# Patient Record
Sex: FEMALE | Race: WHITE | NOT HISPANIC OR LATINO | Employment: OTHER | URBAN - METROPOLITAN AREA
[De-identification: names, ages, dates, MRNs, and addresses within clinical notes are randomized per-mention and may not be internally consistent; named-entity substitution may affect disease eponyms.]

---

## 2020-07-21 ENCOUNTER — OFFICE VISIT CONVERTED (OUTPATIENT)
Dept: FAMILY MEDICINE CLINIC | Facility: CLINIC | Age: 40
End: 2020-07-21
Attending: NURSE PRACTITIONER

## 2020-07-21 ENCOUNTER — HOSPITAL ENCOUNTER (OUTPATIENT)
Dept: FAMILY MEDICINE CLINIC | Facility: CLINIC | Age: 40
Discharge: HOME | End: 2020-07-21
Attending: NURSE PRACTITIONER

## 2020-07-21 LAB
ALBUMIN SERPL-MCNC: 4.2 G/DL (ref 3.5–5)
ALBUMIN/GLOB SERPL: 1.6 {RATIO} (ref 1.4–2.6)
ALP SERPL-CCNC: 121 U/L (ref 42–98)
ALT SERPL-CCNC: 11 U/L (ref 10–40)
ANION GAP SERPL CALC-SCNC: 20 MMOL/L (ref 8–19)
AST SERPL-CCNC: 15 U/L (ref 15–50)
BASOPHILS # BLD AUTO: 0.05 10*3/UL (ref 0–0.2)
BASOPHILS NFR BLD AUTO: 0.6 % (ref 0–3)
BILIRUB SERPL-MCNC: 0.19 MG/DL (ref 0.2–1.3)
BUN SERPL-MCNC: 12 MG/DL (ref 5–25)
BUN/CREAT SERPL: 9 {RATIO} (ref 6–20)
CALCIUM SERPL-MCNC: 9 MG/DL (ref 8.7–10.4)
CHLORIDE SERPL-SCNC: 106 MMOL/L (ref 99–111)
CHOLEST SERPL-MCNC: 227 MG/DL (ref 107–200)
CHOLEST/HDLC SERPL: 5.7 {RATIO} (ref 3–6)
CONV ABS IMM GRAN: 0.01 10*3/UL (ref 0–0.2)
CONV CO2: 19 MMOL/L (ref 22–32)
CONV IMMATURE GRAN: 0.1 % (ref 0–1.8)
CONV TOTAL PROTEIN: 6.9 G/DL (ref 6.3–8.2)
CREAT UR-MCNC: 1.34 MG/DL (ref 0.5–0.9)
DEPRECATED RDW RBC AUTO: 55 FL (ref 36.4–46.3)
EOSINOPHIL # BLD AUTO: 0.23 10*3/UL (ref 0–0.7)
EOSINOPHIL # BLD AUTO: 3 % (ref 0–7)
ERYTHROCYTE [DISTWIDTH] IN BLOOD BY AUTOMATED COUNT: 14.5 % (ref 11.7–14.4)
GFR SERPLBLD BASED ON 1.73 SQ M-ARVRAT: 49 ML/MIN/{1.73_M2}
GLOBULIN UR ELPH-MCNC: 2.7 G/DL (ref 2–3.5)
GLUCOSE SERPL-MCNC: 82 MG/DL (ref 65–99)
HCT VFR BLD AUTO: 41.7 % (ref 37–47)
HDLC SERPL-MCNC: 40 MG/DL (ref 40–60)
HGB BLD-MCNC: 13.8 G/DL (ref 12–16)
LDLC SERPL CALC-MCNC: 140 MG/DL (ref 70–100)
LYMPHOCYTES # BLD AUTO: 2.99 10*3/UL (ref 1–5)
LYMPHOCYTES NFR BLD AUTO: 38.7 % (ref 20–45)
MCH RBC QN AUTO: 34.4 PG (ref 27–31)
MCHC RBC AUTO-ENTMCNC: 33.1 G/DL (ref 33–37)
MCV RBC AUTO: 104 FL (ref 81–99)
MONOCYTES # BLD AUTO: 0.51 10*3/UL (ref 0.2–1.2)
MONOCYTES NFR BLD AUTO: 6.6 % (ref 3–10)
NEUTROPHILS # BLD AUTO: 3.93 10*3/UL (ref 2–8)
NEUTROPHILS NFR BLD AUTO: 51 % (ref 30–85)
NRBC CBCN: 0 % (ref 0–0.7)
OSMOLALITY SERPL CALC.SUM OF ELEC: 289 MOSM/KG (ref 273–304)
PLATELET # BLD AUTO: 349 10*3/UL (ref 130–400)
PMV BLD AUTO: 10.3 FL (ref 9.4–12.3)
POTASSIUM SERPL-SCNC: 4.7 MMOL/L (ref 3.5–5.3)
RBC # BLD AUTO: 4.01 10*6/UL (ref 4.2–5.4)
SODIUM SERPL-SCNC: 140 MMOL/L (ref 135–147)
TRIGL SERPL-MCNC: 234 MG/DL (ref 40–150)
VLDLC SERPL-MCNC: 47 MG/DL (ref 5–37)
WBC # BLD AUTO: 7.72 10*3/UL (ref 4.8–10.8)

## 2020-07-22 ENCOUNTER — HOSPITAL ENCOUNTER (OUTPATIENT)
Dept: FAMILY MEDICINE CLINIC | Facility: CLINIC | Age: 40
Discharge: HOME | End: 2020-07-22
Attending: NURSE PRACTITIONER

## 2020-07-22 ENCOUNTER — HOSPITAL ENCOUNTER (OUTPATIENT)
Dept: GENERAL RADIOLOGY | Facility: HOSPITAL | Age: 40
Discharge: HOME | End: 2020-07-22
Attending: NURSE PRACTITIONER

## 2020-07-22 LAB
CONV CREATININE URINE, RANDOM: 49 MG/DL (ref 10–300)
CONV MICROALBUM.,U,RANDOM: <12 MG/L (ref 0–20)
MICROALBUMIN/CREAT UR: 24.5 MG/G{CRE} (ref 0–35)
T4 FREE SERPL-MCNC: 0.9 NG/DL (ref 0.9–1.8)
TSH SERPL-ACNC: 2.32 M[IU]/L (ref 0.27–4.2)

## 2020-08-24 ENCOUNTER — OFFICE VISIT CONVERTED (OUTPATIENT)
Dept: FAMILY MEDICINE CLINIC | Facility: CLINIC | Age: 40
End: 2020-08-24
Attending: NURSE PRACTITIONER

## 2020-08-24 ENCOUNTER — HOSPITAL ENCOUNTER (OUTPATIENT)
Dept: FAMILY MEDICINE CLINIC | Facility: CLINIC | Age: 40
Discharge: HOME | End: 2020-08-24
Attending: NURSE PRACTITIONER

## 2020-08-24 LAB
ANION GAP SERPL CALC-SCNC: 18 MMOL/L (ref 8–19)
BUN SERPL-MCNC: 17 MG/DL (ref 5–25)
BUN/CREAT SERPL: 11 {RATIO} (ref 6–20)
CALCIUM SERPL-MCNC: 9.9 MG/DL (ref 8.7–10.4)
CHLORIDE SERPL-SCNC: 102 MMOL/L (ref 99–111)
CONV CO2: 23 MMOL/L (ref 22–32)
CREAT UR-MCNC: 1.52 MG/DL (ref 0.5–0.9)
GFR SERPLBLD BASED ON 1.73 SQ M-ARVRAT: 42 ML/MIN/{1.73_M2}
GLUCOSE SERPL-MCNC: 85 MG/DL (ref 65–99)
OSMOLALITY SERPL CALC.SUM OF ELEC: 287 MOSM/KG (ref 273–304)
POTASSIUM SERPL-SCNC: 4.7 MMOL/L (ref 3.5–5.3)
SODIUM SERPL-SCNC: 138 MMOL/L (ref 135–147)

## 2020-09-01 ENCOUNTER — OFFICE VISIT CONVERTED (OUTPATIENT)
Dept: FAMILY MEDICINE CLINIC | Facility: CLINIC | Age: 40
End: 2020-09-01
Attending: NURSE PRACTITIONER

## 2020-12-02 ENCOUNTER — HOSPITAL ENCOUNTER (OUTPATIENT)
Dept: GENERAL RADIOLOGY | Facility: HOSPITAL | Age: 40
Discharge: HOME | End: 2020-12-02
Attending: NURSE PRACTITIONER

## 2020-12-02 ENCOUNTER — HOSPITAL ENCOUNTER (OUTPATIENT)
Dept: FAMILY MEDICINE CLINIC | Facility: CLINIC | Age: 40
Discharge: HOME | End: 2020-12-02
Attending: NURSE PRACTITIONER

## 2020-12-02 ENCOUNTER — OFFICE VISIT CONVERTED (OUTPATIENT)
Dept: FAMILY MEDICINE CLINIC | Facility: CLINIC | Age: 40
End: 2020-12-02
Attending: NURSE PRACTITIONER

## 2020-12-02 LAB
ALBUMIN SERPL-MCNC: 3.8 G/DL (ref 3.5–5)
ALBUMIN/GLOB SERPL: 1.5 {RATIO} (ref 1.4–2.6)
ALP SERPL-CCNC: 104 U/L (ref 42–98)
ALT SERPL-CCNC: 14 U/L (ref 10–40)
ANION GAP SERPL CALC-SCNC: 16 MMOL/L (ref 8–19)
AST SERPL-CCNC: 14 U/L (ref 15–50)
BASOPHILS # BLD AUTO: 0.05 10*3/UL (ref 0–0.2)
BASOPHILS NFR BLD AUTO: 0.6 % (ref 0–3)
BILIRUB SERPL-MCNC: <0.15 MG/DL (ref 0.2–1.3)
BUN SERPL-MCNC: 11 MG/DL (ref 5–25)
BUN/CREAT SERPL: 9 {RATIO} (ref 6–20)
CALCIUM SERPL-MCNC: 9 MG/DL (ref 8.7–10.4)
CHLORIDE SERPL-SCNC: 105 MMOL/L (ref 99–111)
CONV ABS IMM GRAN: 0.01 10*3/UL (ref 0–0.2)
CONV CO2: 23 MMOL/L (ref 22–32)
CONV IMMATURE GRAN: 0.1 % (ref 0–1.8)
CONV TOTAL PROTEIN: 6.4 G/DL (ref 6.3–8.2)
CREAT UR-MCNC: 1.22 MG/DL (ref 0.5–0.9)
DEPRECATED RDW RBC AUTO: 50.2 FL (ref 36.4–46.3)
EOSINOPHIL # BLD AUTO: 0.26 10*3/UL (ref 0–0.7)
EOSINOPHIL # BLD AUTO: 3.4 % (ref 0–7)
ERYTHROCYTE [DISTWIDTH] IN BLOOD BY AUTOMATED COUNT: 13.1 % (ref 11.7–14.4)
GFR SERPLBLD BASED ON 1.73 SQ M-ARVRAT: 54 ML/MIN/{1.73_M2}
GLOBULIN UR ELPH-MCNC: 2.6 G/DL (ref 2–3.5)
GLUCOSE SERPL-MCNC: 63 MG/DL (ref 65–99)
HCT VFR BLD AUTO: 40.9 % (ref 37–47)
HGB BLD-MCNC: 13.1 G/DL (ref 12–16)
LYMPHOCYTES # BLD AUTO: 3.66 10*3/UL (ref 1–5)
LYMPHOCYTES NFR BLD AUTO: 47.4 % (ref 20–45)
MCH RBC QN AUTO: 33 PG (ref 27–31)
MCHC RBC AUTO-ENTMCNC: 32 G/DL (ref 33–37)
MCV RBC AUTO: 103 FL (ref 81–99)
MONOCYTES # BLD AUTO: 0.6 10*3/UL (ref 0.2–1.2)
MONOCYTES NFR BLD AUTO: 7.8 % (ref 3–10)
NEUTROPHILS # BLD AUTO: 3.14 10*3/UL (ref 2–8)
NEUTROPHILS NFR BLD AUTO: 40.7 % (ref 30–85)
NRBC CBCN: 0 % (ref 0–0.7)
OSMOLALITY SERPL CALC.SUM OF ELEC: 287 MOSM/KG (ref 273–304)
PLATELET # BLD AUTO: 293 10*3/UL (ref 130–400)
PMV BLD AUTO: 9.7 FL (ref 9.4–12.3)
POTASSIUM SERPL-SCNC: 4.3 MMOL/L (ref 3.5–5.3)
RBC # BLD AUTO: 3.97 10*6/UL (ref 4.2–5.4)
SODIUM SERPL-SCNC: 140 MMOL/L (ref 135–147)
WBC # BLD AUTO: 7.72 10*3/UL (ref 4.8–10.8)

## 2020-12-03 LAB — 25(OH)D3 SERPL-MCNC: 9.8 NG/ML (ref 30–100)

## 2020-12-04 ENCOUNTER — LAB REQUISITION (OUTPATIENT)
Dept: LAB | Facility: HOSPITAL | Age: 40
End: 2020-12-04

## 2020-12-04 DIAGNOSIS — Z00.00 ROUTINE GENERAL MEDICAL EXAMINATION AT A HEALTH CARE FACILITY: ICD-10-CM

## 2020-12-04 LAB
FOLATE SERPL-MCNC: 6.42 NG/ML (ref 4.78–24.2)
VIT B12 BLD-MCNC: 209 PG/ML (ref 211–946)

## 2020-12-04 PROCEDURE — 82746 ASSAY OF FOLIC ACID SERUM: CPT

## 2020-12-04 PROCEDURE — 82607 VITAMIN B-12: CPT

## 2020-12-21 ENCOUNTER — OFFICE VISIT CONVERTED (OUTPATIENT)
Dept: PODIATRY | Facility: CLINIC | Age: 40
End: 2020-12-21
Attending: PODIATRIST

## 2021-01-04 ENCOUNTER — CONVERSION ENCOUNTER (OUTPATIENT)
Dept: FAMILY MEDICINE CLINIC | Facility: CLINIC | Age: 41
End: 2021-01-04

## 2021-01-04 ENCOUNTER — OFFICE VISIT CONVERTED (OUTPATIENT)
Dept: FAMILY MEDICINE CLINIC | Facility: CLINIC | Age: 41
End: 2021-01-04
Attending: NURSE PRACTITIONER

## 2021-02-10 ENCOUNTER — OFFICE VISIT CONVERTED (OUTPATIENT)
Dept: FAMILY MEDICINE CLINIC | Facility: CLINIC | Age: 41
End: 2021-02-10
Attending: NURSE PRACTITIONER

## 2021-02-10 ENCOUNTER — HOSPITAL ENCOUNTER (OUTPATIENT)
Dept: FAMILY MEDICINE CLINIC | Facility: CLINIC | Age: 41
Discharge: HOME | End: 2021-02-10
Attending: NURSE PRACTITIONER

## 2021-02-10 LAB
ANION GAP SERPL CALC-SCNC: 16 MMOL/L (ref 8–19)
BUN SERPL-MCNC: 14 MG/DL (ref 5–25)
BUN/CREAT SERPL: 15 {RATIO} (ref 6–20)
CALCIUM SERPL-MCNC: 8.6 MG/DL (ref 8.7–10.4)
CHLORIDE SERPL-SCNC: 101 MMOL/L (ref 99–111)
CONV CO2: 23 MMOL/L (ref 22–32)
CREAT UR-MCNC: 0.96 MG/DL (ref 0.5–0.9)
FOLATE SERPL-MCNC: 10.1 NG/ML (ref 4.8–20)
GFR SERPLBLD BASED ON 1.73 SQ M-ARVRAT: >60 ML/MIN/{1.73_M2}
GLUCOSE SERPL-MCNC: 86 MG/DL (ref 65–99)
OSMOLALITY SERPL CALC.SUM OF ELEC: 282 MOSM/KG (ref 273–304)
POTASSIUM SERPL-SCNC: 3.8 MMOL/L (ref 3.5–5.3)
SODIUM SERPL-SCNC: 136 MMOL/L (ref 135–147)
VIT B12 SERPL-MCNC: 258 PG/ML (ref 211–911)

## 2021-02-11 LAB — 25(OH)D3 SERPL-MCNC: 24.6 NG/ML (ref 30–100)

## 2021-03-15 ENCOUNTER — HOSPITAL ENCOUNTER (OUTPATIENT)
Dept: ULTRASOUND IMAGING | Facility: HOSPITAL | Age: 41
Discharge: HOME | End: 2021-03-15
Attending: NURSE PRACTITIONER

## 2021-04-06 ENCOUNTER — HOSPITAL ENCOUNTER (OUTPATIENT)
Dept: MRI IMAGING | Facility: HOSPITAL | Age: 41
Discharge: HOME | End: 2021-04-06
Attending: ANESTHESIOLOGY

## 2021-04-06 ENCOUNTER — OFFICE VISIT CONVERTED (OUTPATIENT)
Dept: PODIATRY | Facility: CLINIC | Age: 41
End: 2021-04-06
Attending: PODIATRIST

## 2021-05-10 NOTE — H&P
History and Physical      Patient Name: Bebe Steward   Patient ID: [id number]   Sex: Female   YOB: 1980    Primary Care Provider: Francie STILES   Referring Provider: Francie STILES    Visit Date: December 21, 2020    Provider: William Marti DPM   Location: [facility]   Location Address: [address] [place] [address]  [id number]   Location Phone: [phone]          Chief Complaint  · Right Foot Pain  · Bursitis      History Of Present Illness  Bebe Steward is a 40 year old /White female who presents to the [facility] today new patient referred from Francie STILES.      New, Established, New Problem:  new  Location:  Right styloid process area  Duration:  early November 2020  Onset:  insidious  Nature:  sore, shooting  Stable, worsening, improving:  stable, worsening    Aggravating factors:   Patient relates pain is aggravated by shoe gear and ambulation.   Previous Treatment:  none    Patient denies any fevers, chills, nausea, vomiting, shortness of breathe, nor any other constitutional signs nor symptoms.    Pt relates hx of Left BKA in distant past due to trauma that did not healed.    Medically disabled.         Past Medical History  Abnormal Pap smear of cervix; Allergies; Anemia; Anxiety; Arthritis; Broken Bones; Corns and callus; Essential hypertension; Foot pain; Gallstone; Head injury; Hypertension; Ingrown toenail; Insomnia, unspecified; Kidney problem; Left below-knee amputee; Migraine; Numbness in feet; Plantar wart; Right foot pain; Right hip pain; Sexually transmissible disease; Sinus trouble; Stage 3 chronic kidney disease; Vitamin D deficiency         Past Surgical History  Abdomen; Amputation of left leg below knee; Ankle surgery; Back surgery; Endometrial ablation; Gallbladder; Gastric Bypass; Knee surgery; Spinal Surgery; Tonsilectomy         Medication List  amlodipine 5 mg oral tablet;  "Prosthetic leg; Prosthetic Supplies; rizatriptan 10 mg oral tablet,disintegrating; tramadol 50 mg oral tablet; trazodone 100 mg oral tablet; Tylenol 325 mg oral capsule; Vitamin D2 1,250 mcg (50,000 unit) oral capsule         Allergy List  Ceclor; Demerol; erythromycin; Flexeril; morphine; NSAIDS; PENICILLINS; Septra; SULFA (SULFONAMIDES)       Allergies Reconciled  Family Medical History  Stroke; Heart Disease; Renal failure; FH: diabetes mellitus; FH: melanoma; FH: kidney disease         Social History  Alcohol (Light); Tobacco (Former)         Immunizations  Name Date Admin   Influenza 12/02/2020         Review of Systems  · Constitutional  o Denies  o : fatigue, night sweats  · Eyes  o Denies  o : double vision, blurred vision  · HENT  o Denies  o : vertigo, recent head injury  · Cardiovascular  o Denies  o : chest pain, irregular heart beats  · Respiratory  o Denies  o : shortness of breath, productive cough  · Gastrointestinal  o Denies  o : nausea, vomiting  · Genitourinary  o Denies  o : dysuria, urinary retention  · Integument  o Denies  o : hair growth change, new skin lesions  · Neurologic  o Denies  o : altered mental status, seizures  · Musculoskeletal  o * See HPI  · Endocrine  o Denies  o : cold intolerance, heat intolerance  · Heme-Lymph  o Denies  o : petechiae, lymph node enlargement or tenderness  · Allergic-Immunologic  o Denies  o : frequent illnesses      Vitals  Date Time BP Position Site L\R Cuff Size HR RR TEMP (F) WT  HT  BMI kg/m2 BSA m2 O2 Sat FR L/min FiO2 HC       12/21/2020 10:51 /64 Sitting    87 - R  97.9 246lbs 0oz 5'  4\" 42.23 2.24 99 %            Physical Examination  · Constitutional  o Appearance  o : Awake, alert, well developed, well nourished and well groomed  · Cardiovascular  o Peripheral Vascular System  o :   § Pedal Pulses  § : pulses 2 bilaterally  § Extremities  § : no edema of the lower extremities  · Musculoskeletal  o Extremeties/Joint  o : Lower extremity " muscle strength and range of motion is equal and symmetrical bilaterally.   · Skin and Subcutaneous Tissue  o General Inspection  o : Skin is noted to have normal texture and turgor, with no excresences noted.  o Digits and Nails  o : The tonails are noted to be without disease.  · Neurologic  o Sensation  o : Epicritic sensations intact bilaterally.   o Gait and Station  o :   § Gait Screening  § : normal gait  · Right Ankle/Foot  o Inspection  o : Right foot: Foot structure is noted to be normal. +TTP to the Right styloid process area. No edema, erythema, lymphangitis, nor signs of infection.   · Injection Note/Aspiration Note  o Site  o : Right 5th styloid process area.  o Procedure  o : This patient presents for a trigger point injection, which is located Retrocalcaneal bursa. I have discussed the nature, risks, benefits, alternatives and limitations of this procedure with this patient and obtained informed consent. The area was sterilely prepped with alcohol.  o Medication  o : 0.5ml of 1% lidocaine plain, 0.5ml of 40mg/ml Kenalog, and 0.5ml of 4mg/ml Dexamethasone   o Disposition  o : The patient tolerated the procedure well.     Dr. Marti reviewed radiographs and results from [facility] and discussed them with the patient.  These are significant for DJD in 1st and 5th metatarsal phalangeal joint.           Assessment  · Bursitis of foot, right     726.79/M77.51  · Foot pain, right     729.5/M79.671      Plan  · Orders  o Decadron 4 mg/1cc Injection () - 726.79/M77.51, 729.5/M79.671 - 12/21/2020   Injection - Dexamethasone 4mg/mL; Dose: 2 mg; Site: Not Entered; Route: subcutaneous; Date: 12/21/2020 13:12:55; Exp: 12/31/2020; Lot: [id number]; Mfg: [employer]; TradeName: dexamethasone sodium phosphate; Location: [facility]; Administered By: William Marti DPM; Comment: ndc [id number] inj site right foot  o 2.00 - Kenalog Injection 20mg (-3) - 726.79/M77.51, 729.5/M79.671 -  12/21/2020   Injection - Kenalog 20 mg; Dose: 20mg; Site: Not Entered; Route: subcutaneous; Date: 12/21/2020 13:13:52; Exp: 05/31/2022; Lot: [id number]; Mfg: [employer].; TradeName: triamcinolone acetonide; [facility] [facility]; Administered By: William Marti DPM; Comment: ndc [id number] inj site right foor  o Inj Tendon Sheath Or Ligament ([id number]) - 726.79/M77.51, 729.5/M79.671 - 12/21/2020  · Medications  o Medications have been Reconciled  o Transition of Care or Provider Policy  · Instructions  o Discuss Findings: I have discussed the findings of this evaluation with the patient. The discussion included a complete verbal explanation of any changes in the examination results, diagnosis, and the current treatment plan. A schedule for future care needs was explained. If any questions should arise after returning home, I have encouraged the patient to feel free to contact Dr. Marti. The patient states understanding and agreement with this plan.  o Driving Precautions: Advised patient may resume driving, but advised not to drive while wearing an ambulatory device. Advised quick/hard depression of brakes could cause injury to areas. Also advised of possible legal implications while driving in restrictive device. The patient states understanding and agreement with these instructions.  o Monitor for Problems: Pt to monitor for problems and to contact Dr. Marti for follow-up should such signs occur. Patient states understanding and agreement with this plan.  o Rice Therapy: It is important to treat any injury as soon as possible to help control swelling and increase recovery time. The recognized regimen for immediate treatment of sport injuries includes rest, ice (cold application), compression, and elevation (RICE). Remove the injured athlete from play, apply ice to the affected area, wrap or compress the injured area with an elastic bandage when appropriate, and elevate the injured area above heart level  to reduce swelling.  o Rest: Rest the area. Do not move the injured area. Splint or immobilize the area if needed.  o Ice: Apply ice or cold application to the area. Ice helps to promote local constriction of blood vessels, which in turn helps to control swelling. Ice also helps decrease pain to the injured area. Ice should be applied at least 72 hours following the injury. Ice should be left on the injured area for approximately 20 minutes (longer for areas with more muscle/fat, such as the thigh). Remember to place a towel over the skin before applying a commercial ice pack.  o Compression: Apply an elastic wrap over and around the injured area to help reduce swelling. Using medium tightness, begin the wrap at the distal end of the limb (furthest away from your body) and wrap to the proximal end of the limb (closest to your body). For example, if an elastic wrap is needed for an ankle sprain, begin at the ball of the foot (leave toes exposed), and spiral up ending about mid-calf.  o Elevation: Elevate the injured area to help reduce swelling around the injury site.  o Patient request PRN follow-up.  o Discuss Findings: I have discussed the findings of this evaluation with the patient. The discussion included a complete verbal explanation of any changes in the examination results, diagnosis, and the current treatment plan. A schedule for future care needs was explained. If any questions should arise after returning home, I have encouraged the patient to feel free to contact Dr. Marti. The patient states understanding and agreement with this plan.  o Discussed proper shoegear for the patient's feet and medical condition.Patient to monitor for recurrence of symptoms and to contact Dr. Potts office for a follow-up appointment. The patient states understanding and agreement with this plan.   o Electronically Identified Patient Education Materials Provided Electronically  · Disposition  o Call or Return if  symptoms worsen or persist.            Electronically Signed by: William Marti DPM -Author on December 21, 2020 01:36:12 PM

## 2021-05-11 ENCOUNTER — HOSPITAL ENCOUNTER (OUTPATIENT)
Dept: FAMILY MEDICINE CLINIC | Facility: CLINIC | Age: 41
Discharge: HOME | End: 2021-05-11
Attending: NURSE PRACTITIONER

## 2021-05-11 ENCOUNTER — OFFICE VISIT CONVERTED (OUTPATIENT)
Dept: FAMILY MEDICINE CLINIC | Facility: CLINIC | Age: 41
End: 2021-05-11
Attending: NURSE PRACTITIONER

## 2021-05-11 LAB
ALBUMIN SERPL-MCNC: 4.1 G/DL (ref 3.5–5)
ALBUMIN/GLOB SERPL: 1.3 {RATIO} (ref 1.4–2.6)
ALP SERPL-CCNC: 103 U/L (ref 42–98)
ALT SERPL-CCNC: 10 U/L (ref 10–40)
ANION GAP SERPL CALC-SCNC: 13 MMOL/L (ref 8–19)
AST SERPL-CCNC: 11 U/L (ref 15–50)
BASOPHILS # BLD AUTO: 0.06 10*3/UL (ref 0–0.2)
BASOPHILS NFR BLD AUTO: 0.8 % (ref 0–3)
BILIRUB SERPL-MCNC: 0.19 MG/DL (ref 0.2–1.3)
BUN SERPL-MCNC: 17 MG/DL (ref 5–25)
BUN/CREAT SERPL: 15 {RATIO} (ref 6–20)
CALCIUM SERPL-MCNC: 8.9 MG/DL (ref 8.7–10.4)
CHLORIDE SERPL-SCNC: 103 MMOL/L (ref 99–111)
CHOLEST SERPL-MCNC: 222 MG/DL (ref 107–200)
CHOLEST/HDLC SERPL: 2.8 {RATIO} (ref 3–6)
CONV ABS IMM GRAN: 0.01 10*3/UL (ref 0–0.2)
CONV CO2: 26 MMOL/L (ref 22–32)
CONV IMMATURE GRAN: 0.1 % (ref 0–1.8)
CONV TOTAL PROTEIN: 7.3 G/DL (ref 6.3–8.2)
CREAT UR-MCNC: 1.17 MG/DL (ref 0.5–0.9)
DEPRECATED RDW RBC AUTO: 44.5 FL (ref 36.4–46.3)
EOSINOPHIL # BLD AUTO: 0.22 10*3/UL (ref 0–0.7)
EOSINOPHIL # BLD AUTO: 2.8 % (ref 0–7)
ERYTHROCYTE [DISTWIDTH] IN BLOOD BY AUTOMATED COUNT: 12.2 % (ref 11.7–14.4)
GFR SERPLBLD BASED ON 1.73 SQ M-ARVRAT: 57 ML/MIN/{1.73_M2}
GLOBULIN UR ELPH-MCNC: 3.2 G/DL (ref 2–3.5)
GLUCOSE SERPL-MCNC: 79 MG/DL (ref 65–99)
HCT VFR BLD AUTO: 43.1 % (ref 37–47)
HDLC SERPL-MCNC: 79 MG/DL (ref 40–60)
HGB BLD-MCNC: 13.6 G/DL (ref 12–16)
LDLC SERPL CALC-MCNC: 125 MG/DL (ref 70–100)
LYMPHOCYTES # BLD AUTO: 3.15 10*3/UL (ref 1–5)
LYMPHOCYTES NFR BLD AUTO: 39.4 % (ref 20–45)
MCH RBC QN AUTO: 31.4 PG (ref 27–31)
MCHC RBC AUTO-ENTMCNC: 31.6 G/DL (ref 33–37)
MCV RBC AUTO: 99.5 FL (ref 81–99)
MONOCYTES # BLD AUTO: 0.43 10*3/UL (ref 0.2–1.2)
MONOCYTES NFR BLD AUTO: 5.4 % (ref 3–10)
NEUTROPHILS # BLD AUTO: 4.13 10*3/UL (ref 2–8)
NEUTROPHILS NFR BLD AUTO: 51.5 % (ref 30–85)
NRBC CBCN: 0 % (ref 0–0.7)
OSMOLALITY SERPL CALC.SUM OF ELEC: 286 MOSM/KG (ref 273–304)
PLATELET # BLD AUTO: 317 10*3/UL (ref 130–400)
PMV BLD AUTO: 10.4 FL (ref 9.4–12.3)
POTASSIUM SERPL-SCNC: 4.4 MMOL/L (ref 3.5–5.3)
RBC # BLD AUTO: 4.33 10*6/UL (ref 4.2–5.4)
SODIUM SERPL-SCNC: 138 MMOL/L (ref 135–147)
TRIGL SERPL-MCNC: 91 MG/DL (ref 40–150)
VIT B12 SERPL-MCNC: 832 PG/ML (ref 211–911)
VLDLC SERPL-MCNC: 18 MG/DL (ref 5–37)
WBC # BLD AUTO: 8 10*3/UL (ref 4.8–10.8)

## 2021-05-12 LAB — 25(OH)D3 SERPL-MCNC: 18.3 NG/ML (ref 30–100)

## 2021-05-13 NOTE — PROGRESS NOTES
Progress Note      Patient Name: Bebe Steward   Patient ID: [id number]   Sex: Female   YOB: 1980        Visit Date: August 24, 2020    Provider: GO Melendez   Location: [facility]   Location Address: [address] [place] [address], [address] [place] [address]  [id number]   Location Phone: [phone]          Chief Complaint  · patient need letter and prescription for new prosthetic leg      History Of Present Illness  Bebe Steward is a 40 year old /White female who presents for evaluation and treatment of:      She is a left below the knee amputee and uses a prosthesis.  She has had this prosthesis for 5 years and it recently has been broken and flops when she walks.  She went to have it serviced and they have told her that they no longer make this model or service it so she will have to get a new prosthesis.  She needs an order in a letter that states that hers is broken and that she is in need of a prosthesis.    She states she quit smoking 3 weeks ago.  She states she also has stopped drinking sodas.  She has a strong family history of chronic kidney disease and her father is on dialysis.  Her last labs showed that her kidney functions that she was in chronic kidney disease stage III.  She is wanting to do what ever she needs to do to preserve her kidneys.    History of hypertension: Blood pressure is well controlled at 110/78, she is on Lisinopril-hydrochlorothiazide 20-25 mg.       Past Medical History  Disease Name Date Onset Notes   Abnormal Pap smear of cervix 07/21/2020 --    Allergies --  --    Anemia --  --    Anxiety 2014 --    Arthritis --  --    Broken Bones 08/14 --    Essential hypertension 07/21/2020 --    Gallstone 2001 --    Head injury 1996 --    Hypertension --  --    Insomnia, unspecified 07/21/2020 --    Left below-knee amputee 07/21/2020 --    Migraine --  --    Right hip pain 07/21/2020 --    Sexually transmissible disease 2019 --    Sinus trouble --  --           Past Surgical History  Procedure Name Date Notes   Abdomen 2010 --    Amputation of left leg below knee 2014 --    Ankle surgery 03/08,05/08,08/08,12/10 03/11, 2012, 2013, 2014   Endometrial ablation 2007 --    Gallbladder 2001 --    Gastric Bypass 2001 --    Knee surgery 1996, 2015 left 1996/1997, right 2015   Spinal Surgery 2008 lumbar spine   Tonsilectomy 1994 --          Medication List  Name Date Started Instructions   lisinopril-hydrochlorothiazide 20-25 mg oral tablet 07/21/2020 take 1 tablet by oral route once daily for 30 days   Prosthetic Supplies 07/21/2020 Patient needs prosthetic supplies   trazodone 50 mg oral tablet 07/21/2020 take 1 tablet (50 mg) by oral route once daily at bedtime for 30 days         Allergy List  Allergen Name Date Reaction Notes   Ceclor --  --  --    Demerol --  --  --    erythromycin --  --  --    Flexeril --  --  --    morphine --  --  --    PENICILLINS --  --  --    Septra --  --  --    SULFA (SULFONAMIDES) --  --  --          Family Medical History  Disease Name Relative/Age Notes   Stroke Mother/   grandparent   Heart Disease Father/   grandparent   Renal failure Mother/   --          Social History  Finding Status Start/Stop Quantity Notes   Tobacco Former --/-- --  08/24/2020- pt stated quit smoking 3 weeks ago         Review of Systems  · Constitutional  o Denies  o : fever, fatigue, weight loss, weight gain  · Cardiovascular  o Denies  o : lower extremity edema, claudication, chest pressure, palpitations  · Respiratory  o Denies  o : shortness of breath, wheezing, cough, hemoptysis, dyspnea on exertion  · Gastrointestinal  o Denies  o : nausea, vomiting, diarrhea, constipation, abdominal pain  · Integument  o Denies  o : rash, itching  · Musculoskeletal  o Admits  o : limitation of motion  o Denies  o : knee pain      Vitals  Date Time BP Position Site L\R Cuff Size HR RR TEMP (F) WT  HT  BMI kg/m2 BSA m2 O2 Sat        08/24/2020 08:10 /78 Sitting    74  "- R  97.1 255lbs 6oz 5'  4\" 43.83 2.29 97 %          Physical Examination  · Constitutional  o Appearance  o : no acute distress, well-nourished  · Head and Face  o Head  o :   § Inspection  § : atraumatic, normocephalic  · Respiratory  o Respiratory Effort  o : breathing comfortably, symmetric chest rise  o Auscultation of Lungs  o : clear to asculatation bilaterally, no wheezes, rales, or rhonchii  · Cardiovascular  o Heart  o :   § Auscultation of Heart  § : regular rate and rhythm, no murmurs, rubs, or gallops  o Peripheral Vascular System  o :   § Extremities  § : no edema  · Neurologic  o Mental Status Examination  o :   § Orientation  § : grossly oriented to person, place and time  o Gait and Station  o :   § Gait Screening  § : Limping gait due to broken left below the knee prosthesis  · Psychiatric  o General  o : normal mood and affect          Assessment  · Essential hypertension     401.9/I10  · Left below-knee amputee     V49.75/Z89.512  · Prosthesis fitting     V52.9/Z44.9  · CKD (chronic kidney disease) stage 3, GFR 30-59 ml/min     585.3/N18.3    Problems Reconciled  Plan  · Orders  o [facility] ([id number]) - 585.3/N18.3 - 08/24/2020  o ACO-39: Current medications updated and reviewed () - - 08/24/2020  · Medications  o Prosthetic leg   SIG: Please fit patient with LT below the knee prosthesis   DISP: (1) with 0 refills  Prescribed on 08/24/2020     o Medications have been Reconciled  o Transition of Care or Provider Policy  · Instructions  o Patient advised to monitor blood pressure (B/P) at home and journal readings. Patient informed that a B/P reading at home of more than 130/80 is considered hypertension. For readings greater pegw478/90 or higher patient is advised to follow up in the office with readings for management. Patient advised to limit sodium intake.  o Patient was educated/instructed on their diagnosis, treatment and medications prior to discharge from the clinic today.  o Patient " instructed to seek medical attention urgently for new or worsening symptoms.  o Call the office with any concerns or questions.  · Disposition  o Return to clinic in 3 months     We will recheck kidney functions today with a BMP, will call with results.  We discussed that if her kidney functions have not improved, we may need to change her blood pressure medicine to amlodipine which would be more kidney friendly.             Electronically Signed by: GO Melendez -Author on August 24, 2020 11:53:39 AM

## 2021-05-13 NOTE — PROGRESS NOTES
Progress Note      Patient Name: Bebe Steward   Patient ID: [id number]   Sex: Female   YOB: 1980        Visit Date: July 21, 2020    Provider: GO Melendez   Location: [facility]   Location Address: [address] [place] [address], [address] [place] [address]  [id number]   Location Phone: [phone]          Chief Complaint  · New Pt  · Med refill  · Right hip pain      History Of Present Illness  Bebe Steward is a 40 year old /White female who presents for evaluation and treatment of:      As a new patient today to establish care.  She has recently moved here from [place].    Hypertension: Her blood pressure is high today 180/98 but she states she has been out of her medication x1 week.  She usually takes Lisinoprilhydrochlorothiazide 20-25 mg once daily.    She is wanting to have some labs checked.  She states that she has a strong family history of kidney failure.    History left below the knee amputee, she states she had a severe fracture of the left ankle in 2008.  She states she had multiple surgeries and never could heal and she ended up having to have an amputation in 2014.  She is needing an order stating that she needs prosthetic supplies.  She states she was on gabapentin at one time but she did not like taking medications and did not feel like she needed anymore.  She does states sometimes at night she does have some phantom pain.    She is complaining of right hip pain, complains on the lateral side of her hip and states it feels like it is deep and is a strong ache.  She would like to have an x-ray.  She takes Tylenol as needed.    She states she has a history of abnormal Pap smear, she states she has had 3 abnormal Pap smears, last one was in 2019.  She states she also has a history of endometriosis and PCOS.    Her last mammogram was in either August or September 2019, states she never did receive the results but assumed it was within normal limits.  She states it  "was done at [facility] in [place].    She complains of chronic insomnia.  She states she was on Ambien 10 mg at one time but states when they changed to 5 mg it did not work for her.  She is tried multiple over-the-counter medicines such as melatonin and Benadryl which no longer work.  She is also tried Lunesta which did not help.   PHQ 9 score was 5 today but she denies depression.  Her score is related to her sleep issues.       Review of Systems  · Constitutional  o Denies  o : fever, fatigue, weight loss, weight gain  · HENT  o Admits  o : headaches  o Denies  o : sore throat  · Cardiovascular  o Denies  o : lower extremity edema, claudication, chest pressure, palpitations  · Respiratory  o Denies  o : shortness of breath, wheezing, cough, hemoptysis, dyspnea on exertion  · Gastrointestinal  o Denies  o : nausea, vomiting, diarrhea, constipation, abdominal pain  · Genitourinary  o Denies  o : urgency, frequency  · Integument  o Denies  o : rash, itching  · Neurologic  o Denies  o : tingling or numbness, loss of balance  · Musculoskeletal  o Admits  o : limitation of motion, hip pain  o Denies  o : joint pain, joint swelling  · Psychiatric  o Admits  o : difficulty sleeping  o Denies  o : anxiety, depression, suicidal ideation, homicidal ideation  · Allergic-Immunologic  o Admits  o : sinus allergy symptoms  o Denies  o : frequent illnesses      Vitals  Date Time BP Position Site L\R Cuff Size HR RR TEMP (F) WT  HT  BMI kg/m2 BSA m2 O2 Sat HC       07/21/2020 01:33 /98 Sitting    92 - R  98.2 246lbs 0oz 5'  4\" 42.23 2.24 97 %    07/21/2020 01:34 /98 Sitting                     Physical Examination  · Constitutional  o Appearance  o : no acute distress, well-nourished  · Head and Face  o Head  o :   § Inspection  § : atraumatic, normocephalic  · Neck  o Thyroid  o : gland size normal, nontender, no nodules or masses present on palpation, symmetric  · Respiratory  o Respiratory " Effort  o : breathing comfortably, symmetric chest rise  o Auscultation of Lungs  o : clear to asculatation bilaterally, no wheezes, rales, or rhonchii  · Cardiovascular  o Heart  o :   § Auscultation of Heart  § : regular rate and rhythm, no murmurs, rubs, or gallops  o Peripheral Vascular System  o :   § Extremities  § : no edema  · Lymphatic  o Neck  o : no lymphadenopathy present  · Neurologic  o Mental Status Examination  o :   § Orientation  § : grossly oriented to person, place and time  o Gait and Station  o :   § Gait Screening  § : normal gait, left below the knee amputee using prosthesis  · Psychiatric  o General  o : normal mood and affect  o Presence of Abnormal Thoughts  o : no hallucinations, no delusions present, no psychotic thoughts, no homicidal ideation, no suicidal ideation, no evidence of obsessional thinking          Assessment  · Essential hypertension     401.9/I10  · Insomnia, unspecified     780.52/G47.00  · Visit for screening mammogram     V76.12/Z12.31  · Left below-knee amputee     V49.75/Z89.512  · Right hip pain     719.45/M25.551  · Abnormal Pap smear of cervix     795.00/R87.619    Problems Reconciled  Plan  · Orders  o HTN/Lipid Panel (CMP, Lipid) [facility] (61066, 69248) - 401.9/I10 - 07/21/2020  o CBC with Auto Diff [facility] (41171) - 401.9/I10 - 07/21/2020  o Screening Mammography; Bilateral 3D (49815, , 35547) - V76.12/Z12.31 - 07/21/2020   last one done at [facility] in [facility], Aug or Sept 2019  o Thyroid Profile (09598, 10591, THYII) - 780.52/G47.00, 401.9/I10, V49.75/Z89.512, 719.45/M25.551 - 07/21/2020  o ACO-39: Current medications updated and reviewed () - - 07/21/2020  o ACO-18: Negative screen for clinical depression using a standardized tool () - 780.52/G47.00 - 07/21/2020   PHQ 9 score 5 but patient denies depression, due to insomnia  o Hip (Right) 2 or more views (includes AP Pelvis) X-Ray [facility]. (09469) - 384.57/U25.555,  V49.75/Z89.512 - 07/21/2020  o OB/GYN CONSULTATION (OBGYN) - 795.00/R87.619 - 07/21/2020   Dr. Rubi but if they don't take ins, EPW  · Medications  o trazodone 50 mg oral tablet   SIG: take 1 tablet (50 mg) by oral route once daily at bedtime for 30 days   DISP: (30) tablets with 2 refills  Prescribed on 07/21/2020     o Prosthetic Supplies   SIG: Patient needs prosthetic supplies   DISP: (1) with 0 refills  Prescribed on 07/21/2020     o lisinopril-hydrochlorothiazide 20-25 mg oral tablet   SIG: take 1 tablet by oral route once daily for 30 days   DISP: (30) tablet with 5 refills  Adjusted on 07/21/2020     · Instructions  o Patient advised to monitor blood pressure (B/P) at home and journal readings. Patient informed that a B/P reading at home of more than 130/80 is considered hypertension. For readings greater zsdr918/90 or higher patient is advised to follow up in the office with readings for management. Patient advised to limit sodium intake.  o Avoid any electronic use for at least 30 minutes prior to bed time. Cell phone screens, tablets and TVs imitate daylight, so your brain can become confused on the time of day. No caffeine use in the late afternoon and evenings.  o Patient was educated/instructed on their diagnosis, treatment and medications prior to discharge from the clinic today.  o Patient instructed to seek medical attention urgently for new or worsening symptoms.  o Call the office with any concerns or questions.  · Disposition  o Return to clinic in 3 months     We will start her on trazodone 50 mg nightly for her sleep.  We did discuss that may be in the future if she wants to retry gabapentin that would also help with her phantom pain and may help with her insomnia.             Electronically Signed by: GO Melendez -Author on July 21, 2020 03:00:26 PM

## 2021-05-13 NOTE — PROGRESS NOTES
Progress Note      Patient Name: Bebe Steward   Patient ID: [id number]   Sex: Female   YOB: 1980        Visit Date: September 1, 2020    Provider: GO Melendez   Location: [facility]   Location Address: [address] [place] [address], [address] [place] [address]  [id number]   Location Phone: [phone]          Chief Complaint  · Annual Wellness Exam      History Of Present Illness  The patient is a 40 year old /White female who has come to this office for her Annual Wellness Visit.   Her Primary Care Provider is Francie STILES. Her comprehensive Care Team list, including suppliers, has been updated on the Facesheet. Her medical/family history, height, weight, BMI, and blood pressure have been reviewed and are in the chart. The Health Risk Assessment has been completed and scanned in the chart.   Medications are listed in the medication list.   The active problem list includes: Abnormal Pap smear of cervix, Allergies, Anemia, Arthritis, Essential hypertension, Hypertension, Insomnia, unspecified, Left below-knee amputee, Migraine, Right hip pain, and Sinus trouble   The patient does not have a history of substance use.   Patient reports her diet is adequate.   The Mini-Cog has been administered and is scanned in chart. The results are negative. Her cognitive function is without limitation.   A hearing loss screen was completed today and the result is negative.   Patient does not have any risk factors for depression. Patient completed the PHQ-9 today and it has been scanned in the chart. The total score is 1-4.   The Timed Up and Go screen was administered today and the result is negative.   The Carter Index of [place] in ADLs indicated full function (score of 6).   A Falls Risk Assessment has been completed, including a review of home fall hazards and medication review.   Overall, the patient's functional ability is noted by this provider to be within normal  limits. Her level of safety is noted to be within normal limits. Her balance/gait is within normal limits. There have been two or more falls in the past year. Patient has a prosthetic left leg and has slipped on wet floor. Patient-specific home safety recommendations have been reviewed and a copy has been given to patient.   She denies issues with leaking urine.   There are no additional risk factors identified.   Living Will/Advanced Directive has not previously been completed.   Personalized health advice was given to the patient and a written health screening schedule was established; see Plan for details.   Bebe Steward is a 40 year old /White female who presents for evaluation and treatment of:      History of insomnia: She states she has been taking 2 trazodone 50 mg tablets because 1 does not seem to help her sleep enough.  She states she is going need a refill sooner since she is taking twice as many.    Hypertension with chronic kidney disease and stage III: We had stopped her Lisinoprilhydrochlorothiazide due to her GFR.  She was given a prescription for amlodipine 5 mg to replace it.  She states she has been monitoring her blood pressure at home and it has been in the normal range so she has not started taking this medication yet.  She is concerned about her kidneys because she has a strong family history of kidney failure and she wants to know whether she should be referred to nephrology.    Obesity: BMI 43.3, we discussed her weight.  She states that she had gastric bypass in 2001 and lost 80 pounds at that time.  She states that after she has had her 3 children and then had to have her leg amputated is when she gained weight again.    History of migraines: She states that she used to take Excedrin for her migraines but she is concerned because it is an NSAID.  She states she gets migraines more in the fall when her sinuses are bothering her.       Past Medical History  Disease Name Date  Onset Notes   Abnormal Pap smear of cervix 07/21/2020 --    Allergies --  --    Anemia --  --    Anxiety 2014 --    Arthritis --  --    Broken Bones 08/14 --    Essential hypertension 07/21/2020 --    Gallstone 2001 --    Head injury 1996 --    Hypertension --  --    Insomnia, unspecified 07/21/2020 --    Left below-knee amputee 07/21/2020 --    Migraine --  --    Right hip pain 07/21/2020 --    Sexually transmissible disease 2019 --    Sinus trouble --  --    Stage 3 chronic kidney disease 09/01/2020 --          Past Surgical History  Procedure Name Date Notes   Abdomen 2010 --    Amputation of left leg below knee 2014 --    Ankle surgery 03/08,05/08,08/08,12/10 03/11, 2012, 2013, 2014   Endometrial ablation 2007 --    Gallbladder 2001 --    Gastric Bypass 2001 --    Knee surgery 1996, 2015 left 1996/1997, right 2015   Spinal Surgery 2008 lumbar spine   Tonsilectomy 1994 --          Medication List  Name Date Started Instructions   amlodipine 5 mg oral tablet 08/25/2020 take 1 tablet (5 mg) by oral route once daily for 30 days   Prosthetic leg 08/24/2020 Please fit patient with LT below the knee prosthesis   Prosthetic Supplies 07/21/2020 Patient needs prosthetic supplies   trazodone 100 mg oral tablet 09/01/2020 take 1 tablet (100 mg) by oral route once daily at bedtime for 30 days         Allergy List  Allergen Name Date Reaction Notes   Ceclor --  --  --    Demerol --  --  --    erythromycin --  --  --    Flexeril --  --  --    morphine --  --  --    PENICILLINS --  --  --    Septra --  --  --    SULFA (SULFONAMIDES) --  --  --          Family Medical History  Disease Name Relative/Age Notes   Stroke Mother/   grandparent   Heart Disease Father/   grandparent   Renal failure Mother/   --          Social History  Finding Status Start/Stop Quantity Notes   Tobacco Former --/-- --  08/24/2020- pt stated quit smoking 3 weeks ago         Review of Systems  · Constitutional  o Denies  o : fatigue, fever,  "chills  · HENT  o Admits  o : headaches  o Denies  o : nasal congestion  · Cardiovascular  o Denies  o : chest pain, dyspnea on exertion, lower extremity edema  · Respiratory  o Denies  o : shortness of breath, cough  · Genitourinary  o Denies  o : urgency, frequency  · Integument  o Denies  o : rash, itching  · Psychiatric  o Admits  o : difficulty sleeping  o Denies  o : anxiety, depression, suicidal ideation, homicidal ideation      Vitals  Date Time BP Position Site L\R Cuff Size HR RR TEMP (F) WT  HT  BMI kg/m2 BSA m2 O2 Sat HC       09/01/2020 02:55 /82 Sitting    78 - R  98.2 252lbs 4oz 5'  4\" 43.3 2.27 97 %          Physical Examination  · Constitutional  o Appearance  o : well-nourished, well developed  · Head and Face  o Head  o :   § Inspection  § : atraumatic, normocephalic  · Respiratory  o Respiratory Effort  o : breathing comfortably, symmetric chest rise  o Auscultation of Lungs  o : clear to asculatation bilaterally, no wheezes, rales, or rhonchii  · Cardiovascular  o Heart  o :   § Auscultation of Heart  § : regular rate and rhythm, no murmurs, rubs, or gallops  o Peripheral Vascular System  o :   § Extremities  § : no edema  · Neurologic  o Mental Status Examination  o :   § Orientation  § : grossly oriented to person, place and time  o Gait and Station  o :   § Gait Screening  § : normal gait  · Psychiatric  o General  o : normal mood and affect              Assessment  · Encounter for Medicare annual wellness exam     V70.0/Z00.00  · Screening for depression     V79.0/Z13.89  · Screening for alcoholism     V79.1/Z13.39  · Essential hypertension     401.9/I10  Advised her that if her blood pressure is more than 130/80 that she needs to start taking amlodipine, but we discussed that she may want to start with a half the 5 mg tablet.  · Insomnia, unspecified     780.52/G47.00  She is doing better on 2 tabs of trazodone 50 mg. We will increase her trazodone dose to 100 mg nightly.  · Class 3 " severe obesity with body mass index (BMI) of 40.0 to 44.9 in adult, unspecified obesity type, unspecified whether serious comorbidity present       Morbid (severe) obesity due to excess calories     278.01/E66.01  Body mass index (BMI) 40.0-44.9, adult     278.01/Z68.41  · Migraine     346.10/G43.009  We will start her on rizatriptan to take at onset of migraines.  · Stage 3 chronic kidney disease     585.3/N18.3  I discussed with her that we do not need to refer her to nephrology at this time but we will continue to monitor her kidney functions closely. We discussed that hopefully the discontinuing of Lisinoprilhydrochlorothiazide will improve her kidney functions. We will plan to recheck a BMP on her follow-up in 3 months.      Plan  · Orders  o Falls Risk Assessment Completed (3288F) - V70.0/Z00.00 - 09/01/2020  o Brief hearing screening (written) [facility] () - V70.0/Z00.00 - 09/01/2020  o Annual Wellness Visit-includes a Personalized Prevention Plan of Service (PPS), SUBSEQUENT VISIT (Medicare) () - V70.0/Z00.00 - 09/01/2020  o Presence or absence of urinary incontinence assessed (JUANITO) (1090F) - V70.0/Z00.00 - 09/01/2020  o Annual depression screening using the PHQ-9 tool, 15 minutes (07464, ) - V79.0/Z13.89 - 09/01/2020  o ACO-18: Negative screen for clinical depression using a standardized tool () - V79.0/Z13.89 - 09/01/2020   2 pts.  o Annual alcohol screening using the AUDIT-C tool, 15 minutes [facility] (76257, ) - V79.1/Z13.39 - 09/01/2020  o Negative alcohol screening () - V79.1/Z13.39 - 09/01/2020  o ACO-39: Current medications updated and reviewed () - - 09/01/2020  o ACO-13: Fall Risk Screening with 2 or more falls in past year or any fall with injury in the past year (1100F) - - 09/01/2020   slips on wet floor, has prosthetic left leg  o ACO - Pt declines to or was not able to provide an Advance Care Plan or name a Surrogate Decision Maker (1124F) - -  09/01/2020  · Medications  o rizatriptan 10 mg oral tablet,disintegrating   SIG: palce 1 tablet on top of tongue where to dissolve at onset of migraine, may repeat at 2 hour intervals; do not exceed 30 mg in 24 hours   DISP: (9) tablets with 5 refills  Prescribed on 09/01/2020     o trazodone 100 mg oral tablet   SIG: take 1 tablet (100 mg) by oral route once daily at bedtime for 30 days   DISP: (30) tablets with 5 refills  Adjusted on 09/01/2020     · Instructions  o Health Risk Assessment has been reviewed with the patient.  o Written health screening schedule for next 5-10 years was established with patient; information scanned in chart and given/mailed to patient.  o Fall prevention methods discussed and a copy of recommendations given/mailed to patient.  o Today's PHQ-9 score is: _2_  o Audit-C Questionnaire completed and scanned into the EMR under the designated folder within the patient's documents.  o Audit-C score of 0-4 - Negative Screen - Brief Discussion  o Patient advised to monitor blood pressure (B/P) at home and journal readings. Patient informed that a B/P reading at home of more than 130/80 is considered hypertension. For readings greater wwnl586/90 or higher patient is advised to follow up in the office with readings for management. Patient advised to limit sodium intake.  o Patient instructed/educated on their diet and exercise program.  o Patient was educated/instructed on their diagnosis, treatment and medications prior to discharge from the clinic today.  o Patient counseled to reduce calorie intake.  o Patient was instructed to exercise regularly.  o Patient instructed to seek medical attention urgently for new or worsening symptoms.  o Call the office with any concerns or questions.  · Disposition  o Return to clinic in 3 months     We discussed her BMI and need to lose weight.  Patient states she is trying to get more active and states she eats a kidney friendly diet.              Electronically Signed by: Francie Koch, APRN -Author on September 1, 2020 03:39:39 PM

## 2021-05-13 NOTE — PROGRESS NOTES
Progress Note      Patient Name: Bebe Steward   Patient ID: [id number]   Sex: Female   YOB: 1980        Visit Date: December 2, 2020    Provider: GO Melendez   Location: [facility]   Location Address: [address] [place] [address], [address] [place] [address]  [id number]   Location Phone: [phone]          Chief Complaint  · follow up      History Of Present Illness  Bebe Steward is a 40 year old /White female who presents for evaluation and treatment of:      She is here for follow-up on her chronic kidney disease and hypertension.  Her GFR had decreased to 42 on her last labs.  We changed her blood pressure medicine from Lisinoprilhydrochlorothiazide to amlodipine 5 mg daily.  Her blood pressure is borderline elevated today at 144/88.    History of left below the knee amputation.  She recently got a new prosthesis for her left below the knee amputation.  She states she is having some problems with the adjustments.  She is complaining of right foot pain for the past month that has gotten worse.  She states she cannot stand for to be touched on the side and the bottom of her foot is hurting as well.  She states that she has had stress fractures in the past without knowing it.  She has been taking Tylenol for the pain but states is not helping enough.  She cannot take NSAIDs due to her kidney disease.  She states she has tried rubbing Voltaren gel on her foot which did not help either.    She states she has a history of vitamin D deficiency.  She is complaining of Chronic fatigue also.       Past Medical History  Disease Name Date Onset Notes   Abnormal Pap smear of cervix 07/21/2020 --    Allergies --  --    Anemia --  --    Anxiety 2014 --    Arthritis --  --    Broken Bones 08/14 --    Essential hypertension 07/21/2020 --    Gallstone 2001 --    Head injury 1996 --    Hypertension --  --    Insomnia, unspecified 07/21/2020 --    Left below-knee amputee 07/21/2020  --    Migraine --  --    Right hip pain 07/21/2020 --    Sexually transmissible disease 2019 --    Sinus trouble --  --    Stage 3 chronic kidney disease 09/01/2020 --          Past Surgical History  Procedure Name Date Notes   Abdomen 2010 --    Amputation of left leg below knee 2014 --    Ankle surgery 03/08,05/08,08/08,12/10 03/11, 2012, 2013, 2014   Endometrial ablation 2007 --    Gallbladder 2001 --    Gastric Bypass 2001 --    Knee surgery 1996, 2015 left 1996/1997, right 2015   Spinal Surgery 2008 lumbar spine   Tonsilectomy 1994 --          Medication List  Name Date Started Instructions   amlodipine 5 mg oral tablet 12/02/2020 take 2 tablets (10 mg) by oral route once daily for 30 days   Prosthetic leg 08/24/2020 Please fit patient with LT below the knee prosthesis   Prosthetic Supplies 07/21/2020 Patient needs prosthetic supplies   rizatriptan 10 mg oral tablet,disintegrating 09/01/2020 palce 1 tablet on top of tongue where to dissolve at onset of migraine, may repeat at 2 hour intervals; do not exceed 30 mg in 24 hours   trazodone 100 mg oral tablet 09/01/2020 take 1 tablet (100 mg) by oral route once daily at bedtime for 30 days   Tylenol 325 mg oral capsule  take 1 capsule by oral route As needed         Allergy List  Allergen Name Date Reaction Notes   Ceclor --  --  --    Demerol --  --  --    erythromycin --  --  --    Flexeril --  --  --    morphine --  --  --    PENICILLINS --  --  --    Septra --  --  --    SULFA (SULFONAMIDES) --  --  --          Family Medical History  Disease Name Relative/Age Notes   Stroke Mother/   grandparent   Heart Disease Father/   grandparent   Renal failure Mother/   --          Social History  Finding Status Start/Stop Quantity Notes   Tobacco Former --/-- --  08/24/2020- pt stated quit smoking 3 weeks ago         Review of Systems  · Constitutional  o Admits  o : fatigue  o Denies  o : fever, weight loss, weight gain  · Cardiovascular  o Denies  o : lower extremity  "edema, claudication, chest pressure, palpitations  · Respiratory  o Denies  o : shortness of breath, wheezing, cough, hemoptysis, dyspnea on exertion  · Gastrointestinal  o Denies  o : nausea, vomiting, diarrhea, constipation, abdominal pain  · Genitourinary  o Denies  o : urgency, frequency  · Integument  o Denies  o : rash, itching  · Musculoskeletal  o Admits  o : limitation of motion, foot pain  o Denies  o : joint swelling      Vitals  Date Time BP Position Site L\R Cuff Size HR RR TEMP (F) WT  HT  BMI kg/m2 BSA m2 O2 Sat FR L/min FiO2 HC       12/02/2020 11:46 /88 Sitting    92 - R  98.1 263lbs 2oz 5'  4\" 45.16 2.32 95 %            Physical Examination  · Constitutional  o Appearance  o : no acute distress, well-nourished  · Head and Face  o Head  o :   § Inspection  § : atraumatic, normocephalic  · Neck  o Thyroid  o : gland size normal, nontender, no nodules or masses present on palpation, symmetric  · Respiratory  o Respiratory Effort  o : breathing comfortably, symmetric chest rise  o Auscultation of Lungs  o : clear to asculatation bilaterally, no wheezes, rales, or rhonchii  · Cardiovascular  o Heart  o :   § Auscultation of Heart  § : regular rate and rhythm, no murmurs, rubs, or gallops  o Peripheral Vascular System  o :   § Extremities  § : no edema  · Neurologic  o Mental Status Examination  o :   § Orientation  § : grossly oriented to person, place and time  o Gait and Station  o :   § Gait Screening  § : normal gait  · Psychiatric  o General  o : normal mood and affect          Assessment  · Need for influenza vaccination     V04.81/Z23  · Essential hypertension     401.9/I10  Blood pressure not at goal, will increase amlodipine 5 mg to 2 tablets daily. Advised patient to monitor for swelling of her lower extremity.  · Fatigue     780.79/R53.83  We will check labs today, will call with results.  · Vitamin D deficiency     268.9/E55.9  · Left below-knee amputee     V49.75/Z89.512  · Stage 3 " chronic kidney disease     585.3/N18.3  · Right foot pain     729.5/M79.671  We will get an x-ray of her right foot. Due to her severe pain not relieved with Tylenol, unable to take NSAIDs, I will prescribe her tramadol. Bernabe has been reviewed and is negative. Narcotic contract discussed and signed today.      Plan  · Orders  o Immunization Admin Fee (Single) ([facility]) (50712) - V04.81/Z23 - 12/02/2020  o Fluzone Quadrivalent Vaccine, age 6 months + (33764) - V04.81/Z23 - 12/02/2020   Vaccine - Influenza; Dose: 0.5; Site: Right Deltoid; Route: Intramuscular; Date: 12/02/2020 12:44:00; Exp: 06/30/2021; Lot: [id number]; Mfg: sanofi pasteur; TradeName: Fluzone Quadrivalent; Administered By: Dulce Maria Saini MA; Comment: Pt tolerated well, stable condition  o CMP [facility] (01685) - 401.9/I10, 585.3/N18.3, 780.79/R53.83 - 12/02/2020  o CBC with Auto Diff [facility] (78363) - 780.79/R53.83 - 12/02/2020  o B12 Folate levels (B12FO) - 780.79/R53.83 - 12/02/2020  o Vitamin D Level (14893) - 268.9/E55.9 - 12/02/2020  o ACO-39: Current medications updated and reviewed (, 1159F) - - 12/02/2020  o Foot (Right) 3 or more views X-Ray [facility] Preferred View (01593-AE) - 729.5/M79.671 - 12/02/2020  · Medications  o tramadol 50 mg oral tablet   SIG: take 1 tablet (50 mg) by oral route every 6 hours as needed for 10 days   DISP: (40) Tablet with 0 refills  Prescribed on 12/02/2020     o amlodipine 5 mg oral tablet   SIG: take 2 tablets (10 mg) by oral route once daily for 30 days   DISP: (60) Tablet with 3 refills  Adjusted on 12/02/2020     · Instructions  o Patient advised to monitor blood pressure (B/P) at home and journal readings. Patient informed that a B/P reading at home of more than 130/80 is considered hypertension. For readings greater [id number]/90 or higher patient is advised to follow up in the office with readings for management. Patient advised to limit sodium intake.  o Discussed the risk and benefits of the use of controlled substances  with the patient, including the risk of tolerance and drug dependence. The patient has been counseled on the need to have an exit strategy, including potentially discontinuing the use of controlled substances. NIMO has or will be reviewed as soon as it becomes available.   o See note for indication of controlled substance. Nimo and drug screen have been reviewed. Controlled substance agreement signed and scanned into chart. After discussion of risks and benefits of medication, I have determined patient is suitable for Rx while demonstrating the ability to safely follow and administer the medication plan. Patient understands the expectation that medication directions cannot be adjusted without a provider's written approval.   o Patient was educated/instructed on their diagnosis, treatment and medications prior to discharge from the clinic today.  o Patient instructed to seek medical attention urgently for new or worsening symptoms.  o Call the office with any concerns or questions.  · Disposition  o Return to clinic in 4 weeks            Electronically Signed by: GO Melendez -Author on December 2, 2020 01:16:23 PM

## 2021-05-14 VITALS
HEART RATE: 74 BPM | DIASTOLIC BLOOD PRESSURE: 78 MMHG | TEMPERATURE: 97.1 F | SYSTOLIC BLOOD PRESSURE: 110 MMHG | BODY MASS INDEX: 43.6 KG/M2 | OXYGEN SATURATION: 97 % | HEIGHT: 64 IN | WEIGHT: 255.37 LBS

## 2021-05-14 VITALS
DIASTOLIC BLOOD PRESSURE: 111 MMHG | SYSTOLIC BLOOD PRESSURE: 173 MMHG | TEMPERATURE: 97.5 F | WEIGHT: 260 LBS | BODY MASS INDEX: 44.39 KG/M2 | OXYGEN SATURATION: 96 % | HEIGHT: 64 IN | HEART RATE: 80 BPM

## 2021-05-14 VITALS
HEART RATE: 92 BPM | BODY MASS INDEX: 44.92 KG/M2 | WEIGHT: 263.12 LBS | HEIGHT: 64 IN | SYSTOLIC BLOOD PRESSURE: 144 MMHG | OXYGEN SATURATION: 95 % | DIASTOLIC BLOOD PRESSURE: 88 MMHG | TEMPERATURE: 98.1 F

## 2021-05-14 VITALS
HEART RATE: 87 BPM | SYSTOLIC BLOOD PRESSURE: 125 MMHG | BODY MASS INDEX: 42 KG/M2 | OXYGEN SATURATION: 99 % | HEIGHT: 64 IN | WEIGHT: 246 LBS | TEMPERATURE: 97.9 F | DIASTOLIC BLOOD PRESSURE: 64 MMHG

## 2021-05-14 VITALS
WEIGHT: 250.12 LBS | TEMPERATURE: 97.4 F | DIASTOLIC BLOOD PRESSURE: 94 MMHG | OXYGEN SATURATION: 96 % | SYSTOLIC BLOOD PRESSURE: 174 MMHG | HEART RATE: 104 BPM | HEIGHT: 64 IN | BODY MASS INDEX: 42.7 KG/M2

## 2021-05-14 VITALS
HEIGHT: 64 IN | BODY MASS INDEX: 43.07 KG/M2 | HEART RATE: 78 BPM | WEIGHT: 252.25 LBS | OXYGEN SATURATION: 97 % | SYSTOLIC BLOOD PRESSURE: 134 MMHG | TEMPERATURE: 98.2 F | DIASTOLIC BLOOD PRESSURE: 82 MMHG

## 2021-05-14 VITALS
TEMPERATURE: 98 F | OXYGEN SATURATION: 98 % | SYSTOLIC BLOOD PRESSURE: 138 MMHG | HEART RATE: 84 BPM | DIASTOLIC BLOOD PRESSURE: 88 MMHG | HEIGHT: 64 IN | BODY MASS INDEX: 44.58 KG/M2 | WEIGHT: 261.12 LBS

## 2021-05-14 NOTE — PROGRESS NOTES
Progress Note      Patient Name: Bebe Steward   Patient ID: [id number]   Sex: Female   YOB: 1980    Primary Care Provider: Francie STILES   Referring Provider: Francie STILES    Visit Date: April 6, 2021    Provider: William Marti DPM   Location: [facility]   Location Address: [address] [place] [address]  [id number]   Location Phone: [phone]          Chief Complaint  · Right Foot Pain  · Bursitis      History Of Present Illness  Bebe Steward is a 41 year old /White female who presents to the [facility] today a follow up for:      New, Established, New Problem: Established  Location:  Right styloid process area  Duration:  early November 2020  Onset:  insidious  Nature:  sore, shooting  Stable, worsening, improving:  stable, worsening, recurring  Aggravating factors:   Patient relates pain is aggravated by shoe gear and ambulation.   Previous Treatment: Cortisone injection    Patient denies any fevers, chills, nausea, vomiting, shortness of breathe, nor any other constitutional signs nor symptoms.    Pt relates hx of Left BKA in distant past due to trauma that did not healed.    Medically disabled.    Patient relates no medical changes since their last visit.       Past Medical History  Abnormal Pap smear of cervix; Allergies; Anemia; Anxiety; Arthritis; Broken Bones; Corns and callus; Essential hypertension; Foot pain; Gallstone; Head injury; Hypertension; Ingrown toenail; Insomnia, unspecified; Kidney problem; Left below-knee amputee; Migraine; Muscle spasm; Numbness in feet; Phantom pain after amputation of lower extremity; Plantar wart; Right foot pain; Right hip pain; Sexually transmissible disease; Sinus trouble; Stage 3 chronic kidney disease; Vitamin D deficiency         Past Surgical History  Abdomen; Amputation of left leg below knee; Ankle surgery; Back surgery; Endometrial ablation; Gallbladder; Gastric  "Bypass; Knee surgery; Spinal Surgery; Tonsilectomy         Medication List  amlodipine 5 mg oral tablet; hydrocodone-acetaminophen 7.5-325 mg oral tablet; methocarbamol 500 mg oral tablet; Prosthetic leg; Prosthetic Supplies; rizatriptan 10 mg oral tablet,disintegrating; trazodone 100 mg oral tablet; Tylenol 325 mg oral capsule; Vitamin D2 1,250 mcg (50,000 unit) oral capsule         Allergy List  Ceclor; Demerol; erythromycin; Flexeril; morphine; NSAIDS; PENICILLINS; Septra; SULFA (SULFONAMIDES)       Allergies Reconciled  Family Medical History  Stroke; Heart Disease; Renal failure; FH: diabetes mellitus; FH: melanoma; FH: kidney disease         Social History  Alcohol (Light); Tobacco (Former)         Immunizations  Name Date Admin   Influenza 12/02/2020         Review of Systems  · Constitutional  o Denies  o : fatigue, night sweats  · Eyes  o Denies  o : double vision, blurred vision  · HENT  o Denies  o : vertigo, recent head injury  · Cardiovascular  o Denies  o : chest pain, irregular heart beats  · Respiratory  o Denies  o : shortness of breath, productive cough  · Gastrointestinal  o Denies  o : nausea, vomiting  · Genitourinary  o Denies  o : dysuria, urinary retention  · Integument  o Denies  o : hair growth change, new skin lesions  · Neurologic  o Denies  o : altered mental status, seizures  · Musculoskeletal  o * See HPI  · Endocrine  o Denies  o : cold intolerance, heat intolerance  · Heme-Lymph  o Denies  o : petechiae, lymph node enlargement or tenderness  · Allergic-Immunologic  o Denies  o : frequent illnesses      Vitals  Date Time BP Position Site L\R Cuff Size HR RR TEMP (F) WT  HT  BMI kg/m2 BSA m2 O2 Sat FR L/min FiO2 HC       04/06/2021 09:35 /111 Sitting    80 - R  97.5 260lbs 0oz 5'  4\" 44.63 2.31 96 %      04/06/2021 09:35 /101 Sitting                       Physical Examination  · Constitutional  o Appearance  o : Awake, alert, well developed, well nourished and well " groomed  · Cardiovascular  o Peripheral Vascular System  o :   § Pedal Pulses  § : pulses 2 bilaterally  § Extremities  § : no edema of the lower extremities  · Musculoskeletal  o Extremeties/Joint  o : Lower extremity muscle strength and range of motion is equal and symmetrical bilaterally.   · Skin and Subcutaneous Tissue  o General Inspection  o : Skin is noted to have normal texture and turgor, with no excresences noted.  o Digits and Nails  o : The tonails are noted to be without disease.  · Neurologic  o Sensation  o : Epicritic sensations intact bilaterally.   o Gait and Station  o :   § Gait Screening  § : normal gait  · Right Ankle/Foot  o Inspection  o : Right foot: Foot structure is noted to be normal. +TTP to the Right styloid process area. No edema, erythema, lymphangitis, nor signs of infection.   · Injection Note/Aspiration Note  o Site  o : Right 5th styloid process area.  o Procedure  o : This patient presents for a trigger point injection, which is located Retrocalcaneal bursa. I have discussed the nature, risks, benefits, alternatives and limitations of this procedure with this patient and obtained informed consent. The area was sterilely prepped with alcohol.  o Medication  o : 0.5ml of 1% lidocaine plain, 0.5ml of 40mg/ml Kenalog, and 0.5ml of 4mg/ml Dexamethasone   o Disposition  o : The patient tolerated the procedure well.          Assessment  · Bursitis of foot, right     726.79/M77.51  · Foot pain, right     729.5/M79.671      Plan  · Orders  o Decadron 4 mg/1cc Injection () - 726.79/M77.51, 729.5/M79.671 - 04/06/2021   Injection - Dexamethasone 4mg/mL; Dose: 2 mg; Site: Not Entered; Route: subcutaneous; Date: 04/06/2021 10:03:44; Exp: 11/30/2022; Lot: [id number]; Mfg: [employer]; TradeName: dexamethasone sodium phosphate; Location[facility]; Administered By: William Marti DPM; Comment: ndc [id number] inj site right foot  o 2.00 - Kenalog Injection 20mg (-3) -  726.79/M77.51, 729.5/M79.671 - 04/06/2021   Injection - Kenalog 20 mg; Dose: 20mg; Site: Not Entered; Route: subcutaneous; Date: 04/06/2021 10:04:38; Exp: 11/30/2021; Lot: [id number]; Mfg: [employer].; TradeName: triamcinolone acetonide; [facility]: [facility]; Administered By: William Marti DPM; Comment: ndc [id number] inj site right foot  o Inj Tendon Sheath Or Ligament (50933) - 726.79/M77.51, 729.5/M79.671 - 04/06/2021  · Medications  o Medications have been Reconciled  o Transition of Care or Provider Policy  · Instructions  o Monitor for Problems: Pt to monitor for problems and to contact Dr. Marti for follow-up should such signs occur. Patient states understanding and agreement with this plan.  o Rice Therapy: It is important to treat any injury as soon as possible to help control swelling and increase recovery time. The recognized regimen for immediate treatment of sport injuries includes rest, ice (cold application), compression, and elevation (RICE). Remove the injured athlete from play, apply ice to the affected area, wrap or compress the injured area with an elastic bandage when appropriate, and elevate the injured area above heart level to reduce swelling.  o Compression: Apply an elastic wrap over and around the injured area to help reduce swelling. Using medium tightness, begin the wrap at the distal end of the limb (furthest away from your body) and wrap to the proximal end of the limb (closest to your body). For example, if an elastic wrap is needed for an ankle sprain, begin at the ball of the foot (leave toes exposed), and spiral up ending about mid-calf.  o Patient request PRN follow-up.  o Discuss Findings: I have discussed the findings of this evaluation with the patient. The discussion included a complete verbal explanation of any changes in the examination results, diagnosis, and the current treatment plan. A schedule for future care needs was explained. If any questions should arise  after returning home, I have encouraged the patient to feel free to contact Dr. Marti. The patient states understanding and agreement with this plan.  o Discussed proper shoegear for the patient's feet and medical condition.Patient to monitor for recurrence of symptoms and to contact Dr. Potts office for a follow-up appointment. The patient states understanding and agreement with this plan.   o Electronically Identified Patient Education Materials Provided Electronically  · Disposition  o Call or Return if symptoms worsen or persist.            Electronically Signed by: William Marti DPM -Author on April 6, 2021 10:57:36 AM

## 2021-05-14 NOTE — PROGRESS NOTES
Progress Note      Patient Name: Bebe Steward   Patient ID: [id number]   Sex: Female   YOB: 1980    Primary Care Provider: Francie STILES   Referring Provider: Francie STILES    Visit Date: February 10, 2021    Provider: GO Melendez   Location: [facility]   Location Address: [address] [place] [address], [address]  [id number]   Location Phone: [phone]          Chief Complaint  · follow up  · medication refills      History Of Present Illness  Bebe Steward is a 40 year old /White female who presents for evaluation and treatment of:      She is here for follow-up and med refills.    She is also wanting a referral to pain management. She has chronic low back pain and phantom pain of the left lower extremity from left below the knee amputee. She is unable to take NSAIDs due to her chronic kidney disease.    Chronic kidney disease: We have changed her blood pressure medicines and her GFR did improve. We will recheck a BMP today.    Hypertension blood pressure is borderline elevated but stable at 138/88 on amlodipine 5 mg once daily.    History of vitamin D deficiency: Her last vitamin D level was 9.83 months ago. She has been taking vitamin D as prescribed.  She complains of chronic fatigue. She would like to have her vitamin B12 level checked as well.    She states she has been having some pain with her periods and she is wanting a referral to OB/GYN. She did see OB/GYN about 6 months ago so she still has an active referral. Advised patient she may just call and schedule her appointment with OB/GYN.       Past Medical History  Disease Name Date Onset Notes   Abnormal Pap smear of cervix 07/21/2020 --    Allergies --  --    Anemia --  --    Anxiety 2014 --    Arthritis --  --    Broken Bones 08/14 --    Corns and callus --  --    Essential hypertension 07/21/2020 --    Foot pain --  --    Gallstone 2001 --    Head injury 1996 --     Hypertension --  --    Ingrown toenail --  --    Insomnia, unspecified 07/21/2020 --    Kidney problem --  --    Left below-knee amputee 07/21/2020 --    Migraine --  --    Muscle spasm 01/04/2021 --    Numbness in feet --  --    Plantar wart --  --    Right foot pain 12/02/2020 --    Right hip pain 07/21/2020 --    Sexually transmissible disease 2019 --    Sinus trouble --  --    Stage 3 chronic kidney disease 09/01/2020 --    Vitamin D deficiency 12/02/2020 --          Past Surgical History  Procedure Name Date Notes   Abdomen 2010 --    Amputation of left leg below knee 2014 --    Ankle surgery 03/08,05/08,08/08,12/10 03/11, 2012, 2013, 2014   Back surgery --  --    Endometrial ablation 2007 --    Gallbladder 2001 --    Gastric Bypass 2001 --    Knee surgery 1996, 2015 left 1996/1997, right 2015   Spinal Surgery 2008 lumbar spine   Tonsilectomy 1994 --          Medication List  Name Date Started Instructions   amlodipine 5 mg oral tablet 12/02/2020 take 2 tablets (10 mg) by oral route once daily for 30 days   methocarbamol 500 mg oral tablet 01/04/2021 take 1 or 2 tablets by oral route once daily at bedtime   Prosthetic leg 08/24/2020 Please fit patient with LT below the knee prosthesis   Prosthetic Supplies 07/21/2020 Patient needs prosthetic supplies   rizatriptan 10 mg oral tablet,disintegrating 09/01/2020 palce 1 tablet on top of tongue where to dissolve at onset of migraine, may repeat at 2 hour intervals; do not exceed 30 mg in 24 hours   trazodone 100 mg oral tablet 09/01/2020 take 1 tablet (100 mg) by oral route once daily at bedtime for 30 days   Tylenol 325 mg oral capsule  take 1 capsule by oral route As needed   Vitamin D2 1,250 mcg (50,000 unit) oral capsule 12/04/2020 take 1 capsule by oral route QD for 2 weeks, then twice weekly         Allergy List  Allergen Name Date Reaction Notes   Ceclor --  --  --    Demerol --  --  --    erythromycin --  --  --    Flexeril --  --  --    morphine --  --  --  "   NSAIDS --  --  --    PENICILLINS --  --  --    Septra --  --  --    SULFA (SULFONAMIDES) --  --  --        Allergies Reconciled  Family Medical History  Disease Name Relative/Age Notes   Stroke Mother/   grandparent   Heart Disease Father/   grandparent   Renal failure Mother/   --    FH: diabetes mellitus  --    FH: melanoma  --    FH: kidney disease Father/  Mother/   --          Social History  Finding Status Start/Stop Quantity Notes   Alcohol Light --/-- --  1-2 drinks per year   Tobacco Former --/-- --  08/24/2020- pt stated quit smoking 3 weeks ago         Immunizations  NameDate Admin Mfg Trade Name Lot Number Route Inj VIS Given VIS Publication   Clrhuprwj67/02/2020 [facility] Fluzone Quadrivalent [id number] IM RD 12/02/2020 08/15/2019   Comments: Pt tolerated well, stable condition         Review of Systems  · Constitutional  o Admits  o : fatigue  o Denies  o : fever, weight loss, weight gain  · Cardiovascular  o Denies  o : lower extremity edema, claudication, chest pressure, palpitations  · Respiratory  o Denies  o : shortness of breath, wheezing, cough, hemoptysis, dyspnea on exertion  · Gastrointestinal  o Denies  o : nausea, vomiting, diarrhea, constipation, abdominal pain  · Genitourinary  o Denies  o : urgency, frequency  · Integument  o Denies  o : rash, itching  · Musculoskeletal  o Admits  o : limitation of motion, back pain      Vitals  Date Time BP Position Site L\R Cuff Size HR RR TEMP (F) WT  HT  BMI kg/m2 BSA m2 O2 Sat FR L/min FiO2        02/10/2021 01:49 /88 Sitting    84 - R  98 261lbs 2oz 5'  4\" 44.82 2.31 98 %            Physical Examination  · Constitutional  o Appearance  o : no acute distress, well-nourished  · Head and Face  o Head  o :   § Inspection  § : atraumatic, normocephalic  · Neck  o Thyroid  o : gland size normal, nontender, no nodules or masses present on palpation, symmetric  · Respiratory  o Respiratory Effort  o : breathing comfortably, symmetric chest " rise  o Auscultation of Lungs  o : clear to asculatation bilaterally, no wheezes, rales, or rhonchii  · Cardiovascular  o Heart  o :   § Auscultation of Heart  § : regular rate and rhythm, no murmurs, rubs, or gallops  o Peripheral Vascular System  o :   § Extremities  § : no edema  · Lymphatic  o Neck  o : no lymphadenopathy present  · Neurologic  o Mental Status Examination  o :   § Orientation  § : grossly oriented to person, place and time  o Gait and Station  o :   § Gait Screening  § : normal gait, uses prosthesis for left below the knee amputee  · Psychiatric  o General  o : normal mood and affect          Assessment  · Essential hypertension     401.9/I10  Blood pressure borderline elevated but stable on amlodipine 5 mg. Advised patient to start monitoring blood pressure at home.  · Fatigue     780.79/R53.83  · Lumbago     724.2/M54.5  I will get her referred to pain management.  · Vitamin D deficiency     268.9/E55.9  We will recheck vitamin D level today. Advised that she can start just taking vitamin D once weekly.  · Left below-knee amputee     V49.75/Z89.512  · Stage 3 chronic kidney disease     585.3/N18.3  · Phantom pain after amputation of lower extremity     353.6/G54.6      Plan  · Orders  o [facility] (38047) - 401.9/I10, 780.79/R53.83, 585.3/N18.3, 268.9/E55.9 - 02/10/2021  o B12 Folate levels (B12FO) - 780.79/R53.83 - 02/10/2021  o Vitamin D Level (37653) - 268.9/E55.9 - 02/10/2021  o ACO-39: Current medications updated and reviewed (, 1159F) - - 02/10/2021  o PAIN MANAGEMENT CONSULTATION (PAINM) - V49.75/Z89.512, 353.6/G54.6, 724.2/M54.5 - 02/10/2021  · Medications  o trazodone 100 mg oral tablet   SIG: take 1 tablet (100 mg) by oral route once daily at bedtime for 90 days   DISP: (90) Tablet with 1 refills  Adjusted on 02/10/2021     o Vitamin D2 1,250 mcg (50,000 unit) oral capsule   SIG: take 1 capsule by oral route once weekly   DISP: (13) Capsule with 1 refills  Adjusted on 02/10/2021      o Medications have been Reconciled  o Transition of Care or Provider Policy  · Instructions  o Patient advised to monitor blood pressure (B/P) at home and journal readings. Patient informed that a B/P reading at home of more than 130/80 is considered hypertension. For readings greater [id number]/90 or higher patient is advised to follow up in the office with readings for management. Patient advised to limit sodium intake.  o Patient was educated/instructed on their diagnosis, treatment and medications prior to discharge from the clinic today.  o Patient instructed to seek medical attention urgently for new or worsening symptoms.  o Call the office with any concerns or questions.  · Disposition  o Return to clinic in 3 months            Electronically Signed by: GO Melendez -Author on February 11, 2021 03:03:46 PM
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
## 2021-05-15 VITALS
HEIGHT: 64 IN | DIASTOLIC BLOOD PRESSURE: 98 MMHG | WEIGHT: 246 LBS | TEMPERATURE: 98.2 F | OXYGEN SATURATION: 97 % | BODY MASS INDEX: 42 KG/M2 | HEART RATE: 92 BPM | SYSTOLIC BLOOD PRESSURE: 180 MMHG

## 2021-06-06 NOTE — PROGRESS NOTES
Progress Note      Patient Name: Bebe Steward   Patient ID: [id number]   Sex: Female   YOB: 1980    Primary Care Provider: Francie STILES   Referring Provider: Francie STILES    Visit Date: May 11, 2021    Provider: GO Melendez   Location: [facility]   Location Address: [address] [place] [address], [address]  [id number]   Location Phone: [phone]          Chief Complaint  · 3 month follow up      History Of Present Illness  Bebe Steward is a 41 year old /White female who presents for evaluation and treatment of:      Patient present for 3 month follow up.     States that she has been having a cough that worsens at night. States that during the day she has noticed some drainage. Denies fevers and sore throat. States that she was on claritin in the past but has not taken anything for allergies this year.     Hx HTN: Patient in office blood pressure 126/82. States that she is doing well on amlodipine 5 mg.     Hx Migraines: Patient states that there has been a reduction in the amount of migraines. States that the last was approxiamtely a month ago. Patient currenlty prescribed rizatriptan 10 mg to take as needed.       Hx Vitamin D defiency: Patient last Vitamin D level 2/10/2021 was 24.6 ng/mL. She has been taking vitamin D2 1250 mcg.     Hx low normal B12:Patient states that she has some fatigue. Last B12 2/10/2021 was low normal 258 pg/mL.Patient states that she has a history of gastric bypass surgery and that her level has been lower ever since.     Hx CKD:  Patient wants to have creatine and GFR checked.     Patient also complains of lower back pain and is being managed by pain management for this issue.      Patient was also seen by Valerie Ruiz NP student           Past Medical History  Disease Name Date Onset Notes   Abnormal Pap smear of cervix 07/21/2020 --    Allergies --  --    Anemia --  --    Anxiety 2014 --     Arthritis --  --    Broken Bones 08/14 --    Corns and callus --  --    Essential hypertension 07/21/2020 --    Foot pain --  --    Gallstone 2001 --    Head injury 1996 --    Hypertension --  --    Ingrown toenail --  --    Insomnia, unspecified 07/21/2020 --    Kidney problem --  --    Left below-knee amputee 07/21/2020 --    Migraine --  --    Muscle spasm 01/04/2021 --    Numbness in feet --  --    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 --    Plantar wart --  --    Right foot pain 12/02/2020 --    Right hip pain 07/21/2020 --    Sexually transmissible disease 2019 --    Sinus trouble --  --    Stage 3 chronic kidney disease 09/01/2020 --    Vitamin D deficiency 12/02/2020 --          Past Surgical History  Procedure Name Date Notes   Abdomen 2010 --    Amputation of left leg below knee 2014 --    Ankle surgery 03/08,05/08,08/08,12/10 03/11, 2012, 2013, 2014   Back surgery --  --    Endometrial ablation 2007 --    Gallbladder 2001 --    Gastric Bypass 2001 --    Knee surgery 1996, 2015 left 1996/1997, right 2015   Spinal Surgery 2008 lumbar spine   Tonsilectomy 1994 --          Medication List  Name Date Started Instructions   amlodipine 5 mg oral tablet 03/15/2021 take 2 tablets (10 mg) by oral route once daily for 30 days   gabapentin 300 mg oral capsule  take 1 capsule (300 mg) by oral route 3 times per day   hydrocodone-acetaminophen 7.5-325 mg oral tablet  take 1 tablet by oral route every 6 hours as needed for pain   methocarbamol 500 mg oral tablet 01/04/2021 take 1 or 2 tablets by oral route once daily at bedtime   Prosthetic leg 08/24/2020 Please fit patient with LT below the knee prosthesis   Prosthetic Supplies 07/21/2020 Patient needs prosthetic supplies   rizatriptan 10 mg oral tablet,disintegrating 09/01/2020 palce 1 tablet on top of tongue where to dissolve at onset of migraine, may repeat at 2 hour intervals; do not exceed 30 mg in 24 hours   trazodone 100 mg oral tablet  02/10/2021 take 1 tablet (100 mg) by oral route once daily at bedtime for 90 days   Tylenol 325 mg oral capsule  take 1 capsule by oral route As needed   Vitamin D2 1,250 mcg (50,000 unit) oral capsule 02/10/2021 take 1 capsule by oral route once weekly         Allergy List  Allergen Name Date Reaction Notes   Ceclor --  --  --    Demerol --  --  --    erythromycin --  --  --    Flexeril --  --  --    morphine --  --  --    NSAIDS --  --  --    PENICILLINS --  --  --    Septra --  --  --    SULFA (SULFONAMIDES) --  --  --          Family Medical History  Disease Name Relative/Age Notes   Stroke Mother/   grandparent   Heart Disease Father/   grandparent   Renal failure Mother/   --    FH: diabetes mellitus  --    FH: melanoma  --    FH: kidney disease Father/  Mother/   --          Social History  Finding Status Start/Stop Quantity Notes   Alcohol Light --/-- --  1-2 drinks per year   Tobacco Former --/-- --  08/24/2020- pt stated quit smoking 3 weeks ago         Immunizations  NameDate Admin Mfg Trade Name Lot Number Route Inj VIS Given VIS Publication   Maayuxvrx96/02/2020 PMC Fl[employer]one Quadrivalent [id number] IM RD 12/02/2020 08/15/2019   Comments: Pt tolerated well, stable condition         Review of Systems  · Constitutional  o Admits  o : fatigue, weight gain  o Denies  o : fever, weight loss  · HENT  o Admits  o : nasal discharge, postnasal drip  o Denies  o : headaches, sinus pain, nasal congestion, sore throat  · Cardiovascular  o Denies  o : lower extremity edema, claudication, chest pressure, palpitations  · Respiratory  o Admits  o : cough  o Denies  o : shortness of breath, wheezing, hemoptysis, dyspnea on exertion  · Gastrointestinal  o Denies  o : nausea, vomiting, diarrhea, constipation, abdominal pain  · Musculoskeletal  o Admits  o : back pain, hip pain  · Allergic-Immunologic  o Admits  o : sinus allergy symptoms  o Denies  o : frequent illnesses      Vitals  Date Time BP Position Site L\R Cuff Size  "HR RR TEMP (F) WT  HT  BMI kg/m2 BSA m2 O2 Sat FR L/min FiO2 HC       05/11/2021 10:12 /82 Sitting    78 - R  97.8 270lbs 6oz 5'  4\" 46.41 2.35 98 %            Physical Examination  · Constitutional  o Appearance  o : no acute distress, well-nourished  · Head and Face  o Head  o :   § Inspection  § : atraumatic, normocephalic  · Ears, Nose, Mouth and Throat  o Ears  o :   § External Ears  § : normal  § Otoscopic Examination  § : tympanic membrane appearance within normal limits bilaterally  o Oral Cavity  o :   § Oral Mucosa  § : moist mucous membranes  o Throat  o :   § Oropharynx  § : Mild postnasal drip noted,no inflammation or lesions present, tonsils within normal limits  · Neck  o Thyroid  o : gland size normal, nontender, no nodules or masses present on palpation, symmetric  · Respiratory  o Respiratory Effort  o : breathing comfortably, symmetric chest rise  o Auscultation of Lungs  o : clear to asculatation bilaterally, no wheezes, rales, or rhonchii  · Cardiovascular  o Heart  o :   § Auscultation of Heart  § : regular rate and rhythm, no murmurs, rubs, or gallops  o Peripheral Vascular System  o :   § Extremities  § : no edema  · Lymphatic  o Neck  o : no lymphadenopathy present  · Neurologic  o Mental Status Examination  o :   § Orientation  § : grossly oriented to person, place and time  o Gait and Station  o :   § Gait Screening  § : normal gait  · Psychiatric  o General  o : normal mood and affect          Assessment  · Allergic rhinitis due to allergen     477.9/J30.9  started on Claritin 10 mg daily  · Cough     786.2/R05  started on Tessalon Peals 200mg TID as needed for 5 days for cough.  · Essential hypertension     401.9/I10  Patient stable on amlodipine 5mg, will continue this dose.  · Fatigue     780.79/R53.83  will check vitamin D level and B12 levels.  · Moderate mixed hyperlipidemia not requiring statin therapy     272.2/E78.2  Will check lab and call patient with results.  · Vitamin " D deficiency     268.9/E55.9  will check vitamin d level with labs today and call patient with result.  · Stage 3 chronic kidney disease     585.3/N18.3  Will check CMP and contact patient with results.      Plan  · Orders  o HTN/Lipid Panel (CMP, Lipid) [facility] ([id number], 51539) - 401.9/I10, 272.2/E78.2 - 05/11/2021  o CBC with Auto Diff [facility] ([id number]) - 780.79/R53.83, 401.9/I10 - 05/11/2021  o Vitamin D Level (87307) - 268.9/E55.9 - 05/11/2021  o ACO-39: Current medications updated and reviewed (1159F, ) - - 05/11/2021  o B12 level (50891) - 780.79/R53.83 - 05/11/2021  · Medications  o benzonatate 200 mg oral capsule   SIG: take 1 capsule (200 mg) by oral route 3 times per day as needed for cough for 5 days   DISP: (15) Capsule with 0 refills  Prescribed on 05/11/2021     o loratadine 10 mg oral tablet   SIG: take 1 tablet (10 mg) by oral route once daily for 30 days   DISP: (30) Tablet with 5 refills  Prescribed on 05/11/2021     o Medications have been Reconciled  o Transition of Care or Provider Policy  · Instructions  o Take a daily over the counter Vitamin D supplement.  o Patient was educated/instructed on their diagnosis, treatment and medications prior to discharge from the clinic today.  o Patient instructed to seek medical attention urgently for new or worsening symptoms.  o Call the office with any concerns or questions.  · Disposition  o Return to clinic in 3 months            Electronically Signed by: GO Melendez -Author on May 11, 2021 05:12:48 PM

## 2021-06-07 ENCOUNTER — TELEPHONE (OUTPATIENT)
Dept: FAMILY MEDICINE CLINIC | Facility: CLINIC | Age: 41
End: 2021-06-07

## 2021-06-07 RX ORDER — TIZANIDINE HYDROCHLORIDE 4 MG/1
4 CAPSULE, GELATIN COATED ORAL 3 TIMES DAILY PRN
Qty: 90 CAPSULE | Refills: 0 | Status: SHIPPED | OUTPATIENT
Start: 2021-06-07 | End: 2021-07-07

## 2021-06-07 NOTE — TELEPHONE ENCOUNTER
Caller: Bebe Steward    Relationship: Self    Best call back number: [phone]    What medications are you currently taking:   METHOCARBAMOL      When did you start taking these medications: LAST FRIDAY    Which medication are you concerned about: METHOCARBAMOL    Who prescribed you this medication: JUAN M CAGLE    What are your concerns: PATIENT STATES THAT SHE HAS A NEW PRESCRIPTION  COVERAGE THAT STATED IN MARCH AND THIS NEW PRESCRIPTION COVERAGE IS WANTING PATIENT TRY A DIFFERENT MEDICATION THAT WILL BE APPROVED.  BACLOFEN IS ONE THEY ARE  RECOMMENDING AND PATIENT STATES THAT SHE HAS TRIED THIS BEFORE AND IT DOESN'T WORK.   TIZANIDINE IS THE OTHER ONE AND PATIENT STATES THAT SHE NOT SURE IF SHE HAS TAKING THIS MEDICATION BEFORE.

## 2021-06-08 ENCOUNTER — TELEPHONE (OUTPATIENT)
Dept: FAMILY MEDICINE CLINIC | Facility: CLINIC | Age: 41
End: 2021-06-08

## 2021-06-08 NOTE — TELEPHONE ENCOUNTER
Caller: Bebe Steward    Relationship: Self    Best call back number: [phone]    What form or medical record are you requesting: [facility] WILL BE SENDING AUTHORIZATION FOR MEDICATION    Who is requesting this form or medical record from you: [facility] IN REGARDS TO   TiZANidine (ZANAFLEX) 4 MG capsule  4 mg, 3 Times Daily PRN           Timeframe paperwork needed: ASAP

## 2021-06-08 NOTE — TELEPHONE ENCOUNTER
PLEASE RESEND MAMMOGRAM ORDER TO [facility] AS IT HAS BEEN LOST.      SEND AGAIN ORDER AGAIN PLEASE

## 2021-07-15 VITALS
HEIGHT: 64 IN | SYSTOLIC BLOOD PRESSURE: 126 MMHG | WEIGHT: 270.37 LBS | HEART RATE: 78 BPM | OXYGEN SATURATION: 98 % | DIASTOLIC BLOOD PRESSURE: 82 MMHG | TEMPERATURE: 97.8 F | BODY MASS INDEX: 46.16 KG/M2

## 2021-07-22 PROBLEM — J01.80 OTHER ACUTE SINUSITIS: Status: ACTIVE | Noted: 2021-07-22

## 2021-07-22 PROBLEM — F41.9 ANXIETY: Status: ACTIVE | Noted: 2021-07-22

## 2021-07-22 PROBLEM — B07.0 PLANTAR WART: Status: ACTIVE | Noted: 2021-07-22

## 2021-07-22 PROBLEM — T14.8XXA BROKEN BONES: Status: ACTIVE | Noted: 2021-07-22

## 2021-07-22 PROBLEM — G54.6 PHANTOM PAIN AFTER AMPUTATION OF LOWER EXTREMITY (HCC): Status: ACTIVE | Noted: 2021-02-11

## 2021-07-22 PROBLEM — A64 SEXUALLY TRANSMISSIBLE DISEASE: Status: ACTIVE | Noted: 2021-07-22

## 2021-07-22 PROBLEM — M19.90 ARTHRITIS: Status: ACTIVE | Noted: 2021-07-22

## 2021-07-22 PROBLEM — G47.00 INSOMNIA, UNSPECIFIED: Status: ACTIVE | Noted: 2020-07-21

## 2021-07-22 PROBLEM — N18.30 STAGE 3 CHRONIC KIDNEY DISEASE (HCC): Status: ACTIVE | Noted: 2020-09-01

## 2021-07-22 PROBLEM — J34.9 SINUS TROUBLE: Status: ACTIVE | Noted: 2021-07-22

## 2021-07-22 PROBLEM — Z89.512 LEFT BELOW-KNEE AMPUTEE: Status: ACTIVE | Noted: 2020-07-21

## 2021-07-22 PROBLEM — N28.9 KIDNEY PROBLEM: Status: ACTIVE | Noted: 2021-07-22

## 2021-07-22 PROBLEM — M25.551 RIGHT HIP PAIN: Status: ACTIVE | Noted: 2020-07-21

## 2021-07-22 PROBLEM — R87.619 ABNORMAL PAP SMEAR OF CERVIX: Status: ACTIVE | Noted: 2020-07-21

## 2021-07-22 PROBLEM — K80.20 GALLSTONE: Status: ACTIVE | Noted: 2021-07-22

## 2021-07-22 PROBLEM — L84 CORNS AND CALLOSITY: Status: ACTIVE | Noted: 2021-07-22

## 2021-07-22 PROBLEM — M62.838 MUSCLE SPASM: Status: ACTIVE | Noted: 2021-01-04

## 2021-07-22 PROBLEM — I10 ESSENTIAL HYPERTENSION: Status: ACTIVE | Noted: 2020-07-21

## 2021-07-22 PROBLEM — L60.0 INGROWN TOENAIL: Status: ACTIVE | Noted: 2021-07-22

## 2021-07-22 PROBLEM — G43.909 MIGRAINE: Status: ACTIVE | Noted: 2021-07-22

## 2021-07-22 PROBLEM — E55.9 VITAMIN D DEFICIENCY: Status: ACTIVE | Noted: 2020-12-02

## 2021-07-22 PROBLEM — D64.9 ANEMIA: Status: ACTIVE | Noted: 2021-07-22

## 2021-07-22 RX ORDER — ACETAMINOPHEN 325 MG/1
650 TABLET ORAL EVERY 6 HOURS PRN
Status: ON HOLD | COMMUNITY
End: 2023-04-06

## 2021-07-22 RX ORDER — HYDROCODONE BITARTRATE AND ACETAMINOPHEN 7.5; 325 MG/1; MG/1
1 TABLET ORAL 2 TIMES DAILY
COMMUNITY
End: 2022-01-10

## 2021-07-22 RX ORDER — TRAZODONE HYDROCHLORIDE 100 MG/1
100 TABLET ORAL NIGHTLY
COMMUNITY
End: 2021-07-23

## 2021-07-22 RX ORDER — METHOCARBAMOL 500 MG/1
500 TABLET, FILM COATED ORAL 2 TIMES DAILY
COMMUNITY
End: 2021-11-12 | Stop reason: SDUPTHER

## 2021-07-22 RX ORDER — AMLODIPINE BESYLATE 5 MG/1
5 TABLET ORAL 2 TIMES DAILY
COMMUNITY
Start: 2021-03-15 | End: 2021-09-14 | Stop reason: DRUGHIGH

## 2021-07-22 RX ORDER — RIZATRIPTAN BENZOATE 10 MG/1
10 TABLET ORAL ONCE AS NEEDED
COMMUNITY
End: 2021-09-21 | Stop reason: SDUPTHER

## 2021-07-22 RX ORDER — LORATADINE 10 MG/1
CAPSULE, LIQUID FILLED ORAL
COMMUNITY
End: 2022-01-03 | Stop reason: SDUPTHER

## 2021-07-22 RX ORDER — GABAPENTIN 300 MG/1
300 CAPSULE ORAL 2 TIMES DAILY
COMMUNITY
End: 2022-05-31 | Stop reason: SDUPTHER

## 2021-07-23 ENCOUNTER — OFFICE VISIT (OUTPATIENT)
Dept: FAMILY MEDICINE CLINIC | Facility: CLINIC | Age: 41
End: 2021-07-23

## 2021-07-23 ENCOUNTER — LAB (OUTPATIENT)
Dept: LAB | Facility: HOSPITAL | Age: 41
End: 2021-07-23

## 2021-07-23 ENCOUNTER — HOSPITAL ENCOUNTER (OUTPATIENT)
Dept: GENERAL RADIOLOGY | Facility: HOSPITAL | Age: 41
Discharge: HOME | End: 2021-07-23

## 2021-07-23 VITALS
HEIGHT: 64 IN | OXYGEN SATURATION: 100 % | SYSTOLIC BLOOD PRESSURE: 140 MMHG | HEART RATE: 87 BPM | BODY MASS INDEX: 46.13 KG/M2 | DIASTOLIC BLOOD PRESSURE: 90 MMHG | TEMPERATURE: 97.1 F | WEIGHT: 270.2 LBS

## 2021-07-23 DIAGNOSIS — E55.9 VITAMIN D DEFICIENCY: ICD-10-CM

## 2021-07-23 DIAGNOSIS — M25.561 ACUTE PAIN OF RIGHT KNEE: ICD-10-CM

## 2021-07-23 DIAGNOSIS — N18.31 STAGE 3A CHRONIC KIDNEY DISEASE (HCC): ICD-10-CM

## 2021-07-23 DIAGNOSIS — G47.00 INSOMNIA, UNSPECIFIED TYPE: ICD-10-CM

## 2021-07-23 DIAGNOSIS — Z89.512 LEFT BELOW-KNEE AMPUTEE (HCC): ICD-10-CM

## 2021-07-23 DIAGNOSIS — F41.9 ANXIETY: ICD-10-CM

## 2021-07-23 DIAGNOSIS — M25.561 ACUTE PAIN OF RIGHT KNEE: Primary | ICD-10-CM

## 2021-07-23 LAB
ANION GAP SERPL CALCULATED.3IONS-SCNC: 9.5 MMOL/L (ref 5–15)
BUN SERPL-MCNC: 15 MG/DL (ref 6–20)
BUN/CREAT SERPL: 12.7 (ref 7–25)
CALCIUM SPEC-SCNC: 9 MG/DL (ref 8.6–10.5)
CHLORIDE SERPL-SCNC: 101 MMOL/L (ref 98–107)
CO2 SERPL-SCNC: 24.5 MMOL/L (ref 22–29)
CREAT SERPL-MCNC: 1.18 MG/DL (ref 0.57–1)
GFR SERPL CREATININE-BSD FRML MDRD: 50 ML/MIN/1.73
GLUCOSE SERPL-MCNC: 99 MG/DL (ref 65–99)
POTASSIUM SERPL-SCNC: 4.6 MMOL/L (ref 3.5–5.2)
SODIUM SERPL-SCNC: 135 MMOL/L (ref 136–145)

## 2021-07-23 PROCEDURE — 80048 BASIC METABOLIC PNL TOTAL CA: CPT

## 2021-07-23 PROCEDURE — 99214 OFFICE O/P EST MOD 30 MIN: CPT | Performed by: NURSE PRACTITIONER

## 2021-07-23 PROCEDURE — 73562 X-RAY EXAM OF KNEE 3: CPT

## 2021-07-23 RX ORDER — HYDROXYZINE HYDROCHLORIDE 25 MG/1
25 TABLET, FILM COATED ORAL EVERY 8 HOURS PRN
Qty: 90 TABLET | Refills: 3 | Status: SHIPPED | OUTPATIENT
Start: 2021-07-23 | End: 2022-01-03

## 2021-07-23 NOTE — ASSESSMENT & PLAN NOTE
Anxiety not well controlled, will start her back on Zoloft 50 mg daily.  We will also give her hydroxyzine to take as needed.  She has a follow-up next month.

## 2021-07-23 NOTE — PROGRESS NOTES
"Chief Complaint  Knee Pain (right)    Subjective          Bebe Steward presents to [facility]  History of Present Illness   She is here for an acute visit today.  She is complaining of right knee pain, states she twisted it after a dog got under her feet 3 days ago.  She cannot take NSAIDs due to her chronic kidney disease.  She does have Norco and gabapentin to take as needed.  She has history of left below the knee amputation and walks with a prosthesis.    History of insomnia: She is on trazodone 100 mg nightly and states this is not working for her.  She has tried taking 200 mg of trazodone and states that it did not help at all.  She states she has taken Restoril in the past which worked for her sleep.  She is complaining of more anxiety recently and wants to go back on her Zoloft.  She states her dad is in end-stage kidney disease and having a lot of complications.    She will need to get labs before her follow-up appointment on 8/11/2021 for chronic kidney disease and vitamin D deficiency:    Patient was also seen by Valerie Ruiz, NP student.    Objective   Vital Signs:   /90   Pulse 87   Temp 97.1 °F (36.2 °C)   Ht 162.6 cm (64\")   Wt 123 kg (270 lb 3.2 oz)   SpO2 100%   BMI 46.38 kg/m²     Physical Exam  Vitals reviewed.   Constitutional:       Appearance: Normal appearance. She is well-developed.   Neck:      Thyroid: No thyroid mass, thyromegaly or thyroid tenderness.   Cardiovascular:      Rate and Rhythm: Normal rate and regular rhythm.      Heart sounds: No murmur heard.   No friction rub. No gallop.    Pulmonary:      Effort: Pulmonary effort is normal.      Breath sounds: Normal breath sounds. No wheezing or rhonchi.   Lymphadenopathy:      Cervical: No cervical adenopathy.   Skin:     General: Skin is warm and dry.   Neurological:      Mental Status: She is alert and oriented to person, place, and time.      Cranial Nerves: No cranial nerve deficit. "   Psychiatric:         Mood and Affect: Mood and affect normal.         Behavior: Behavior normal.         Thought Content: Thought content normal. Thought content does not include homicidal or suicidal ideation.         Judgment: Judgment normal.        Result Review :                 Assessment and Plan    Diagnoses and all orders for this visit:    1. Acute pain of right knee (Primary)  -     XR Knee 3 View Right; Future    2. Left below-knee amputee (CMS/[facility])    3. Anxiety  Assessment & Plan:  Anxiety not well controlled, will start her back on Zoloft 50 mg daily.  We will also give her hydroxyzine to take as needed.  She has a follow-up next month.    Orders:  -     TSH+Free T4; Future    4. Insomnia, unspecified type  Assessment & Plan:  I discussed with her that I will put her on Restoril since she is on pain medications also.  I will give her hydroxyzine to take as needed which will help with her anxiety as well.    Orders:  -     TSH+Free T4; Future    5. Stage 3a chronic kidney disease (CMS/[facility])  -     Basic Metabolic Panel; Future    6. Vitamin D deficiency  -     Vitamin D 25 Hydroxy; Future    Other orders  -     sertraline (Zoloft) 50 MG tablet; Take 1 tablet by mouth Daily.  Dispense: 30 tablet; Refill: 3  -     hydrOXYzine (ATARAX) 25 MG tablet; Take 1 tablet by mouth Every 8 (Eight) Hours As Needed for Anxiety.  Dispense: 90 tablet; Refill: 3      Follow Up   Return for has follow up next month.  Patient was given instructions and counseling regarding her condition or for health maintenance advice. Please see specific information pulled into the AVS if appropriate.

## 2021-07-23 NOTE — PATIENT INSTRUCTIONS
Acute Knee Pain, Adult  Acute knee pain is sudden and may be caused by damage, swelling, or irritation of the muscles and tissues that support your knee. The injury may result from:  · A fall.  · An injury to your knee from twisting motions.  · A hit to the knee.  · Infection.  Acute knee pain may go away on its own with time and rest. If it does not, your health care provider may order tests to find the cause of the pain. These may include:  · Imaging tests, such as an X-ray, MRI, or ultrasound.  · Joint aspiration. In this test, fluid is removed from the knee.  · Arthroscopy. In this test, a lighted tube is inserted into the knee and an image is projected onto a TV screen.  · Biopsy. In this test, a sample of tissue is removed from the body and studied under a microscope.  Follow these instructions at home:  Pay attention to any changes in your symptoms. Take these actions to relieve your pain.  If you have a knee sleeve or brace:    · Wear the sleeve or brace as told by your health care provider. Remove it only as told by your health care provider.  · Loosen the sleeve or brace if your toes tingle, become numb, or turn cold and blue.  · Keep the sleeve or brace clean.  · If the sleeve or brace is not waterproof:  ? Do not let it get wet.  ? Cover it with a watertight covering when you take a bath or shower.  Activity  · Rest your knee.  · Do not do things that cause pain or make pain worse.  · Avoid high-impact activities or exercises, such as running, jumping rope, or doing jumping jacks.  · Work with a physical therapist to make a safe exercise program, as recommended by your health care provider. Do exercises as told by your physical therapist.  Managing pain, stiffness, and swelling    · If directed, put ice on the knee:  ? Put ice in a plastic bag.  ? Place a towel between your skin and the bag.  ? Leave the ice on for 20 minutes, 2-3 times a day.  · If directed, use an elastic bandage to put pressure  (compression) on your injured knee. This may control swelling, give support, and help with discomfort.  General instructions  · Take over-the-counter and prescription medicines only as told by your health care provider.  · Raise (elevate) your knee above the level of your heart when you are sitting or lying down.  · Sleep with a pillow under your knee.  · Do not use any products that contain nicotine or tobacco, such as cigarettes, e-cigarettes, and chewing tobacco. These can delay healing. If you need help quitting, ask your health care provider.  · If you are overweight, work with your health care provider and a dietitian to set a weight-loss goal that is healthy and reasonable for you. Extra weight can put pressure on your knee.  · Keep all follow-up visits as told by your health care provider. This is important.  Contact a health care provider if:  · Your knee pain continues, changes, or gets worse.  · You have a fever along with knee pain.  · Your knee feels warm to the touch.  · Your knee vickie or locks up.  Get help right away if:  · Your knee swells, and the swelling becomes worse.  · You cannot move your knee.  · You have severe pain in your knee.  Summary  · Acute knee pain can be caused by a fall, an injury, an infection, or damage, swelling, or irritation of the tissues that support your knee.  · Your health care provider may perform tests to find out the cause of the pain.  · Pay attention to any changes in your symptoms. Relieve your pain with rest, medicines, light activity, and use of ice.  · Get help if your pain continues or becomes worse, your knee swells, or you cannot move your knee.  This information is not intended to replace advice given to you by your health care provider. Make sure you discuss any questions you have with your health care provider.  Document Revised: 05/30/2019 Document Reviewed: 05/30/2019  [employer] Patient Education © 2021 [employer]    Insomnia  Insomnia is a sleep  disorder that makes it difficult to fall asleep or stay asleep. Insomnia can cause fatigue, low energy, difficulty concentrating, mood swings, and poor performance at work or school.  There are three different ways to classify insomnia:  · Difficulty falling asleep.  · Difficulty staying asleep.  · Waking up too early in the morning.  Any type of insomnia can be long-term (chronic) or short-term (acute). Both are common. Short-term insomnia usually lasts for three months or less. Chronic insomnia occurs at least three times a week for longer than three months.  What are the causes?  Insomnia may be caused by another condition, situation, or substance, such as:  · Anxiety.  · Certain medicines.  · Gastroesophageal reflux disease (GERD) or other gastrointestinal conditions.  · Asthma or other breathing conditions.  · Restless legs syndrome, sleep apnea, or other sleep disorders.  · Chronic pain.  · Menopause.  · Stroke.  · Abuse of alcohol, tobacco, or illegal drugs.  · Mental health conditions, such as depression.  · Caffeine.  · Neurological disorders, such as Alzheimer's disease.  · An overactive thyroid (hyperthyroidism).  Sometimes, the cause of insomnia may not be known.  What increases the risk?  Risk factors for insomnia include:  · Gender. Women are affected more often than men.  · Age. Insomnia is more common as you get older.  · Stress.  · Lack of exercise.  · Irregular work schedule or working night shifts.  · Traveling between different time zones.  · Certain medical and mental health conditions.  What are the signs or symptoms?  If you have insomnia, the main symptom is having trouble falling asleep or having trouble staying asleep. This may lead to other symptoms, such as:  · Feeling fatigued or having low energy.  · Feeling nervous about going to sleep.  · Not feeling rested in the morning.  · Having trouble concentrating.  · Feeling irritable, anxious, or depressed.  How is this diagnosed?  This  condition may be diagnosed based on:  · Your symptoms and medical history. Your health care provider may ask about:  ? Your sleep habits.  ? Any medical conditions you have.  ? Your mental health.  · A physical exam.  How is this treated?  Treatment for insomnia depends on the cause. Treatment may focus on treating an underlying condition that is causing insomnia. Treatment may also include:  · Medicines to help you sleep.  · Counseling or therapy.  · Lifestyle adjustments to help you sleep better.  Follow these instructions at home:  Eating and drinking    · Limit or avoid alcohol, caffeinated beverages, and cigarettes, especially close to bedtime. These can disrupt your sleep.  · Do not eat a large meal or eat spicy foods right before bedtime. This can lead to digestive discomfort that can make it hard for you to sleep.  Sleep habits    · Keep a sleep diary to help you and your health care provider figure out what could be causing your insomnia. Write down:  ? When you sleep.  ? When you wake up during the night.  ? How well you sleep.  ? How rested you feel the next day.  ? Any side effects of medicines you are taking.  ? What you eat and drink.  · Make your bedroom a dark, comfortable place where it is easy to fall asleep.  ? Put up shades or blackout curtains to block light from outside.  ? Use a white noise machine to block noise.  ? Keep the temperature cool.  · Limit screen use before bedtime. This includes:  ? Watching TV.  ? Using your smartphone, tablet, or computer.  · Stick to a routine that includes going to bed and waking up at the same times every day and night. This can help you fall asleep faster. Consider making a quiet activity, such as reading, part of your nighttime routine.  · Try to avoid taking naps during the day so that you sleep better at night.  · Get out of bed if you are still awake after 15 minutes of trying to sleep. Keep the lights down, but try reading or doing a quiet activity.  When you feel sleepy, go back to bed.  General instructions  · Take over-the-counter and prescription medicines only as told by your health care provider.  · Exercise regularly, as told by your health care provider. Avoid exercise starting several hours before bedtime.  · Use relaxation techniques to manage stress. Ask your health care provider to suggest some techniques that may work well for you. These may include:  ? Breathing exercises.  ? Routines to release muscle tension.  ? Visualizing peaceful scenes.  · Make sure that you drive carefully. Avoid driving if you feel very sleepy.  · Keep all follow-up visits as told by your health care provider. This is important.  Contact a health care provider if:  · You are tired throughout the day.  · You have trouble in your daily routine due to sleepiness.  · You continue to have sleep problems, or your sleep problems get worse.  Get help right away if:  · You have serious thoughts about hurting yourself or someone else.  If you ever feel like you may hurt yourself or others, or have thoughts about taking your own life, get help right away. You can go to your nearest emergency department or call:  · Your local emergency services (911 in the [place]  · A suicide crisis helpline, such as the National Suicide Prevention Lifeline at 1-319.298.1499. This is open 24 hours a day.  Summary  · Insomnia is a sleep disorder that makes it difficult to fall asleep or stay asleep.  · Insomnia can be long-term (chronic) or short-term (acute).  · Treatment for insomnia depends on the cause. Treatment may focus on treating an underlying condition that is causing insomnia.  · Keep a sleep diary to help you and your health care provider figure out what could be causing your insomnia.  This information is not intended to replace advice given to you by your health care provider. Make sure you discuss any questions you have with your health care provider.  Document Revised: 11/30/2018  Document Reviewed: 09/27/2018  [employer] Patient Education © 2021 [employer]    Managing Anxiety, Adult  After being diagnosed with an anxiety disorder, you may be relieved to know why you have felt or behaved a certain way. You may also feel overwhelmed about the treatment ahead and what it will mean for your life. With care and support, you can manage this condition and recover from it.  How to manage lifestyle changes  Managing stress and anxiety    Stress is your body's reaction to life changes and events, both good and bad. Most stress will last just a few hours, but stress can be ongoing and can lead to more than just stress. Although stress can play a major role in anxiety, it is not the same as anxiety. Stress is usually caused by something external, such as a deadline, test, or competition. Stress normally passes after the triggering event has ended.   Anxiety is caused by something internal, such as imagining a terrible outcome or worrying that something will go wrong that will devastate you. Anxiety often does not go away even after the triggering event is over, and it can become long-term (chronic) worry. It is important to understand the differences between stress and anxiety and to manage your stress effectively so that it does not lead to an anxious response.  Talk with your health care provider or a counselor to learn more about reducing anxiety and stress. He or she may suggest tension reduction techniques, such as:  · Music therapy. This can include creating or listening to music that you enjoy and that inspires you.  · Mindfulness-based meditation. This involves being aware of your normal breaths while not trying to control your breathing. It can be done while sitting or walking.  · Centering prayer. This involves focusing on a word, phrase, or sacred image that means something to you and brings you peace.  · Deep breathing. To do this, expand your stomach and inhale slowly through your nose.  Hold your breath for 3-5 seconds. Then exhale slowly, letting your stomach muscles relax.  · Self-talk. This involves identifying thought patterns that lead to anxiety reactions and changing those patterns.  · Muscle relaxation. This involves tensing muscles and then relaxing them.  Choose a tension reduction technique that suits your lifestyle and personality. These techniques take time and practice. Set aside 5-15 minutes a day to do them. Therapists can offer counseling and training in these techniques. The training to help with anxiety may be covered by some insurance plans. Other things you can do to manage stress and anxiety include:  · Keeping a stress/anxiety diary. This can help you learn what triggers your reaction and then learn ways to manage your response.  · Thinking about how you react to certain situations. You may not be able to control everything, but you can control your response.  · Making time for activities that help you relax and not feeling guilty about spending your time in this way.  · Visual imagery and yoga can help you stay calm and relax.    Medicines  Medicines can help ease symptoms. Medicines for anxiety include:  · Anti-anxiety drugs.  · Antidepressants.  Medicines are often used as a primary treatment for anxiety disorder. Medicines will be prescribed by a health care provider. When used together, medicines, psychotherapy, and tension reduction techniques may be the most effective treatment.  Relationships  Relationships can play a big part in helping you recover. Try to spend more time connecting with trusted friends and family members. Consider going to couples counseling, taking family education classes, or going to family therapy. Therapy can help you and others better understand your condition.  How to recognize changes in your anxiety  Everyone responds differently to treatment for anxiety. Recovery from anxiety happens when symptoms decrease and stop interfering with your  daily activities at home or work. This may mean that you will start to:  · Have better concentration and focus. Worry will interfere less in your daily thinking.  · Sleep better.  · Be less irritable.  · Have more energy.  · Have improved memory.  It is important to recognize when your condition is getting worse. Contact your health care provider if your symptoms interfere with home or work and you feel like your condition is not improving.  Follow these instructions at home:  Activity  · Exercise. Most adults should do the following:  ? Exercise for at least 150 minutes each week. The exercise should increase your heart rate and make you sweat (moderate-intensity exercise).  ? Strengthening exercises at least twice a week.  · Get the right amount and quality of sleep. Most adults need 7-9 hours of sleep each night.  Lifestyle    · Eat a healthy diet that includes plenty of vegetables, fruits, whole grains, low-fat dairy products, and lean protein. Do not eat a lot of foods that are high in solid fats, added sugars, or salt.  · Make choices that simplify your life.  · Do not use any products that contain nicotine or tobacco, such as cigarettes, e-cigarettes, and chewing tobacco. If you need help quitting, ask your health care provider.  · Avoid caffeine, alcohol, and certain over-the-counter cold medicines. These may make you feel worse. Ask your pharmacist which medicines to avoid.  General instructions  · Take over-the-counter and prescription medicines only as told by your health care provider.  · Keep all follow-up visits as told by your health care provider. This is important.  Where to find support  You can get help and support from these sources:  · Self-help groups.  · Online and community organizations.  · A trusted spiritual leader.  · Couples counseling.  · Family education classes.  · Family therapy.  Where to find more information  You may find that joining a support group helps you deal with your  anxiety. The following sources can help you locate counselors or support groups near you:  · Mental Health [place]: www.mentalhealth[place].net  · Anxiety and Depression Association of [place] (ADAA): www.adaa.org  · National [place] on Mental Illness (AMELIE): www.amelie.org  Contact a health care provider if you:  · Have a hard time staying focused or finishing daily tasks.  · Spend many hours a day feeling worried about everyday life.  · Become exhausted by worry.  · Start to have headaches, feel tense, or have nausea.  · Urinate more than normal.  · Have diarrhea.  Get help right away if you have:  · A racing heart and shortness of breath.  · Thoughts of hurting yourself or others.  If you ever feel like you may hurt yourself or others, or have thoughts about taking your own life, get help right away. You can go to your nearest emergency department or call:  · Your local emergency services (911 in the [place]  · A suicide crisis helpline, such as the National Suicide Prevention Lifeline at 1-620.216.5687. This is open 24 hours a day.  Summary  · Taking steps to learn and use tension reduction techniques can help calm you and help prevent triggering an anxiety reaction.  · When used together, medicines, psychotherapy, and tension reduction techniques may be the most effective treatment.  · Family, friends, and partners can play a big part in helping you recover from an anxiety disorder.  This information is not intended to replace advice given to you by your health care provider. Make sure you discuss any questions you have with your health care provider.  Document Revised: 05/19/2020 Document Reviewed: 05/19/2020  [employer] Patient Education © 2021 [employer]

## 2021-07-23 NOTE — ASSESSMENT & PLAN NOTE
I discussed with her that I will put her on Restoril since she is on pain medications also.  I will give her hydroxyzine to take as needed which will help with her anxiety as well.

## 2021-08-11 ENCOUNTER — OFFICE VISIT (OUTPATIENT)
Dept: FAMILY MEDICINE CLINIC | Facility: CLINIC | Age: 41
End: 2021-08-11

## 2021-08-11 VITALS
HEIGHT: 64 IN | HEART RATE: 84 BPM | BODY MASS INDEX: 46.1 KG/M2 | SYSTOLIC BLOOD PRESSURE: 146 MMHG | OXYGEN SATURATION: 95 % | DIASTOLIC BLOOD PRESSURE: 90 MMHG | WEIGHT: 270 LBS | TEMPERATURE: 98.7 F

## 2021-08-11 DIAGNOSIS — I10 ESSENTIAL HYPERTENSION: ICD-10-CM

## 2021-08-11 DIAGNOSIS — G47.00 INSOMNIA, UNSPECIFIED TYPE: ICD-10-CM

## 2021-08-11 DIAGNOSIS — N18.31 STAGE 3A CHRONIC KIDNEY DISEASE (HCC): Primary | ICD-10-CM

## 2021-08-11 DIAGNOSIS — E83.39 SERUM PHOSPHATE ELEVATED: ICD-10-CM

## 2021-08-11 DIAGNOSIS — F41.9 ANXIETY: ICD-10-CM

## 2021-08-11 DIAGNOSIS — G89.29 CHRONIC MIDLINE THORACIC BACK PAIN: ICD-10-CM

## 2021-08-11 DIAGNOSIS — M54.6 CHRONIC MIDLINE THORACIC BACK PAIN: ICD-10-CM

## 2021-08-11 DIAGNOSIS — E55.9 VITAMIN D DEFICIENCY: ICD-10-CM

## 2021-08-11 LAB
25(OH)D3 SERPL-MCNC: 26.6 NG/ML (ref 30–100)
PHOSPHATE SERPL-MCNC: 2.4 MG/DL (ref 2.5–4.5)
T4 FREE SERPL-MCNC: 1.02 NG/DL (ref 0.93–1.7)
TSH SERPL DL<=0.05 MIU/L-ACNC: 1.88 UIU/ML (ref 0.27–4.2)

## 2021-08-11 PROCEDURE — 99214 OFFICE O/P EST MOD 30 MIN: CPT | Performed by: NURSE PRACTITIONER

## 2021-08-11 PROCEDURE — 84439 ASSAY OF FREE THYROXINE: CPT | Performed by: NURSE PRACTITIONER

## 2021-08-11 PROCEDURE — 82306 VITAMIN D 25 HYDROXY: CPT | Performed by: NURSE PRACTITIONER

## 2021-08-11 PROCEDURE — 84100 ASSAY OF PHOSPHORUS: CPT | Performed by: NURSE PRACTITIONER

## 2021-08-11 PROCEDURE — 84443 ASSAY THYROID STIM HORMONE: CPT | Performed by: NURSE PRACTITIONER

## 2021-08-11 RX ORDER — ERGOCALCIFEROL 1.25 MG/1
50000 CAPSULE ORAL WEEKLY
COMMUNITY
End: 2021-08-11 | Stop reason: SDUPTHER

## 2021-08-11 RX ORDER — ERGOCALCIFEROL 1.25 MG/1
50000 CAPSULE ORAL WEEKLY
Qty: 5 CAPSULE | Refills: 1 | Status: SHIPPED | OUTPATIENT
Start: 2021-08-11 | End: 2021-11-04 | Stop reason: SDUPTHER

## 2021-08-11 NOTE — PROGRESS NOTES
"Chief Complaint  Follow-up (3 month) and Hypertension    Subjective          Bebe Steward presents to [facility]  History of Present Illness  She is here for 3-month follow-up.  She had labs drawn on 7/23/2021.  Her BUN was 15, creatinine 1.18, EGFR 50.  She is wanting to get her phosphorus level drawn, she states she has had elevated phosphorus level in the past.  She states her father is getting dialysis and his phosphorus level is been elevated also.    Hypertension: Blood pressure stable on Norvasc 5 mg 2 tabs daily.  Blood pressure is noted to be elevated today but she states she just took her blood pressure medicine before coming to the appointment.    History of vitamin D deficiency: She currently takes vitamin D 50,000 units weekly.  An order was in the chart for vitamin D level but lab missed drawing this with her recent labs.  Also TSH did not get drawn.    History of anxiety and insomnia: She is doing well with Zoloft 50 mg daily.    She is in pain management currently but she has been having some mid/thoracic back pain.  She states that pain management told her she needed to get an MRI of her thoracic spine and have a new referral placed for the thoracic spine pain.  Objective   Vital Signs:   /90   Pulse 84   Temp 98.7 °F (37.1 °C)   Ht 162.6 cm (64\")   Wt 122 kg (270 lb)   SpO2 95%   BMI 46.35 kg/m²     Physical Exam  Vitals reviewed.   Constitutional:       Appearance: Normal appearance. She is well-developed.   Neck:      Thyroid: No thyroid mass, thyromegaly or thyroid tenderness.   Cardiovascular:      Rate and Rhythm: Normal rate and regular rhythm.      Heart sounds: No murmur heard.   No friction rub. No gallop.    Pulmonary:      Effort: Pulmonary effort is normal.      Breath sounds: Normal breath sounds. No wheezing or rhonchi.   Lymphadenopathy:      Cervical: No cervical adenopathy.   Skin:     General: Skin is warm and dry.   Neurological:    "   Mental Status: She is alert and oriented to person, place, and time.      Cranial Nerves: No cranial nerve deficit.   Psychiatric:         Mood and Affect: Mood and affect normal.         Behavior: Behavior normal.         Thought Content: Thought content normal. Thought content does not include homicidal or suicidal ideation.         Judgment: Judgment normal.        Result Review :                 Assessment and Plan    Diagnoses and all orders for this visit:    1. Stage 3a chronic kidney disease (CMS/HCC) (Primary)  Assessment & Plan:  Renal condition is Worsening slightly but fluctuates.  Continue current treatment regimen.  Continue current medications.  Renal condition will be reassessed in 3 months.    Orders:  -     Phosphorus    2. Serum phosphate elevated  -     Phosphorus    3. Chronic midline thoracic back pain  -     MRI Thoracic Spine Without Contrast; Future  -     Ambulatory Referral to Pain Management    4. Essential hypertension  Assessment & Plan:  Hypertension is Mildly elevated today due to not taking her medication until 30 minutes ago but is stable..  Continue current treatment regimen.  Continue current medications.  Ambulatory blood pressure monitoring.  Blood pressure will be reassessed in 3 months.      5. Vitamin D deficiency  Assessment & Plan:  We will check her vitamin D level today, will continue vitamin D 50,000 units weekly.    Orders:  -     Vitamin D 25 Hydroxy    6. Insomnia, unspecified type  -     TSH+Free T4    7. Anxiety  -     TSH+Free T4    Other orders  -     vitamin D (ERGOCALCIFEROL) 1.25 MG (19754 UT) capsule capsule; Take 1 capsule by mouth 1 (One) Time Per Week.  Dispense: 5 capsule; Refill: 1      Follow Up   Return in about 3 months (around 11/11/2021).  Patient was given instructions and counseling regarding her condition or for health maintenance advice. Please see specific information pulled into the AVS if appropriate.

## 2021-08-11 NOTE — ASSESSMENT & PLAN NOTE
Hypertension is Mildly elevated today due to not taking her medication until 30 minutes ago but is stable..  Continue current treatment regimen.  Continue current medications.  Ambulatory blood pressure monitoring.  Blood pressure will be reassessed in 3 months.

## 2021-08-11 NOTE — ASSESSMENT & PLAN NOTE
Renal condition is Worsening slightly but fluctuates.  Continue current treatment regimen.  Continue current medications.  Renal condition will be reassessed in 3 months.

## 2021-08-27 ENCOUNTER — HOSPITAL ENCOUNTER (OUTPATIENT)
Dept: MRI IMAGING | Facility: HOSPITAL | Age: 41
Discharge: HOME | End: 2021-08-27
Admitting: NURSE PRACTITIONER

## 2021-08-27 DIAGNOSIS — M54.6 CHRONIC MIDLINE THORACIC BACK PAIN: ICD-10-CM

## 2021-08-27 DIAGNOSIS — G89.29 CHRONIC MIDLINE THORACIC BACK PAIN: ICD-10-CM

## 2021-08-27 PROCEDURE — 72146 MRI CHEST SPINE W/O DYE: CPT

## 2021-09-13 ENCOUNTER — NURSE TRIAGE (OUTPATIENT)
Dept: CALL CENTER | Facility: HOSPITAL | Age: 41
End: 2021-09-13

## 2021-09-13 ENCOUNTER — TELEPHONE (OUTPATIENT)
Dept: FAMILY MEDICINE CLINIC | Facility: CLINIC | Age: 41
End: 2021-09-13

## 2021-09-13 NOTE — TELEPHONE ENCOUNTER
Caller feels need bp  Med adjustment.  bp has been elevated this morning was 169/104, now is down to 132/93, has not taken meds as yet. Will be calling her proviider    Reason for Disposition  • Systolic BP  >= 160 OR Diastolic >= 100    Additional Information  • Negative: Difficult to awaken or acting confused (e.g., disoriented, slurred speech)  • Negative: SEVERE difficulty breathing (e.g., struggling for each breath, speaks in single words)  • Negative: [1] Weakness of the face, arm or leg on one side of the body AND [2] new-onset  • Negative: [1] Numbness (i.e., loss of sensation) of the face, arm or leg on one side of the body AND [2] new-onset  • Negative: [1] Chest pain lasts > 5 minutes AND [2] history of heart disease  (i.e., heart attack, bypass surgery, angina, angioplasty, CHF)  • Negative: [1] Chest pain AND [2] took nitrogylcerin AND [3] pain was not relieved  • Negative: Sounds like a life-threatening emergency to the triager  • Negative: Symptom is main concern  (e.g., headache, chest pain)  • Negative: Low blood pressure is main concern  • Negative: [1] Systolic BP  >= 160 OR Diastolic >= 100 AND [2] cardiac or neurologic symptoms (e.g., chest pain, difficulty breathing, unsteady gait, blurred vision)  • Negative: [1] Pregnant 20 or more weeks (or postpartum < 6 weeks) AND [2] new hand or face swelling  • Negative: [1] Pregnant 20 or more weeks AND [2] Systolic BP  >= 140 OR Diastolic >= 90  • Negative: [1] Systolic BP  >= 200 OR Diastolic >= 120  AND [2] having NO cardiac or neurologic symptoms  • Negative: [1] Postpartum < 6 weeks AND [2] Systolic BP  >= 140 OR Diastolic >= 90  • Negative: [1] Systolic BP  >= 180 OR Diastolic >= 110 AND [2] missed most recent dose of blood pressure medication  • Negative: Systolic BP  >= 180 OR Diastolic >= 110  • Negative: Ran out of BP medications  • Negative: [1] Taking BP medications AND [2] feels is having side effects (e.g., impotence, cough, dizzy upon  "standing)    Answer Assessment - Initial Assessment Questions  1. BLOOD PRESSURE: \"What is the blood pressure?\" \"Did you take at least two measurements 5 minutes apart?\"  bp is 139/93  2. ONSET: \"When did you take your blood pressure?\"    At the time of the call  3. HOW: \"How did you obtain the blood pressure?\" (e.g., visiting nurse, automatic home BP monitor)    automatic  4. HISTORY: \"Do you have a history of high blood pressure?\"   yes  5. MEDICATIONS: \"Are you taking any medications for blood pressure?\" \"Have you missed any doses recently?\"     Yes and has not taken this morning  6. OTHER SYMPTOMS: \"Do you have any symptoms?\" (e.g., headache, chest pain, blurred vision, difficulty breathing, weakness)   none, has back pain  7. PREGNANCY: \"Is there any chance you are pregnant?\" \"When was your last menstrual period?\"    na    Protocols used: BLOOD PRESSURE - HIGH-ADULT-AH      "

## 2021-09-13 NOTE — TELEPHONE ENCOUNTER
Spoke with patient and let her know referral was sent, they will call her. She stated she called their office and has an appt on 09/29th with the APRN there. She has an appt scheduled with Francie Koch for 09/13/2021 for her blood pressure.

## 2021-09-13 NOTE — TELEPHONE ENCOUNTER
Caller: Bebe Steward    Relationship: Self    Best call back number: [phone]    What is the best time to reach you: ANYTIME    Who are you requesting to speak with (clinical staff, provider,  specific staff member): JUAN M CAGLE    Do you know the name of the person who called:     What was the call regarding: PATIENT HAS EXPRESSED THAT SHE HAS NOT HEARD FROM THE PAIN MANAGEMENT CLINIC AND  IS WONDERING IF HER PCP COULD GET SOMETHING DONE.     PATIENT ALSO EXPRESSED HIGH BLOOD PRESSURE /104. TRANSFERRED PATIENT TO NURSE TRIAGE AS THE OFFICE WAS NOT ANSWERING.     Do you require a callback: YES

## 2021-09-14 ENCOUNTER — OFFICE VISIT (OUTPATIENT)
Dept: FAMILY MEDICINE CLINIC | Facility: CLINIC | Age: 41
End: 2021-09-14

## 2021-09-14 VITALS
DIASTOLIC BLOOD PRESSURE: 88 MMHG | SYSTOLIC BLOOD PRESSURE: 164 MMHG | HEIGHT: 64 IN | BODY MASS INDEX: 47.6 KG/M2 | HEART RATE: 103 BPM | TEMPERATURE: 98.9 F | OXYGEN SATURATION: 97 % | WEIGHT: 278.8 LBS

## 2021-09-14 DIAGNOSIS — I10 ESSENTIAL HYPERTENSION: Primary | ICD-10-CM

## 2021-09-14 DIAGNOSIS — M54.50 CHRONIC LOW BACK PAIN, UNSPECIFIED BACK PAIN LATERALITY, UNSPECIFIED WHETHER SCIATICA PRESENT: ICD-10-CM

## 2021-09-14 DIAGNOSIS — G89.29 CHRONIC LOW BACK PAIN, UNSPECIFIED BACK PAIN LATERALITY, UNSPECIFIED WHETHER SCIATICA PRESENT: ICD-10-CM

## 2021-09-14 DIAGNOSIS — R60.1 GENERALIZED EDEMA: ICD-10-CM

## 2021-09-14 DIAGNOSIS — G47.00 INSOMNIA, UNSPECIFIED TYPE: ICD-10-CM

## 2021-09-14 DIAGNOSIS — N18.31 STAGE 3A CHRONIC KIDNEY DISEASE (HCC): ICD-10-CM

## 2021-09-14 PROCEDURE — 99214 OFFICE O/P EST MOD 30 MIN: CPT | Performed by: NURSE PRACTITIONER

## 2021-09-14 RX ORDER — TRAZODONE HYDROCHLORIDE 100 MG/1
TABLET ORAL
COMMUNITY
End: 2021-09-14

## 2021-09-14 RX ORDER — QUETIAPINE FUMARATE 25 MG/1
25 TABLET, FILM COATED ORAL NIGHTLY
Qty: 30 TABLET | Refills: 0 | Status: SHIPPED | OUTPATIENT
Start: 2021-09-14 | End: 2021-10-05 | Stop reason: SDUPTHER

## 2021-09-14 RX ORDER — FUROSEMIDE 20 MG/1
20 TABLET ORAL DAILY
Qty: 30 TABLET | Refills: 0 | Status: SHIPPED | OUTPATIENT
Start: 2021-09-14 | End: 2021-10-18 | Stop reason: SDUPTHER

## 2021-09-14 RX ORDER — LISINOPRIL 20 MG/1
20 TABLET ORAL DAILY
Qty: 30 TABLET | Refills: 2 | Status: SHIPPED | OUTPATIENT
Start: 2021-09-14 | End: 2021-10-05 | Stop reason: SDUPTHER

## 2021-09-14 RX ORDER — AMLODIPINE BESYLATE 5 MG/1
5 TABLET ORAL DAILY
COMMUNITY
End: 2021-10-05 | Stop reason: SDUPTHER

## 2021-09-14 NOTE — ASSESSMENT & PLAN NOTE
Back pain is worsening, patient is following with pain management.  She states she has left a message for them to return her call.

## 2021-09-14 NOTE — ASSESSMENT & PLAN NOTE
Hypertension is worsening.  Medication changes per orders.  Ambulatory blood pressure monitoring.  I will have her decrease amlodipine dose to 5 mg due to the swelling and start her on lisinopril 20 mg once daily.  Blood pressure will be reassessed In 3 weeks.

## 2021-09-14 NOTE — ASSESSMENT & PLAN NOTE
Renal condition is unchanged.  Medication changes per orders.  Renal condition will be reassessed 3 weeks.

## 2021-09-14 NOTE — ASSESSMENT & PLAN NOTE
Insomnia not controlled.  We will start her on Seroquel 25 mg nightly.  We will reevaluate on follow-up in 3 weeks.

## 2021-09-14 NOTE — PROGRESS NOTES
"Chief Complaint  Back Pain, Hypertension, Foot Swelling, and Leg Swelling    Subjective          Bebe Steward presents to [facility]  History of Present Illness  She presents today with blood pressure concerns and states that she is swelling.  She has been checking her blood pressures at home and brought a blood pressure log.  Her last 3 blood pressure readings are 169/104, 135/93 and 142/101.  She has a history of chronic kidney disease and previously her blood pressure medicine has been changed to amlodipine 5 mg twice daily.  Her last GFR was 50 on 2021.    She is complaining of worsening of her chronic back pain.  She follows with pain management and has been trying to get in touch with them but states they have not returned her call.  She also is complaining of generalized pain all over in her joints since she started having swelling.    She states she still having difficulty sleeping.  She was trialed on trazodone which did not help so she stopped taking it.  She previously had taken Restoril in the past which helped her sleep but we had discussed that due to her being on other narcotics that I do not want to start her on Restoril due to potential CNS depression.    Patient was also seen by Valerie Ruiz, NP student.    Objective   Vital Signs:   /88   Pulse 103   Temp 98.9 °F (37.2 °C)   Ht 162.6 cm (64\")   Wt 126 kg (278 lb 12.8 oz)   SpO2 97%   BMI 47.86 kg/m²     Physical Exam  Vitals reviewed.   Constitutional:       Appearance: Normal appearance. She is well-developed.   Neck:      Thyroid: No thyroid mass, thyromegaly or thyroid tenderness.   Cardiovascular:      Rate and Rhythm: Normal rate and regular rhythm.      Heart sounds: No murmur heard.   No friction rub. No gallop.    Pulmonary:      Effort: Pulmonary effort is normal.      Breath sounds: Normal breath sounds. No wheezing or rhonchi.   Musculoskeletal:      Right lower le+ Edema " present.      Left lower le+ Edema present.   Lymphadenopathy:      Cervical: No cervical adenopathy.   Skin:     General: Skin is warm and dry.   Neurological:      Mental Status: She is alert and oriented to person, place, and time.      Cranial Nerves: No cranial nerve deficit.   Psychiatric:         Mood and Affect: Mood and affect normal.         Behavior: Behavior normal.         Thought Content: Thought content normal. Thought content does not include homicidal or suicidal ideation.         Judgment: Judgment normal.        Result Review :                 Assessment and Plan    Diagnoses and all orders for this visit:    1. Essential hypertension (Primary)  Assessment & Plan:  Hypertension is worsening.  Medication changes per orders.  Ambulatory blood pressure monitoring.  I will have her decrease amlodipine dose to 5 mg due to the swelling and start her on lisinopril 20 mg once daily.  Blood pressure will be reassessed In 3 weeks.      2. Generalized edema  Assessment & Plan:  Edema newly identified, we discussed that amlodipine high-dose can sometimes cause swelling so we will decrease her dose to 5 mg daily.  Will start her on Lasix 20 mg once daily for short time, advised that she can stop taking it once her swelling has improved.      3. Stage 3a chronic kidney disease (CMS/HCC)  Assessment & Plan:  Renal condition is unchanged.  Medication changes per orders.  Renal condition will be reassessed 3 weeks.      4. Insomnia, unspecified type  Assessment & Plan:  Insomnia not controlled.  We will start her on Seroquel 25 mg nightly.  We will reevaluate on follow-up in 3 weeks.      5. Chronic low back pain, unspecified back pain laterality, unspecified whether sciatica present  Assessment & Plan:  Back pain is worsening, patient is following with pain management.  She states she has left a message for them to return her call.      Other orders  -     lisinopril (PRINIVIL,ZESTRIL) 20 MG tablet; Take 1  tablet by mouth Daily.  Dispense: 30 tablet; Refill: 2  -     Cancel: FluLaval >6 Months ([id number])  -     furosemide (Lasix) 20 MG tablet; Take 1 tablet by mouth Daily.  Dispense: 30 tablet; Refill: 0  -     QUEtiapine (SEROquel) 25 MG tablet; Take 1 tablet by mouth Every Night.  Dispense: 30 tablet; Refill: 0      Follow Up   Return in about 3 weeks (around 10/5/2021) for Recheck BP and swelling.  Patient was given instructions and counseling regarding her condition or for health maintenance advice. Please see specific information pulled into the AVS if appropriate.

## 2021-09-20 ENCOUNTER — PATIENT MESSAGE (OUTPATIENT)
Dept: FAMILY MEDICINE CLINIC | Facility: CLINIC | Age: 41
End: 2021-09-20

## 2021-09-21 RX ORDER — RIZATRIPTAN BENZOATE 10 MG/1
10 TABLET ORAL ONCE AS NEEDED
Qty: 9 TABLET | Refills: 3 | Status: SHIPPED | OUTPATIENT
Start: 2021-09-21 | End: 2022-02-28

## 2021-09-21 NOTE — TELEPHONE ENCOUNTER
From: Bebe Steward  To: GO Cullen  Sent: 2021 1:07 PM EDT  Subject: Prescription Question    Can a refill be called in for my Rizatriptan 10mg? I have 2 refills left, but they have . It was last filled on 21.

## 2021-10-05 ENCOUNTER — OFFICE VISIT (OUTPATIENT)
Dept: FAMILY MEDICINE CLINIC | Facility: CLINIC | Age: 41
End: 2021-10-05

## 2021-10-05 VITALS
HEIGHT: 64 IN | WEIGHT: 276.4 LBS | SYSTOLIC BLOOD PRESSURE: 162 MMHG | HEART RATE: 87 BPM | OXYGEN SATURATION: 98 % | TEMPERATURE: 98.6 F | DIASTOLIC BLOOD PRESSURE: 92 MMHG | BODY MASS INDEX: 47.19 KG/M2

## 2021-10-05 DIAGNOSIS — N18.31 STAGE 3A CHRONIC KIDNEY DISEASE (HCC): ICD-10-CM

## 2021-10-05 DIAGNOSIS — I10 ESSENTIAL HYPERTENSION: Primary | ICD-10-CM

## 2021-10-05 DIAGNOSIS — F51.01 PRIMARY INSOMNIA: ICD-10-CM

## 2021-10-05 PROBLEM — J01.80 OTHER ACUTE SINUSITIS: Status: RESOLVED | Noted: 2021-07-22 | Resolved: 2021-10-05

## 2021-10-05 PROCEDURE — 90686 IIV4 VACC NO PRSV 0.5 ML IM: CPT | Performed by: NURSE PRACTITIONER

## 2021-10-05 PROCEDURE — G0008 ADMIN INFLUENZA VIRUS VAC: HCPCS | Performed by: NURSE PRACTITIONER

## 2021-10-05 PROCEDURE — 99214 OFFICE O/P EST MOD 30 MIN: CPT | Performed by: NURSE PRACTITIONER

## 2021-10-05 RX ORDER — LISINOPRIL 40 MG/1
40 TABLET ORAL DAILY
Qty: 90 TABLET | Refills: 1 | Status: SHIPPED | OUTPATIENT
Start: 2021-10-05 | End: 2022-05-02

## 2021-10-05 RX ORDER — QUETIAPINE FUMARATE 50 MG/1
50 TABLET, FILM COATED ORAL NIGHTLY
Qty: 90 TABLET | Refills: 1 | Status: SHIPPED | OUTPATIENT
Start: 2021-10-05 | End: 2022-03-29

## 2021-10-05 RX ORDER — AMLODIPINE BESYLATE 5 MG/1
5 TABLET ORAL DAILY
Qty: 90 TABLET | Refills: 1 | Status: SHIPPED | OUTPATIENT
Start: 2021-10-05 | End: 2022-05-02

## 2021-10-05 RX ORDER — HYDRALAZINE HYDROCHLORIDE 25 MG/1
25 TABLET, FILM COATED ORAL 2 TIMES DAILY
Qty: 60 TABLET | Refills: 3 | Status: SHIPPED | OUTPATIENT
Start: 2021-10-05 | End: 2021-11-04 | Stop reason: SDUPTHER

## 2021-10-05 NOTE — PROGRESS NOTES
"Chief Complaint  3 month follow up and Hypertension    Subjective          Bbee Steward presents to [facility]  History of Present Illness  She presents for a 3-week follow-up on blood pressure.  She has chronic kidney disease and had to have blood pressure medications changed.  She also had swelling in lower extremities with amlodipine 10 mg so we had to decrease the dose to 5 mg.  Her lisinopril dose was increased to 40 mg.  Her blood pressure is still elevated today at 162/92.  She states at home her blood pressure has been running systolic 125-145 and diastolic 70-90.  She is taking Lasix 20 mg daily for swelling which she states has improved.    Insomnia: She states Seroquel 25 mg was working well at first but is no longer as effective.    Objective   Vital Signs:   /92   Pulse 87   Temp 98.6 °F (37 °C)   Ht 162.6 cm (64\")   Wt 125 kg (276 lb 6.4 oz)   SpO2 98%   BMI 47.44 kg/m²     Physical Exam  Vitals reviewed.   Constitutional:       Appearance: Normal appearance. She is well-developed.   Cardiovascular:      Rate and Rhythm: Normal rate and regular rhythm.      Heart sounds: No murmur heard.   No friction rub. No gallop.    Pulmonary:      Effort: Pulmonary effort is normal.      Breath sounds: Normal breath sounds. No wheezing or rhonchi.   Musculoskeletal:      Right lower leg: No edema.      Left Lower Extremity: Left leg is amputated below knee.   Skin:     General: Skin is warm and dry.   Neurological:      Mental Status: She is alert and oriented to person, place, and time.      Cranial Nerves: No cranial nerve deficit.   Psychiatric:         Mood and Affect: Mood and affect normal.         Behavior: Behavior normal.         Thought Content: Thought content normal. Thought content does not include homicidal or suicidal ideation.         Judgment: Judgment normal.        Result Review :                 Assessment and Plan    Diagnoses and all orders for " this visit:    1. Essential hypertension (Primary)  Assessment & Plan:  Hypertension is unchanged.  Medication changes per orders.  Ambulatory blood pressure monitoring.  We will add on hydralazine 25 mg twice daily, continue lisinopril 40 mg daily and amlodipine 5 mg daily.  Blood pressure will be reassessed At her next follow-up in 1 month.      2. Stage 3a chronic kidney disease (HCC)    3. Primary insomnia  Assessment & Plan:  Insomnia worsening, will increase Seroquel dose to 50 mg nightly.      Other orders  -     lisinopril (PRINIVIL,ZESTRIL) 40 MG tablet; Take 1 tablet by mouth Daily.  Dispense: 90 tablet; Refill: 1  -     amLODIPine (NORVASC) 5 MG tablet; Take 1 tablet by mouth Daily.  Dispense: 90 tablet; Refill: 1  -     hydrALAZINE (APRESOLINE) 25 MG tablet; Take 1 tablet by mouth 2 (two) times a day.  Dispense: 60 tablet; Refill: 3  -     QUEtiapine (SEROquel) 50 MG tablet; Take 1 tablet by mouth Every Night.  Dispense: 90 tablet; Refill: 1  -     FluLaval/Fluarix >6 Months ([id number])      Follow Up   Return for scheduled follow up apt on 11/12/2021.  Patient was given instructions and counseling regarding her condition or for health maintenance advice. Please see specific information pulled into the AVS if appropriate.

## 2021-10-05 NOTE — ASSESSMENT & PLAN NOTE
Hypertension is unchanged.  Medication changes per orders.  Ambulatory blood pressure monitoring.  We will add on hydralazine 25 mg twice daily, continue lisinopril 40 mg daily and amlodipine 5 mg daily.  Blood pressure will be reassessed At her next follow-up in 1 month.

## 2021-10-18 RX ORDER — FUROSEMIDE 20 MG/1
20 TABLET ORAL DAILY
Qty: 90 TABLET | Refills: 1 | Status: SHIPPED | OUTPATIENT
Start: 2021-10-18 | End: 2022-05-09

## 2021-11-05 RX ORDER — ERGOCALCIFEROL 1.25 MG/1
50000 CAPSULE ORAL WEEKLY
Qty: 5 CAPSULE | Refills: 1 | Status: SHIPPED | OUTPATIENT
Start: 2021-11-05 | End: 2021-12-27

## 2021-11-05 RX ORDER — HYDRALAZINE HYDROCHLORIDE 25 MG/1
25 TABLET, FILM COATED ORAL 3 TIMES DAILY
Qty: 60 TABLET | Refills: 3 | Status: SHIPPED | OUTPATIENT
Start: 2021-11-05 | End: 2022-03-18

## 2021-11-08 ENCOUNTER — LAB (OUTPATIENT)
Dept: FAMILY MEDICINE CLINIC | Facility: CLINIC | Age: 41
End: 2021-11-08

## 2021-11-08 DIAGNOSIS — E55.9 VITAMIN D DEFICIENCY: ICD-10-CM

## 2021-11-08 DIAGNOSIS — N18.31 STAGE 3A CHRONIC KIDNEY DISEASE (HCC): ICD-10-CM

## 2021-11-08 DIAGNOSIS — E83.39 SERUM PHOSPHATE ELEVATED: ICD-10-CM

## 2021-11-08 DIAGNOSIS — I10 ESSENTIAL HYPERTENSION: ICD-10-CM

## 2021-11-08 LAB
25(OH)D3 SERPL-MCNC: 25.6 NG/ML (ref 30–100)
ALBUMIN SERPL-MCNC: 4.4 G/DL (ref 3.5–5.2)
ALBUMIN/GLOB SERPL: 1.5 G/DL
ALP SERPL-CCNC: 101 U/L (ref 39–117)
ALT SERPL W P-5'-P-CCNC: 8 U/L (ref 1–33)
ANION GAP SERPL CALCULATED.3IONS-SCNC: 8.2 MMOL/L (ref 5–15)
AST SERPL-CCNC: 10 U/L (ref 1–32)
BASOPHILS # BLD AUTO: 0.08 10*3/MM3 (ref 0–0.2)
BASOPHILS NFR BLD AUTO: 0.8 % (ref 0–1.5)
BILIRUB SERPL-MCNC: 0.2 MG/DL (ref 0–1.2)
BUN SERPL-MCNC: 13 MG/DL (ref 6–20)
BUN/CREAT SERPL: 11.1 (ref 7–25)
CALCIUM SPEC-SCNC: 9.2 MG/DL (ref 8.6–10.5)
CHLORIDE SERPL-SCNC: 108 MMOL/L (ref 98–107)
CHOLEST SERPL-MCNC: 244 MG/DL (ref 0–200)
CO2 SERPL-SCNC: 22.8 MMOL/L (ref 22–29)
CREAT SERPL-MCNC: 1.17 MG/DL (ref 0.57–1)
DEPRECATED RDW RBC AUTO: 48.1 FL (ref 37–54)
EOSINOPHIL # BLD AUTO: 0.18 10*3/MM3 (ref 0–0.4)
EOSINOPHIL NFR BLD AUTO: 1.8 % (ref 0.3–6.2)
ERYTHROCYTE [DISTWIDTH] IN BLOOD BY AUTOMATED COUNT: 13.2 % (ref 12.3–15.4)
GFR SERPL CREATININE-BSD FRML MDRD: 50 ML/MIN/1.73
GLOBULIN UR ELPH-MCNC: 2.9 GM/DL
GLUCOSE SERPL-MCNC: 96 MG/DL (ref 65–99)
HCT VFR BLD AUTO: 42 % (ref 34–46.6)
HDLC SERPL-MCNC: 53 MG/DL (ref 40–60)
HGB BLD-MCNC: 13.2 G/DL (ref 12–15.9)
IMM GRANULOCYTES # BLD AUTO: 0.03 10*3/MM3 (ref 0–0.05)
IMM GRANULOCYTES NFR BLD AUTO: 0.3 % (ref 0–0.5)
LDLC SERPL CALC-MCNC: 148 MG/DL (ref 0–100)
LDLC/HDLC SERPL: 2.71 {RATIO}
LYMPHOCYTES # BLD AUTO: 3.1 10*3/MM3 (ref 0.7–3.1)
LYMPHOCYTES NFR BLD AUTO: 30.7 % (ref 19.6–45.3)
MCH RBC QN AUTO: 31.1 PG (ref 26.6–33)
MCHC RBC AUTO-ENTMCNC: 31.4 G/DL (ref 31.5–35.7)
MCV RBC AUTO: 98.8 FL (ref 79–97)
MONOCYTES # BLD AUTO: 0.46 10*3/MM3 (ref 0.1–0.9)
MONOCYTES NFR BLD AUTO: 4.6 % (ref 5–12)
NEUTROPHILS NFR BLD AUTO: 6.25 10*3/MM3 (ref 1.7–7)
NEUTROPHILS NFR BLD AUTO: 61.8 % (ref 42.7–76)
NRBC BLD AUTO-RTO: 0 /100 WBC (ref 0–0.2)
PHOSPHATE SERPL-MCNC: 2.7 MG/DL (ref 2.5–4.5)
PLATELET # BLD AUTO: 356 10*3/MM3 (ref 140–450)
PMV BLD AUTO: 10.3 FL (ref 6–12)
POTASSIUM SERPL-SCNC: 4.5 MMOL/L (ref 3.5–5.2)
PROT SERPL-MCNC: 7.3 G/DL (ref 6–8.5)
RBC # BLD AUTO: 4.25 10*6/MM3 (ref 3.77–5.28)
SODIUM SERPL-SCNC: 139 MMOL/L (ref 136–145)
TRIGL SERPL-MCNC: 237 MG/DL (ref 0–150)
VLDLC SERPL-MCNC: 43 MG/DL (ref 5–40)
WBC # BLD AUTO: 10.1 10*3/MM3 (ref 3.4–10.8)

## 2021-11-08 PROCEDURE — 80053 COMPREHEN METABOLIC PANEL: CPT | Performed by: NURSE PRACTITIONER

## 2021-11-08 PROCEDURE — 85025 COMPLETE CBC W/AUTO DIFF WBC: CPT | Performed by: NURSE PRACTITIONER

## 2021-11-08 PROCEDURE — 80061 LIPID PANEL: CPT | Performed by: NURSE PRACTITIONER

## 2021-11-08 PROCEDURE — 84100 ASSAY OF PHOSPHORUS: CPT | Performed by: NURSE PRACTITIONER

## 2021-11-08 PROCEDURE — 82306 VITAMIN D 25 HYDROXY: CPT | Performed by: NURSE PRACTITIONER

## 2021-11-12 ENCOUNTER — OFFICE VISIT (OUTPATIENT)
Dept: FAMILY MEDICINE CLINIC | Facility: CLINIC | Age: 41
End: 2021-11-12

## 2021-11-12 VITALS
HEIGHT: 64 IN | WEIGHT: 277.6 LBS | BODY MASS INDEX: 47.39 KG/M2 | TEMPERATURE: 98.5 F | HEART RATE: 83 BPM | OXYGEN SATURATION: 99 % | SYSTOLIC BLOOD PRESSURE: 128 MMHG | DIASTOLIC BLOOD PRESSURE: 72 MMHG

## 2021-11-12 DIAGNOSIS — E55.9 VITAMIN D DEFICIENCY: ICD-10-CM

## 2021-11-12 DIAGNOSIS — R60.1 GENERALIZED EDEMA: ICD-10-CM

## 2021-11-12 DIAGNOSIS — I10 ESSENTIAL HYPERTENSION: Primary | ICD-10-CM

## 2021-11-12 DIAGNOSIS — Z89.512 LEFT BELOW-KNEE AMPUTEE (HCC): ICD-10-CM

## 2021-11-12 DIAGNOSIS — E78.2 MIXED HYPERLIPIDEMIA: ICD-10-CM

## 2021-11-12 DIAGNOSIS — N18.31 STAGE 3A CHRONIC KIDNEY DISEASE (HCC): ICD-10-CM

## 2021-11-12 DIAGNOSIS — E66.01 CLASS 3 SEVERE OBESITY DUE TO EXCESS CALORIES WITH SERIOUS COMORBIDITY AND BODY MASS INDEX (BMI) OF 45.0 TO 49.9 IN ADULT (HCC): ICD-10-CM

## 2021-11-12 PROBLEM — E66.813 CLASS 3 SEVERE OBESITY DUE TO EXCESS CALORIES WITH BODY MASS INDEX (BMI) OF 45.0 TO 49.9 IN ADULT: Status: ACTIVE | Noted: 2021-11-12

## 2021-11-12 PROCEDURE — 99214 OFFICE O/P EST MOD 30 MIN: CPT | Performed by: NURSE PRACTITIONER

## 2021-11-12 RX ORDER — OXYCODONE HYDROCHLORIDE AND ACETAMINOPHEN 5; 325 MG/1; MG/1
1 TABLET ORAL EVERY 6 HOURS PRN
COMMUNITY
End: 2022-06-24

## 2021-11-12 RX ORDER — ATORVASTATIN CALCIUM 20 MG/1
20 TABLET, FILM COATED ORAL DAILY
Qty: 30 TABLET | Refills: 3 | Status: SHIPPED | OUTPATIENT
Start: 2021-11-12 | End: 2022-03-18

## 2021-11-12 RX ORDER — METHOCARBAMOL 500 MG/1
500 TABLET, FILM COATED ORAL 2 TIMES DAILY PRN
Qty: 180 TABLET | Refills: 1 | Status: SHIPPED | OUTPATIENT
Start: 2021-11-12 | End: 2022-04-28

## 2021-11-12 NOTE — ASSESSMENT & PLAN NOTE
Patient's (Body mass index is 47.65 kg/m².) indicates that they are morbidly obese (BMI > 40 or > 35 with obesity - related health condition) with health conditions that include hypertension and dyslipidemias . Weight is worsening. BMI is is above average; BMI management plan is completed. We discussed portion control and increasing exercise.

## 2021-11-12 NOTE — PROGRESS NOTES
"Chief Complaint  Hypertension    Subjective          Bebe Steward presents to [facility]  History of Present Illness  She presents today for 3-month follow-up.    Hypertension: Her blood pressure is much improved at 128/72 today.  She is on multiple medications to include amlodipine, lisinopril, and hydralazine.  She states the edema in her legs is much improved also.  She takes Lasix 20 mg daily.  She had her labs drawn on 11/8/2021 and they were reviewed with the patient today.  It is noted that her cholesterol level is elevated, total cholesterol 244, triglycerides 237, HDL 53, , VLDL 43.  She is not currently on any statin medication.  All her other labs are stable.  She has chronic kidney disease and her GFR stable at 50.    She has a history of chronic back pain and follows with pain management.  She needs a refill on Robaxin.    Obesity: She is complaining of weight gain and difficulty losing weight.  She is a left below the knee amputee.  She states the pain management gave her some low impact exercises to do.  She states she does not feel like she eats enough food but is wanting some help with her diet.    Vitamin D deficiency: Her vitamin D is stable at 25, on vitamin D 50,000 units weekly.  Objective   Vital Signs:   /72   Pulse 83   Temp 98.5 °F (36.9 °C)   Ht 162.6 cm (64\")   Wt 126 kg (277 lb 9.6 oz)   SpO2 99%   BMI 47.65 kg/m²     Physical Exam  Vitals reviewed.   Constitutional:       Appearance: Normal appearance. She is well-developed. She is obese.   Neck:      Thyroid: No thyroid mass, thyromegaly or thyroid tenderness.   Cardiovascular:      Rate and Rhythm: Normal rate and regular rhythm.      Heart sounds: No murmur heard.  No friction rub. No gallop.    Pulmonary:      Effort: Pulmonary effort is normal.      Breath sounds: Normal breath sounds. No wheezing or rhonchi.   Lymphadenopathy:      Cervical: No cervical adenopathy.   Skin:     " General: Skin is warm and dry.   Neurological:      Mental Status: She is alert and oriented to person, place, and time.      Cranial Nerves: No cranial nerve deficit.   Psychiatric:         Mood and Affect: Mood and affect normal.         Behavior: Behavior normal.         Thought Content: Thought content normal. Thought content does not include homicidal or suicidal ideation.         Judgment: Judgment normal.        Result Review :     CMP    CMP 5/11/21 7/23/21 11/8/21   Glucose 79 99 96   BUN 17 15 13   Creatinine 1.17 (A) 1.18 (A) 1.17 (A)   eGFR Non African Am  50 (A) 50 (A)   Sodium 138 135 (A) 139   Potassium 4.4 4.6 4.5   Chloride 103 101 108 (A)   Calcium 8.9 9.0 9.2   Albumin 4.1  4.40   Total Bilirubin 0.19 (A)  0.2   Alkaline Phosphatase 103 (A)  101   AST (SGOT) 11 (A)  10   ALT (SGPT) 10  8   (A) Abnormal value            Lipid Panel    Lipid Panel 5/11/21 11/8/21   Total Cholesterol  244 (A)   Total Cholesterol 222 (A)    Triglycerides 91 237 (A)   HDL Cholesterol 79 (A) 53   VLDL Cholesterol 18 43 (A)   LDL Cholesterol  125 (A) 148 (A)   LDL/HDL Ratio  2.71   (A) Abnormal value       Comments are available for some flowsheets but are not being displayed.                     Assessment and Plan    Diagnoses and all orders for this visit:    1. Essential hypertension (Primary)  Assessment & Plan:  Hypertension is improving with treatment.  Continue current treatment regimen.  Continue current medications.  Ambulatory blood pressure monitoring.  Blood pressure will be reassessed in 3 months.    Orders:  -     Comprehensive Metabolic Panel; Future  -     Lipid Panel; Future    2. Mixed hyperlipidemia  Assessment & Plan:  Lipid abnormalities are newly identified.  Nutritional counseling was provided. and Pharmacotherapy as ordered.  Lipids will be reassessed in 3 months.    Orders:  -     Comprehensive Metabolic Panel; Future  -     Lipid Panel; Future    3. Class 3 severe obesity due to excess calories  with serious comorbidity and body mass index (BMI) of 45.0 to 49.9 in adult ([facility])  Assessment & Plan:  Patient's (Body mass index is 47.65 kg/m².) indicates that they are morbidly obese (BMI > 40 or > 35 with obesity - related health condition) with health conditions that include hypertension and dyslipidemias . Weight is worsening. BMI is is above average; BMI management plan is completed. We discussed portion control and increasing exercise.       4. Generalized edema  Assessment & Plan:  Edema is improving with decreasing dose of amlodipine and Lasix 20 mg daily.      5. Vitamin D deficiency  Assessment & Plan:  Vitamin D is stable, will continue current dose of vitamin D.    Orders:  -     Vitamin D 25 Hydroxy; Future    6. Stage 3a chronic kidney disease (HCC)  Assessment & Plan:  Renal condition is unchanged.  Continue current treatment regimen.  Renal condition will be reassessed in 3 months.    Orders:  -     Comprehensive Metabolic Panel; Future  -     Phosphorus; Future    7. Left below-knee amputee (HCC)    Other orders  -     atorvastatin (LIPITOR) 20 MG tablet; Take 1 tablet by mouth Daily.  Dispense: 30 tablet; Refill: 3  -     methocarbamol (ROBAXIN) 500 MG tablet; Take 1 tablet by mouth 2 (Two) Times a Day As Needed for Muscle Spasms.  Dispense: 180 tablet; Refill: 1      Follow Up   Return in about 3 months (around 2/12/2022) for Next scheduled follow up.  Patient was given instructions and counseling regarding her condition or for health maintenance advice. Please see specific information pulled into the AVS if appropriate.

## 2021-11-12 NOTE — ASSESSMENT & PLAN NOTE
Lipid abnormalities are newly identified.  Nutritional counseling was provided. and Pharmacotherapy as ordered.  Lipids will be reassessed in 3 months.

## 2021-11-12 NOTE — ASSESSMENT & PLAN NOTE
Hypertension is improving with treatment.  Continue current treatment regimen.  Continue current medications.  Ambulatory blood pressure monitoring.  Blood pressure will be reassessed in 3 months.

## 2021-12-08 RX ORDER — LORATADINE 10 MG/1
TABLET ORAL
Qty: 30 TABLET | Refills: 1 | Status: SHIPPED | OUTPATIENT
Start: 2021-12-08 | End: 2022-02-28

## 2021-12-27 RX ORDER — ERGOCALCIFEROL 1.25 MG/1
CAPSULE ORAL
Qty: 4 CAPSULE | Refills: 0 | Status: SHIPPED | OUTPATIENT
Start: 2021-12-27 | End: 2022-02-01

## 2021-12-30 ENCOUNTER — TELEPHONE (OUTPATIENT)
Dept: FAMILY MEDICINE CLINIC | Facility: CLINIC | Age: 41
End: 2021-12-30

## 2021-12-30 ENCOUNTER — HOSPITAL ENCOUNTER (EMERGENCY)
Facility: HOSPITAL | Age: 41
Discharge: HOME | End: 2021-12-30
Attending: EMERGENCY MEDICINE | Admitting: EMERGENCY MEDICINE

## 2021-12-30 VITALS
HEIGHT: 65 IN | HEART RATE: 104 BPM | OXYGEN SATURATION: 100 % | BODY MASS INDEX: 43.93 KG/M2 | RESPIRATION RATE: 18 BRPM | SYSTOLIC BLOOD PRESSURE: 131 MMHG | WEIGHT: 263.67 LBS | TEMPERATURE: 98 F | DIASTOLIC BLOOD PRESSURE: 69 MMHG

## 2021-12-30 DIAGNOSIS — G56.02 ACUTE CARPAL TUNNEL SYNDROME OF LEFT WRIST: Primary | ICD-10-CM

## 2021-12-30 PROCEDURE — 99283 EMERGENCY DEPT VISIT LOW MDM: CPT

## 2021-12-30 PROCEDURE — 25010000002 DEXAMETHASONE PER 1 MG: Performed by: NURSE PRACTITIONER

## 2021-12-30 PROCEDURE — 96372 THER/PROPH/DIAG INJ SC/IM: CPT

## 2021-12-30 RX ORDER — DEXAMETHASONE SODIUM PHOSPHATE 10 MG/ML
10 INJECTION INTRAMUSCULAR; INTRAVENOUS ONCE
Status: COMPLETED | OUTPATIENT
Start: 2021-12-30 | End: 2021-12-30

## 2021-12-30 RX ADMIN — DEXAMETHASONE SODIUM PHOSPHATE 10 MG: 10 INJECTION INTRAMUSCULAR; INTRAVENOUS at 12:57

## 2021-12-30 NOTE — TELEPHONE ENCOUNTER
Caller: Bebe Steward    Relationship to patient: Self    Best call back number: [phone]    Chief complaint: SUDDEN PAIN AND NUMBING IN  LEFT HAND AND FINGERS    Patient directed to call 911 or go to their nearest emergency room.     Patient verbalized understanding: [x] Yes  [] No  If no, why?    Additional notes:THE PATIENT STATED SHE NOTICED PAIN AND THEN NUMBING AND TINGLING IN HER LEFT HAND ON 12/27/2021. SHE STATED NOW HER LEFT THUMB AND INDEX FINGER ARE NUMB/TINGLING. PATIENT STATED SHE WILL BE GOING TO [facility]

## 2022-01-03 RX ORDER — HYDROXYZINE HYDROCHLORIDE 25 MG/1
TABLET, FILM COATED ORAL
Qty: 90 TABLET | Refills: 1 | Status: SHIPPED | OUTPATIENT
Start: 2022-01-03 | End: 2022-03-29

## 2022-01-10 ENCOUNTER — OFFICE VISIT (OUTPATIENT)
Dept: FAMILY MEDICINE CLINIC | Facility: CLINIC | Age: 42
End: 2022-01-10

## 2022-01-10 VITALS
WEIGHT: 279.2 LBS | TEMPERATURE: 97.2 F | HEART RATE: 108 BPM | DIASTOLIC BLOOD PRESSURE: 82 MMHG | OXYGEN SATURATION: 98 % | BODY MASS INDEX: 46.52 KG/M2 | HEIGHT: 65 IN | SYSTOLIC BLOOD PRESSURE: 134 MMHG

## 2022-01-10 DIAGNOSIS — M54.41 ACUTE MIDLINE LOW BACK PAIN WITH RIGHT-SIDED SCIATICA: ICD-10-CM

## 2022-01-10 DIAGNOSIS — G56.02 CARPAL TUNNEL SYNDROME OF LEFT WRIST: Primary | ICD-10-CM

## 2022-01-10 DIAGNOSIS — Z98.1 HISTORY OF LUMBAR SPINAL FUSION: ICD-10-CM

## 2022-01-10 PROCEDURE — 99214 OFFICE O/P EST MOD 30 MIN: CPT | Performed by: NURSE PRACTITIONER

## 2022-01-10 NOTE — PROGRESS NOTES
"Chief Complaint  Back Pain and Wrist Pain    Subjective          Bebe Steward presents to [facility]  History of Present Illness   41-year-old female presents today for follow-up from [facility] emergency room visit on 12/30/2021.  She was having left thumb and finger numbness and tingling for a few weeks, no known injury.  She states the numbness and tingling have been waxing and waning.  She states they gave her hard wrist splint to wear and she states that it has improved.    She states she has been having increasing amount of lower back pain that is radiating to her right hip and leg.  She states that on 12/2/2021 her shower chair broke and she fell backwards, landed on her shoulders but her back was twisted at the time.  She follows with pain management for chronic back pain.  She has had a lumbar fusion in the past.  She is wanting to get an MRI because her pain is not getting any better, and complains, in fact, that it is getting worse.  Objective   Vital Signs:   /82   Pulse 108   Temp 97.2 °F (36.2 °C)   Ht 165.1 cm (65\")   Wt 127 kg (279 lb 3.2 oz)   SpO2 98%   BMI 46.46 kg/m²     Physical Exam  Vitals reviewed.   Constitutional:       Appearance: Normal appearance. She is well-developed.   Neck:      Thyroid: No thyroid mass, thyromegaly or thyroid tenderness.   Cardiovascular:      Rate and Rhythm: Normal rate and regular rhythm.      Heart sounds: No murmur heard.  No friction rub. No gallop.    Pulmonary:      Effort: Pulmonary effort is normal.      Breath sounds: Normal breath sounds. No wheezing or rhonchi.   Musculoskeletal:      Lumbar back: No spasms or tenderness.      Left Lower Extremity: Left leg is amputated below ankle.   Lymphadenopathy:      Cervical: No cervical adenopathy.   Skin:     General: Skin is warm and dry.   Neurological:      Mental Status: She is alert and oriented to person, place, and time.      Cranial Nerves: No " cranial nerve deficit.   Psychiatric:         Mood and Affect: Mood and affect normal.         Behavior: Behavior normal.         Thought Content: Thought content normal. Thought content does not include homicidal or suicidal ideation.         Judgment: Judgment normal.        Result Review :                 Assessment and Plan    Diagnoses and all orders for this visit:    1. Carpal tunnel syndrome of left wrist (Primary)  Assessment & Plan:  Numbness and tingling pain of left 3 digits of left hand improving with wearing of cock-up splint brace.  Discussed with her to continue wearing the brace as needed but the follow-up if any worsening or not improving.      2. Acute midline low back pain with right-sided sciatica  Comments:  Worsening of back pain, history of back surgery, will order MRI.  Patient is following with pain management at this time.  Orders:  -     MRI Lumbar Spine Without Contrast; Future    3. History of lumbar spinal fusion  -     MRI Lumbar Spine Without Contrast; Future      Follow Up   Return if symptoms worsen or fail to improve, for keep scheduled f/u on 2/14/22.  Patient was given instructions and counseling regarding her condition or for health maintenance advice. Please see specific information pulled into the AVS if appropriate.

## 2022-01-10 NOTE — ASSESSMENT & PLAN NOTE
Numbness and tingling pain of left 3 digits of left hand improving with wearing of cock-up splint brace.  Discussed with her to continue wearing the brace as needed but the follow-up if any worsening or not improving.

## 2022-01-24 ENCOUNTER — HOSPITAL ENCOUNTER (OUTPATIENT)
Dept: MRI IMAGING | Facility: HOSPITAL | Age: 42
Discharge: HOME | End: 2022-01-24
Admitting: NURSE PRACTITIONER

## 2022-01-24 ENCOUNTER — PATIENT MESSAGE (OUTPATIENT)
Dept: FAMILY MEDICINE CLINIC | Facility: CLINIC | Age: 42
End: 2022-01-24

## 2022-01-24 DIAGNOSIS — M54.41 ACUTE MIDLINE LOW BACK PAIN WITH RIGHT-SIDED SCIATICA: ICD-10-CM

## 2022-01-24 DIAGNOSIS — Z98.1 HISTORY OF LUMBAR SPINAL FUSION: ICD-10-CM

## 2022-01-24 PROCEDURE — 72148 MRI LUMBAR SPINE W/O DYE: CPT

## 2022-02-01 RX ORDER — ERGOCALCIFEROL 1.25 MG/1
CAPSULE ORAL
Qty: 13 CAPSULE | Refills: 1 | Status: SHIPPED | OUTPATIENT
Start: 2022-02-01 | End: 2022-07-06

## 2022-02-10 ENCOUNTER — LAB (OUTPATIENT)
Dept: FAMILY MEDICINE CLINIC | Facility: CLINIC | Age: 42
End: 2022-02-10

## 2022-02-10 DIAGNOSIS — E78.2 MIXED HYPERLIPIDEMIA: ICD-10-CM

## 2022-02-10 DIAGNOSIS — E55.9 VITAMIN D DEFICIENCY: ICD-10-CM

## 2022-02-10 DIAGNOSIS — I10 ESSENTIAL HYPERTENSION: ICD-10-CM

## 2022-02-10 DIAGNOSIS — N18.31 STAGE 3A CHRONIC KIDNEY DISEASE: ICD-10-CM

## 2022-02-10 PROCEDURE — 84100 ASSAY OF PHOSPHORUS: CPT | Performed by: NURSE PRACTITIONER

## 2022-02-10 PROCEDURE — 80061 LIPID PANEL: CPT | Performed by: NURSE PRACTITIONER

## 2022-02-10 PROCEDURE — 82306 VITAMIN D 25 HYDROXY: CPT | Performed by: NURSE PRACTITIONER

## 2022-02-10 PROCEDURE — 80053 COMPREHEN METABOLIC PANEL: CPT | Performed by: NURSE PRACTITIONER

## 2022-02-11 LAB
25(OH)D3 SERPL-MCNC: 32.4 NG/ML (ref 30–100)
ALBUMIN SERPL-MCNC: 4.3 G/DL (ref 3.5–5.2)
ALBUMIN/GLOB SERPL: 1.4 G/DL
ALP SERPL-CCNC: 101 U/L (ref 39–117)
ALT SERPL W P-5'-P-CCNC: 13 U/L (ref 1–33)
ANION GAP SERPL CALCULATED.3IONS-SCNC: 14 MMOL/L (ref 5–15)
AST SERPL-CCNC: 19 U/L (ref 1–32)
BILIRUB SERPL-MCNC: <0.2 MG/DL (ref 0–1.2)
BUN SERPL-MCNC: 24 MG/DL (ref 6–20)
BUN/CREAT SERPL: 16.8 (ref 7–25)
CALCIUM SPEC-SCNC: 9.1 MG/DL (ref 8.6–10.5)
CHLORIDE SERPL-SCNC: 102 MMOL/L (ref 98–107)
CHOLEST SERPL-MCNC: 137 MG/DL (ref 0–200)
CO2 SERPL-SCNC: 20 MMOL/L (ref 22–29)
CREAT SERPL-MCNC: 1.43 MG/DL (ref 0.57–1)
GFR SERPL CREATININE-BSD FRML MDRD: 40 ML/MIN/1.73
GLOBULIN UR ELPH-MCNC: 3.1 GM/DL
GLUCOSE SERPL-MCNC: 113 MG/DL (ref 65–99)
HDLC SERPL-MCNC: 50 MG/DL (ref 40–60)
LDLC SERPL CALC-MCNC: 62 MG/DL (ref 0–100)
LDLC/HDLC SERPL: 1.15 {RATIO}
PHOSPHATE SERPL-MCNC: 3.5 MG/DL (ref 2.5–4.5)
POTASSIUM SERPL-SCNC: 4.1 MMOL/L (ref 3.5–5.2)
PROT SERPL-MCNC: 7.4 G/DL (ref 6–8.5)
SODIUM SERPL-SCNC: 136 MMOL/L (ref 136–145)
TRIGL SERPL-MCNC: 147 MG/DL (ref 0–150)
VLDLC SERPL-MCNC: 25 MG/DL (ref 5–40)

## 2022-02-14 ENCOUNTER — OFFICE VISIT (OUTPATIENT)
Dept: FAMILY MEDICINE CLINIC | Facility: CLINIC | Age: 42
End: 2022-02-14

## 2022-02-14 VITALS
BODY MASS INDEX: 46.3 KG/M2 | HEIGHT: 64 IN | TEMPERATURE: 98.1 F | SYSTOLIC BLOOD PRESSURE: 130 MMHG | OXYGEN SATURATION: 99 % | DIASTOLIC BLOOD PRESSURE: 78 MMHG | HEART RATE: 104 BPM | WEIGHT: 271.2 LBS

## 2022-02-14 DIAGNOSIS — E78.2 MIXED HYPERLIPIDEMIA: ICD-10-CM

## 2022-02-14 DIAGNOSIS — K59.00 CONSTIPATION, UNSPECIFIED CONSTIPATION TYPE: ICD-10-CM

## 2022-02-14 DIAGNOSIS — E55.9 VITAMIN D DEFICIENCY: ICD-10-CM

## 2022-02-14 DIAGNOSIS — I10 ESSENTIAL HYPERTENSION: ICD-10-CM

## 2022-02-14 DIAGNOSIS — N18.32 STAGE 3B CHRONIC KIDNEY DISEASE: Primary | ICD-10-CM

## 2022-02-14 PROBLEM — Z79.4 ENCOUNTER FOR LONG-TERM (CURRENT) USE OF INSULIN: Status: ACTIVE | Noted: 2021-12-07

## 2022-02-14 PROCEDURE — 99214 OFFICE O/P EST MOD 30 MIN: CPT | Performed by: NURSE PRACTITIONER

## 2022-02-14 RX ORDER — DOCUSATE SODIUM 100 MG/1
100 CAPSULE, LIQUID FILLED ORAL 2 TIMES DAILY PRN
Qty: 60 CAPSULE | Refills: 5 | Status: SHIPPED | OUTPATIENT
Start: 2022-02-14 | End: 2022-05-16 | Stop reason: SDUPTHER

## 2022-02-14 RX ORDER — OXYCODONE HYDROCHLORIDE AND ACETAMINOPHEN 5; 325 MG/1; MG/1
TABLET ORAL EVERY 8 HOURS
COMMUNITY
End: 2022-02-14 | Stop reason: SDUPTHER

## 2022-02-14 RX ORDER — GABAPENTIN 300 MG/1
CAPSULE ORAL EVERY 12 HOURS
COMMUNITY
End: 2022-02-14 | Stop reason: SDUPTHER

## 2022-02-14 NOTE — ASSESSMENT & PLAN NOTE
Vitamin D is slowly improving, will continue vitamin D 50,000 units weekly.  We will plan to recheck vitamin D with next labs.

## 2022-02-14 NOTE — ASSESSMENT & PLAN NOTE
Renal condition is worsening.  Continue current treatment regimen.  Referral to nephrology, Dr. Miranda per patient's request.  Renal condition will be reassessed in 3 months.

## 2022-02-14 NOTE — ASSESSMENT & PLAN NOTE
Constipation is newly identified, will start her on Colace twice daily as needed.  Advised to increase water intake and decrease iced tea.

## 2022-02-14 NOTE — PROGRESS NOTES
"Chief Complaint  Hypertension    Subjective          Bebe Steward presents to [facility]  History of Present Illness   41-year-old female, left below the knee amputee, presents today for 3-month follow-up.    History of chronic kidney disease: Her EGFR did decrease to 40.  She is very concerned about chronic kidney disease because her father also has chronic kidney disease and is on dialysis.  She has never seen nephrology.    Hyperlipidemia: She was started on atorvastatin 20 mg 3 months ago.  Her cholesterol levels have improved to normal.    Vitamin D deficiency: Her vitamin D level is starting to improve but is still low normal.  She is on vitamin D 50,000 units weekly.    Hypertension: Blood pressure stable 130/78 on amlodipine 5 mg daily, hydralazine 25 mg 3 times a day and lisinopril 40 mg daily.    She is complaining of chronic constipation.  She states that her stools have been hard and she would like to get a stool softener.  She is on chronic pain medications.    Objective   Vital Signs:   /78   Pulse 104   Temp 98.1 °F (36.7 °C)   Ht 162.6 cm (64\")   Wt 123 kg (271 lb 3.2 oz)   SpO2 99%   BMI 46.55 kg/m²     Physical Exam  Vitals reviewed.   Constitutional:       Appearance: Normal appearance. She is well-developed.   Neck:      Thyroid: No thyroid mass, thyromegaly or thyroid tenderness.   Cardiovascular:      Rate and Rhythm: Normal rate and regular rhythm.      Heart sounds: No murmur heard.  No friction rub. No gallop.    Pulmonary:      Effort: Pulmonary effort is normal.      Breath sounds: Normal breath sounds. No wheezing or rhonchi.   Musculoskeletal:      Left Lower Extremity: Left leg is amputated below knee.   Lymphadenopathy:      Cervical: No cervical adenopathy.   Skin:     General: Skin is warm and dry.   Neurological:      Mental Status: She is alert and oriented to person, place, and time.      Cranial Nerves: No cranial nerve deficit. "   Psychiatric:         Mood and Affect: Mood and affect normal.         Behavior: Behavior normal.         Thought Content: Thought content normal. Thought content does not include homicidal or suicidal ideation.         Judgment: Judgment normal.        Result Review :     CMP    CMP 7/23/21 11/8/21 2/10/22   Glucose 99 96 113 (A)   BUN 15 13 24 (A)   Creatinine 1.18 (A) 1.17 (A) 1.43 (A)   eGFR Non African Am 50 (A) 50 (A) 40 (A)   Sodium 135 (A) 139 136   Potassium 4.6 4.5 4.1   Chloride 101 108 (A) 102   Calcium 9.0 9.2 9.1   Albumin  4.40 4.30   Total Bilirubin  0.2 <0.2   Alkaline Phosphatase  101 101   AST (SGOT)  10 19   ALT (SGPT)  8 13   (A) Abnormal value            Lipid Panel    Lipid Panel 5/11/21 11/8/21 2/10/22   Total Cholesterol  244 (A) 137   Total Cholesterol 222 (A)     Triglycerides 91 237 (A) 147   HDL Cholesterol 79 (A) 53 50   VLDL Cholesterol 18 43 (A) 25   LDL Cholesterol  125 (A) 148 (A) 62   LDL/HDL Ratio  2.71 1.15   (A) Abnormal value       Comments are available for some flowsheets but are not being displayed.                     Assessment and Plan    Diagnoses and all orders for this visit:    1. Stage 3b chronic kidney disease (HCC) (Primary)  Assessment & Plan:  Renal condition is worsening.  Continue current treatment regimen.  Referral to nephrology, Dr. Miranda per patient's request.  Renal condition will be reassessed in 3 months.    Orders:  -     Ambulatory Referral to Nephrology  -     Comprehensive Metabolic Panel; Future  -     Vitamin D 25 Hydroxy; Future  -     Phosphorus; Future    2. Constipation, unspecified constipation type  Assessment & Plan:  Constipation is newly identified, will start her on Colace twice daily as needed.  Advised to increase water intake and decrease iced tea.      3. Mixed hyperlipidemia  Assessment & Plan:  Lipid abnormalities are improving with treatment.  Pharmacotherapy as ordered.  Lipids will be reassessed in 6 months.      4. Vitamin D  deficiency  Assessment & Plan:  Vitamin D is slowly improving, will continue vitamin D 50,000 units weekly.  We will plan to recheck vitamin D with next labs.    Orders:  -     Vitamin D 25 Hydroxy; Future    5. Essential hypertension  Assessment & Plan:  Hypertension is improving with treatment.  Continue current treatment regimen.  Continue current medications.  Blood pressure will be reassessed in 3 months.      Other orders  -     docusate sodium (Colace) 100 MG capsule; Take 1 capsule by mouth 2 (Two) Times a Day As Needed for Constipation.  Dispense: 60 capsule; Refill: 5      Follow Up   Return in about 3 months (around 5/14/2022) for Next scheduled follow up.  Patient was given instructions and counseling regarding her condition or for health maintenance advice. Please see specific information pulled into the AVS if appropriate.

## 2022-02-14 NOTE — PATIENT INSTRUCTIONS
Chronic Constipation  Chronic constipation is a condition in which a person has three or fewer bowel movements a week, for 3 months or longer. This condition is especially common in older adults.  What are the causes?  Causes of chronic constipation may include:  · Not drinking enough fluid, eating enough food or fiber, or getting enough physical activity.  · Pregnancy.  · A tear in the anus (anal fissure).  · Blockage in the bowel (bowel obstruction).  · Narrowing of the bowel (bowel stricture).  · Having a long-term medical condition, such as:  ? Diabetes, hypothyroidism, or iron-deficiency anemia.  ? Stroke or spinal cord injury.  ? Multiple sclerosis or Parkinson's disease.  ? Colon cancer.  ? Dementia.  ? Inflammatory bowel disease (IBD), outward collapse of the rectum (rectal prolapse), or hemorrhoids.  · Taking certain medicines, including:  ? Narcotics. These are a certain type of prescription pain medicine.  ? Antacids or iron supplements.  ? Water pills (diuretics).  ? Certain blood pressure medicines.  ? Anti-seizure medicines.  ? Antidepressants.  ? Medicines for Parkinson's disease.  Other causes of this condition may include:  · Stress.  · Problems in the nerves and muscles that control the movement of stool.  · Weak or impaired pelvic floor muscles.  What increases the risk?  You may be at higher risk for chronic constipation if:  · You are older than age 70.  · You are female.  · You live in a long-term care facility.  · You have a long-term disease.  · You have a mental health disorder or eating disorder.  What are the signs or symptoms?  The main symptom of chronic constipation is having three or fewer bowel movements a week for several weeks. Other signs and symptoms may vary from person to person. These include:  · Pushing hard (straining) to pass stool, or having hard or lumpy stools.  · Painful bowel movements.  · Having lower abdominal discomfort, such as cramps or bloating.  · Being unable to  have a bowel movement when you feel the urge, or feeling like you still need to pass stool after a bowel movement.  · Feeling that you have something in your rectum that is blocking or preventing bowel movements.  · Seeing blood on the toilet paper or in your stool.  · Worsening confusion (in older adults).  How is this diagnosed?  This condition may be diagnosed based on:  · Your symptoms and medical history. You will be asked about your symptoms, lifestyle, diet, and any medicines that you are taking.  · A physical exam.  ? Your abdomen will be examined.  ? A digital rectal exam may be done. For this exam, a health care provider places a lubricated, gloved finger into the rectum.  · Tests to check for any underlying causes of your constipation. These may be ordered if you have bleeding in your rectum, weight loss, or a family history of colon cancer. In these cases, you may have:  ? Imaging studies of the colon. These may include X-ray, ultrasound, or a CT scan.  ? Blood tests.  ? A procedure to examine the inside of your colon (colonoscopy).  ? More specialized tests to check:  § Whether your anal sphincter works well. This is a ring-shaped muscle that controls the closing of the anus.  § How well food moves through your colon.  ? Tests to measure the nerve signal in your pelvic floor muscles (electromyography).  How is this treated?  Treatment for chronic constipation depends on the cause. Most often, treatment starts with:  · Being more active and getting regular exercise.  · Drinking more fluids.  · Adding fiber to your diet. Sources of fiber include fruits, vegetables, whole grains, and fiber supplements.  · Using medicines such as stool softeners or medicines that increase contractions in your digestive system (pro-motility agents).  · Training your pelvic muscles with biofeedback.  · Surgery, if there is obstruction.  Treatment may also include:  · Stopping or changing some medicines if they cause  constipation.  · Using a fiber supplement (bulk laxative) or stool softener.  · Using a prescription laxative. This works by absorbing water into your colon (osmotic laxative).  You may also need to see a specialist who treats conditions of the digestive system (gastroenterologist).  Follow these instructions at home:  Medicines  · Take over-the-counter and prescription medicines only as told by your health care provider.  · If you are taking a laxative, take it as told by your health care provider.  Eating and drinking    · Eat a balanced diet that includes enough fiber. Ask your health care provider to recommend a diet that is right for you.  · Drink clear fluids, especially water. Avoid drinking alcohol, caffeine, and soda. These can make constipation worse.  · Drink enough fluid to keep your urine pale yellow.    General instructions  · Get some physical activity every day. Ask your health care provider what activities are safe for you.  · Get colon cancer screenings as told by your health care provider.  · Keep all follow-up visits as told by your health care provider. This is important.  Contact a health care provider if you have:  · Three or fewer bowel movements a week.  · Stools that are hard or lumpy.  · Blood on the toilet paper or in your stool after you have a bowel movement.  · Unexplained weight loss.  · Rectum (rectal) pain.  · Stool leakage.  · Nausea or vomiting.  Get help right away if you have:  · Rectal bleeding or you pass blood clots.  · Severe rectal pain.  · Body tissue that pushes out (protrudes) from your anus.  · Severe pain or bloating (distension) in your abdomen.  · Vomiting that you cannot control.  Summary  · Chronic constipation is a condition in which a person has three or fewer bowel movements a week, for 3 months or longer.  · You may have a higher risk for this condition if you are an older adult, you are female, or you have a long-term disease.  · Treatment for this condition  depends on the cause. Most treatments for chronic constipation include adding fiber to your diet, drinking more fluids, and getting more physical activity. You may also need to treat any underlying medical conditions or stop or change certain medicines if they cause constipation.  · If lifestyle changes do not relieve constipation, your health care provider may recommend taking a laxative.  This information is not intended to replace advice given to you by your health care provider. Make sure you discuss any questions you have with your health care provider.  Document Revised: 11/04/2020 Document Reviewed: 11/04/2020  Elsevier Patient Education © 2021 [employer]

## 2022-02-28 RX ORDER — LORATADINE 10 MG/1
TABLET ORAL
Qty: 30 TABLET | Refills: 1 | Status: SHIPPED | OUTPATIENT
Start: 2022-02-28 | End: 2022-05-04 | Stop reason: SDUPTHER

## 2022-02-28 RX ORDER — RIZATRIPTAN BENZOATE 10 MG/1
TABLET ORAL
Qty: 9 TABLET | Refills: 3 | Status: SHIPPED | OUTPATIENT
Start: 2022-02-28 | End: 2022-05-16 | Stop reason: SDUPTHER

## 2022-03-18 RX ORDER — HYDRALAZINE HYDROCHLORIDE 25 MG/1
TABLET, FILM COATED ORAL
Qty: 60 TABLET | Refills: 3 | Status: SHIPPED | OUTPATIENT
Start: 2022-03-18 | End: 2022-04-08 | Stop reason: SDUPTHER

## 2022-03-18 RX ORDER — ATORVASTATIN CALCIUM 20 MG/1
TABLET, FILM COATED ORAL
Qty: 30 TABLET | Refills: 3 | Status: SHIPPED | OUTPATIENT
Start: 2022-03-18 | End: 2022-05-16 | Stop reason: SDUPTHER

## 2022-03-29 RX ORDER — HYDROXYZINE HYDROCHLORIDE 25 MG/1
TABLET, FILM COATED ORAL
Qty: 90 TABLET | Refills: 1 | Status: SHIPPED | OUTPATIENT
Start: 2022-03-29 | End: 2022-07-05

## 2022-03-29 RX ORDER — QUETIAPINE FUMARATE 50 MG/1
TABLET, FILM COATED ORAL
Qty: 90 TABLET | Refills: 1 | Status: SHIPPED | OUTPATIENT
Start: 2022-03-29 | End: 2022-05-16 | Stop reason: SDUPTHER

## 2022-04-04 ENCOUNTER — TRANSCRIBE ORDERS (OUTPATIENT)
Dept: ADMINISTRATIVE | Facility: HOSPITAL | Age: 42
End: 2022-04-04

## 2022-04-04 DIAGNOSIS — N18.31 STAGE 3A CHRONIC KIDNEY DISEASE: Primary | ICD-10-CM

## 2022-04-08 ENCOUNTER — HOSPITAL ENCOUNTER (OUTPATIENT)
Dept: GENERAL RADIOLOGY | Facility: HOSPITAL | Age: 42
Discharge: HOME | End: 2022-04-08

## 2022-04-08 ENCOUNTER — OFFICE VISIT (OUTPATIENT)
Dept: FAMILY MEDICINE CLINIC | Facility: CLINIC | Age: 42
End: 2022-04-08

## 2022-04-08 ENCOUNTER — HOSPITAL ENCOUNTER (OUTPATIENT)
Dept: ULTRASOUND IMAGING | Facility: HOSPITAL | Age: 42
Discharge: HOME | End: 2022-04-08

## 2022-04-08 VITALS
OXYGEN SATURATION: 93 % | TEMPERATURE: 98 F | HEIGHT: 64 IN | HEART RATE: 85 BPM | WEIGHT: 271.4 LBS | SYSTOLIC BLOOD PRESSURE: 112 MMHG | BODY MASS INDEX: 46.33 KG/M2 | DIASTOLIC BLOOD PRESSURE: 60 MMHG

## 2022-04-08 DIAGNOSIS — N18.32 STAGE 3B CHRONIC KIDNEY DISEASE: ICD-10-CM

## 2022-04-08 DIAGNOSIS — G89.29 CHRONIC PAIN OF RIGHT KNEE: ICD-10-CM

## 2022-04-08 DIAGNOSIS — M25.561 CHRONIC PAIN OF RIGHT KNEE: ICD-10-CM

## 2022-04-08 DIAGNOSIS — I10 ESSENTIAL HYPERTENSION: ICD-10-CM

## 2022-04-08 DIAGNOSIS — Z12.31 ENCOUNTER FOR SCREENING MAMMOGRAM FOR MALIGNANT NEOPLASM OF BREAST: ICD-10-CM

## 2022-04-08 DIAGNOSIS — Z80.0 FAMILY HISTORY OF COLON CANCER REQUIRING SCREENING COLONOSCOPY: ICD-10-CM

## 2022-04-08 DIAGNOSIS — G89.29 CHRONIC PAIN OF RIGHT KNEE: Primary | ICD-10-CM

## 2022-04-08 DIAGNOSIS — R20.0 NUMBNESS: Primary | ICD-10-CM

## 2022-04-08 DIAGNOSIS — N18.31 STAGE 3A CHRONIC KIDNEY DISEASE: ICD-10-CM

## 2022-04-08 DIAGNOSIS — M51.36 DEGENERATION OF LUMBAR INTERVERTEBRAL DISC: ICD-10-CM

## 2022-04-08 DIAGNOSIS — Z89.512 LEFT BELOW-KNEE AMPUTEE: ICD-10-CM

## 2022-04-08 DIAGNOSIS — Z98.1 HISTORY OF LUMBAR SPINAL FUSION: ICD-10-CM

## 2022-04-08 DIAGNOSIS — Z12.4 ENCOUNTER FOR PAPANICOLAOU SMEAR OF CERVIX: ICD-10-CM

## 2022-04-08 DIAGNOSIS — M25.561 CHRONIC PAIN OF RIGHT KNEE: Primary | ICD-10-CM

## 2022-04-08 PROBLEM — M51.369 DEGENERATION OF LUMBAR INTERVERTEBRAL DISC: Status: ACTIVE | Noted: 2022-03-23

## 2022-04-08 PROCEDURE — 76775 US EXAM ABDO BACK WALL LIM: CPT

## 2022-04-08 PROCEDURE — 99214 OFFICE O/P EST MOD 30 MIN: CPT | Performed by: NURSE PRACTITIONER

## 2022-04-08 PROCEDURE — 73562 X-RAY EXAM OF KNEE 3: CPT

## 2022-04-08 RX ORDER — HYDRALAZINE HYDROCHLORIDE 50 MG/1
50 TABLET, FILM COATED ORAL 3 TIMES DAILY
Qty: 90 TABLET | Refills: 5 | Status: SHIPPED | OUTPATIENT
Start: 2022-04-08 | End: 2023-03-17

## 2022-04-08 NOTE — PROGRESS NOTES
Answers for HPI/ROS submitted by the patient on 4/5/2022  What is the primary reason for your visit?: Lower Extremity Injury  Incident occurred: 3 to 5 days ago  Incident location: at home  Injury mechanism: a twisting injury  Pain location: right knee  Pain quality: aching, shooting  Pain - numeric: 4/10  Pain course: intermittent  loss of motion: Yes  muscle weakness: Yes  Foreign body present: no foreign bodies  Aggravated by: movement, weight bearing    Chief Complaint  No chief complaint on file.    Subjective     {Problem List  Visit Diagnosis   Encounters  Notes  Medications  Labs  Result Review Imaging  Media :23}     Bebe Steward presents to [facility]  History of Present Illness     Objective   Vital Signs:   There were no vitals taken for this visit.    Physical Exam   Result Review :{Labs  Result Review  Imaging  Med Tab  Media  Procedures :23}   {The following data was reviewed by (Optional):35651}  {Ambulatory Labs (Optional):64291}  {Data reviewed (Optional):21036:::1}          Assessment and Plan {CC Problem List  Visit Diagnosis   ROS  Review (Popup)  Health Maintenance  Quality  BestPractice  Medications  SmartSets  SnapShot Encounters  Media :23}   There are no diagnoses linked to this encounter.  {Time Spent (Optional):66576}  Follow Up {Instructions Charge Capture  Follow-up Communications :23}  No follow-ups on file.  Patient was given instructions and counseling regarding her condition or for health maintenance advice. Please see specific information pulled into the AVS if appropriate.

## 2022-04-08 NOTE — ASSESSMENT & PLAN NOTE
Due to her chronic kidney disease and concern for worsening, will we will decrease lisinopril dose to 20 mg and increase hydralazine dose to 50 mg 3 times daily.  Patient will continue to monitor her blood pressure at home.  She has a follow-up next month and we will reassess at that time.

## 2022-04-08 NOTE — PROGRESS NOTES
Answers for HPI/ROS submitted by the patient on 4/5/2022  What is the primary reason for your visit?: Lower Extremity Injury  Incident occurred: 3 to 5 days ago  Incident location: at home  Injury mechanism: a twisting injury  Pain location: right knee  Pain quality: aching, shooting  Pain - numeric: 4/10  Pain course: intermittent  loss of motion: Yes  muscle weakness: Yes  Foreign body present: no foreign bodies  Aggravated by: movement, weight bearing  Chief Complaint  Knee Pain (Pain and swelling)    Subjective          Bebe Steward presents to [facility]  History of Present Illness   42-year-old female presents today with complaints of bright knee pain and right leg swelling.  She states she has had surgeries in the past on her right knee.  She states it just recently started swelling and hurting more this past week.  She is left below the knee amputee, ambulates with prosthesis.    She has chronic kidney disease and she has seen nephrologist.  She states she is getting an ultrasound of her kidneys today and she has orders for multiple tests.  She also has hypertension which is well controlled on meds as listed.  She states that her nephrologist said the only medication he might be concerned with would be the lisinopril.    She states that she has multiple family members who have had colon cancer.  She states that she has an aunt, great uncle and now her grandmother has colon cancer.  She wants to get a colonoscopy.    She states she needs a new referral for her OB/GYN for annual Pap smear.  She also is requesting a mammogram order today.    Current Outpatient Medications on File Prior to Visit   Medication Sig Dispense Refill   • acetaminophen (TYLENOL) 325 MG tablet Take 650 mg by mouth Every 6 (Six) Hours As Needed.     • amLODIPine (NORVASC) 5 MG tablet Take 1 tablet by mouth Daily. 90 tablet 1   • atorvastatin (LIPITOR) 20 MG tablet TAKE ONE TABLET BY MOUTH DAILY 30  "tablet 3   • docusate sodium (Colace) 100 MG capsule Take 1 capsule by mouth 2 (Two) Times a Day As Needed for Constipation. 60 capsule 5   • furosemide (Lasix) 20 MG tablet Take 1 tablet by mouth Daily. 90 tablet 1   • gabapentin (NEURONTIN) 300 MG capsule Take 300 mg by mouth 2 (two) times a day.     • hydrOXYzine (ATARAX) 25 MG tablet TAKE ONE TABLET BY MOUTH EVERY 8 HOURS AS NEEDED FOR ANXIETY 90 tablet 1   • lisinopril (PRINIVIL,ZESTRIL) 40 MG tablet Take 1 tablet by mouth Daily. 90 tablet 1   • loratadine (CLARITIN) 10 MG tablet TAKE ONE TABLET BY MOUTH DAILY 30 tablet 1   • methocarbamol (ROBAXIN) 500 MG tablet Take 1 tablet by mouth 2 (Two) Times a Day As Needed for Muscle Spasms. 180 tablet 1   • oxyCODONE-acetaminophen (PERCOCET) 5-325 MG per tablet Take 1 tablet by mouth Every 6 (Six) Hours As Needed.     • QUEtiapine (SEROquel) 50 MG tablet TAKE ONE TABLET BY MOUTH ONCE NIGHTLY 90 tablet 1   • rizatriptan (MAXALT) 10 MG tablet TAKE ONE TABLET BY MOUTH AT ONSET OF HEADACHE; MAY REPEAT ONE TABLET IN 2 HOURS IF NEEDED. 9 tablet 3   • sertraline (ZOLOFT) 50 MG tablet TAKE ONE TABLET BY MOUTH DAILY 30 tablet 3   • vitamin D (ERGOCALCIFEROL) 1.25 MG (03912 UT) capsule capsule TAKE ONE CAPSULE BY MOUTH ONCE WEEKLY 13 capsule 1   • [DISCONTINUED] hydrALAZINE (APRESOLINE) 25 MG tablet TAKE ONE TABLET BY MOUTH THREE TIMES A DAY 60 tablet 3     No current facility-administered medications on file prior to visit.       Objective   Vital Signs:   /60   Pulse 85   Temp 98 °F (36.7 °C)   Ht 162.6 cm (64\")   Wt 123 kg (271 lb 6.4 oz)   SpO2 93%   BMI 46.59 kg/m²     Physical Exam  Vitals reviewed.   Constitutional:       Appearance: Normal appearance. She is well-developed.   Neck:      Thyroid: No thyroid mass, thyromegaly or thyroid tenderness.   Cardiovascular:      Rate and Rhythm: Normal rate and regular rhythm.      Heart sounds: No murmur heard.    No friction rub. No gallop.   Pulmonary:      Effort: " Pulmonary effort is normal.      Breath sounds: Normal breath sounds. No wheezing or rhonchi.   Musculoskeletal:      Right knee: Swelling present. Tenderness present.      Right lower le+ Edema present.      Left Lower Extremity: Left leg is amputated below knee.   Lymphadenopathy:      Cervical: No cervical adenopathy.   Skin:     General: Skin is warm and dry.   Neurological:      Mental Status: She is alert and oriented to person, place, and time.      Cranial Nerves: No cranial nerve deficit.   Psychiatric:         Mood and Affect: Mood and affect normal.         Behavior: Behavior normal.         Thought Content: Thought content normal. Thought content does not include homicidal or suicidal ideation.         Judgment: Judgment normal.        Result Review :                 Assessment and Plan    Diagnoses and all orders for this visit:    1. Chronic pain of right knee (Primary)  Assessment & Plan:  Worsening of chronic knee pain with right lower leg swelling, will refer to orthopedic.  I will have her get an x-ray of her right knee.  She is on pain medications for pain management.    Orders:  -     XR Knee 3 View Right; Future  -     Ambulatory Referral to Orthopedic Surgery    2. Left below-knee amputee (HCC)  -     Ambulatory Referral to Orthopedic Surgery    3. Essential hypertension  Assessment & Plan:  Due to her chronic kidney disease and concern for worsening, will we will decrease lisinopril dose to 20 mg and increase hydralazine dose to 50 mg 3 times daily.  Patient will continue to monitor her blood pressure at home.  She has a follow-up next month and we will reassess at that time.      4. Stage 3b chronic kidney disease (HCC)  Assessment & Plan:  Following with nephrology.      5. Encounter for Papanicolaou smear of cervix  -     Ambulatory Referral to Obstetrics / Gynecology    6. Encounter for screening mammogram for malignant neoplasm of breast  -     Mammo Screening Digital Tomosynthesis  Bilateral With CAD; Future    7. Family history of colon cancer requiring screening colonoscopy  -     Ambulatory Referral For Screening Colonoscopy    Other orders  -     hydrALAZINE (APRESOLINE) 50 MG tablet; Take 1 tablet by mouth 3 (Three) Times a Day.  Dispense: 90 tablet; Refill: 5      Follow Up   Return keep follow up apt on 5/16/2022.  Patient was given instructions and counseling regarding her condition or for health maintenance advice. Please see specific information pulled into the AVS if appropriate.

## 2022-04-13 ENCOUNTER — TELEPHONE (OUTPATIENT)
Dept: ORTHOPEDIC SURGERY | Facility: CLINIC | Age: 42
End: 2022-04-13

## 2022-04-13 NOTE — TELEPHONE ENCOUNTER
Chronic pain of right knee/Left below-knee amputee (HCC)-NOTES 4.8.22-IMAGES 4.8.22 (ORDRED), 7.23.21. LEFT KNEE SX/AMPUTATION WAS DONE 2014 [facility]. RIGHT KNEE 2015 SURGERY [place] [facility]. RECORDS SCANNED IN MEDIA

## 2022-04-21 ENCOUNTER — OFFICE VISIT (OUTPATIENT)
Dept: ORTHOPEDIC SURGERY | Facility: CLINIC | Age: 42
End: 2022-04-21

## 2022-04-21 VITALS — BODY MASS INDEX: 45.45 KG/M2 | HEART RATE: 97 BPM | WEIGHT: 266.2 LBS | OXYGEN SATURATION: 97 % | HEIGHT: 64 IN

## 2022-04-21 DIAGNOSIS — M17.11 PRIMARY OSTEOARTHRITIS OF RIGHT KNEE: Primary | ICD-10-CM

## 2022-04-21 PROCEDURE — 99203 OFFICE O/P NEW LOW 30 MIN: CPT | Performed by: ORTHOPAEDIC SURGERY

## 2022-04-21 PROCEDURE — 20610 DRAIN/INJ JOINT/BURSA W/O US: CPT | Performed by: ORTHOPAEDIC SURGERY

## 2022-04-21 RX ORDER — LIDOCAINE HYDROCHLORIDE 10 MG/ML
5 INJECTION, SOLUTION INFILTRATION; PERINEURAL
Status: COMPLETED | OUTPATIENT
Start: 2022-04-21 | End: 2022-04-21

## 2022-04-21 RX ADMIN — LIDOCAINE HYDROCHLORIDE 5 ML: 10 INJECTION, SOLUTION INFILTRATION; PERINEURAL at 14:26

## 2022-04-21 NOTE — PROGRESS NOTES
"Chief Complaint  Initial Evaluation of the Right Knee     Subjective      Bebe Steward presents to [facility] for evaluation of the right knee. 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 She has medial and lateral right knee pain. She reports swelling with increased activity. She reports grinding and popping to the knee. She has had arthroscopy surgery to her right knee in 2015. She has tried visco injections and steroid injections in the knee. She can not take NSAIDS.     Allergies   Allergen Reactions   • Penicillins Anaphylaxis   • Cyclobenzaprine Other (See Comments)     Muscle spasms   • Meperidine Headache   • Monosodium Glutamate Other (See Comments)   • Morphine Irritability   • Vancomycin Itching   • Adhesive Tape Itching   • Cefaclor Rash   • Cephalexin Rash   • Erythromycin Rash   • Nsaids Rash   • Sulfa Antibiotics Rash   • Sulfamethoxazole-Trimethoprim Rash        Social History     Socioeconomic History   • Marital status:    Tobacco Use   • Smoking status: Former Smoker     Packs/day: 1.00     Years: 21.00     Pack years: 21.00     Types: Cigarettes     Start date:      Quit date:      Years since quittin.3   • Smokeless tobacco: Never Used   Vaping Use   • Vaping Use: Never used   Substance and Sexual Activity   • Alcohol use: Not Currently     Comment: light, 1-2 drinks per yr   • Drug use: Never   • Sexual activity: Defer        Review of Systems     Objective   Vital Signs:   Pulse 97   Ht 162.6 cm (64\")   Wt 121 kg (266 lb 3.2 oz)   SpO2 97%   BMI 45.69 kg/m²       Physical Exam  Constitutional:       Appearance: Normal appearance. The patient is well-developed and normal weight.   HENT:      Head: Normocephalic.      Right Ear: Hearing and external ear normal.      Left Ear: Hearing and external ear normal.      Nose: Nose normal.   Eyes:      " Conjunctiva/sclera: Conjunctivae normal.   Cardiovascular:      Rate and Rhythm: Normal rate.   Pulmonary:      Effort: Pulmonary effort is normal.      Breath sounds: No wheezing or rales.   Abdominal:      Palpations: Abdomen is soft.      Tenderness: There is no abdominal tenderness.   Musculoskeletal:      Cervical back: Normal range of motion.   Skin:     Findings: No rash.   Neurological:      Mental Status: The patient is alert and oriented to person, place, and time.   Psychiatric:         Mood and Affect: Mood and affect normal.         Judgment: Judgment normal.       Ortho Exam      Right knee- ROM 0-125. Positive crepitus. Tender to the peripatellar region. Pain with patella compression patella stable Stable to varus/valgus stress. Stable to anterior/posterior drawer. Well healed arthroscopy scars. Negative Kolby's. Neurovascularly intact.     Right knee : R knee  Date/Time: 4/21/2022 2:26 PM  Consent given by: patient  Site marked: site marked  Timeout: Immediately prior to procedure a time out was called to verify the correct patient, procedure, equipment, support staff and site/side marked as required   Supporting Documentation  Indications: pain   Procedure Details  Location: knee - R knee  Needle gauge: 21G.  Medications administered: 32 mg Triamcinolone Acetonide 32 MG; 5 mL lidocaine 1 %  Patient tolerance: patient tolerated the procedure well with no immediate complications          Imaging Results (Most Recent)     None           Result Review :       XR Knee 3 View Right    Result Date: 4/8/2022  Narrative: PROCEDURE: XR KNEE 3 VW RIGHT  COMPARISON: [facility], CR, XR KNEE 3 VW RIGHT, 7/23/2021, 15:04.  INDICATIONS: GENERALIZED RIGHT KNEE PAIN FOR 3 DAYS. NO KNOWN INJURY  FINDINGS:  There is a osteophyte formation marginally at the medial and lateral compartments.  There is no fracture or dislocation.  The patella is intact.  Small joint effusion.      Impression:   1. Negative  for fracture. 2. Mild tricompartmental osteoarthritis.      LARY PARNELL MD       Electronically Signed and Approved By: LARY PARNELL MD on 4/08/2022 at 23:56             US Renal Bilateral    Result Date: 4/9/2022  Narrative: PROCEDURE: US RENAL BILATERAL  COMPARISON: None  INDICATIONS: N18.31  FINDINGS:  The right kidney measures 7.4 x 3.3 x 4.3 cm with cortical thinning.  There is no right-sided hydronephrosis.  The left kidney measures 10.3 x 5.6 x 5.3 cm.  There is no left-sided hydronephrosis.  The bladder is without internal debris.  Normal bilateral ureteral jets are noted.       Impression:   1. Negative for hydronephrosis. 2. Atrophic right kidney with cortical thinning.      LARY PARNELL MD       Electronically Signed and Approved By: LARY PARNELL MD on 4/09/2022 at 0:51                      Assessment and Plan     DX: Right knee osteoarthritis     Discussed the treatment plan with the patient.  Plan for conservative treatment at this time. Discussed the risks and benefits of a right knee Zilretta injection. The patient expressed understanding and wished to proceed. She tolerated the injection well.     Call or return if worsening symptoms.    Follow Up     6 weeks      Patient was given instructions and counseling regarding her condition or for health maintenance advice. Please see specific information pulled into the AVS if appropriate.     Scribed for Tomy Dobson MD by Mima Kaplan.  04/21/22   14:11 EDT    I have personally performed the services described in this document as scribed by the above individual and it is both accurate and complete. Tomy Dobson MD 04/21/22

## 2022-04-22 ENCOUNTER — PATIENT ROUNDING (BHMG ONLY) (OUTPATIENT)
Dept: ORTHOPEDIC SURGERY | Facility: CLINIC | Age: 42
End: 2022-04-22

## 2022-04-22 NOTE — PROGRESS NOTES
April 22, 2022    Hello, may I speak with Bebe Steward?    My name is Ekta      I am  with [facility]  [address]  [place] [address] [phone].    Before we get started may I verify your date of birth? 1980    I am calling to officially welcome you to our practice and ask about your recent visit. Is this a good time to talk? yes    Tell me about your visit with us. What things went well?  The DR listened, and confirmed what she already thought was going on, and she received an injection.       We're always looking for ways to make our patients' experiences even better. Do you have recommendations on ways we may improve?  no    Overall were you satisfied with your first visit to our practice? yes       I appreciate you taking the time to speak with me today. Is there anything else I can do for you? no      Thank you, and have a great day.

## 2022-04-28 RX ORDER — METHOCARBAMOL 500 MG/1
TABLET, FILM COATED ORAL
Qty: 180 TABLET | Refills: 1 | Status: SHIPPED | OUTPATIENT
Start: 2022-04-28 | End: 2022-10-13 | Stop reason: SDUPTHER

## 2022-05-02 RX ORDER — LISINOPRIL 40 MG/1
TABLET ORAL
Qty: 90 TABLET | Refills: 1 | Status: SHIPPED | OUTPATIENT
Start: 2022-05-02 | End: 2022-05-16 | Stop reason: SDUPTHER

## 2022-05-02 RX ORDER — AMLODIPINE BESYLATE 5 MG/1
TABLET ORAL
Qty: 90 TABLET | Refills: 1 | Status: SHIPPED | OUTPATIENT
Start: 2022-05-02 | End: 2022-06-24

## 2022-05-04 RX ORDER — LORATADINE 10 MG/1
10 TABLET ORAL DAILY
Qty: 30 TABLET | Refills: 1 | Status: SHIPPED | OUTPATIENT
Start: 2022-05-04 | End: 2022-05-16 | Stop reason: SDUPTHER

## 2022-05-09 RX ORDER — FUROSEMIDE 20 MG/1
TABLET ORAL
Qty: 90 TABLET | Refills: 0 | Status: SHIPPED | OUTPATIENT
Start: 2022-05-09 | End: 2022-05-16 | Stop reason: SDUPTHER

## 2022-05-16 ENCOUNTER — OFFICE VISIT (OUTPATIENT)
Dept: FAMILY MEDICINE CLINIC | Facility: CLINIC | Age: 42
End: 2022-05-16

## 2022-05-16 VITALS
WEIGHT: 272 LBS | BODY MASS INDEX: 46.44 KG/M2 | TEMPERATURE: 97.6 F | SYSTOLIC BLOOD PRESSURE: 128 MMHG | OXYGEN SATURATION: 98 % | HEIGHT: 64 IN | HEART RATE: 86 BPM | DIASTOLIC BLOOD PRESSURE: 68 MMHG

## 2022-05-16 DIAGNOSIS — E55.9 VITAMIN D DEFICIENCY: ICD-10-CM

## 2022-05-16 DIAGNOSIS — Z11.59 NEED FOR HEPATITIS C SCREENING TEST: ICD-10-CM

## 2022-05-16 DIAGNOSIS — I10 ESSENTIAL HYPERTENSION: Primary | ICD-10-CM

## 2022-05-16 DIAGNOSIS — N18.32 STAGE 3B CHRONIC KIDNEY DISEASE: ICD-10-CM

## 2022-05-16 LAB — PHOSPHATE SERPL-MCNC: 3 MG/DL (ref 2.5–4.5)

## 2022-05-16 PROCEDURE — 84100 ASSAY OF PHOSPHORUS: CPT | Performed by: NURSE PRACTITIONER

## 2022-05-16 PROCEDURE — 82306 VITAMIN D 25 HYDROXY: CPT | Performed by: NURSE PRACTITIONER

## 2022-05-16 PROCEDURE — 99214 OFFICE O/P EST MOD 30 MIN: CPT | Performed by: NURSE PRACTITIONER

## 2022-05-16 PROCEDURE — 80053 COMPREHEN METABOLIC PANEL: CPT | Performed by: NURSE PRACTITIONER

## 2022-05-16 PROCEDURE — 86803 HEPATITIS C AB TEST: CPT | Performed by: NURSE PRACTITIONER

## 2022-05-16 RX ORDER — ATORVASTATIN CALCIUM 20 MG/1
20 TABLET, FILM COATED ORAL DAILY
Qty: 90 TABLET | Refills: 1 | Status: SHIPPED | OUTPATIENT
Start: 2022-05-16 | End: 2023-01-16

## 2022-05-16 RX ORDER — FUROSEMIDE 20 MG/1
20 TABLET ORAL DAILY
Qty: 90 TABLET | Refills: 1 | Status: ON HOLD | OUTPATIENT
Start: 2022-05-16

## 2022-05-16 RX ORDER — DOCUSATE SODIUM 100 MG/1
100 CAPSULE, LIQUID FILLED ORAL 2 TIMES DAILY PRN
Qty: 60 CAPSULE | Refills: 5 | Status: ON HOLD | OUTPATIENT
Start: 2022-05-16

## 2022-05-16 RX ORDER — QUETIAPINE FUMARATE 100 MG/1
100 TABLET, FILM COATED ORAL NIGHTLY
Qty: 90 TABLET | Refills: 1 | Status: SHIPPED | OUTPATIENT
Start: 2022-05-16 | End: 2022-09-17

## 2022-05-16 RX ORDER — LISINOPRIL 20 MG/1
20 TABLET ORAL DAILY
Qty: 90 TABLET | Refills: 1 | Status: SHIPPED | OUTPATIENT
Start: 2022-05-16 | End: 2022-06-24

## 2022-05-16 RX ORDER — RIZATRIPTAN BENZOATE 10 MG/1
10 TABLET ORAL ONCE
Qty: 9 TABLET | Refills: 5 | Status: SHIPPED | OUTPATIENT
Start: 2022-05-16 | End: 2022-12-12

## 2022-05-16 RX ORDER — LORATADINE 10 MG/1
10 TABLET ORAL DAILY
Qty: 90 TABLET | Refills: 1 | Status: SHIPPED | OUTPATIENT
Start: 2022-05-16 | End: 2022-12-27

## 2022-05-16 NOTE — PROGRESS NOTES
Chief Complaint  Hypertension    Subjective          Bebe Steward presents to [facility]  History of Present Illness   42-year-old female presents today for 3-month follow-up.    Hypertension: Blood pressure stable on meds as listed.  We decreased her dose of lisinopril to 20 mg and increased hydralazine dose to 50 mg 3 times a day due to her chronic kidney disease.  Her blood pressure is much better today at 128/68.    Chronic kidney disease: She is now following with nephrology, Dr. Miranda.     Vitamin D deficiency: She is currently taking vitamin D 50,000 units weekly.    She states that she has seen the orthopedic and that she is going to need a knee replacement but they want to wait 5 years because she has not old enough.     Current Outpatient Medications on File Prior to Visit   Medication Sig Dispense Refill   • acetaminophen (TYLENOL) 325 MG tablet Take 650 mg by mouth Every 6 (Six) Hours As Needed.     • amLODIPine (NORVASC) 5 MG tablet TAKE ONE TABLET BY MOUTH DAILY 90 tablet 1   • gabapentin (NEURONTIN) 300 MG capsule Take 300 mg by mouth 2 (two) times a day.     • hydrALAZINE (APRESOLINE) 50 MG tablet Take 1 tablet by mouth 3 (Three) Times a Day. 90 tablet 5   • hydrOXYzine (ATARAX) 25 MG tablet TAKE ONE TABLET BY MOUTH EVERY 8 HOURS AS NEEDED FOR ANXIETY 90 tablet 1   • methocarbamol (ROBAXIN) 500 MG tablet TAKE ONE TABLET BY MOUTH TWICE A DAY AS NEEDED FOR MUSCLE SPASMS 180 tablet 1   • oxyCODONE-acetaminophen (PERCOCET) 5-325 MG per tablet Take 1 tablet by mouth Every 6 (Six) Hours As Needed.     • vitamin D (ERGOCALCIFEROL) 1.25 MG (70845 UT) capsule capsule TAKE ONE CAPSULE BY MOUTH ONCE WEEKLY 13 capsule 1   • [DISCONTINUED] atorvastatin (LIPITOR) 20 MG tablet TAKE ONE TABLET BY MOUTH DAILY 30 tablet 3   • [DISCONTINUED] docusate sodium (Colace) 100 MG capsule Take 1 capsule by mouth 2 (Two) Times a Day As Needed for Constipation. 60 capsule 5   • [DISCONTINUED]  "furosemide (LASIX) 20 MG tablet TAKE ONE TABLET BY MOUTH DAILY 90 tablet 0   • [DISCONTINUED] lisinopril (PRINIVIL,ZESTRIL) 40 MG tablet TAKE ONE TABLET BY MOUTH DAILY 90 tablet 1   • [DISCONTINUED] loratadine (CLARITIN) 10 MG tablet Take 1 tablet by mouth Daily. 30 tablet 1   • [DISCONTINUED] QUEtiapine (SEROquel) 50 MG tablet TAKE ONE TABLET BY MOUTH ONCE NIGHTLY 90 tablet 1   • [DISCONTINUED] rizatriptan (MAXALT) 10 MG tablet TAKE ONE TABLET BY MOUTH AT ONSET OF HEADACHE; MAY REPEAT ONE TABLET IN 2 HOURS IF NEEDED. 9 tablet 3   • [DISCONTINUED] sertraline (ZOLOFT) 50 MG tablet TAKE ONE TABLET BY MOUTH DAILY 30 tablet 3     No current facility-administered medications on file prior to visit.       Objective   Vital Signs:   /68   Pulse 86   Temp 97.6 °F (36.4 °C)   Ht 162.6 cm (64\")   Wt 123 kg (272 lb)   SpO2 98%   BMI 46.69 kg/m²     Physical Exam  Vitals reviewed.   Constitutional:       Appearance: Normal appearance. She is well-developed.   Neck:      Thyroid: No thyroid mass, thyromegaly or thyroid tenderness.   Cardiovascular:      Rate and Rhythm: Normal rate and regular rhythm.      Heart sounds: No murmur heard.    No friction rub. No gallop.   Pulmonary:      Effort: Pulmonary effort is normal.      Breath sounds: Normal breath sounds. No wheezing or rhonchi.   Musculoskeletal:      Left Lower Extremity: Left leg is amputated below knee.   Lymphadenopathy:      Cervical: No cervical adenopathy.   Skin:     General: Skin is warm and dry.   Neurological:      Mental Status: She is alert and oriented to person, place, and time.      Cranial Nerves: No cranial nerve deficit.   Psychiatric:         Mood and Affect: Mood and affect normal.         Behavior: Behavior normal.         Thought Content: Thought content normal. Thought content does not include homicidal or suicidal ideation.         Judgment: Judgment normal.        Result Review :                 Assessment and Plan    Diagnoses and " all orders for this visit:    1. Essential hypertension (Primary)  Assessment & Plan:  Hypertension is improving with treatment.  Continue current treatment regimen.  Continue current medications.  Ambulatory blood pressure monitoring.  Blood pressure will be reassessed in 3 months.      2. Stage 3b chronic kidney disease (HCC)  Assessment & Plan:  We will recheck her kidney function levels today.  She will continue to follow with nephrology also.    Orders:  -     Comprehensive Metabolic Panel  -     Vitamin D 25 Hydroxy  -     Phosphorus    3. Vitamin D deficiency  Assessment & Plan:  I will check vitamin D level today and we will let her know what dose of vitamin D she will need to continue.    Orders:  -     Vitamin D 25 Hydroxy    4. Need for hepatitis C screening test  -     Hepatitis C Antibody    Other orders  -     lisinopril (PRINIVIL,ZESTRIL) 20 MG tablet; Take 1 tablet by mouth Daily.  Dispense: 90 tablet; Refill: 1  -     atorvastatin (LIPITOR) 20 MG tablet; Take 1 tablet by mouth Daily.  Dispense: 90 tablet; Refill: 1  -     docusate sodium (Colace) 100 MG capsule; Take 1 capsule by mouth 2 (Two) Times a Day As Needed for Constipation.  Dispense: 60 capsule; Refill: 5  -     furosemide (LASIX) 20 MG tablet; Take 1 tablet by mouth Daily.  Dispense: 90 tablet; Refill: 1  -     loratadine (CLARITIN) 10 MG tablet; Take 1 tablet by mouth Daily.  Dispense: 90 tablet; Refill: 1  -     QUEtiapine (SEROquel) 100 MG tablet; Take 1 tablet by mouth Every Night.  Dispense: 90 tablet; Refill: 1  -     rizatriptan (MAXALT) 10 MG tablet; Take 1 tablet by mouth 1 (One) Time for 1 dose. AT ONSET OF HEADACHE MAY REPEAT ONE TABLET IN 2 HOURS IF NEEDED  Dispense: 9 tablet; Refill: 5  -     sertraline (ZOLOFT) 50 MG tablet; Take 1 tablet by mouth Daily.  Dispense: 90 tablet; Refill: 1      Follow Up   Return in about 3 months (around 8/16/2022) for to est care with Dr. Almaguer.  Patient was given instructions and counseling  regarding her condition or for health maintenance advice. Please see specific information pulled into the AVS if appropriate.

## 2022-05-16 NOTE — ASSESSMENT & PLAN NOTE
I will check vitamin D level today and we will let her know what dose of vitamin D she will need to continue.

## 2022-05-16 NOTE — ASSESSMENT & PLAN NOTE
We will recheck her kidney function levels today.  She will continue to follow with nephrology also.

## 2022-05-17 LAB
25(OH)D3 SERPL-MCNC: 33.4 NG/ML (ref 30–100)
ALBUMIN SERPL-MCNC: 4.4 G/DL (ref 3.5–5.2)
ALBUMIN/GLOB SERPL: 1.5 G/DL
ALP SERPL-CCNC: 110 U/L (ref 39–117)
ALT SERPL W P-5'-P-CCNC: 12 U/L (ref 1–33)
ANION GAP SERPL CALCULATED.3IONS-SCNC: 13 MMOL/L (ref 5–15)
AST SERPL-CCNC: 12 U/L (ref 1–32)
BILIRUB SERPL-MCNC: <0.2 MG/DL (ref 0–1.2)
BUN SERPL-MCNC: 26 MG/DL (ref 6–20)
BUN/CREAT SERPL: 19 (ref 7–25)
CALCIUM SPEC-SCNC: 9.3 MG/DL (ref 8.6–10.5)
CHLORIDE SERPL-SCNC: 103 MMOL/L (ref 98–107)
CO2 SERPL-SCNC: 18 MMOL/L (ref 22–29)
CREAT SERPL-MCNC: 1.37 MG/DL (ref 0.57–1)
EGFRCR SERPLBLD CKD-EPI 2021: 48.9 ML/MIN/1.73
GLOBULIN UR ELPH-MCNC: 2.9 GM/DL
GLUCOSE SERPL-MCNC: 86 MG/DL (ref 65–99)
HCV AB SER DONR QL: NORMAL
POTASSIUM SERPL-SCNC: 4.9 MMOL/L (ref 3.5–5.2)
PROT SERPL-MCNC: 7.3 G/DL (ref 6–8.5)
SODIUM SERPL-SCNC: 134 MMOL/L (ref 136–145)

## 2022-05-23 ENCOUNTER — TRANSCRIBE ORDERS (OUTPATIENT)
Dept: LAB | Facility: HOSPITAL | Age: 42
End: 2022-05-23

## 2022-05-23 ENCOUNTER — TELEPHONE (OUTPATIENT)
Dept: FAMILY MEDICINE CLINIC | Facility: CLINIC | Age: 42
End: 2022-05-23

## 2022-05-23 DIAGNOSIS — I12.9 BENIGN HYPERTENSION WITH CHRONIC KIDNEY DISEASE: ICD-10-CM

## 2022-05-23 DIAGNOSIS — N18.31 CHRONIC KIDNEY DISEASE (CKD) STAGE G3A/A1, MODERATELY DECREASED GLOMERULAR FILTRATION RATE (GFR) BETWEEN 45-59 ML/MIN/1.73 SQUARE METER AND ALBUMINURIA CREATININE RATIO LESS THAN 30 MG/G (CMS/H*: Primary | ICD-10-CM

## 2022-05-23 NOTE — TELEPHONE ENCOUNTER
Spoke with patient. There are no appts to be seen by any provider this week. Patient stated she will go to the ER to be seen.

## 2022-05-23 NOTE — TELEPHONE ENCOUNTER
Caller: Bebe Steward    Relationship to patient: Self    Best call back number: [phone]    Chief complaint: BACK PAIN    Type of visit: NEW PATIENT, BACK PAIN    Requested date: AS SOON AS POSSIBLE    If rescheduling, when is the original appointment: 08/16/2022    Additional notes: PATIENT IS REQUESTING SOONER APPOINTMENT TO SEE DR. HARTMAN FOR BACK PAIN. SHE IS OFFBOARDING [facility] PATIENT. SHE IS REQUESTING CALL BACK WITH SOONER APPOINTMENT WITH MINNIE IF AVAILABLE.

## 2022-05-23 NOTE — TELEPHONE ENCOUNTER
Pt said that she going to wait and see how she feels tomorrow and then will go to the ER if needed.

## 2022-05-24 ENCOUNTER — LAB (OUTPATIENT)
Dept: LAB | Facility: HOSPITAL | Age: 42
End: 2022-05-24

## 2022-05-24 DIAGNOSIS — N18.31 CHRONIC KIDNEY DISEASE (CKD) STAGE G3A/A1, MODERATELY DECREASED GLOMERULAR FILTRATION RATE (GFR) BETWEEN 45-59 ML/MIN/1.73 SQUARE METER AND ALBUMINURIA CREATININE RATIO LESS THAN 30 MG/G (CMS/H*: ICD-10-CM

## 2022-05-24 DIAGNOSIS — I12.9 BENIGN HYPERTENSION WITH CHRONIC KIDNEY DISEASE: ICD-10-CM

## 2022-05-24 LAB
ALBUMIN SERPL-MCNC: 3.9 G/DL (ref 3.5–5.2)
ANION GAP SERPL CALCULATED.3IONS-SCNC: 14.4 MMOL/L (ref 5–15)
BUN SERPL-MCNC: 34 MG/DL (ref 6–20)
BUN/CREAT SERPL: 19.3 (ref 7–25)
CALCIUM SPEC-SCNC: 9 MG/DL (ref 8.6–10.5)
CHLORIDE SERPL-SCNC: 106 MMOL/L (ref 98–107)
CO2 SERPL-SCNC: 16.6 MMOL/L (ref 22–29)
CREAT SERPL-MCNC: 1.76 MG/DL (ref 0.57–1)
EGFRCR SERPLBLD CKD-EPI 2021: 36.2 ML/MIN/1.73
GLUCOSE SERPL-MCNC: 89 MG/DL (ref 65–99)
PHOSPHATE SERPL-MCNC: 4 MG/DL (ref 2.5–4.5)
POTASSIUM SERPL-SCNC: 5.5 MMOL/L (ref 3.5–5.2)
SODIUM SERPL-SCNC: 137 MMOL/L (ref 136–145)

## 2022-05-24 PROCEDURE — 36415 COLL VENOUS BLD VENIPUNCTURE: CPT

## 2022-05-24 PROCEDURE — 84156 ASSAY OF PROTEIN URINE: CPT

## 2022-05-24 PROCEDURE — 81003 URINALYSIS AUTO W/O SCOPE: CPT

## 2022-05-24 PROCEDURE — 82610 CYSTATIN C: CPT

## 2022-05-24 PROCEDURE — 82570 ASSAY OF URINE CREATININE: CPT

## 2022-05-24 PROCEDURE — 80069 RENAL FUNCTION PANEL: CPT

## 2022-05-26 LAB
BILIRUB UR QL STRIP: NORMAL
CLARITY UR: NORMAL
COLOR UR: NORMAL
CREAT UR-MCNC: NORMAL MG/DL
CYSTATIN C SERPL-MCNC: 2.38 MG/L (ref 0.6–1)
GLUCOSE UR STRIP-MCNC: NORMAL MG/DL
HGB UR QL STRIP.AUTO: NORMAL
KETONES UR QL STRIP: NORMAL
LEUKOCYTE ESTERASE UR QL STRIP.AUTO: NORMAL
NITRITE UR QL STRIP: NORMAL
PH UR STRIP.AUTO: NORMAL [PH]
PROT ?TM UR-MCNC: NORMAL MG/DL
PROT UR QL STRIP: NORMAL
PROT/CREAT UR: NORMAL MG/G{CREAT}
SP GR UR STRIP: NORMAL
UROBILINOGEN UR QL STRIP: NORMAL

## 2022-05-31 DIAGNOSIS — M51.36 DEGENERATION OF LUMBAR INTERVERTEBRAL DISC: Primary | ICD-10-CM

## 2022-06-01 ENCOUNTER — OFFICE VISIT (OUTPATIENT)
Dept: OBSTETRICS AND GYNECOLOGY | Facility: CLINIC | Age: 42
End: 2022-06-01

## 2022-06-01 VITALS
HEIGHT: 65 IN | SYSTOLIC BLOOD PRESSURE: 91 MMHG | BODY MASS INDEX: 42.32 KG/M2 | WEIGHT: 254 LBS | DIASTOLIC BLOOD PRESSURE: 64 MMHG | HEART RATE: 86 BPM

## 2022-06-01 DIAGNOSIS — E66.01 CLASS 3 SEVERE OBESITY DUE TO EXCESS CALORIES WITH SERIOUS COMORBIDITY AND BODY MASS INDEX (BMI) OF 45.0 TO 49.9 IN ADULT: ICD-10-CM

## 2022-06-01 DIAGNOSIS — R10.2 PELVIC PAIN: ICD-10-CM

## 2022-06-01 DIAGNOSIS — Z01.419 WOMEN'S ANNUAL ROUTINE GYNECOLOGICAL EXAMINATION: Primary | ICD-10-CM

## 2022-06-01 DIAGNOSIS — N18.32 STAGE 3B CHRONIC KIDNEY DISEASE: ICD-10-CM

## 2022-06-01 DIAGNOSIS — N80.9 ENDOMETRIOSIS: ICD-10-CM

## 2022-06-01 PROBLEM — N26.1 ATROPHY OF RIGHT KIDNEY: Status: ACTIVE | Noted: 2022-05-24

## 2022-06-01 PROBLEM — Z80.0 FAMILY HISTORY OF COLON CANCER REQUIRING SCREENING COLONOSCOPY: Status: RESOLVED | Noted: 2022-04-08 | Resolved: 2022-06-01

## 2022-06-01 PROBLEM — A64 SEXUALLY TRANSMISSIBLE DISEASE: Status: RESOLVED | Noted: 2021-07-22 | Resolved: 2022-06-01

## 2022-06-01 PROCEDURE — 99396 PREV VISIT EST AGE 40-64: CPT | Performed by: OBSTETRICS & GYNECOLOGY

## 2022-06-01 PROCEDURE — G0123 SCREEN CERV/VAG THIN LAYER: HCPCS | Performed by: OBSTETRICS & GYNECOLOGY

## 2022-06-01 NOTE — PROGRESS NOTES
"Well Woman Visit    CC: CHIN    HPI:   42 y.o. who presents for a well woman exam. Reports she has heavy and painful menses. History of endometriosis.  Reports that her endometriosis was diagnosed by laparoscopy with Dr. Mcwilliams in [place].  States that she has severe pain with every menstrual flow.  She has pain leading up to her menses.  Her pain typically resolves a few days once her cycle has ended.  She is interested in treatment of her symptoms.  She is open to either medical and/or surgical therapy.    History: PMHx, Meds, Allergies, PSHx, Social Hx, and POBHx all reviewed and updated.    BP 91/64   Pulse 86   Ht 165.1 cm (65\")   Wt 115 kg (254 lb)   LMP 2022   Breastfeeding No   BMI 42.27 kg/m²     Physical Exam  Vitals and nursing note reviewed. Exam conducted with a chaperone present.   Constitutional:       General: She is not in acute distress.     Appearance: Normal appearance. She is obese. She is not ill-appearing.   HENT:      Head: Normocephalic and atraumatic.   Eyes:      Extraocular Movements: Extraocular movements intact.   Neck:      Thyroid: No thyroid mass or thyromegaly.   Chest:   Breasts: Breasts are symmetrical.      Right: Normal. No swelling, bleeding, inverted nipple, mass, nipple discharge, skin change, tenderness, axillary adenopathy or supraclavicular adenopathy.      Left: Normal. No swelling, bleeding, inverted nipple, mass, nipple discharge, skin change, tenderness, axillary adenopathy or supraclavicular adenopathy.       Abdominal:      General: Abdomen is flat. Bowel sounds are normal. There is no distension.      Palpations: Abdomen is soft. There is no mass.      Tenderness: There is no abdominal tenderness. There is no guarding or rebound.      Hernia: No hernia is present. There is no hernia in the left inguinal area or right inguinal area.   Genitourinary:     General: Normal vulva.      Exam position: Lithotomy position.      Pubic Area: No rash.       " Labia:         Right: No rash, tenderness, lesion or injury.         Left: No rash, tenderness, lesion or injury.       Urethra: No prolapse, urethral pain or urethral lesion.      Vagina: Normal. No vaginal discharge, tenderness or prolapsed vaginal walls.      Cervix: Normal.      Uterus: Normal.       Adnexa:         Right: Tenderness present.         Left: Tenderness present.       Comments: There is bilateral adnexal tenderness to palpation.  I cannot palpate any adnexal masses or uterine masses.  The patient's exam is limited by her body habitus.  Musculoskeletal:      Right lower leg: No edema.      Comments: The left lower extremity below the knee is surgically absent.  Patient does have a prosthesis   Lymphadenopathy:      Upper Body:      Right upper body: No supraclavicular or axillary adenopathy.      Left upper body: No supraclavicular or axillary adenopathy.   Skin:     General: Skin is warm and dry.   Neurological:      Mental Status: She is alert and oriented to person, place, and time.   Psychiatric:         Mood and Affect: Mood normal.         Behavior: Behavior normal.         Thought Content: Thought content normal.         ASSESSMENT AND PLAN:    Diagnoses and all orders for this visit:    1. Women's annual routine gynecological examination (Primary)  Assessment & Plan:  Pap  Calcium with vitamin D  Multivitamin folic acid  Mammogram is already scheduled    Orders:  -     IGP,rfx Aptima HPV All Pth    2. Stage 3b chronic kidney disease (HCC)    3. Class 3 severe obesity due to excess calories with serious comorbidity and body mass index (BMI) of 45.0 to 49.9 in adult ([facility])  Assessment & Plan:  Discussed exercise, nutrition and recommended weight loss      4. Pelvic pain  Assessment & Plan:  The patient has chronic pelvic pain likely due to endometriosis.  She has surgically proven endometriosis in the past.  Her symptoms are disruptive to her life.  Her symptoms are not controlled with NSAIDs  or narcotic pain medications.  She is interested in either surgical or medical therapy.  Given her medical history I do not think she is a good candidate for estrogen therapy with birth control pills.  Due to her medical culpability she is also not an ideal surgical candidate.  We discussed trying Depo-Lupron to control her symptoms.  We discussed the effects of Depo-Lupron and its mechanism of action.  She understands that she would have significant menopausal symptoms related to the use of Depo-Lupron.  Her menstruation should cease while on this medication.  We discussed potential adverse effects such as bone density changes.  We discussed that Depo-Lupron can only be used for a brief period of time.  The patient would like to proceed with trial of Depo-Lupron.      5. Endometriosis      Preventative:   MMG  s/p FLU vaccine this season  s/p COVID vaccine    She understands the importance of having any ordered tests to be performed in a timely fashion.  The risks of not performing them include, but are not limited to, advanced cancer stages, bone loss from osteoporosis and/or subsequent increase in morbidity and/or mortality.  She is encouraged to review her results online and/or contact or office if she has questions.     Follow Up:  Return in about 3 months (around 9/1/2022) for Recheck.    Yoan Yoo MD  06/01/2022

## 2022-06-01 NOTE — ASSESSMENT & PLAN NOTE
The patient has chronic pelvic pain likely due to endometriosis.  She has surgically proven endometriosis in the past.  Her symptoms are disruptive to her life.  Her symptoms are not controlled with NSAIDs or narcotic pain medications.  She is interested in either surgical or medical therapy.  Given her medical history I do not think she is a good candidate for estrogen therapy with birth control pills.  Due to her medical culpability she is also not an ideal surgical candidate.  We discussed trying Depo-Lupron to control her symptoms.  We discussed the effects of Depo-Lupron and its mechanism of action.  She understands that she would have significant menopausal symptoms related to the use of Depo-Lupron.  Her menstruation should cease while on this medication.  We discussed potential adverse effects such as bone density changes.  We discussed that Depo-Lupron can only be used for a brief period of time.  The patient would like to proceed with trial of Depo-Lupron.

## 2022-06-02 RX ORDER — GABAPENTIN 300 MG/1
300 CAPSULE ORAL 3 TIMES DAILY
Qty: 90 CAPSULE | Refills: 1 | Status: SHIPPED | OUTPATIENT
Start: 2022-06-02 | End: 2022-08-18 | Stop reason: SDUPTHER

## 2022-06-06 LAB
CONV .: NORMAL
CYTOLOGIST CVX/VAG CYTO: NORMAL
CYTOLOGY CVX/VAG DOC CYTO: NORMAL
CYTOLOGY CVX/VAG DOC THIN PREP: NORMAL
DX ICD CODE: NORMAL
HIV 1 & 2 AB SER-IMP: NORMAL
OTHER STN SPEC: NORMAL
STAT OF ADQ CVX/VAG CYTO-IMP: NORMAL

## 2022-06-09 ENCOUNTER — CLINICAL SUPPORT (OUTPATIENT)
Dept: OBSTETRICS AND GYNECOLOGY | Facility: CLINIC | Age: 42
End: 2022-06-09

## 2022-06-09 DIAGNOSIS — N80.9 ENDOMETRIOSIS: Primary | ICD-10-CM

## 2022-06-09 PROCEDURE — 96372 THER/PROPH/DIAG INJ SC/IM: CPT | Performed by: OBSTETRICS & GYNECOLOGY

## 2022-06-09 RX ORDER — LEUPROLIDE ACETATE 11.25 MG
11.25 KIT INTRAMUSCULAR
Qty: 1 KIT | Refills: 1 | Status: SHIPPED | OUTPATIENT
Start: 2022-06-09 | End: 2023-01-10

## 2022-06-09 RX ORDER — LEUPROLIDE ACETATE 11.25 MG
11.25 KIT INTRAMUSCULAR
Qty: 1 KIT | Refills: 1 | Status: SHIPPED | OUTPATIENT
Start: 2022-06-09 | End: 2022-06-09

## 2022-06-09 NOTE — PROGRESS NOTES
Patient received Depo Lupron today  Administered in Left dorsoglut  Next injection due between: In 3 months  Is patient due for yearly exam/pap smear: No, pap done 6/1/22 WNL

## 2022-06-10 ENCOUNTER — HOSPITAL ENCOUNTER (OUTPATIENT)
Dept: MAMMOGRAPHY | Facility: HOSPITAL | Age: 42
Discharge: HOME | End: 2022-06-10
Admitting: NURSE PRACTITIONER

## 2022-06-10 ENCOUNTER — LAB (OUTPATIENT)
Dept: LAB | Facility: HOSPITAL | Age: 42
End: 2022-06-10

## 2022-06-10 DIAGNOSIS — N18.31 CHRONIC KIDNEY DISEASE (CKD) STAGE G3A/A1, MODERATELY DECREASED GLOMERULAR FILTRATION RATE (GFR) BETWEEN 45-59 ML/MIN/1.73 SQUARE METER AND ALBUMINURIA CREATININE RATIO LESS THAN 30 MG/G (CMS/H*: ICD-10-CM

## 2022-06-10 DIAGNOSIS — I12.9 BENIGN HYPERTENSION WITH CHRONIC KIDNEY DISEASE: ICD-10-CM

## 2022-06-10 DIAGNOSIS — Z12.31 ENCOUNTER FOR SCREENING MAMMOGRAM FOR MALIGNANT NEOPLASM OF BREAST: ICD-10-CM

## 2022-06-10 PROCEDURE — 81050 URINALYSIS VOLUME MEASURE: CPT

## 2022-06-10 PROCEDURE — 77067 SCR MAMMO BI INCL CAD: CPT

## 2022-06-10 PROCEDURE — 82570 ASSAY OF URINE CREATININE: CPT

## 2022-06-10 PROCEDURE — 77063 BREAST TOMOSYNTHESIS BI: CPT

## 2022-06-11 LAB
COLLECT DURATION TIME UR: 24 HRS
CREAT UR-MCNC: 63.5 MG/DL
CREATINE 24H UR-MRATE: 0.95 G/24 HR (ref 0.7–1.6)
SPECIMEN VOL 24H UR: 1500 ML

## 2022-06-24 ENCOUNTER — OFFICE VISIT (OUTPATIENT)
Dept: FAMILY MEDICINE CLINIC | Facility: CLINIC | Age: 42
End: 2022-06-24

## 2022-06-24 VITALS
HEART RATE: 95 BPM | DIASTOLIC BLOOD PRESSURE: 82 MMHG | SYSTOLIC BLOOD PRESSURE: 112 MMHG | HEIGHT: 65 IN | WEIGHT: 258 LBS | BODY MASS INDEX: 42.99 KG/M2 | TEMPERATURE: 98.4 F | OXYGEN SATURATION: 99 %

## 2022-06-24 DIAGNOSIS — M54.50 CHRONIC BILATERAL LOW BACK PAIN, UNSPECIFIED WHETHER SCIATICA PRESENT: ICD-10-CM

## 2022-06-24 DIAGNOSIS — Z51.81 MEDICATION MONITORING ENCOUNTER: ICD-10-CM

## 2022-06-24 DIAGNOSIS — Z98.1 HISTORY OF LUMBAR SPINAL FUSION: ICD-10-CM

## 2022-06-24 DIAGNOSIS — M17.11 PRIMARY OSTEOARTHRITIS OF RIGHT KNEE: ICD-10-CM

## 2022-06-24 DIAGNOSIS — M51.36 DEGENERATION OF LUMBAR INTERVERTEBRAL DISC: Primary | ICD-10-CM

## 2022-06-24 DIAGNOSIS — G89.29 CHRONIC BILATERAL LOW BACK PAIN, UNSPECIFIED WHETHER SCIATICA PRESENT: ICD-10-CM

## 2022-06-24 LAB

## 2022-06-24 PROCEDURE — 99214 OFFICE O/P EST MOD 30 MIN: CPT | Performed by: NURSE PRACTITIONER

## 2022-06-24 PROCEDURE — 80305 DRUG TEST PRSMV DIR OPT OBS: CPT | Performed by: NURSE PRACTITIONER

## 2022-06-24 RX ORDER — HYDROCODONE BITARTRATE AND ACETAMINOPHEN 7.5; 325 MG/1; MG/1
1 TABLET ORAL EVERY 4 HOURS PRN
Qty: 18 TABLET | Refills: 0 | Status: SHIPPED | OUTPATIENT
Start: 2022-06-24 | End: 2022-06-27

## 2022-06-24 RX ORDER — ISOSORBIDE MONONITRATE 60 MG/1
120 TABLET, EXTENDED RELEASE ORAL DAILY
COMMUNITY
Start: 2022-06-15 | End: 2023-02-03 | Stop reason: SDUPTHER

## 2022-06-24 NOTE — PROGRESS NOTES
"Chief Complaint  Med Refill     Subjective        Bebe Steward presents to [facility]  History of Present Illness  Presents today for an acute visit for back pain and right knee pain.  Previous PCP was Francie Koch.  She has an appointment with Dr. Velazquez on August 16 to establish care.  Patient has a history of an injury to her left ankle which she has had multiple surgeries then later having an amputation below the knee.  Due to favoring her left leg she has developed right knee pain.  Patient was previously seeing pain management [facility] pain and spine.  She was on Percocets for her degenerative disc disease and back pain.  She felt that they were not listening to her concerns and when she stopped seeing pain management.  She has been without Percocet over the past month.  Patient also reports that she had stumbled and fell on her shower chair in the past month.    Objective   Vital Signs:  /82   Pulse 95   Temp 98.4 °F (36.9 °C)   Ht 165.1 cm (65\")   Wt 117 kg (258 lb)   SpO2 99%   BMI 42.93 kg/m²   Estimated body mass index is 42.93 kg/m² as calculated from the following:    Height as of this encounter: 165.1 cm (65\").    Weight as of this encounter: 117 kg (258 lb).          Physical Exam  Vitals reviewed.   Constitutional:       Appearance: Normal appearance. She is well-developed.   HENT:      Head: Normocephalic and atraumatic.      Right Ear: External ear normal.      Left Ear: External ear normal.      Mouth/Throat:      Pharynx: No oropharyngeal exudate.   Eyes:      Conjunctiva/sclera: Conjunctivae normal.      Pupils: Pupils are equal, round, and reactive to light.   Cardiovascular:      Rate and Rhythm: Normal rate and regular rhythm.      Heart sounds: No murmur heard.    No friction rub. No gallop.   Pulmonary:      Effort: Pulmonary effort is normal.      Breath sounds: Normal breath sounds. No wheezing or rhonchi.   Abdominal:      General: " Bowel sounds are normal. There is no distension.      Palpations: Abdomen is soft.      Tenderness: There is no abdominal tenderness.   Musculoskeletal:      Lumbar back: Tenderness present.      Right knee: Tenderness present over the lateral joint line.      Left Lower Extremity: Left leg is amputated below knee.   Skin:     General: Skin is warm and dry.   Neurological:      Mental Status: She is alert and oriented to person, place, and time.        Result Review :                Assessment and Plan   Diagnoses and all orders for this visit:    1. Degeneration of lumbar intervertebral disc (Primary)  -     Cancel: Ambulatory Referral to Pain Management  -     Ambulatory Referral to Pain Management    2. Primary osteoarthritis of right knee  -     Cancel: Ambulatory Referral to Pain Management  -     Ambulatory Referral to Pain Management    3. History of lumbar spinal fusion  -     Cancel: Ambulatory Referral to Pain Management  -     Ambulatory Referral to Pain Management    4. Chronic bilateral low back pain, unspecified whether sciatica present  -     Cancel: Ambulatory Referral to Pain Management  -     Ambulatory Referral to Pain Management  -     HYDROcodone-acetaminophen (Norco) 7.5-325 MG per tablet; Take 1 tablet by mouth Every 4 (Four) Hours As Needed for Moderate Pain  for up to 3 days.  Dispense: 18 tablet; Refill: 0    5. Medication monitoring encounter  -     POC Urine Drug Screen [facility]    Consult [facility] pain [place] nose [place].  Will prescribe [place] 7.5 325 for 3 days.  May take 500 mg of Tylenol with the Norco.  May continue taking Robaxin muscle relaxer.  Discussed stretching exercising.    Bernabe reviewed UDS is appropriate.         Follow Up   Return in about 2 months (around 8/24/2022), or if symptoms worsen or fail to improve, for Next scheduled follow up.  Patient was given instructions and counseling regarding her condition or for health maintenance advice. Please see  specific information pulled into the AVS if appropriate.

## 2022-06-27 ENCOUNTER — OFFICE VISIT (OUTPATIENT)
Dept: SURGERY | Facility: CLINIC | Age: 42
End: 2022-06-27

## 2022-06-27 ENCOUNTER — PREP FOR SURGERY (OUTPATIENT)
Dept: OTHER | Facility: HOSPITAL | Age: 42
End: 2022-06-27

## 2022-06-27 VITALS — WEIGHT: 271 LBS | RESPIRATION RATE: 18 BRPM | HEIGHT: 65 IN | HEART RATE: 69 BPM | BODY MASS INDEX: 45.15 KG/M2

## 2022-06-27 DIAGNOSIS — Z80.0 FAMILY HISTORY OF COLON CANCER: ICD-10-CM

## 2022-06-27 DIAGNOSIS — Z12.11 SCREENING FOR MALIGNANT NEOPLASM OF COLON: Primary | ICD-10-CM

## 2022-06-27 PROCEDURE — S0260 H&P FOR SURGERY: HCPCS | Performed by: NURSE PRACTITIONER

## 2022-06-27 RX ORDER — SODIUM CHLORIDE 0.9 % (FLUSH) 0.9 %
10 SYRINGE (ML) INJECTION AS NEEDED
Status: CANCELLED | OUTPATIENT
Start: 2022-06-27

## 2022-06-27 RX ORDER — SODIUM CHLORIDE 0.9 % (FLUSH) 0.9 %
3 SYRINGE (ML) INJECTION EVERY 12 HOURS SCHEDULED
Status: CANCELLED | OUTPATIENT
Start: 2022-06-27

## 2022-06-27 RX ORDER — POLYETHYLENE GLYCOL 3350 17 G/17G
POWDER, FOR SOLUTION ORAL
Qty: 238 PACKET | Refills: 0 | Status: SHIPPED | OUTPATIENT
Start: 2022-06-27 | End: 2022-09-17

## 2022-06-27 NOTE — PROGRESS NOTES
Chief Complaint: Colonoscopy (consult)    Subjective      Colonoscopy consultation       History of Present Illness  Bebe Steward is a 42 y.o. female presents to [facility] for colon consultation.    Patient presents today on referral from Francie Koch/[facility] for colonoscopy consultation.  Patient denies any abdominal pain, change in bowel habit, rectal bleeding.    Admits to a strong family history of colon cancer with her paternal grandmother; paternal aunt and maternal uncle.    No previous colonoscopy.    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   • Plantar wart    • Polycystic ovary syndrome    • Right foot pain 12/02/2020   • Right hip pain 07/21/2020   • Sexually transmitted disease (STD) 2019   • Sinus trouble    • Stage 3 chronic kidney disease (HCC) 09/01/2020   • Urogenital trichomoniasis    • Vitamin D deficiency 12/02/2020       Past Surgical History:   Procedure Laterality Date   • ABDOMINAL SURGERY  2010   • ANKLE SURGERY      3/08, 5/08, 8/08, 12/10, 03/11, 2012, 2013, 2014   • BACK SURGERY     • BELOW KNEE AMPUTATION Left 2014   • ENDOMETRIAL ABLATION  2007   • GALLBLADDER SURGERY  2001   • GASTRIC BYPASS  2001   • KNEE SURGERY      left 1996/1997, right 2015   • SPINE SURGERY  2008    lumbar spine   • TONSILLECTOMY  1994       Outpatient Medications Marked as Taking for the 6/27/22 encounter (Office Visit) with  Jermaine, April, APRN   Medication Sig Dispense Refill   • acetaminophen (TYLENOL) 325 MG tablet Take 650 mg by mouth Every 6 (Six) Hours As Needed.     • atorvastatin (LIPITOR) 20 MG tablet Take 1 tablet by mouth Daily. 90 tablet 1   • docusate sodium (Colace) 100 MG capsule Take 1 capsule by mouth 2 (Two) Times a Day As Needed for Constipation. 60 capsule 5   • furosemide (LASIX) 20 MG tablet Take 1 tablet by mouth Daily. (Patient taking differently: Take 20 mg by mouth. AS NEEDED) 90 tablet 1   • gabapentin (NEURONTIN) 300 MG capsule Take 1 capsule by mouth 3 (Three) Times a Day for 30 days. 90 capsule 1   • hydrALAZINE (APRESOLINE) 50 MG tablet Take 1 tablet by mouth 3 (Three) Times a Day. 90 tablet 5   • HYDROcodone-acetaminophen (Norco) 7.5-325 MG per tablet Take 1 tablet by mouth Every 4 (Four) Hours As Needed for Moderate Pain  for up to 3 days. 18 tablet 0   • hydrOXYzine (ATARAX) 25 MG tablet TAKE ONE TABLET BY MOUTH EVERY 8 HOURS AS NEEDED FOR ANXIETY 90 tablet 1   • isosorbide mononitrate (IMDUR) 60 MG 24 hr tablet Take 60 mg by mouth.     • leuprolide (Lupron [place], 3-Month,) 11.25 MG injection Inject 11.25 mg into the appropriate muscle as directed by prescriber Every 3 (Three) Months. 1 kit 1   • loratadine (CLARITIN) 10 MG tablet Take 1 tablet by mouth Daily. 90 tablet 1   • methocarbamol (ROBAXIN) 500 MG tablet TAKE ONE TABLET BY MOUTH TWICE A DAY AS NEEDED FOR MUSCLE SPASMS 180 tablet 1   • norethindrone (AYGESTIN) 5 MG tablet Take 1 tablet by mouth Daily.     • QUEtiapine (SEROquel) 100 MG tablet Take 1 tablet by mouth Every Night. 90 tablet 1   • sertraline (ZOLOFT) 50 MG tablet Take 1 tablet by mouth Daily. 90 tablet 1   • vitamin D (ERGOCALCIFEROL) 1.25 MG (57791 UT) capsule capsule TAKE ONE CAPSULE BY MOUTH ONCE WEEKLY 13 capsule 1       Allergies   Allergen Reactions   • Monosodium Glutamate Other (See Comments)     Blood pressure drops, pass out    • Penicillins Anaphylaxis   • Cyclobenzaprine  "Other (See Comments)     Muscle spasms   • Adhesive Tape Itching   • Cefaclor Rash   • Cephalexin Rash   • Erythromycin Rash   • Meperidine Headache   • Morphine Irritability   • Nsaids Rash   • Sulfa Antibiotics Rash   • Sulfamethoxazole-Trimethoprim Rash   • Vancomycin Itching        Family History   Problem Relation Age of Onset   • Heart disease Father    • Kidney disease Father    • Stroke Mother    • Other Mother         renal failure   • Kidney disease Mother    • Colon cancer Paternal Grandmother 87   • Melanoma Maternal Grandfather    • Diabetes Maternal Grandfather    • Stroke Maternal Grandfather    • Heart disease Maternal Grandfather    • Colon cancer Paternal Aunt    • Ovarian cancer Neg Hx    • Uterine cancer Neg Hx    • Breast cancer Neg Hx    • Prostate cancer Neg Hx        Social History     Socioeconomic History   • Marital status:    • Number of children: 3   Tobacco Use   • Smoking status: Former Smoker     Packs/day: 1.00     Years: 21.00     Pack years: 21.00     Types: Cigarettes     Start date:      Quit date:      Years since quittin.4   • Smokeless tobacco: Never Used   Vaping Use   • Vaping Use: Never used   Substance and Sexual Activity   • Drug use: Never   • Sexual activity: Not Currently     Birth control/protection: None       Review of Systems   Constitutional: Negative for chills and fever.   Gastrointestinal: Negative for abdominal distention, abdominal pain, anal bleeding, blood in stool, constipation, diarrhea and rectal pain.        Vital Signs:   Pulse 69   Resp 18   Ht 165.1 cm (65\")   Wt 123 kg (271 lb)   BMI 45.10 kg/m²      Physical Exam  Constitutional:       Appearance: She is obese.   HENT:      Head: Normocephalic.   Cardiovascular:      Rate and Rhythm: Normal rate.   Pulmonary:      Effort: Pulmonary effort is normal.   Abdominal:      General: Abdomen is flat.      Palpations: Abdomen is soft.   Musculoskeletal:      Comments: Left: " Above-the-knee amputation.     Skin:     General: Skin is warm and dry.   Neurological:      General: No focal deficit present.      Mental Status: She is alert and oriented to person, place, and time.   Psychiatric:         Mood and Affect: Mood normal.         Behavior: Behavior normal.          Result Review :          []  Laboratory  []  Radiology  []  Pathology  []  Microbiology  []  EKG/Telemetry   []  Cardiology/Vascular   [x]  Old records  Today I reviewed Francie Luis Angel's previous office note.     Assessment and Plan    Diagnoses and all orders for this visit:    1. Screening for malignant neoplasm of colon (Primary)    2. Family history of colon cancer    Other orders  -     polyethylene glycol (MIRALAX) 17 g packet; Take as directed.  Instructions given in office.  Dispense: 238 g bottle  Dispense: 238 packet; Refill: 0    orange prep    Follow Up   Return for Schedule colonoscopy with Dr. Smith on 10/6/2022 at [facility].     Hospital arrival time: 0630.    Today I discussed with the patient regarding her age and the screening colonoscopy age, she will need to notify the insurance company to see if it is covered under preventative care, secondary to her age.    Possible risks/complications, benefits, and alternatives to surgical or invasive procedure have been explained to patient and/or legal guardian.     Patient has been evaluated and can tolerate anesthesia and/or sedation. Risks, benefits, and alternatives to anesthesia and sedation have been explained to patient and/or legal guardian.  Patient verbalizes understanding is will proceed with above plan.    Patient was given instructions and counseling regarding her condition or for health maintenance advice. Please see specific information pulled into the AVS if appropriate.

## 2022-06-30 ENCOUNTER — OFFICE VISIT (OUTPATIENT)
Dept: NEUROSURGERY | Facility: CLINIC | Age: 42
End: 2022-06-30

## 2022-06-30 ENCOUNTER — PATIENT ROUNDING (BHMG ONLY) (OUTPATIENT)
Dept: SURGERY | Facility: CLINIC | Age: 42
End: 2022-06-30

## 2022-06-30 VITALS
BODY MASS INDEX: 43.9 KG/M2 | SYSTOLIC BLOOD PRESSURE: 150 MMHG | WEIGHT: 263.5 LBS | HEIGHT: 65 IN | DIASTOLIC BLOOD PRESSURE: 82 MMHG

## 2022-06-30 DIAGNOSIS — M51.36 DDD (DEGENERATIVE DISC DISEASE), LUMBAR: ICD-10-CM

## 2022-06-30 DIAGNOSIS — G89.29 CHRONIC MIDLINE LOW BACK PAIN WITHOUT SCIATICA: Primary | ICD-10-CM

## 2022-06-30 DIAGNOSIS — M54.50 CHRONIC MIDLINE LOW BACK PAIN WITHOUT SCIATICA: Primary | ICD-10-CM

## 2022-06-30 PROCEDURE — 99203 OFFICE O/P NEW LOW 30 MIN: CPT | Performed by: NEUROLOGICAL SURGERY

## 2022-06-30 NOTE — PROGRESS NOTES
"Chief Complaint  Back Pain    Subjective          Bebe Steward who is a 42 y.o. year old female who presents to [facility] for Evaluation of the Spine.     The patient complains of pain located in the lumbar spine.  Patients states the pain has been present for 1 year.  The pain came on gradually.  The pain scale level is 7.  The pain does not radiate at the present, previously did radiate but not below the knee.  The pain is waxing/waning and described as sharp and aching.  The pain is worse at nighttime and awakens the patient at night. Patient states prolonged standing and prolonged walking makes the pain worse.  Patient states pain medication makes the pain better.    Associated Symptoms Include: Denies numbness and tingling into the legs  Conservative Interventions Include: Injections, muscle relaxants, PT     Was this the result of an injury or accident?: No    History of Previous Spinal Surgery?: Yes.  Lumbar, Date spinal fusion at L5-S1 in 2008    This patient  reports that she quit smoking about 17 months ago. Her smoking use included cigarettes and cigarettes. She started smoking about 25 years ago. She has a 21.00 pack-year smoking history. She has never used smokeless tobacco.    Review of Systems   Musculoskeletal: Positive for back pain and myalgias.        Objective   Vital Signs:   /82 (BP Location: Left arm, Patient Position: Sitting)   Ht 165.1 cm (65\")   Wt 120 kg (263 lb 8 oz)   BMI 43.85 kg/m²       Physical Exam  Constitutional:       Comments: BMI 43   Pulmonary:      Effort: Pulmonary effort is normal.   Musculoskeletal:      Comments: Left BKA   Neurological:      Mental Status: She is alert.      Sensory: No sensory deficit (left BKA).      Motor: No weakness.      Deep Tendon Reflexes: Reflexes normal.   Psychiatric:         Mood and Affect: Mood normal.          Result Review :   I personally reviewed the patient's MRI scan which shows " a previous spinal fusion at L5-S1 with multilevel degenerative changes.        Assessment and Plan    Diagnoses and all orders for this visit:    1. Chronic midline low back pain without sciatica (Primary)    2. DDD (degenerative disc disease), lumbar  Comments:  Mild at L3-4    I would recommend she work on continued weight loss. Spine surgery with a BMI of 43 has a very high complication rate.     She is looking to f/u with another pain management physician.     We discussed the importance of core strengthening, avoidance of activities that worsen the pain, nicotine cessation and maintenance of healthy weight. Surgery for patients with multilevel degenerative disc disease is not typically successful with the risks outweighing the benefit.       Follow Up   Return if symptoms worsen or fail to improve.  Patient was given instructions and counseling regarding her condition or for health maintenance advice. Please see specific information pulled into the AVS if appropriate.

## 2022-06-30 NOTE — PROGRESS NOTES
June 30, 2022    Hello, may I speak with Bebe Bin?    My name is KORI Martinez         I am  with [facility]  [facility]  [address]  [place] [address] [phone].    Before we get started may I verify your date of birth? 1980    I am calling to officially welcome you to our practice and ask about your recent visit. Is this a good time to talk? yes    Tell me about your visit with us. What things went well?  Patient stated her visit went very well. She stated it was very smooth and she was very pleased with her visit.        We're always looking for ways to make our patients' experiences even better. Do you have recommendations on ways we may improve?  no    Overall were you satisfied with your first visit to our practice? yes       I appreciate you taking the time to speak with me today. Is there anything else I can do for you? no      Thank you, and have a great day.       Number Of Hemigard Strips Per Side: 1

## 2022-07-05 ENCOUNTER — TRANSCRIBE ORDERS (OUTPATIENT)
Dept: LAB | Facility: HOSPITAL | Age: 42
End: 2022-07-05

## 2022-07-05 ENCOUNTER — LAB (OUTPATIENT)
Dept: LAB | Facility: HOSPITAL | Age: 42
End: 2022-07-05

## 2022-07-05 DIAGNOSIS — I12.9 BENIGN HYPERTENSION WITH CHRONIC KIDNEY DISEASE: ICD-10-CM

## 2022-07-05 DIAGNOSIS — N18.31 CHRONIC KIDNEY DISEASE (CKD) STAGE G3A/A1, MODERATELY DECREASED GLOMERULAR FILTRATION RATE (GFR) BETWEEN 45-59 ML/MIN/1.73 SQUARE METER AND ALBUMINURIA CREATININE RATIO LESS THAN 30 MG/G (CMS/H*: ICD-10-CM

## 2022-07-05 DIAGNOSIS — N18.31 CHRONIC KIDNEY DISEASE, STAGE 3A: ICD-10-CM

## 2022-07-05 DIAGNOSIS — E55.9 VITAMIN D DEFICIENCY: ICD-10-CM

## 2022-07-05 DIAGNOSIS — M51.36 DEGENERATION OF LUMBAR INTERVERTEBRAL DISC: ICD-10-CM

## 2022-07-05 DIAGNOSIS — N18.31 CHRONIC KIDNEY DISEASE, STAGE 3A: Primary | ICD-10-CM

## 2022-07-05 LAB
ALBUMIN SERPL-MCNC: 4.1 G/DL (ref 3.5–5.2)
ALP SERPL-CCNC: 73 U/L (ref 39–117)
ALT SERPL W P-5'-P-CCNC: 8 U/L (ref 1–33)
ANION GAP SERPL CALCULATED.3IONS-SCNC: 11 MMOL/L (ref 5–15)
ASO AB SERPL-ACNC: NEGATIVE [IU]/ML
AST SERPL-CCNC: 10 U/L (ref 1–32)
BACTERIA UR QL AUTO: ABNORMAL /HPF
BASOPHILS # BLD AUTO: 0.06 10*3/MM3 (ref 0–0.2)
BASOPHILS NFR BLD AUTO: 0.7 % (ref 0–1.5)
BILIRUB CONJ SERPL-MCNC: <0.2 MG/DL (ref 0–0.3)
BILIRUB INDIRECT SERPL-MCNC: NORMAL MG/DL
BILIRUB SERPL-MCNC: <0.2 MG/DL (ref 0–1.2)
BILIRUB UR QL STRIP: NEGATIVE
BUN SERPL-MCNC: 13 MG/DL (ref 6–20)
BUN/CREAT SERPL: 9.4 (ref 7–25)
C3 SERPL-MCNC: 135 MG/DL (ref 82–167)
C4 SERPL-MCNC: 24 MG/DL (ref 14–44)
CALCIUM SPEC-SCNC: 8.9 MG/DL (ref 8.6–10.5)
CHLORIDE SERPL-SCNC: 109 MMOL/L (ref 98–107)
CK SERPL-CCNC: 79 U/L (ref 20–180)
CLARITY UR: CLEAR
CO2 SERPL-SCNC: 19 MMOL/L (ref 22–29)
COLOR UR: YELLOW
CREAT SERPL-MCNC: 1.39 MG/DL (ref 0.57–1)
CREAT UR-MCNC: 130.6 MG/DL
DEPRECATED RDW RBC AUTO: 44.8 FL (ref 37–54)
EGFRCR SERPLBLD CKD-EPI 2021: 48.1 ML/MIN/1.73
EOSINOPHIL # BLD AUTO: 0.22 10*3/MM3 (ref 0–0.4)
EOSINOPHIL NFR BLD AUTO: 2.6 % (ref 0.3–6.2)
ERYTHROCYTE [DISTWIDTH] IN BLOOD BY AUTOMATED COUNT: 13.4 % (ref 12.3–15.4)
GLUCOSE SERPL-MCNC: 103 MG/DL (ref 65–99)
GLUCOSE UR STRIP-MCNC: NEGATIVE MG/DL
HBV SURFACE AB SER RIA-ACNC: REACTIVE
HCT VFR BLD AUTO: 33.4 % (ref 34–46.6)
HCV AB SER DONR QL: NORMAL
HGB BLD-MCNC: 11.6 G/DL (ref 12–15.9)
HGB UR QL STRIP.AUTO: NEGATIVE
HIV1+2 AB SER QL: NORMAL
HYALINE CASTS UR QL AUTO: ABNORMAL /LPF
IMM GRANULOCYTES # BLD AUTO: 0.02 10*3/MM3 (ref 0–0.05)
IMM GRANULOCYTES NFR BLD AUTO: 0.2 % (ref 0–0.5)
KETONES UR QL STRIP: NEGATIVE
LEUKOCYTE ESTERASE UR QL STRIP.AUTO: ABNORMAL
LYMPHOCYTES # BLD AUTO: 2.53 10*3/MM3 (ref 0.7–3.1)
LYMPHOCYTES NFR BLD AUTO: 30.1 % (ref 19.6–45.3)
MCH RBC QN AUTO: 32.5 PG (ref 26.6–33)
MCHC RBC AUTO-ENTMCNC: 34.7 G/DL (ref 31.5–35.7)
MCV RBC AUTO: 93.6 FL (ref 79–97)
MONOCYTES # BLD AUTO: 0.46 10*3/MM3 (ref 0.1–0.9)
MONOCYTES NFR BLD AUTO: 5.5 % (ref 5–12)
NEUTROPHILS NFR BLD AUTO: 5.11 10*3/MM3 (ref 1.7–7)
NEUTROPHILS NFR BLD AUTO: 60.9 % (ref 42.7–76)
NITRITE UR QL STRIP: NEGATIVE
NRBC BLD AUTO-RTO: 0 /100 WBC (ref 0–0.2)
PH UR STRIP.AUTO: 6.5 [PH] (ref 5–8)
PHOSPHATE SERPL-MCNC: 2 MG/DL (ref 2.5–4.5)
PLATELET # BLD AUTO: 353 10*3/MM3 (ref 140–450)
PMV BLD AUTO: 10 FL (ref 6–12)
POTASSIUM SERPL-SCNC: 4.1 MMOL/L (ref 3.5–5.2)
PROT ?TM UR-MCNC: 28.2 MG/DL
PROT SERPL-MCNC: 7.2 G/DL (ref 6–8.5)
PROT UR QL STRIP: ABNORMAL
PROT/CREAT UR: 0.22 MG/G{CREAT}
RBC # BLD AUTO: 3.57 10*6/MM3 (ref 3.77–5.28)
RBC # UR STRIP: ABNORMAL /HPF
REF LAB TEST METHOD: ABNORMAL
SODIUM SERPL-SCNC: 139 MMOL/L (ref 136–145)
SP GR UR STRIP: 1.02 (ref 1–1.03)
SQUAMOUS #/AREA URNS HPF: ABNORMAL /HPF
URATE SERPL-MCNC: 6.2 MG/DL (ref 2.4–5.7)
UROBILINOGEN UR QL STRIP: ABNORMAL
WBC # UR STRIP: ABNORMAL /HPF
WBC NRBC COR # BLD: 8.4 10*3/MM3 (ref 3.4–10.8)

## 2022-07-05 PROCEDURE — 82610 CYSTATIN C: CPT

## 2022-07-05 PROCEDURE — 84155 ASSAY OF PROTEIN SERUM: CPT

## 2022-07-05 PROCEDURE — 82550 ASSAY OF CK (CPK): CPT

## 2022-07-05 PROCEDURE — 83521 IG LIGHT CHAINS FREE EACH: CPT

## 2022-07-05 PROCEDURE — 86225 DNA ANTIBODY NATIVE: CPT

## 2022-07-05 PROCEDURE — 83516 IMMUNOASSAY NONANTIBODY: CPT

## 2022-07-05 PROCEDURE — 84100 ASSAY OF PHOSPHORUS: CPT

## 2022-07-05 PROCEDURE — 86235 NUCLEAR ANTIGEN ANTIBODY: CPT

## 2022-07-05 PROCEDURE — 84156 ASSAY OF PROTEIN URINE: CPT

## 2022-07-05 PROCEDURE — 80048 BASIC METABOLIC PNL TOTAL CA: CPT

## 2022-07-05 PROCEDURE — 86803 HEPATITIS C AB TEST: CPT

## 2022-07-05 PROCEDURE — 82570 ASSAY OF URINE CREATININE: CPT

## 2022-07-05 PROCEDURE — 84165 PROTEIN E-PHORESIS SERUM: CPT

## 2022-07-05 PROCEDURE — 84550 ASSAY OF BLOOD/URIC ACID: CPT

## 2022-07-05 PROCEDURE — 86037 ANCA TITER EACH ANTIBODY: CPT

## 2022-07-05 PROCEDURE — 86160 COMPLEMENT ANTIGEN: CPT

## 2022-07-05 PROCEDURE — G0432 EIA HIV-1/HIV-2 SCREEN: HCPCS

## 2022-07-05 PROCEDURE — 86063 ANTISTREPTOLYSIN O SCREEN: CPT

## 2022-07-05 PROCEDURE — 85025 COMPLETE CBC W/AUTO DIFF WBC: CPT

## 2022-07-05 PROCEDURE — 86706 HEP B SURFACE ANTIBODY: CPT

## 2022-07-05 PROCEDURE — 86335 IMMUNFIX E-PHORSIS/URINE/CSF: CPT

## 2022-07-05 PROCEDURE — 80076 HEPATIC FUNCTION PANEL: CPT

## 2022-07-05 PROCEDURE — 81001 URINALYSIS AUTO W/SCOPE: CPT

## 2022-07-05 PROCEDURE — 36415 COLL VENOUS BLD VENIPUNCTURE: CPT

## 2022-07-05 PROCEDURE — 84166 PROTEIN E-PHORESIS/URINE/CSF: CPT

## 2022-07-05 RX ORDER — HYDROXYZINE HYDROCHLORIDE 25 MG/1
TABLET, FILM COATED ORAL
Qty: 90 TABLET | Refills: 1 | Status: SHIPPED | OUTPATIENT
Start: 2022-07-05 | End: 2022-10-13 | Stop reason: SDUPTHER

## 2022-07-06 LAB
ALBUMIN SERPL ELPH-MCNC: 3.5 G/DL (ref 2.9–4.4)
ALBUMIN/GLOB SERPL: 1.1 {RATIO} (ref 0.7–1.7)
ALPHA1 GLOB SERPL ELPH-MCNC: 0.2 G/DL (ref 0–0.4)
ALPHA2 GLOB SERPL ELPH-MCNC: 1.1 G/DL (ref 0.4–1)
B-GLOBULIN SERPL ELPH-MCNC: 1 G/DL (ref 0.7–1.3)
CENTROMERE B AB SER-ACNC: <0.2 AI (ref 0–0.9)
CHROMATIN AB SERPL-ACNC: <0.2 AI (ref 0–0.9)
CYSTATIN C SERPL-MCNC: 1.52 MG/L (ref 0.6–1)
DSDNA AB SER-ACNC: 1 IU/ML (ref 0–9)
ENA JO1 AB SER-ACNC: <0.2 AI (ref 0–0.9)
ENA RNP AB SER-ACNC: <0.2 AI (ref 0–0.9)
ENA SCL70 AB SER-ACNC: <0.2 AI (ref 0–0.9)
ENA SM AB SER-ACNC: <0.2 AI (ref 0–0.9)
ENA SS-A AB SER-ACNC: <0.2 AI (ref 0–0.9)
ENA SS-B AB SER-ACNC: <0.2 AI (ref 0–0.9)
GAMMA GLOB SERPL ELPH-MCNC: 0.8 G/DL (ref 0.4–1.8)
GLOBULIN SER CALC-MCNC: 3.1 G/DL (ref 2.2–3.9)
LABORATORY COMMENT REPORT: ABNORMAL
Lab: NORMAL
M PROTEIN SERPL ELPH-MCNC: ABNORMAL G/DL
PROT SERPL-MCNC: 6.6 G/DL (ref 6–8.5)

## 2022-07-06 RX ORDER — ERGOCALCIFEROL 1.25 MG/1
CAPSULE ORAL
Qty: 12 CAPSULE | Refills: 0 | Status: SHIPPED | OUTPATIENT
Start: 2022-07-06 | End: 2022-10-13 | Stop reason: SDUPTHER

## 2022-07-07 LAB
ALBUMIN 24H MFR UR ELPH: 21 %
ALPHA1 GLOB 24H MFR UR ELPH: 6.3 %
ALPHA2 GLOB 24H MFR UR ELPH: 13.6 %
B-GLOBULIN MFR UR ELPH: 36 %
C-ANCA TITR SER IF: NORMAL TITER
GAMMA GLOB 24H MFR UR ELPH: 23 %
HIV 1 & 2 AB SER-IMP: NORMAL
INTERPRETATION UR IFE-IMP: NORMAL
KAPPA LC FREE SER-MCNC: 27.4 MG/L (ref 3.3–19.4)
KAPPA LC FREE/LAMBDA FREE SER: 1.07 {RATIO} (ref 0.26–1.65)
LAMBDA LC FREE SERPL-MCNC: 25.7 MG/L (ref 5.7–26.3)
M PROTEIN 24H MFR UR ELPH: NORMAL %
MYELOPEROXIDASE AB SER IA-ACNC: <0.2 UNITS (ref 0–0.9)
P-ANCA ATYPICAL TITR SER IF: NORMAL TITER
P-ANCA TITR SER IF: NORMAL TITER
PROT UR-MCNC: 25.5 MG/DL
PROTEINASE3 AB SER IA-ACNC: <0.2 UNITS (ref 0–0.9)

## 2022-07-18 ENCOUNTER — TELEPHONE (OUTPATIENT)
Dept: OBSTETRICS AND GYNECOLOGY | Facility: CLINIC | Age: 42
End: 2022-07-18

## 2022-07-18 NOTE — TELEPHONE ENCOUNTER
Patient informed that the physician does not feel it is related to the injection. She was advised to follow up with her PCP.

## 2022-07-18 NOTE — TELEPHONE ENCOUNTER
Patient called stating she has had a migraine for approximately 2 weeks. She does have a history of migraines and is prescribed Maxalt by her PCP. She states the Maxalt has not helped at all and she is unable to take NSAID's due to kidney disease. She wanted to know if the migraine could be due to the Lupron injection she has 6/9. She was advised to follow up with her PCP but wanted your thoughts also. Please advise.

## 2022-07-21 ENCOUNTER — OFFICE VISIT (OUTPATIENT)
Dept: ORTHOPEDIC SURGERY | Facility: CLINIC | Age: 42
End: 2022-07-21

## 2022-07-21 VITALS — HEIGHT: 65 IN | WEIGHT: 273 LBS | BODY MASS INDEX: 45.48 KG/M2 | HEART RATE: 88 BPM | OXYGEN SATURATION: 98 %

## 2022-07-21 DIAGNOSIS — M17.11 PRIMARY OSTEOARTHRITIS OF RIGHT KNEE: Primary | ICD-10-CM

## 2022-07-21 PROCEDURE — 20610 DRAIN/INJ JOINT/BURSA W/O US: CPT | Performed by: PHYSICIAN ASSISTANT

## 2022-07-21 RX ORDER — LIDOCAINE HYDROCHLORIDE 10 MG/ML
5 INJECTION, SOLUTION INFILTRATION; PERINEURAL
Status: COMPLETED | OUTPATIENT
Start: 2022-07-21 | End: 2022-07-21

## 2022-07-21 RX ADMIN — LIDOCAINE HYDROCHLORIDE 5 ML: 10 INJECTION, SOLUTION INFILTRATION; PERINEURAL at 10:16

## 2022-07-21 NOTE — PROGRESS NOTES
Chief Complaint  Pain and Follow-up of the Right Knee    Subjective          Bebe Steward is a 42 y.o. female  presents to [facility] for   History of Present Illness      Patient presents for follow-up evaluation of right knee pain, right knee osteoarthritis she met with Dr. Dobson on 2022 he gave her a right knee Zilretta injection which she states helped resolve her knee pain for about 3 weeks.  She states that she has a history of right knee pain, right knee surgery, she has had other types of injections including viscosupplementation back in 2015.  She states that she would like to receive another injection today she states pain is in the anterior medial and lateral knee, she feels that the pain is is in side her joint she also has some pressure pain in the posterior knee.  She states she started pain management next week.  She denies new injury or symptoms of pain, states pain is similar to past episodes she has a history of left leg below the knee amputation.  She cannot take NSAIDs.      Allergies   Allergen Reactions   • Monosodium Glutamate Other (See Comments)     Blood pressure drops, pass out    • Penicillins Anaphylaxis   • Cyclobenzaprine Other (See Comments)     Muscle spasms   • Adhesive Tape Itching   • Cefaclor Rash   • Cephalexin Rash   • Erythromycin Rash   • Meperidine Headache   • Morphine Irritability   • Nsaids Rash   • Sulfa Antibiotics Rash   • Sulfamethoxazole-Trimethoprim Rash   • Vancomycin Itching        Social History     Socioeconomic History   • Marital status:    • Number of children: 3   Tobacco Use   • Smoking status: Former Smoker     Packs/day: 1.00     Years: 21.00     Pack years: 21.00     Types: Cigarettes, Cigarettes, Cigarettes     Start date: 1997     Quit date: 2021     Years since quittin.5   • Smokeless tobacco: Never Used   Vaping Use   • Vaping Use: Never used   Substance and Sexual Activity   • Alcohol  "use: Yes     Comment: Maybe once every few months   • Drug use: Never   • Sexual activity: Not Currently     Partners: Male     Birth control/protection: Abstinence, Other, None     Comment: Chemical menopause        REVIEW OF SYSTEMS    Constitutional: Denies fevers, chills, weight loss  Cardiovascular: Denies chest pain, shortness of breath  Skin: Denies rashes, acute skin changes  Neurologic: Denies headache, loss of consciousness  MSK: Right knee pain      Objective   Vital Signs:   Pulse 88   Ht 165.1 cm (65\")   Wt 124 kg (273 lb)   SpO2 98%   BMI 45.43 kg/m²     Body mass index is 45.43 kg/m².    Physical Exam    Right knee: Positive crepitus with range of motion, full extension, flexion 125 with mild pain, stable to varus/valgus stress, stable anterior/posterior drawer, well-healed arthroscopy scars, negative Kolby, neurovascular intact.    Large Joint Arthrocentesis: R knee  Date/Time: 7/21/2022 10:16 AM  Consent given by: patient  Site marked: site marked  Timeout: Immediately prior to procedure a time out was called to verify the correct patient, procedure, equipment, support staff and site/side marked as required   Supporting Documentation  Indications: pain   Procedure Details  Location: knee - R knee  Needle gauge: 21 G.  Medications administered: 32 mg Triamcinolone Acetonide 32 MG; 5 mL lidocaine 1 %  Patient tolerance: patient tolerated the procedure well with no immediate complications          Imaging Results (Most Recent)     None           Result Review :   The following data was reviewed by: CADY Ferrer on 07/21/2022:               Assessment and Plan    Diagnoses and all orders for this visit:    1. Primary osteoarthritis of right knee (Primary)    Other orders  -     Large Joint Arthrocentesis: R knee        Discussed diagnosis and treatment options with the patient, patient states she would like to get knee replacement in the future, she discussed with the  " Bronson he advised her to hold off on this till her 50s, in the meantime we discussed Zilretta injection today, follow-up in 6 to 8 weeks for reevaluation, may consider Synvisc at that visit.  Patient is also about to start therapy prescribed by her PCP.    Call or return if worsening symptoms.    Follow Up   Return for Follow up 6-8 weeks.  Patient was given instructions and counseling regarding her condition or for health maintenance advice. Please see specific information pulled into the AVS if appropriate.

## 2022-08-16 ENCOUNTER — OFFICE VISIT (OUTPATIENT)
Dept: FAMILY MEDICINE CLINIC | Facility: CLINIC | Age: 42
End: 2022-08-16

## 2022-08-16 VITALS
TEMPERATURE: 98.3 F | DIASTOLIC BLOOD PRESSURE: 82 MMHG | HEIGHT: 65 IN | WEIGHT: 260 LBS | BODY MASS INDEX: 43.32 KG/M2 | SYSTOLIC BLOOD PRESSURE: 152 MMHG | HEART RATE: 112 BPM | OXYGEN SATURATION: 97 %

## 2022-08-16 DIAGNOSIS — I10 ESSENTIAL HYPERTENSION: Primary | ICD-10-CM

## 2022-08-16 DIAGNOSIS — N18.32 STAGE 3B CHRONIC KIDNEY DISEASE: ICD-10-CM

## 2022-08-16 DIAGNOSIS — F51.01 PRIMARY INSOMNIA: ICD-10-CM

## 2022-08-16 PROCEDURE — 99214 OFFICE O/P EST MOD 30 MIN: CPT | Performed by: FAMILY MEDICINE

## 2022-08-16 RX ORDER — ZOLPIDEM TARTRATE 10 MG/1
10 TABLET ORAL NIGHTLY PRN
Qty: 90 TABLET | Refills: 0 | Status: SHIPPED | OUTPATIENT
Start: 2022-08-16 | End: 2022-10-13

## 2022-08-16 RX ORDER — GABAPENTIN 300 MG/1
300 CAPSULE ORAL
COMMUNITY
Start: 2022-02-04 | End: 2022-08-16

## 2022-08-16 RX ORDER — HYDROCODONE BITARTRATE AND ACETAMINOPHEN 5; 325 MG/1; MG/1
1 TABLET ORAL EVERY 8 HOURS PRN
COMMUNITY
Start: 2022-07-27 | End: 2023-01-31

## 2022-08-18 ENCOUNTER — PATIENT ROUNDING (BHMG ONLY) (OUTPATIENT)
Dept: FAMILY MEDICINE CLINIC | Facility: CLINIC | Age: 42
End: 2022-08-18

## 2022-08-18 DIAGNOSIS — M51.36 DEGENERATION OF LUMBAR INTERVERTEBRAL DISC: ICD-10-CM

## 2022-08-18 RX ORDER — GABAPENTIN 300 MG/1
300 CAPSULE ORAL 3 TIMES DAILY
Qty: 90 CAPSULE | Refills: 0 | Status: ON HOLD | OUTPATIENT
Start: 2022-08-18 | End: 2023-04-06

## 2022-08-18 NOTE — PROGRESS NOTES
August 18, 2022    Hello, may I speak with Bebe Steward?    My name is Maged Jaimes      I am  with [facility]  [address]  [address]  [facility] [place] [address]  [phone].    Before we get started may I verify your date of birth? 1980    I am calling to officially welcome you to our practice and ask about your recent visit. Is this a good time to talk? yes    Tell me about your visit with us. What things went well?  Very well she was great        We're always looking for ways to make our patients' experiences even better. Do you have recommendations on ways we may improve?  no    Overall were you satisfied with your first visit to our practice? yes       I appreciate you taking the time to speak with me today. Is there anything else I can do for you? no      Thank you, and have a great day.

## 2022-08-30 ENCOUNTER — TELEPHONE (OUTPATIENT)
Dept: SURGERY | Facility: CLINIC | Age: 42
End: 2022-08-30

## 2022-08-30 NOTE — TELEPHONE ENCOUNTER
Called pt to inform her that I had to r/s procedure from 10/6 to 11/3 with a 6:30am arrival time. Pt voiced understanding.

## 2022-09-02 ENCOUNTER — OFFICE VISIT (OUTPATIENT)
Dept: ORTHOPEDIC SURGERY | Facility: CLINIC | Age: 42
End: 2022-09-02

## 2022-09-02 VITALS — BODY MASS INDEX: 43.32 KG/M2 | HEIGHT: 65 IN | WEIGHT: 260 LBS | HEART RATE: 88 BPM | OXYGEN SATURATION: 98 %

## 2022-09-02 DIAGNOSIS — M17.11 PRIMARY OSTEOARTHRITIS OF RIGHT KNEE: Primary | ICD-10-CM

## 2022-09-02 PROCEDURE — 99213 OFFICE O/P EST LOW 20 MIN: CPT | Performed by: PHYSICIAN ASSISTANT

## 2022-09-02 PROCEDURE — 20610 DRAIN/INJ JOINT/BURSA W/O US: CPT | Performed by: PHYSICIAN ASSISTANT

## 2022-09-02 NOTE — PROGRESS NOTES
Chief Complaint  Pain and Follow-up of the Right Knee    Subjective          Bebe Steward is a 42 y.o. female  presents to [facility] for   History of Present Illness      Patient presents for follow-up evaluation of right knee pain, right knee osteoarthritis, at last visit she received a right knee Zilretta injection which she states helped for about 4 weeks she was pain-free.  She states the pain has returned recently she states the pain is similar to past episodes she states that the pain is in the anterior medial and lateral knee, she also points to the side of her joint with pressure pain in the posterior knee.  She is in pain management.  She cannot take NSAIDs.  She has a history of left leg below the knee amputation.  She has had viscosupplementation in the past and also a right knee surgery in the past.  She is requesting right knee Synvisc injection today which was discussed with her in the past.      Allergies   Allergen Reactions   • Monosodium Glutamate Other (See Comments)     Blood pressure drops, pass out    • Penicillins Anaphylaxis   • Cyclobenzaprine Other (See Comments)     Muscle spasms   • Adhesive Tape Itching   • Cefaclor Rash   • Cephalexin Rash   • Erythromycin Rash   • Meperidine Headache   • Morphine Irritability   • Nsaids Rash   • Sulfa Antibiotics Rash   • Sulfamethoxazole-Trimethoprim Rash   • Vancomycin Itching        Social History     Socioeconomic History   • Marital status:    • Number of children: 3   Tobacco Use   • Smoking status: Former Smoker     Packs/day: 1.00     Years: 21.00     Pack years: 21.00     Types: Cigarettes, Cigarettes, Cigarettes     Start date: 1997     Quit date: 2021     Years since quittin.6   • Smokeless tobacco: Never Used   Vaping Use   • Vaping Use: Never used   Substance and Sexual Activity   • Alcohol use: Yes     Comment: Maybe once every few months   • Drug use: Never   • Sexual activity: Not  "Currently     Partners: Male     Birth control/protection: Abstinence, Other, None     Comment: Chemical menopause        REVIEW OF SYSTEMS    Constitutional: Denies fevers, chills, weight loss  Cardiovascular: Denies chest pain, shortness of breath  Skin: Denies rashes, acute skin changes  Neurologic: Denies headache, loss of consciousness  MSK: Right knee pain      Objective   Vital Signs:   Pulse 88   Ht 165.1 cm (65\")   Wt 118 kg (260 lb)   SpO2 98%   BMI 43.27 kg/m²     Body mass index is 43.27 kg/m².    Physical Exam    Right knee: Crepitus with range of motion, full extension, flexion 125 with mild pain, stable to varus/valgus stress, stable anterior/posterior drawer, well-healed arthroscopy scars, negative Kolby, neurovascular intact.    Large Joint Arthrocentesis: R knee  Date/Time: 9/2/2022 3:11 PM  Consent given by: patient  Site marked: site marked  Timeout: Immediately prior to procedure a time out was called to verify the correct patient, procedure, equipment, support staff and site/side marked as required   Supporting Documentation  Indications: pain   Procedure Details  Location: knee - R knee  Needle gauge: 21G.  Medications administered: 48 mg hylan 48 MG/6ML  Patient tolerance: patient tolerated the procedure well with no immediate complications          Imaging Results (Most Recent)     None           Result Review :   The following data was reviewed by: Estefani Wilson MA on 09/02/2022:               Assessment and Plan    Diagnoses and all orders for this visit:    1. Primary osteoarthritis of right knee (Primary)        Discussed diagnosis and treatment options with the patient she was advised recommend viscosupplementation for knee treatment today she agreed to this and tolerated it well, she was given a knee brace, follow-up in 8 weeks for recheck.  Follow-up sooner if any new or concerning symptoms occur, patient agreed    Call or return if worsening symptoms.    Follow Up   Return " in about 8 weeks (around 10/28/2022) for Recheck.  Patient was given instructions and counseling regarding her condition or for health maintenance advice. Please see specific information pulled into the AVS if appropriate.

## 2022-09-04 NOTE — ASSESSMENT & PLAN NOTE
Edema newly identified, we discussed that amlodipine high-dose can sometimes cause swelling so we will decrease her dose to 5 mg daily.  Will start her on Lasix 20 mg once daily for short time, advised that she can stop taking it once her swelling has improved.   show

## 2022-09-09 ENCOUNTER — TELEPHONE (OUTPATIENT)
Dept: FAMILY MEDICINE CLINIC | Facility: CLINIC | Age: 42
End: 2022-09-09

## 2022-09-09 ENCOUNTER — CLINICAL SUPPORT (OUTPATIENT)
Dept: OBSTETRICS AND GYNECOLOGY | Facility: CLINIC | Age: 42
End: 2022-09-09

## 2022-09-09 DIAGNOSIS — N80.9 ENDOMETRIOSIS: Primary | ICD-10-CM

## 2022-09-09 PROCEDURE — 96372 THER/PROPH/DIAG INJ SC/IM: CPT | Performed by: OBSTETRICS & GYNECOLOGY

## 2022-09-09 NOTE — TELEPHONE ENCOUNTER
I CALLED PATIENT TO RESCHEDULE HER 9/27 APPT. I RESCHEDULED HER FOR 10/13 TO BE SEEN. SHE IS ON AMBIEN AND WOULD LIKE TO SEE ABOUT GETTING SWITCHED TO SONATA 10MG. SHE HAS BEEN ON THAT BEFORE AND IT HAS WORKED. AMBIEN IS MAKING HER DO STRANGE THINGS IN THE MIDDLE OF THE NIGHT THAT SHE HAS NO MEMORY OF.    PLEASE ADVISE.

## 2022-10-10 ENCOUNTER — TRANSCRIBE ORDERS (OUTPATIENT)
Dept: LAB | Facility: HOSPITAL | Age: 42
End: 2022-10-10

## 2022-10-10 ENCOUNTER — LAB (OUTPATIENT)
Dept: LAB | Facility: HOSPITAL | Age: 42
End: 2022-10-10

## 2022-10-10 DIAGNOSIS — N18.31 CHRONIC KIDNEY DISEASE (CKD) STAGE G3A/A1, MODERATELY DECREASED GLOMERULAR FILTRATION RATE (GFR) BETWEEN 45-59 ML/MIN/1.73 SQUARE METER AND ALBUMINURIA CREATININE RATIO LESS THAN 30 MG/G (CMS/H*: Primary | ICD-10-CM

## 2022-10-10 DIAGNOSIS — N18.31 CHRONIC KIDNEY DISEASE (CKD) STAGE G3A/A1, MODERATELY DECREASED GLOMERULAR FILTRATION RATE (GFR) BETWEEN 45-59 ML/MIN/1.73 SQUARE METER AND ALBUMINURIA CREATININE RATIO LESS THAN 30 MG/G (CMS/H*: ICD-10-CM

## 2022-10-10 LAB
CREAT UR-MCNC: 77.5 MG/DL
PROT ?TM UR-MCNC: 45.3 MG/DL
PROT/CREAT UR: 0.58 MG/G{CREAT}

## 2022-10-10 PROCEDURE — 82610 CYSTATIN C: CPT

## 2022-10-10 PROCEDURE — 82570 ASSAY OF URINE CREATININE: CPT

## 2022-10-10 PROCEDURE — 80069 RENAL FUNCTION PANEL: CPT

## 2022-10-10 PROCEDURE — 81001 URINALYSIS AUTO W/SCOPE: CPT

## 2022-10-10 PROCEDURE — 36415 COLL VENOUS BLD VENIPUNCTURE: CPT

## 2022-10-10 PROCEDURE — 84156 ASSAY OF PROTEIN URINE: CPT

## 2022-10-11 LAB
ALBUMIN SERPL-MCNC: 4.5 G/DL (ref 3.5–5.2)
ANION GAP SERPL CALCULATED.3IONS-SCNC: 14 MMOL/L (ref 5–15)
BACTERIA UR QL AUTO: NORMAL /HPF
BILIRUB UR QL STRIP: NEGATIVE
BUN SERPL-MCNC: 10 MG/DL (ref 6–20)
BUN/CREAT SERPL: 9.3 (ref 7–25)
CALCIUM SPEC-SCNC: 9.3 MG/DL (ref 8.6–10.5)
CHLORIDE SERPL-SCNC: 104 MMOL/L (ref 98–107)
CLARITY UR: CLEAR
CO2 SERPL-SCNC: 21 MMOL/L (ref 22–29)
COLOR UR: YELLOW
CREAT SERPL-MCNC: 1.08 MG/DL (ref 0.57–1)
EGFRCR SERPLBLD CKD-EPI 2021: 65.1 ML/MIN/1.73
GLUCOSE SERPL-MCNC: 106 MG/DL (ref 65–99)
GLUCOSE UR STRIP-MCNC: NEGATIVE MG/DL
HGB UR QL STRIP.AUTO: NEGATIVE
HYALINE CASTS UR QL AUTO: NORMAL /LPF
KETONES UR QL STRIP: NEGATIVE
LEUKOCYTE ESTERASE UR QL STRIP.AUTO: NEGATIVE
NITRITE UR QL STRIP: NEGATIVE
PH UR STRIP.AUTO: 7 [PH] (ref 5–8)
PHOSPHATE SERPL-MCNC: 2.6 MG/DL (ref 2.5–4.5)
POTASSIUM SERPL-SCNC: 4.5 MMOL/L (ref 3.5–5.2)
PROT UR QL STRIP: ABNORMAL
RBC # UR STRIP: NORMAL /HPF
REF LAB TEST METHOD: NORMAL
SODIUM SERPL-SCNC: 139 MMOL/L (ref 136–145)
SP GR UR STRIP: 1.01 (ref 1–1.03)
SQUAMOUS #/AREA URNS HPF: NORMAL /HPF
UROBILINOGEN UR QL STRIP: ABNORMAL
WBC # UR STRIP: NORMAL /HPF

## 2022-10-12 LAB — CYSTATIN C SERPL-MCNC: 1.75 MG/L (ref 0.6–1)

## 2022-10-13 ENCOUNTER — OFFICE VISIT (OUTPATIENT)
Dept: FAMILY MEDICINE CLINIC | Facility: CLINIC | Age: 42
End: 2022-10-13

## 2022-10-13 VITALS
OXYGEN SATURATION: 99 % | SYSTOLIC BLOOD PRESSURE: 178 MMHG | WEIGHT: 257 LBS | HEIGHT: 65 IN | BODY MASS INDEX: 42.82 KG/M2 | TEMPERATURE: 98.2 F | HEART RATE: 89 BPM | DIASTOLIC BLOOD PRESSURE: 100 MMHG

## 2022-10-13 DIAGNOSIS — M35.3 POLYMYALGIA: ICD-10-CM

## 2022-10-13 DIAGNOSIS — F51.01 PRIMARY INSOMNIA: ICD-10-CM

## 2022-10-13 DIAGNOSIS — M54.50 CHRONIC BILATERAL LOW BACK PAIN, UNSPECIFIED WHETHER SCIATICA PRESENT: ICD-10-CM

## 2022-10-13 DIAGNOSIS — I10 ESSENTIAL HYPERTENSION: Primary | ICD-10-CM

## 2022-10-13 DIAGNOSIS — M51.36 DEGENERATION OF LUMBAR INTERVERTEBRAL DISC: ICD-10-CM

## 2022-10-13 DIAGNOSIS — G89.29 CHRONIC BILATERAL LOW BACK PAIN, UNSPECIFIED WHETHER SCIATICA PRESENT: ICD-10-CM

## 2022-10-13 PROCEDURE — 99214 OFFICE O/P EST MOD 30 MIN: CPT | Performed by: FAMILY MEDICINE

## 2022-10-13 RX ORDER — ERGOCALCIFEROL 1.25 MG/1
50000 CAPSULE ORAL WEEKLY
Qty: 12 CAPSULE | Refills: 3 | Status: SHIPPED | OUTPATIENT
Start: 2022-10-13 | End: 2023-01-11

## 2022-10-13 RX ORDER — HYDROXYZINE HYDROCHLORIDE 25 MG/1
25 TABLET, FILM COATED ORAL EVERY 8 HOURS PRN
Qty: 90 TABLET | Refills: 1 | Status: SHIPPED | OUTPATIENT
Start: 2022-10-13 | End: 2023-02-14

## 2022-10-13 RX ORDER — ZALEPLON 10 MG/1
10 CAPSULE ORAL NIGHTLY
Qty: 30 CAPSULE | Refills: 2 | Status: SHIPPED | OUTPATIENT
Start: 2022-10-13 | End: 2022-11-12

## 2022-10-13 RX ORDER — METHOCARBAMOL 500 MG/1
500 TABLET, FILM COATED ORAL 2 TIMES DAILY PRN
Qty: 180 TABLET | Refills: 1 | Status: SHIPPED | OUTPATIENT
Start: 2022-10-13 | End: 2023-01-11

## 2022-10-13 NOTE — PROGRESS NOTES
Answers for HPI/ROS submitted by the patient on 10/6/2022  Please describe your symptoms.: Check up. Med refills. Bad pain in all joints  Have you had these symptoms before?: No  How long have you been having these symptoms?: 1-2 weeks  What is the primary reason for your visit?: Other    Chief Complaint  Chief Complaint   Patient presents with   • Hypertension   • Med Refill   • Joint pain       HPI:  Bebe Steward presents to [facility]       HTN- Pt BP today is 178/100 which is not well controlled.  Pt is compliant with their medication and will continue their current medication regimen. No side effects to the medication. Pt just went to see her Nephrologist who increased her bp medication and she is now on 120mg of Imbur and she was instructed that if her bp drops too low she can half one of the pills.     Chronic Kidney Disease- pt just had her GFR checked and it was 65.     Polyarthralgia- pt reports she hurts in her joints all over and her uric acid level was higher than normal.     Past History:  Medical History: has a past medical history of Abnormal Pap smear of cervix (07/21/2020), Allergic (1990), Allergies, Anemia, Ankle sprain, Anxiety (2014), Arthritis, Arthritis of back (2006), Brain concussion (1995), Broken bones (08/2014), Cervical disc disorder (2012), Corns and callus, CTS (carpal tunnel syndrome) (2022), Essential hypertension (07/21/2020), Foot pain, Fracture of ankle (2008), Fracture of wrist (2011), Fracture, tibia and fibula (2008), Gallstone (2001), Head injury (1996), HPV (human papilloma virus) infection, Hyperlipidemia, Hypertension, Ingrown toenail, Insomnia (07/21/2020), Kidney problem, Knee swelling (2015), Left below-knee amputee (HCC) (07/21/2020), Low back pain (2006), Lumbosacral disc disease (2006), Migraine, Muscle spasm (01/04/2021), Neck strain, Numbness in feet, Obesity, 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   Surgical History: has a past surgical history that includes Abdominal surgery (2010); Leg amputation, lower tibia/fibula (Left, 2014); Ankle surgery; Back surgery; Endometrial ablation (2007); Gallbladder surgery (2001); Gastric bypass (2001); Knee surgery; Spine surgery (2008); Tonsillectomy (1994); Spinal fusion (10/2008); Epidural block injection (2007); Trigger point injection (2021); Ankle fracture surgery (9752-5376); Bariatric Surgery (2001); Cholecystectomy (2001); Fracture surgery (2008); and Gastrostomy (2001).   Family History: family history includes Anesthesia problems in her father; Anxiety disorder in her maternal grandmother, mother, and son; Arthritis in her father, maternal grandfather, maternal grandmother, mother, paternal grandfather, and paternal grandmother; Cancer in her maternal grandfather, paternal aunt, and paternal grandmother; Colon cancer in her paternal aunt; Colon cancer (age of onset: 87) in her paternal grandmother; Depression in her father, maternal grandmother, and mother; Developmental Disability in her paternal aunt; Diabetes in her maternal grandfather; Early death in her mother; Heart disease in her father, maternal grandfather, maternal grandmother, and paternal grandfather; Hyperlipidemia in her father, maternal grandfather, maternal grandmother, mother, paternal grandfather, and paternal grandmother; Kidney disease in her father, maternal grandmother, mother, paternal grandfather, and paternal grandmother; Liver disease in her maternal grandmother; Melanoma in her maternal grandfather; Miscarriages / Stillbirths in  her maternal grandmother and paternal grandmother; Osteoporosis in her maternal grandmother and paternal grandmother; Other in her mother; Stroke in her maternal grandfather and mother; Thyroid disease in her maternal grandmother and mother; Vision loss in her father.   Social History: reports that she quit smoking about 21 months ago. Her smoking use included cigarettes. She started smoking about 25 years ago. She has a 21.00 pack-year smoking history. She has never used smokeless tobacco. She reports current alcohol use. She reports that she does not use drugs.  Immunization History   Administered Date(s) Administered   • COVID-19 (MODERNA) 1st, 2nd, 3rd Dose Only 12/11/2021, 12/13/2021   • COVID-19 (MODERNA) BOOSTER 11/11/2021, 12/09/2021   • FluLaval/Fluzone >6mos 10/05/2021   • Influenza, Unspecified 12/02/2020         Allergies: Monosodium glutamate, Penicillins, Cyclobenzaprine, Adhesive tape, Cefaclor, Cephalexin, Erythromycin, Meperidine, Morphine, Nsaids, Sulfa antibiotics, Sulfamethoxazole-trimethoprim, and Vancomycin     Medications:  Current Outpatient Medications on File Prior to Visit   Medication Sig Dispense Refill   • acetaminophen (TYLENOL) 325 MG tablet Take 650 mg by mouth Every 6 (Six) Hours As Needed.     • atorvastatin (LIPITOR) 20 MG tablet Take 1 tablet by mouth Daily. 90 tablet 1   • docusate sodium (Colace) 100 MG capsule Take 1 capsule by mouth 2 (Two) Times a Day As Needed for Constipation. 60 capsule 5   • furosemide (LASIX) 20 MG tablet Take 1 tablet by mouth Daily. (Patient taking differently: Take 1 tablet by mouth. AS NEEDED) 90 tablet 1   • hydrALAZINE (APRESOLINE) 50 MG tablet Take 1 tablet by mouth 3 (Three) Times a Day. 90 tablet 5   • HYDROcodone-acetaminophen (NORCO) 5-325 MG per tablet 1 tablet Every 8 (Eight) Hours As Needed.     • isosorbide mononitrate (IMDUR) 60 MG 24 hr tablet Take 2 tablets by mouth Daily. Per endo changed to 120 but if seems to much patient is to take  "half     • leuprolide (Lupron Depot, 3-Month,) 11.25 MG injection Inject 11.25 mg into the appropriate muscle as directed by prescriber Every 3 (Three) Months. 1 kit 1   • loratadine (CLARITIN) 10 MG tablet Take 1 tablet by mouth Daily. 90 tablet 1   • norethindrone (AYGESTIN) 5 MG tablet Take 1 tablet by mouth Daily.     • sertraline (ZOLOFT) 50 MG tablet Take 1 tablet by mouth Daily. 90 tablet 1   • gabapentin (NEURONTIN) 300 MG capsule Take 1 capsule by mouth 3 (Three) Times a Day for 30 days. 90 capsule 0   • rizatriptan (MAXALT) 10 MG tablet Take 1 tablet by mouth 1 (One) Time for 1 dose. AT ONSET OF HEADACHE MAY REPEAT ONE TABLET IN 2 HOURS IF NEEDED 9 tablet 5     No current facility-administered medications on file prior to visit.        Health Maintenance Due   Topic Date Due   • TDAP/TD VACCINES (1 - Tdap) Never done   • ANNUAL WELLNESS VISIT  Never done   • COVID-19 Vaccine (2 - Moderna series) 01/10/2022   • INFLUENZA VACCINE  08/01/2022       Vital Signs:   Vitals:    10/13/22 1218 10/13/22 1224   BP: 178/100 178/100   BP Location: Left arm    Patient Position: Sitting    Cuff Size: Adult    Pulse: 89    Temp: 98.2 °F (36.8 °C)    SpO2: 99%    Weight: 117 kg (257 lb)    Height: 165.1 cm (65\")       Body mass index is 42.77 kg/m².     ROS:  Review of Systems   Constitutional: Negative for fatigue and fever.   HENT: Negative for congestion, ear pain and sinus pressure.    Respiratory: Negative for cough, chest tightness and shortness of breath.    Cardiovascular: Negative for chest pain, palpitations and leg swelling.   Gastrointestinal: Negative for abdominal pain and diarrhea.   Genitourinary: Negative for dysuria and frequency.   Neurological: Negative for speech difficulty, headache and confusion.   Psychiatric/Behavioral: Negative for agitation and behavioral problems.          Physical Exam  Vitals reviewed.   Constitutional:       Appearance: Normal appearance.   HENT:      Right Ear: Tympanic " membrane normal.      Left Ear: Tympanic membrane normal.      Nose: Nose normal.   Eyes:      Extraocular Movements: Extraocular movements intact.      Conjunctiva/sclera: Conjunctivae normal.      Pupils: Pupils are equal, round, and reactive to light.   Cardiovascular:      Rate and Rhythm: Normal rate and regular rhythm.   Pulmonary:      Effort: Pulmonary effort is normal.      Breath sounds: Normal breath sounds.   Abdominal:      General: Bowel sounds are normal.   Musculoskeletal:         General: Normal range of motion.      Cervical back: Normal range of motion.   Skin:     General: Skin is warm and dry.   Neurological:      General: No focal deficit present.      Mental Status: She is alert and oriented to person, place, and time.   Psychiatric:         Mood and Affect: Mood normal.         Behavior: Behavior normal.          Result Review   Renal Function Panel (10/10/2022 12:05)       Diagnoses and all orders for this visit:    1. Essential hypertension (Primary)    2. Polymyalgia (HCC)  -     Ambulatory Referral to Rheumatology    3. Degeneration of lumbar intervertebral disc    4. Chronic bilateral low back pain, unspecified whether sciatica present    5. Primary insomnia  -     zaleplon (Sonata) 10 MG capsule; Take 1 capsule by mouth Every Night for 30 days.  Dispense: 30 capsule; Refill: 2    Other orders  -     methocarbamol (ROBAXIN) 500 MG tablet; Take 1 tablet by mouth 2 (Two) Times a Day As Needed for Muscle Spasms for up to 90 days.  Dispense: 180 tablet; Refill: 1  -     vitamin D (ERGOCALCIFEROL) 1.25 MG (87589 UT) capsule capsule; Take 1 capsule by mouth 1 (One) Time Per Week for 90 days.  Dispense: 12 capsule; Refill: 3  -     hydrOXYzine (ATARAX) 25 MG tablet; Take 1 tablet by mouth Every 8 (Eight) Hours As Needed for Anxiety for up to 90 days. for anxiety  Dispense: 90 tablet; Refill: 1          Smoking Cessation:    Bebe Steward  reports that she quit smoking about 21 months ago. Her  smoking use included cigarettes. She started smoking about 25 years ago. She has a 21.00 pack-year smoking history. She has never used smokeless tobacco.          Follow Up   Return in about 3 months (around 1/13/2023).  Patient was given instructions and counseling regarding her condition or for health maintenance advice. Please see specific information pulled into the AVS if appropriate.       Kimberly Almaguer MD

## 2022-10-14 ENCOUNTER — OFFICE VISIT (OUTPATIENT)
Dept: ORTHOPEDIC SURGERY | Facility: CLINIC | Age: 42
End: 2022-10-14

## 2022-10-14 ENCOUNTER — TELEPHONE (OUTPATIENT)
Dept: ORTHOPEDIC SURGERY | Facility: CLINIC | Age: 42
End: 2022-10-14

## 2022-10-14 VITALS — HEIGHT: 65 IN | BODY MASS INDEX: 44.79 KG/M2 | OXYGEN SATURATION: 99 % | HEART RATE: 90 BPM | WEIGHT: 268.8 LBS

## 2022-10-14 DIAGNOSIS — M17.11 PRIMARY OSTEOARTHRITIS OF RIGHT KNEE: Primary | ICD-10-CM

## 2022-10-14 PROCEDURE — 99213 OFFICE O/P EST LOW 20 MIN: CPT | Performed by: PHYSICIAN ASSISTANT

## 2022-10-14 NOTE — PROGRESS NOTES
Chief Complaint  Pain and Follow-up of the Right Knee    Subjective          Bebe Steward is a 42 y.o. female  presents to [facility] for   History of Present Illness      Patient presents for follow-up evaluation of right knee pain, right knee osteoarthritis at last visit on 2022 she received a right knee Synvisc injection which she states helped until about 2 weeks ago it wore off.  She states that she was recently seen by her primary care physician, they were worried about her uric acid levels and due to history of family history of gout her primary care physician is looking into this with further labs/evaluation.  We discussed right knee steroid injection today she received benefit from a Zilretta injection in the past, it is too soon for her to receive a Zilretta injection, she will follow-up in a few weeks.  She states pain is in the anterior medial knee, she admits to grinding of the knee.      Allergies   Allergen Reactions   • Monosodium Glutamate Other (See Comments)     Blood pressure drops, pass out    • Penicillins Anaphylaxis   • Cyclobenzaprine Other (See Comments)     Muscle spasms   • Adhesive Tape Itching   • Cefaclor Rash   • Cephalexin Rash   • Erythromycin Rash   • Meperidine Headache   • Morphine Irritability   • Nsaids Rash   • Sulfa Antibiotics Rash   • Sulfamethoxazole-Trimethoprim Rash   • Vancomycin Itching        Social History     Socioeconomic History   • Marital status:    • Number of children: 3   Tobacco Use   • Smoking status: Former     Packs/day: 1.00     Years: 21.00     Pack years: 21.00     Types: Cigarettes     Start date: 1997     Quit date: 2021     Years since quittin.7   • Smokeless tobacco: Never   Vaping Use   • Vaping Use: Never used   Substance and Sexual Activity   • Alcohol use: Yes     Comment: Only a couple times a year   • Drug use: Never   • Sexual activity: Not Currently     Partners: Male     Birth  "control/protection: Abstinence, Other     Comment: Chemical menopause        REVIEW OF SYSTEMS    Constitutional: Denies fevers, chills, weight loss  Cardiovascular: Denies chest pain, shortness of breath  Skin: Denies rashes, acute skin changes  Neurologic: Denies headache, loss of consciousness  MSK: Right knee pain      Objective   Vital Signs:   Pulse 90   Ht 165.1 cm (65\")   Wt 122 kg (268 lb 12.8 oz)   SpO2 99%   BMI 44.73 kg/m²     Body mass index is 44.73 kg/m².    Physical Exam    Right knee: Crepitus with range of motion, full extension, flexion 125 with mild pain, stable to varus/valgus stress, stable anterior/posterior drawer, well-healed arthroscopic scars, negative Kolby, neurovascular intact.    Procedures    Imaging Results (Most Recent)     None           Result Review :   The following data was reviewed by: CADY Ferrer on 10/14/2022:               Assessment and Plan    Diagnoses and all orders for this visit:    1. Primary osteoarthritis of right knee (Primary)        Discussed diagnosis and treatment options with the patient, she will follow-up when her eligibility to receive right knee Zilretta injection is due.  Patient agreed with plan.    Call or return if worsening symptoms.    Follow Up   Return for WHEN INJECTION IS APPROVED.  Patient was given instructions and counseling regarding her condition or for health maintenance advice. Please see specific information pulled into the AVS if appropriate.       "

## 2022-10-28 ENCOUNTER — TELEPHONE (OUTPATIENT)
Dept: FAMILY MEDICINE CLINIC | Facility: CLINIC | Age: 42
End: 2022-10-28

## 2022-10-28 NOTE — TELEPHONE ENCOUNTER
Caller: Steward Bebe    Relationship: Self    Best call back number: [phone]    What is the medical concern/diagnosis: GOUT    What specialty or service is being requested: RHEUMATOLOGIST      What is the office location: [facility]    Any additional details: PATIENT STATES THIS REFERRAL WAS DISCUSSED DURING HER LAST VISIT.

## 2022-10-31 ENCOUNTER — TELEPHONE (OUTPATIENT)
Dept: SURGERY | Facility: CLINIC | Age: 42
End: 2022-10-31

## 2022-10-31 NOTE — TELEPHONE ENCOUNTER
Patient wants to cancel 11/03/22.  She wants to wait until after her birthday in [birth year], 2025, because she is not 43 yet.  Can it be rescheduled that far out?     Please leave message if she doesn't answer.

## 2022-10-31 NOTE — TELEPHONE ENCOUNTER
Called sx [facility] and spoke with Sugey to r/s pt to 3- with a 6:30am.     Called pt to inform her that I was able to r/s her after her birthday. Pt was in agreement with new date and time. She asked if I could send another miralax-gatorade bowel prep to her confirmed address. Mailed today 10-31-22.

## 2022-11-03 NOTE — TELEPHONE ENCOUNTER
Called pt and she states she is having new issues  such as shooting pains down leg and back on Rt side

## 2022-11-08 DIAGNOSIS — M54.41 ACUTE RIGHT-SIDED LOW BACK PAIN WITH RIGHT-SIDED SCIATICA: Primary | ICD-10-CM

## 2022-11-10 ENCOUNTER — OFFICE VISIT (OUTPATIENT)
Dept: ORTHOPEDIC SURGERY | Facility: CLINIC | Age: 42
End: 2022-11-10

## 2022-11-10 VITALS — OXYGEN SATURATION: 98 % | HEIGHT: 65 IN | HEART RATE: 101 BPM | WEIGHT: 268 LBS | BODY MASS INDEX: 44.65 KG/M2

## 2022-11-10 DIAGNOSIS — M17.11 PRIMARY OSTEOARTHRITIS OF RIGHT KNEE: Primary | ICD-10-CM

## 2022-11-10 DIAGNOSIS — M25.561 RIGHT KNEE PAIN, UNSPECIFIED CHRONICITY: ICD-10-CM

## 2022-11-10 DIAGNOSIS — M54.41 ACUTE RIGHT-SIDED BACK PAIN WITH SCIATICA: Primary | ICD-10-CM

## 2022-11-10 PROCEDURE — 99213 OFFICE O/P EST LOW 20 MIN: CPT | Performed by: PHYSICIAN ASSISTANT

## 2022-11-10 NOTE — PROGRESS NOTES
"Chief Complaint  Pain and Follow-up of the Right Knee    Subjective          Bebe Steward is a 42 y.o. female  presents to [facility] for   History of Present Illness      Patient presents for follow-up evaluation of right knee pain, right knee osteoarthritis she has been treating her knee pain with injections she had a Synvisc injection back in September.  She states she has new knee pain to the medial anterior knee she states her knee \"feels weak and unstable \".  She wears a knee brace she states she has to wear the knee brace out in public due to knee weakness without it.  She does not wear the brace at home.  She states that she has pain in the anterior medial knee which is new over the last 2 to 3 weeks she denies new injury or symptoms of pain.  She denies actual buckling of the knee but admits to the sensation of instability.      Allergies   Allergen Reactions   • Monosodium Glutamate Other (See Comments)     Blood pressure drops, pass out    • Penicillins Anaphylaxis   • Cyclobenzaprine Other (See Comments)     Muscle spasms   • Adhesive Tape Itching   • Cefaclor Rash   • Cephalexin Rash   • Erythromycin Rash   • Meperidine Headache   • Morphine Irritability   • Nsaids Rash   • Sulfa Antibiotics Rash   • Sulfamethoxazole-Trimethoprim Rash   • Vancomycin Itching        Social History     Socioeconomic History   • Marital status:    • Number of children: 3   Tobacco Use   • Smoking status: Former     Packs/day: 1.00     Years: 15.00     Pack years: 15.00     Types: Cigarettes     Start date: 1997     Quit date: 2021     Years since quittin.8   • Smokeless tobacco: Never   Vaping Use   • Vaping Use: Never used   Substance and Sexual Activity   • Alcohol use: Yes     Comment: Rarely   • Drug use: Never   • Sexual activity: Not Currently     Partners: Male     Birth control/protection: Abstinence     Comment: Chemical menopause        REVIEW OF " "SYSTEMS    Constitutional: Denies fevers, chills, weight loss  Cardiovascular: Denies chest pain, shortness of breath  Skin: Denies rashes, acute skin changes  Neurologic: Denies headache, loss of consciousness  MSK: Right knee pain      Objective   Vital Signs:   Pulse 101   Ht 165.1 cm (65\")   Wt 122 kg (268 lb)   SpO2 98%   BMI 44.60 kg/m²     Body mass index is 44.6 kg/m².    Physical Exam    Right knee: Crepitus with range of motion, full extension, flexion 125 with mild pain, stable to varus/valgus stress, stable anterior/posterior drawer, well-healed arthroscopic incisions, negative Kolby, neurovascular intact, nontender calf, negative Aleta testing.    Procedures    Imaging Results (Most Recent)     None           Result Review :   The following data was reviewed by: CADY Ferrer on 11/10/2022:               Assessment and Plan    Diagnoses and all orders for this visit:    1. Primary osteoarthritis of right knee (Primary)  -     MRI Knee Right Without Contrast; Future    2. Right knee pain, unspecified chronicity  -     MRI Knee Right Without Contrast; Future        Discussed diagnosis and treatment options with the patient we will order MRI of the right knee for further evaluation follow-up after MRI    Call or return if worsening symptoms.    Follow Up   Return for After MRI.  Patient was given instructions and counseling regarding her condition or for health maintenance advice. Please see specific information pulled into the AVS if appropriate.       "

## 2022-11-22 ENCOUNTER — TELEPHONE (OUTPATIENT)
Dept: FAMILY MEDICINE CLINIC | Facility: CLINIC | Age: 42
End: 2022-11-22

## 2022-12-12 RX ORDER — RIZATRIPTAN BENZOATE 10 MG/1
TABLET ORAL
Qty: 9 TABLET | Refills: 5 | Status: ON HOLD | OUTPATIENT
Start: 2022-12-12

## 2022-12-13 ENCOUNTER — HOSPITAL ENCOUNTER (OUTPATIENT)
Dept: MRI IMAGING | Facility: HOSPITAL | Age: 42
Discharge: HOME | End: 2022-12-13

## 2022-12-13 DIAGNOSIS — M25.561 RIGHT KNEE PAIN, UNSPECIFIED CHRONICITY: ICD-10-CM

## 2022-12-13 DIAGNOSIS — M54.41 ACUTE RIGHT-SIDED LOW BACK PAIN WITH RIGHT-SIDED SCIATICA: ICD-10-CM

## 2022-12-13 DIAGNOSIS — M54.41 ACUTE RIGHT-SIDED BACK PAIN WITH SCIATICA: ICD-10-CM

## 2022-12-13 DIAGNOSIS — M17.11 PRIMARY OSTEOARTHRITIS OF RIGHT KNEE: ICD-10-CM

## 2022-12-13 PROCEDURE — 73721 MRI JNT OF LWR EXTRE W/O DYE: CPT

## 2022-12-13 PROCEDURE — 72148 MRI LUMBAR SPINE W/O DYE: CPT

## 2022-12-13 PROCEDURE — 72146 MRI CHEST SPINE W/O DYE: CPT

## 2022-12-14 ENCOUNTER — TELEPHONE (OUTPATIENT)
Dept: OBSTETRICS AND GYNECOLOGY | Facility: CLINIC | Age: 42
End: 2022-12-14

## 2022-12-14 NOTE — TELEPHONE ENCOUNTER
Pt called stating she is due for her depo lupron. She does not have any refills on the prescription that was sent in, in June. Is this something she should continue? Please advise.

## 2022-12-16 ENCOUNTER — OFFICE VISIT (OUTPATIENT)
Dept: FAMILY MEDICINE CLINIC | Facility: CLINIC | Age: 42
End: 2022-12-16
Payer: MEDICARE

## 2022-12-16 ENCOUNTER — OFFICE VISIT (OUTPATIENT)
Dept: ORTHOPEDIC SURGERY | Facility: CLINIC | Age: 42
End: 2022-12-16

## 2022-12-16 VITALS
TEMPERATURE: 98.3 F | SYSTOLIC BLOOD PRESSURE: 172 MMHG | BODY MASS INDEX: 44.82 KG/M2 | DIASTOLIC BLOOD PRESSURE: 92 MMHG | HEART RATE: 105 BPM | HEIGHT: 65 IN | OXYGEN SATURATION: 98 % | WEIGHT: 269 LBS

## 2022-12-16 VITALS — BODY MASS INDEX: 44.82 KG/M2 | WEIGHT: 269 LBS | OXYGEN SATURATION: 98 % | HEIGHT: 65 IN

## 2022-12-16 DIAGNOSIS — S83.411A SPRAIN OF MEDIAL COLLATERAL LIGAMENT OF RIGHT KNEE, INITIAL ENCOUNTER: Primary | ICD-10-CM

## 2022-12-16 DIAGNOSIS — M25.561 RIGHT KNEE PAIN, UNSPECIFIED CHRONICITY: ICD-10-CM

## 2022-12-16 DIAGNOSIS — F51.01 PRIMARY INSOMNIA: ICD-10-CM

## 2022-12-16 DIAGNOSIS — M17.10 PATELLOFEMORAL ARTHRITIS: ICD-10-CM

## 2022-12-16 DIAGNOSIS — M17.11 PRIMARY OSTEOARTHRITIS OF RIGHT KNEE: ICD-10-CM

## 2022-12-16 DIAGNOSIS — M25.561 CHRONIC PAIN OF RIGHT KNEE: Primary | ICD-10-CM

## 2022-12-16 DIAGNOSIS — G89.29 CHRONIC PAIN OF RIGHT KNEE: Primary | ICD-10-CM

## 2022-12-16 DIAGNOSIS — G89.29 CHRONIC BILATERAL LOW BACK PAIN, UNSPECIFIED WHETHER SCIATICA PRESENT: ICD-10-CM

## 2022-12-16 DIAGNOSIS — M54.50 CHRONIC BILATERAL LOW BACK PAIN, UNSPECIFIED WHETHER SCIATICA PRESENT: ICD-10-CM

## 2022-12-16 DIAGNOSIS — Z23 NEED FOR INFLUENZA VACCINATION: ICD-10-CM

## 2022-12-16 DIAGNOSIS — I10 ESSENTIAL HYPERTENSION: ICD-10-CM

## 2022-12-16 PROCEDURE — 99214 OFFICE O/P EST MOD 30 MIN: CPT | Performed by: FAMILY MEDICINE

## 2022-12-16 PROCEDURE — 20610 DRAIN/INJ JOINT/BURSA W/O US: CPT | Performed by: PHYSICIAN ASSISTANT

## 2022-12-16 PROCEDURE — 90686 IIV4 VACC NO PRSV 0.5 ML IM: CPT | Performed by: FAMILY MEDICINE

## 2022-12-16 PROCEDURE — G0008 ADMIN INFLUENZA VIRUS VAC: HCPCS | Performed by: FAMILY MEDICINE

## 2022-12-16 RX ORDER — TRIAMCINOLONE ACETONIDE 40 MG/ML
40 INJECTION, SUSPENSION INTRA-ARTICULAR; INTRAMUSCULAR
Status: COMPLETED | OUTPATIENT
Start: 2022-12-16 | End: 2022-12-16

## 2022-12-16 RX ORDER — LIDOCAINE HYDROCHLORIDE 10 MG/ML
5 INJECTION, SOLUTION INFILTRATION; PERINEURAL
Status: COMPLETED | OUTPATIENT
Start: 2022-12-16 | End: 2022-12-16

## 2022-12-16 RX ADMIN — TRIAMCINOLONE ACETONIDE 40 MG: 40 INJECTION, SUSPENSION INTRA-ARTICULAR; INTRAMUSCULAR at 13:56

## 2022-12-16 RX ADMIN — LIDOCAINE HYDROCHLORIDE 5 ML: 10 INJECTION, SOLUTION INFILTRATION; PERINEURAL at 13:56

## 2022-12-16 NOTE — PROGRESS NOTES
Chief Complaint  Pain and Follow-up of the Right Knee    Subjective          Bebe Steward is a 42 y.o. female  presents to [facility] for   History of Present Illness      Patient presents for follow-up evaluation of right knee pain, right knee osteoarthritis.  She has been treating her right knee conservatively with injections and had received a right knee Synvisc injection in September.  The last visit for checkup on 11/10/2022 she was stating she had some new knee pain in the anterior medial knee she states the knee feels weak and unstable, she wears a knee brace,.  We ordered MRI to rule out internal derangement, she is here to review the MRI.  She denies actual buckling of the knee but admits to sensation of instability and pain in the medial knee around the MCL.      Allergies   Allergen Reactions   • Monosodium Glutamate Other (See Comments)     Blood pressure drops, pass out    • Penicillins Anaphylaxis   • Cyclobenzaprine Other (See Comments)     Muscle spasms   • Adhesive Tape Itching   • Cefaclor Rash   • Cephalexin Rash   • Erythromycin Rash   • Meperidine Headache   • Morphine Irritability   • Nsaids Rash   • Sulfa Antibiotics Rash   • Sulfamethoxazole-Trimethoprim Rash   • Vancomycin Itching        Social History     Socioeconomic History   • Marital status:    • Number of children: 3   Tobacco Use   • Smoking status: Former     Packs/day: 1.00     Years: 15.00     Pack years: 15.00     Types: Cigarettes     Start date: 1997     Quit date: 2021     Years since quittin.9   • Smokeless tobacco: Never   Vaping Use   • Vaping Use: Never used   Substance and Sexual Activity   • Alcohol use: Yes     Comment: Rarely   • Drug use: Never   • Sexual activity: Not Currently     Partners: Male     Birth control/protection: Abstinence     Comment: Chemical menopause        REVIEW OF SYSTEMS    Constitutional: Denies fevers, chills, weight loss  Cardiovascular:  "Denies chest pain, shortness of breath  Skin: Denies rashes, acute skin changes  Neurologic: Denies headache, loss of consciousness  MSK: Right knee pain      Objective   Vital Signs:   Ht 165.1 cm (65\")   Wt 122 kg (269 lb)   SpO2 98%   BMI 44.76 kg/m²     Body mass index is 44.76 kg/m².    Physical Exam    Right knee: Tender palpation of the medial knee around the MCL, crepitus with range of motion, full extension, flexion 125, stable to varus/valgus stress, stable anterior/posterior pain with varus/valgus testing, neurovascular intact.    Large Joint Arthrocentesis: R knee  Date/Time: 12/16/2022 1:56 PM  Consent given by: patient  Site marked: site marked  Timeout: Immediately prior to procedure a time out was called to verify the correct patient, procedure, equipment, support staff and site/side marked as required   Supporting Documentation  Indications: pain   Procedure Details  Location: knee - R knee  Preparation: Patient was prepped and draped in the usual sterile fashion  Needle gauge: 21 G.  Approach: lateral  Medications administered: 5 mL lidocaine 1 %; 40 mg triamcinolone acetonide 40 MG/ML  Patient tolerance: patient tolerated the procedure well with no immediate complications          Imaging Results (Most Recent)     None       MRI Thoracic Spine Without Contrast    Result Date: 12/13/2022  Narrative: PROCEDURE: MRI THORACIC SPINE WO CONTRAST  COMPARISON: [facility], MR, MRI THORACIC SPINE WO CONTRAST, 8/27/2021, 18:59.  INDICATIONS: MID BACK PAIN, RIGHT OF MIDLINE RADIATING INTO FLANK, RIGHT HIP AND LEG TO KNEE.  TECHNIQUE: A variety of imaging planes and parameters were utilized for visualization of suspected pathology.  Images were performed without contrast.   FINDINGS:  PARASPINAL AREA: Normal with no visible mass.  DISCS: No significant disc/facet abnormality, spinal stenosis, or foraminal stenosis.  BONES: No fracture, pars defect, or pathologic osseous lesion.  Multiple small " hemangiomata are seen throughout the thoracic spine as before.  CORD: Normal caliber, contour, and signal intensity.  OTHER: Negative.       Impression:  Stable MRI of the thoracic spine.  No convincing acute abnormality.      TAM ASTORGA MD       Electronically Signed and Approved By: TAM ASTORGA MD on 12/13/2022 at 13:21             MRI Lumbar Spine Without Contrast    Result Date: 12/13/2022  Narrative: PROCEDURE: MRI LUMBAR SPINE WO CONTRAST  COMPARISON: [facility], MR, MRI LUMBAR SPINE WO CONTRAST, 1/24/2022, 12:47.  INDICATIONS: MID BACK PAIN, RIGHT OF MIDLINE RADIATING INTO FLANK, RIGHT HIP AND LEG TO KNEE. PATIENT IS STATUS POST LUMBAR SURGERY  TECHNIQUE: A variety of imaging planes and parameters were utilized for visualization of suspected pathology.  FINDINGS:  Five lumbar vertebrae are identified.  There is a stable posterior/interbody fusion construct at the L5-S1 level with findings of prior laminectomy.  Alignment is anatomic.  There is mild stable narrowing and desiccation of the L3-L4 disc.  There are reactive degenerative endplate signal abnormalities in the lower lumbar spine.  There are small benign intraosseous hemangiomas within the L2 and L4 vertebral bodies.  There is a small chronic Schmorl's node at the L4 superior endplate, unchanged.  There is no focal bone marrow edema or evidence of a marrow replacing process. The distal spinal cord and conus medullaris appear unremarkable terminating at the L1 level. Limited view of the retroperitoneal structures is unremarkable. Paraspinal musculature is well preserved.  L1-L2: The disc is normal. Facet joints appear unremarkable. There is no significant neuroforaminal or spinal canal narrowing.  L2-L3: The disc is normal. Facet joints appear unremarkable. There is no significant neuroforaminal or spinal canal narrowing.  L3-L4:  There is concentric disc bulge and mild facet and ligamentum flavum hypertrophy with mild bilateral neural  foraminal narrowing, unchanged.  Spinal canal is normal.  L4-L5:  There is mild concentric disc bulge with mild facet and ligamentum flavum hypertrophy.  There is mild neural foraminal narrowing without spinal canal compromise.  L5-S1:  There is fusion and laminectomy at this level.  There is no residual neural foraminal or spinal canal compromise.       Impression:   1. Stable postoperative and mild degenerative changes in the lumbar spine resulting in mild neural foraminal narrowing L3-L4 and L4-L5. 2. No acute osseous abnormality.      BRIAN MILLER MD       Electronically Signed and Approved By: BRIAN MILLER MD on 12/13/2022 at 15:42             MRI Knee Right Without Contrast    Result Date: 12/15/2022  Narrative: PROCEDURE: MRI KNEE RIGHT  WO CONTRAST  COMPARISON: None  INDICATIONS: MEDIAL RIGHT KNEE PAIN, OFF/ON SWELLING, HISTORY OF PRIOR SURGERY      TECHNIQUE: A complete multi-planar MRI was performed.   FINDINGS:  The cruciate ligaments, collateral ligaments, and extensor mechanism of the knee are intact.  There is the thickening of the proximal portion of the medial collateral ligament with diffuse fluid seen tracking along the course of the ligament.  An osseous infarct is present within the lateral femoral condyle.  This appears well corticated.  No significant surrounding edema.  Moderate tricompartmental osteoarthritic changes are present.  Diffuse cartilage loss is seen along the lateral facet and the lateral trochlea with underlying osseous edema and subchondral cystic changes.  Probable additional osseous infarct present within the patella (series 7, image 17).  There is a small joint effusion.  No significant Baker's cyst identified.  Partial thickness fissures are present within the medial compartment.  Diffuse cartilage loss is present within the lateral compartment with full-thickness fissures seen overlying the lateral femoral condyle (series 4, image 9).  No evidence of underlying osseous  edema.  The menisci demonstrate mild degeneration with no evidence of a focal tear.  The cortical margins are intact. No significant abnormal focal fluid collection is observed within the surrounding soft tissues.      Impression:   1. Moderate tricompartmental osteoarthritis, most pronounced within the patellofemoral compartment.  Full-thickness defects are present with underlying osseous edema.  Osseous infarcts are present within the patella and the lateral femoral condyle along the trochlea. 2. Degeneration of the bilateral menisci with no evidence of a focal tear. 3. Acute on chronic sprain of the medial collateral ligament. 4. Small joint effusion..     NARENDRA CHU MD       Electronically Signed and Approved By: NARENDRA CHU MD on 12/15/2022 at 7:43                 Result Review :   The following data was reviewed by: Grisel Nava on 12/16/2022:  Data reviewed: Radiologic studies Reviewed by me with the patient             Assessment and Plan    Diagnoses and all orders for this visit:    1. Sprain of medial collateral ligament of right knee, initial encounter (Primary)  -     Ambulatory Referral to Physical Therapy Evaluate and treat (2-3X/WEEK FOR 6-8 WEEKS)    2. Right Patellofemoral arthritis  -     Ambulatory Referral to Physical Therapy Evaluate and treat (2-3X/WEEK FOR 6-8 WEEKS)    3. Primary osteoarthritis of right knee  -     Ambulatory Referral to Physical Therapy Evaluate and treat (2-3X/WEEK FOR 6-8 WEEKS)    4. Right knee pain, unspecified chronicity  -     Ambulatory Referral to Physical Therapy Evaluate and treat (2-3X/WEEK FOR 6-8 WEEKS)        Reviewed MRI with the patient discussed diagnosis and treatment options recommend starting therapy for the MCL sprain, discussed therapy for arthritis, she elected to have right knee steroid injection which she tolerated well she was given prescription for pain cream to apply to her areas of pain follow-up in 6 to 8 weeks.  We also recommended using  her brace in public, with activity.  Patient agreed    Call or return if worsening symptoms.    Follow Up   Return for Follow up 6-8 weeks.  Patient was given instructions and counseling regarding her condition or for health maintenance advice. Please see specific information pulled into the AVS if appropriate.

## 2022-12-16 NOTE — PROGRESS NOTES
Answers for HPI/ROS submitted by the patient on 12/14/2022  Please describe your symptoms.: Coming to discuss MRI test results also following up on severe sinus infection I was treated for at [facility]  Have you had these symptoms before?: Yes  How long have you been having these symptoms?: Greater than 2 weeks  Please list any medications you are currently taking for this condition.: Fluticasone Propionate nasal spray QD,  Azelastine hcl nasal spray BID, Prednisone, and Doxycycline, and zofran., , I take hydrocodone 5mg for the pain in my knee and my back don't feel it's strong enough to handle the pain;  Please describe any probable cause for these symptoms. : Severe sinus infection with eustachian tube dysfunction ,  Buldging discs in L spine. Hemangiomas possibly growing putting pressure on nerves in T spine, L knee pain has increased in the MCL area  What is the primary reason for your visit?: Other    Chief Complaint  Chief Complaint   Patient presents with   • Hypertension   • Right knee pain       HPI:  Bebe Steward presents to [facility]     Pt reports she went to [facility] and they diagnosed her with a sinus infection and eustachian tube dysfunction and she was given 2 nasal sprays and doxycycline and she is no longer getting green discharge but still thick.      HTN- Pt BP today is 172/92 which is not well controlled.  Pt is compliant with their medication and will continue their current medication regimen. No side effects to the medication.     Right Knee Pain- Pt reports her knee is hurting a lot today and she has an appointment for ortho later today.  Pt reports she was on 5mg Percocet at [facility] and when she went to [facility] she was placed on 5 of Norco and she informed them that it was not strong enough and they are supposed to increase her dose.    Insomnia- pt has been on ambien, sonata and lunesta and trazadone.  We will be trying a new medication  for sleep called Quviviq    Procedures     Past History:  Medical History: has a past medical history of Abnormal Pap smear of cervix (07/21/2020), Allergic (1990), Allergies, Anemia, Ankle sprain, Anxiety (2014), Arthritis, Arthritis of back (2006), Brain concussion (1995), Broken bones (08/2014), Cervical disc disorder (2012), Corns and callus, CTS (carpal tunnel syndrome) (2022), Essential hypertension (07/21/2020), Foot pain, Fracture of ankle (2008), Fracture of wrist (2011), Fracture, fibula, Fracture, tibia and fibula (2008), Gallstone (2001), Head injury (1996), HPV (human papilloma virus) infection, Hyperlipidemia, Hypertension, Ingrown toenail, Insomnia (07/21/2020), Kidney problem, Knee sprain, Knee swelling (2015), Left below-knee amputee (HCC) (07/21/2020), Low back pain (2006), Low back strain, Lumbosacral disc disease (2006), Migraine, Muscle spasm (01/04/2021), Neck strain, Numbness in feet, Obesity, 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   Surgical History: has a past surgical history that includes Abdominal surgery (2010); Leg amputation, lower tibia/fibula (Left, 2014); Ankle surgery; Back surgery; Endometrial ablation (2007); Gallbladder surgery (2001); Gastric bypass (2001); Knee surgery; Spine surgery (2008); Tonsillectomy (1994); Spinal fusion (10/2008); Epidural block injection (2007); Trigger point injection (2021); Ankle fracture surgery ([id number]); Bariatric Surgery (2001); Cholecystectomy (2001); Fracture surgery (2008); and Gastrostomy (2001).    Family History: family history includes Anesthesia problems in her father; Anxiety disorder in her maternal grandmother, mother, and son; Arthritis in her father, maternal grandfather, maternal grandmother, mother, paternal grandfather, and paternal grandmother; Broken bones in her maternal grandmother, mother, and paternal grandmother; Cancer in her maternal grandfather, paternal aunt, and paternal grandmother; Colon cancer in her paternal aunt; Colon cancer (age of onset: 87) in her paternal grandmother; Depression in her father, maternal grandmother, and mother; Developmental Disability in her paternal aunt; Diabetes in her maternal grandfather; Early death in her mother; Heart disease in her father, maternal grandfather, maternal grandmother, and paternal grandfather; Hyperlipidemia in her father, maternal grandfather, maternal grandmother, mother, paternal grandfather, and paternal grandmother; Kidney disease in her father, maternal grandmother, mother, paternal grandfather, and paternal grandmother; Liver disease in her maternal grandmother; Melanoma in her maternal grandfather; Miscarriages / Stillbirths in her maternal grandmother and paternal grandmother; Osteoporosis in her maternal grandmother and paternal grandmother; Other in her mother; Rheumatologic disease in her father and paternal grandmother; Stroke in her maternal grandfather and mother; Thyroid disease in her maternal grandmother and mother; Vision loss in her father.   Social History: reports that she quit smoking about 2 years ago. Her smoking use included cigarettes. She started smoking about 26 years ago. She has a 15.00 pack-year smoking history. She has never used smokeless tobacco. She reports current alcohol use. She reports that she does not use drugs.  Immunization History   Administered Date(s) Administered   • COVID-19 (MODERNA) 1st, 2nd, 3rd Dose Only 12/11/2021, 12/13/2021   • COVID-19 (MODERNA) BOOSTER 11/11/2021, 12/09/2021   •  FluLaval/Fluzone >6mos 10/05/2021, 12/16/2022   • Influenza, Unspecified 12/02/2020         Allergies: Monosodium glutamate, Penicillins, Cyclobenzaprine, Adhesive tape, Cefaclor, Cephalexin, Erythromycin, Meperidine, Morphine, Nsaids, Sulfa antibiotics, Sulfamethoxazole-trimethoprim, and Vancomycin     Medications:  Current Outpatient Medications on File Prior to Visit   Medication Sig Dispense Refill   • acetaminophen (TYLENOL) 325 MG tablet Take 650 mg by mouth Every 6 (Six) Hours As Needed.     • atorvastatin (LIPITOR) 20 MG tablet Take 1 tablet by mouth Daily. 90 tablet 1   • azelastine (ASTELIN) 0.1 % nasal spray 2 sprays into the nostril(s) as directed by provider 2 (Two) Times a Day. Use in each nostril as directed 30 mL 0   • docusate sodium (Colace) 100 MG capsule Take 1 capsule by mouth 2 (Two) Times a Day As Needed for Constipation. 60 capsule 5   • fluticasone (FLONASE) 50 MCG/ACT nasal spray 2 sprays into the nostril(s) as directed by provider Daily. 18.2 mL 0   • furosemide (LASIX) 20 MG tablet Take 1 tablet by mouth Daily. (Patient taking differently: Take 20 mg by mouth. AS NEEDED) 90 tablet 1   • hydrALAZINE (APRESOLINE) 50 MG tablet Take 1 tablet by mouth 3 (Three) Times a Day. 90 tablet 5   • HYDROcodone-acetaminophen (NORCO) 5-325 MG per tablet 1 tablet Every 8 (Eight) Hours As Needed.     • norethindrone (AYGESTIN) 5 MG tablet Take 1 tablet by mouth Daily.     • ondansetron ODT (ZOFRAN-ODT) 4 MG disintegrating tablet Place 1 tablet on the tongue Every 8 (Eight) Hours As Needed for Nausea or Vomiting. 15 tablet 0   • rizatriptan (MAXALT) 10 MG tablet TAKE ONE TABLET BY MOUTH AT ONSET OF HEADACHE; MAY REPEAT ONE TABLET IN 2 HOURS IF NEEDED. 9 tablet 5   • sertraline (ZOLOFT) 50 MG tablet Take 1 tablet by mouth Daily. 90 tablet 1   • gabapentin (NEURONTIN) 300 MG capsule Take 1 capsule by mouth 3 (Three) Times a Day for 30 days. 90 capsule 0   • isosorbide mononitrate (IMDUR) 60 MG 24 hr tablet  "Take 2 tablets by mouth Daily. Per endo changed to 120 but if seems to much patient is to take half       No current facility-administered medications on file prior to visit.        Health Maintenance Due   Topic Date Due   • TDAP/TD VACCINES (1 - Tdap) Never done   • ANNUAL WELLNESS VISIT  09/01/2021   • COVID-19 Vaccine (2 - Moderna series) 01/10/2022       Vital Signs:   Vitals:    12/16/22 1146 12/16/22 1151   BP: 170/90 172/92   Pulse: 105    Temp: 98.3 °F (36.8 °C)    SpO2: 98%    Weight: 122 kg (269 lb)    Height: 165.1 cm (65\")       Body mass index is 44.76 kg/m².     ROS:  Review of Systems   Constitutional: Negative for fatigue and fever.   HENT: Negative for congestion, ear pain and sinus pressure.    Respiratory: Negative for cough, chest tightness and shortness of breath.    Cardiovascular: Negative for chest pain, palpitations and leg swelling.   Gastrointestinal: Negative for abdominal pain and diarrhea.   Genitourinary: Negative for dysuria and frequency.   Neurological: Negative for speech difficulty, headache and confusion.   Psychiatric/Behavioral: Negative for agitation and behavioral problems.          Physical Exam  Vitals reviewed.   Constitutional:       Appearance: Normal appearance.   HENT:      Right Ear: Tympanic membrane normal.      Left Ear: Tympanic membrane normal.      Nose: Nose normal.   Eyes:      Extraocular Movements: Extraocular movements intact.      Conjunctiva/sclera: Conjunctivae normal.      Pupils: Pupils are equal, round, and reactive to light.   Cardiovascular:      Rate and Rhythm: Normal rate and regular rhythm.   Pulmonary:      Effort: Pulmonary effort is normal.      Breath sounds: Normal breath sounds.   Abdominal:      General: Bowel sounds are normal.   Musculoskeletal:         General: Normal range of motion.      Cervical back: Normal range of motion.   Skin:     General: Skin is warm and dry.   Neurological:      General: No focal deficit present.      " Mental Status: She is alert and oriented to person, place, and time. Mental status is at baseline.   Psychiatric:         Mood and Affect: Mood normal.         Behavior: Behavior normal.         Thought Content: Thought content normal.         Judgment: Judgment normal.          Result Review   MRI Knee Right Without Contrast (12/13/2022 10:10)  MRI Lumbar Spine Without Contrast (12/13/2022 10:59)  MRI Thoracic Spine Without Contrast (12/13/2022 10:35)       Diagnoses and all orders for this visit:    1. Chronic pain of right knee (Primary)    2. Essential hypertension    3. Chronic bilateral low back pain, unspecified whether sciatica present    4. Need for influenza vaccination  -     FluLaval/Fluzone >6 mos ([id number])    5. Primary insomnia  -     Daridorexant HCl (Quviviq) 25 MG tablet; Take 25 mg by mouth Every Night for 30 days.  Dispense: 30 tablet; Refill: 2          Smoking Cessation:    Bebe Steward  reports that she quit smoking about 2 years ago. Her smoking use included cigarettes. She started smoking about 26 years ago. She has a 15.00 pack-year smoking history. She has never used smokeless tobacco.          Follow Up   Return in about 3 months (around 3/16/2023).  Patient was given instructions and counseling regarding her condition or for health maintenance advice. Please see specific information pulled into the AVS if appropriate.       Kimberly Almaguer MD

## 2022-12-20 RX ORDER — DARIDOREXANT 25 MG/1
25 TABLET, FILM COATED ORAL NIGHTLY
Qty: 30 TABLET | Refills: 2 | Status: SHIPPED | OUTPATIENT
Start: 2022-12-20 | End: 2023-01-19

## 2022-12-27 RX ORDER — LORATADINE 10 MG/1
TABLET ORAL
Qty: 30 TABLET | Refills: 1 | Status: SHIPPED | OUTPATIENT
Start: 2022-12-27 | End: 2023-03-21 | Stop reason: SDUPTHER

## 2023-01-05 DIAGNOSIS — Z89.512 HISTORY OF LEFT BELOW KNEE AMPUTATION: Primary | ICD-10-CM

## 2023-01-10 ENCOUNTER — OFFICE VISIT (OUTPATIENT)
Dept: OBSTETRICS AND GYNECOLOGY | Facility: CLINIC | Age: 43
End: 2023-01-10
Payer: MEDICARE

## 2023-01-10 VITALS
BODY MASS INDEX: 42.27 KG/M2 | WEIGHT: 254 LBS | DIASTOLIC BLOOD PRESSURE: 102 MMHG | HEART RATE: 108 BPM | SYSTOLIC BLOOD PRESSURE: 172 MMHG

## 2023-01-10 DIAGNOSIS — R10.2 CHRONIC PELVIC PAIN IN FEMALE: ICD-10-CM

## 2023-01-10 DIAGNOSIS — N93.9 ABNORMAL UTERINE BLEEDING (AUB): ICD-10-CM

## 2023-01-10 DIAGNOSIS — G89.29 CHRONIC PELVIC PAIN IN FEMALE: ICD-10-CM

## 2023-01-10 DIAGNOSIS — N80.9 ENDOMETRIOSIS: Primary | ICD-10-CM

## 2023-01-10 PROBLEM — Z01.419 WOMEN'S ANNUAL ROUTINE GYNECOLOGICAL EXAMINATION: Status: RESOLVED | Noted: 2022-06-01 | Resolved: 2023-01-10

## 2023-01-10 PROCEDURE — 99214 OFFICE O/P EST MOD 30 MIN: CPT | Performed by: OBSTETRICS & GYNECOLOGY

## 2023-01-10 RX ORDER — LEUPROLIDE ACETATE 11.25 MG
11.25 KIT INTRAMUSCULAR
Qty: 1 KIT | Refills: 0 | Status: SHIPPED | OUTPATIENT
Start: 2023-01-10 | End: 2023-03-23 | Stop reason: HOSPADM

## 2023-01-10 RX ORDER — SODIUM CHLORIDE, SODIUM LACTATE, POTASSIUM CHLORIDE, CALCIUM CHLORIDE 600; 310; 30; 20 MG/100ML; MG/100ML; MG/100ML; MG/100ML
125 INJECTION, SOLUTION INTRAVENOUS CONTINUOUS
Status: CANCELLED | OUTPATIENT
Start: 2023-01-10

## 2023-01-10 RX ORDER — ONDANSETRON 2 MG/ML
4 INJECTION INTRAMUSCULAR; INTRAVENOUS EVERY 6 HOURS PRN
Status: CANCELLED | OUTPATIENT
Start: 2023-01-10

## 2023-01-10 NOTE — PROGRESS NOTES
GYN Visit    CC: Follow-up meds    HPI:   42 y.o. who presents in follow-up of abnormal uterine bleeding, pelvic pain and endometriosis.  The patient has been on Depo-Lupron for the last 6 months.  She reports she has been doing very well.  Her pain has been significantly improved. She has not been having menstrual cycles.  Her quality of life is much better.  She is interested in considering surgical therapy at this time.  She has no new problems or concerns today    History: PMHx, Meds, Allergies, PSHx, Social Hx, and POBHx all reviewed and updated.    BP (!) 172/102   Pulse 108   Wt 115 kg (254 lb)   Breastfeeding No   BMI 42.27 kg/m²     Physical Exam  Vitals and nursing note reviewed. Exam conducted with a chaperone present.   Constitutional:       General: She is not in acute distress.     Appearance: Normal appearance. She is obese. She is not ill-appearing.   HENT:      Head: Normocephalic and atraumatic.   Eyes:      Extraocular Movements: Extraocular movements intact.   Abdominal:      General: Abdomen is flat. There is no distension.      Palpations: Abdomen is soft. There is no mass.      Tenderness: There is no abdominal tenderness. There is no guarding or rebound.      Hernia: No hernia is present.   Musculoskeletal:         General: No swelling.      Right lower leg: No edema.      Left lower leg: No edema.   Skin:     General: Skin is warm and dry.      Findings: No rash.   Neurological:      Mental Status: She is alert and oriented to person, place, and time.   Psychiatric:         Mood and Affect: Mood normal.         Behavior: Behavior normal.         Thought Content: Thought content normal.         Judgment: Judgment normal.         ASSESSMENT AND PLAN:  Diagnoses and all orders for this visit:    1. Endometriosis (Primary)  Assessment & Plan:  Patient's symptoms are greatly improved.  The patient is now interested in pursuing surgical therapy with total laparoscopic hysterectomy and  bilateral salpingo-oophorectomy.  Given her excellent improvement with Lupron and the inability to continue Lupron long-term I think this is a reasonable next step.  Recommend to continue the Depo-Lupron until time of surgery to ensure patient remains pain-free over that duration of time.  Depo-Lupron 11.25 mg IM x1.  Continue norethindrone add back therapy and recommend daily calcium supplementation.    Orders:  -     leuprolide (Lupron Depot, 3-Month,) 11.25 MG injection; Inject 11.25 mg into the appropriate muscle as directed by prescriber Every 3 (Three) Months.  Dispense: 1 kit; Refill: 0  -     Case Request; Standing  -     lactated ringers infusion  -     ondansetron (ZOFRAN) injection 4 mg  -     levoFLOXacin (LEVAQUIN) 500 mg in sodium chloride 0.9 % 150 mL IVPB  -     Case Request    2. Chronic pelvic pain in female  Assessment & Plan:  The patient's pain remains controlled with the Depo-Lupron.  For any residual pain recommend OTC Tylenol and or ibuprofen.  The patient will continue the Depo-Lupron for now.  She desires proceed with total laparoscopic hysterectomy with bilateral salpingo-oophorectomy.  We have discussed the risks, benefits, and alternatives to the procedure including the risk of infection, bleeding and hemorrhage, the risk of injury to my structures including bowel, bladder, vasculature, pelvic nerves, the ureters, and other nearby structures.  We discussed the risk of anesthesia, perioperative complications including thromboembolism, myocardial infarction, strokes, and death.  We have discussed the risks that the procedure may need to be converted to an abdominal hysterectomy in order to be completed safely.  We have discussed the changes in the risks of the surgery if were we to convert to an open hysterectomy, as well as the changes in hospitalization and postoperative recovery times.  The patient expresses her understanding of these risks and wishes to proceed.  Plan a pelvic  ultrasound prior to surgery to ensure as best as possible no unexpected findings in the OR.    Orders:  -     leuprolide (Lupron Depot, 3-Month,) 11.25 MG injection; Inject 11.25 mg into the appropriate muscle as directed by prescriber Every 3 (Three) Months.  Dispense: 1 kit; Refill: 0  -     Case Request; Standing  -     lactated ringers infusion  -     ondansetron (ZOFRAN) injection 4 mg  -     levoFLOXacin (LEVAQUIN) 500 mg in sodium chloride 0.9 % 150 mL IVPB  -     Case Request  -     US Pelvis Transvaginal Non OB; Future    3. Abnormal uterine bleeding (AUB)  Assessment & Plan:  Symptoms controlled for now as she is not having menstrual cycles with the Lupron.      Other orders  -     Follow Anesthesia Guidelines / Protocol; Future  -     Obtain Informed Consent; Future  -     Provide NPO Instructions to Patient; Future  -     Chlorhexidine Skin Prep; Future  -     Follow Anesthesia Guidelines / Protocol; Standing  -     Verify / Perform Chlorhexidine Skin Prep; Standing  -     Verify / Perform Chlorhexidine Skin Prep if Indicated (If Not Already Completed); Standing  -     Notify Physician - Standard; Standing  -     CBC & Differential; Standing  -     Type & Screen; Standing  -     Comprehensive Metabolic Panel; Standing  -     hCG, Serum, Qualitative; Standing      Follow Up:  Return for Preop.    Yoan Yoo MD  01/10/2023

## 2023-01-11 PROBLEM — N93.9 ABNORMAL UTERINE BLEEDING (AUB): Status: ACTIVE | Noted: 2023-01-11

## 2023-01-11 PROBLEM — R10.2 CHRONIC PELVIC PAIN IN FEMALE: Status: ACTIVE | Noted: 2023-01-11

## 2023-01-11 PROBLEM — Z79.4 ENCOUNTER FOR LONG-TERM (CURRENT) USE OF INSULIN (HCC): Status: RESOLVED | Noted: 2021-12-07 | Resolved: 2023-01-11

## 2023-01-11 PROBLEM — G89.29 CHRONIC PELVIC PAIN IN FEMALE: Status: ACTIVE | Noted: 2023-01-11

## 2023-01-11 PROBLEM — R10.2 PELVIC PAIN: Status: RESOLVED | Noted: 2022-06-01 | Resolved: 2023-01-11

## 2023-01-11 NOTE — ASSESSMENT & PLAN NOTE
Patient's symptoms are greatly improved.  The patient is now interested in pursuing surgical therapy with total laparoscopic hysterectomy and bilateral salpingo-oophorectomy.  Given her excellent improvement with Lupron and the inability to continue Lupron long-term I think this is a reasonable next step.  Recommend to continue the Depo-Lupron until time of surgery to ensure patient remains pain-free over that duration of time.  Depo-Lupron 11.25 mg IM x1.  Continue norethindrone add back therapy and recommend daily calcium supplementation.

## 2023-01-11 NOTE — ASSESSMENT & PLAN NOTE
The patient's pain remains controlled with the Depo-Lupron.  For any residual pain recommend OTC Tylenol and or ibuprofen.  The patient will continue the Depo-Lupron for now.  She desires proceed with total laparoscopic hysterectomy with bilateral salpingo-oophorectomy.  We have discussed the risks, benefits, and alternatives to the procedure including the risk of infection, bleeding and hemorrhage, the risk of injury to my structures including bowel, bladder, vasculature, pelvic nerves, the ureters, and other nearby structures.  We discussed the risk of anesthesia, perioperative complications including thromboembolism, myocardial infarction, strokes, and death.  We have discussed the risks that the procedure may need to be converted to an abdominal hysterectomy in order to be completed safely.  We have discussed the changes in the risks of the surgery if were we to convert to an open hysterectomy, as well as the changes in hospitalization and postoperative recovery times.  The patient expresses her understanding of these risks and wishes to proceed.  Plan a pelvic ultrasound prior to surgery to ensure as best as possible no unexpected findings in the OR.

## 2023-01-13 ENCOUNTER — CLINICAL SUPPORT (OUTPATIENT)
Dept: OBSTETRICS AND GYNECOLOGY | Facility: CLINIC | Age: 43
End: 2023-01-13
Payer: MEDICARE

## 2023-01-13 DIAGNOSIS — G89.29 CHRONIC PELVIC PAIN IN FEMALE: ICD-10-CM

## 2023-01-13 DIAGNOSIS — R10.2 CHRONIC PELVIC PAIN IN FEMALE: ICD-10-CM

## 2023-01-13 DIAGNOSIS — N80.9 ENDOMETRIOSIS: Primary | ICD-10-CM

## 2023-01-13 PROCEDURE — 96372 THER/PROPH/DIAG INJ SC/IM: CPT | Performed by: OBSTETRICS & GYNECOLOGY

## 2023-01-13 NOTE — PROGRESS NOTES
Patient received Depo today  Administered in Left Ventrogluteal  Next injection due between: 3 months  Beta HCG: was not done  Urine HCG:was not done  [place] in the last two weeks(NA if pt has been receiving depo):not applicable  Last yearly exam/Last Pap Smear: 06-

## 2023-01-16 RX ORDER — ATORVASTATIN CALCIUM 20 MG/1
TABLET, FILM COATED ORAL
Qty: 30 TABLET | Refills: 1 | Status: SHIPPED | OUTPATIENT
Start: 2023-01-16

## 2023-01-19 ENCOUNTER — OFFICE VISIT (OUTPATIENT)
Dept: FAMILY MEDICINE CLINIC | Facility: CLINIC | Age: 43
End: 2023-01-19
Payer: MEDICARE

## 2023-01-19 VITALS
OXYGEN SATURATION: 98 % | BODY MASS INDEX: 43.82 KG/M2 | WEIGHT: 263 LBS | RESPIRATION RATE: 14 BRPM | HEIGHT: 65 IN | DIASTOLIC BLOOD PRESSURE: 106 MMHG | SYSTOLIC BLOOD PRESSURE: 208 MMHG | TEMPERATURE: 97.6 F | HEART RATE: 88 BPM

## 2023-01-19 DIAGNOSIS — Z00.00 ENCOUNTER FOR SUBSEQUENT ANNUAL WELLNESS VISIT (AWV) IN MEDICARE PATIENT: ICD-10-CM

## 2023-01-19 DIAGNOSIS — J01.00 SUBACUTE MAXILLARY SINUSITIS: Primary | ICD-10-CM

## 2023-01-19 PROCEDURE — 1159F MED LIST DOCD IN RCRD: CPT | Performed by: FAMILY MEDICINE

## 2023-01-19 PROCEDURE — 1125F AMNT PAIN NOTED PAIN PRSNT: CPT | Performed by: FAMILY MEDICINE

## 2023-01-19 PROCEDURE — 1170F FXNL STATUS ASSESSED: CPT | Performed by: FAMILY MEDICINE

## 2023-01-19 PROCEDURE — G0439 PPPS, SUBSEQ VISIT: HCPCS | Performed by: FAMILY MEDICINE

## 2023-01-19 RX ORDER — AZITHROMYCIN 250 MG/1
TABLET, FILM COATED ORAL
Qty: 6 TABLET | Refills: 0 | Status: SHIPPED | OUTPATIENT
Start: 2023-01-19 | End: 2023-02-21

## 2023-01-19 NOTE — PROGRESS NOTES
The ABCs of the Annual Wellness Visit  Subsequent Medicare Wellness Visit    Subjective    Bebe Steward is a 42 y.o. female who presents for a Subsequent Medicare Wellness Visit.    The following portions of the patient's history were reviewed and   updated as appropriate: allergies, current medications, past family history, past medical history, past social history, past surgical history and problem list.    Compared to one year ago, the patient feels her physical   health is worse.    Compared to one year ago, the patient feels her mental   health is the same.    Recent Hospitalizations:  She was not admitted to the hospital during the last year.       Current Medical Providers:  Patient Care Team:  Kimberly Almaguer MD as PCP - General (Family Medicine)  Mabel Dean APRN as Nurse Practitioner (Nurse Practitioner)    Outpatient Medications Prior to Visit   Medication Sig Dispense Refill   • acetaminophen (TYLENOL) 325 MG tablet Take 650 mg by mouth Every 6 (Six) Hours As Needed.     • atorvastatin (LIPITOR) 20 MG tablet TAKE ONE TABLET BY MOUTH DAILY 30 tablet 1   • azelastine (ASTELIN) 0.1 % nasal spray 2 sprays into the nostril(s) as directed by provider 2 (Two) Times a Day. Use in each nostril as directed 30 mL 0   • Daridorexant HCl (Quviviq) 25 MG tablet Take 25 mg by mouth Every Night for 30 days. 30 tablet 2   • docusate sodium (Colace) 100 MG capsule Take 1 capsule by mouth 2 (Two) Times a Day As Needed for Constipation. 60 capsule 5   • fluticasone (FLONASE) 50 MCG/ACT nasal spray 2 sprays into the nostril(s) as directed by provider Daily. 18.2 mL 0   • furosemide (LASIX) 20 MG tablet Take 1 tablet by mouth Daily. (Patient taking differently: Take 20 mg by mouth. AS NEEDED) 90 tablet 1   • gabapentin (NEURONTIN) 300 MG capsule Take 1 capsule by mouth 3 (Three) Times a Day for 30 days. 90 capsule 0   • hydrALAZINE (APRESOLINE) 50 MG tablet Take 1 tablet by mouth 3 (Three) Times a Day. 90 tablet 5   •  HYDROcodone-acetaminophen (NORCO) 5-325 MG per tablet 1 tablet Every 8 (Eight) Hours As Needed.     • isosorbide mononitrate (IMDUR) 60 MG 24 hr tablet Take 2 tablets by mouth Daily. Per endo changed to 120 but if seems to much patient is to take half     • leuprolide (Lupron Depot, 3-Month,) 11.25 MG injection Inject 11.25 mg into the appropriate muscle as directed by prescriber Every 3 (Three) Months. 1 kit 0   • loratadine (CLARITIN) 10 MG tablet TAKE ONE TABLET BY MOUTH DAILY 30 tablet 1   • ondansetron ODT (ZOFRAN-ODT) 4 MG disintegrating tablet Place 1 tablet on the tongue Every 8 (Eight) Hours As Needed for Nausea or Vomiting. 15 tablet 0   • rizatriptan (MAXALT) 10 MG tablet TAKE ONE TABLET BY MOUTH AT ONSET OF HEADACHE; MAY REPEAT ONE TABLET IN 2 HOURS IF NEEDED. 9 tablet 5   • sertraline (ZOLOFT) 50 MG tablet TAKE ONE TABLET BY MOUTH DAILY 30 tablet 1     No facility-administered medications prior to visit.       Opioid medication/s are on active medication list.  and I have evaluated her active treatment plan and pain score trends (see table).  Vitals:    01/19/23 1503   PainSc:   5   PainLoc: Back     I have reviewed the chart for potential of high risk medication and harmful drug interactions in the elderly.            Aspirin is not on active medication list.  Aspirin use is not indicated based on review of current medical condition/s. Risk of harm outweighs potential benefits.  .    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)   • Plantar wart   • Stage 3 chronic kidney disease (HCC)   • Vitamin D deficiency   • Generalized edema   •  "Chronic low back pain   • Mixed hyperlipidemia   • Class 3 severe obesity due to excess calories with body mass index (BMI) of 45.0 to 49.9 in adult (HCC)   • History of lumbar spinal fusion   • Carpal tunnel syndrome of left wrist   • Constipation   • Degeneration of lumbar intervertebral disc   • Right knee pain   • Primary osteoarthritis of right knee   • Endometriosis   • Atrophy of right kidney   • Family history of colon cancer   • Sprain of medial collateral ligament of right knee   • Patellofemoral arthritis   • Chronic pelvic pain in female   • Abnormal uterine bleeding (AUB)     Advance Care Planning  Advance Directive is not on file.  ACP discussion was held with the patient during this visit. Patient does not have an advance directive, information provided.     Objective    Vitals:    23 1503   BP: (!) 208/106  Comment: forgot to take meds for BP today   BP Location: Right arm   Patient Position: Sitting   Pulse: 88   Resp: 14   Temp: 97.6 °F (36.4 °C)   SpO2: 98%   Weight: 119 kg (263 lb)   Height: 165.1 cm (65\")   PainSc:   5   PainLoc: Back     Estimated body mass index is 43.77 kg/m² as calculated from the following:    Height as of this encounter: 165.1 cm (65\").    Weight as of this encounter: 119 kg (263 lb).    Class 3 Severe Obesity (BMI >=40). Obesity-related health conditions include the following: hypertension. Obesity is forgot to take bp meds today. BMI is is above average; BMI management plan is completed. We discussed portion control and increasing exercise.      Does the patient have evidence of cognitive impairment? No          HEALTH RISK ASSESSMENT    Smoking Status:  Social History     Tobacco Use   Smoking Status Former   • Packs/day: 1.00   • Years: 15.00   • Pack years: 15.00   • Types: Cigarettes   • Start date: 1997   • Quit date: 2021   • Years since quittin.0   Smokeless Tobacco Never     Alcohol Consumption:  Social History     Substance and Sexual Activity "   Alcohol Use Yes    Comment: Rarely. 1-2 times yearly     Fall Risk Screen:    LORRAINEADI Fall Risk Assessment was completed, and patient is at MODERATE risk for falls. Assessment completed on:1/19/2023    Depression Screening:  PHQ-2/PHQ-9 Depression Screening 1/19/2023   Little Interest or Pleasure in Doing Things 0-->not at all   Feeling Down, Depressed or Hopeless 0-->not at all   PHQ-9: Brief Depression Severity Measure Score 0       Health Habits and Functional and Cognitive Screening:  Functional & Cognitive Status 1/19/2023   Do you have difficulty preparing food and eating? No   Do you have difficulty bathing yourself, getting dressed or grooming yourself? No   Do you have difficulty using the toilet? No   Do you have difficulty moving around from place to place? No   Do you have trouble with steps or getting out of a bed or a chair? Yes   Current Diet Well Balanced Diet   Dental Exam Not up to date   Eye Exam Not up to date   Exercise (times per week) 0 times per week   Current Exercises Include No Regular Exercise   Do you need help using the phone?  No   Are you deaf or do you have serious difficulty hearing?  No   Do you need help with transportation? No   Do you need help shopping? No   Do you need help preparing meals?  No   Do you need help with housework?  No   Do you need help with laundry? No   Do you need help taking your medications? No   Do you need help managing money? No   Do you ever drive or ride in a car without wearing a seat belt? No   Have you felt unusual stress, anger or loneliness in the last month? No   Who do you live with? Alone   If you need help, do you have trouble finding someone available to you? No   Have you been bothered in the last four weeks by sexual problems? No   Do you have difficulty concentrating, remembering or making decisions? Yes       Age-appropriate Screening Schedule:  Refer to the list below for future screening recommendations based on patient's age, sex  "and/or medical conditions. Orders for these recommended tests are listed in the plan section. The patient has been provided with a written plan.    Health Maintenance   Topic Date Due   • TDAP/TD VACCINES (1 - Tdap) Never done   • LIPID PANEL  02/10/2023   • PAP SMEAR  06/01/2025   • INFLUENZA VACCINE  Completed                CMS Preventative Services Quick Reference  Risk Factors Identified During Encounter  Depression/Dysphoria: Current medication is effective, no change recommended  The above risks/problems have been discussed with the patient.  Pertinent information has been shared with the patient in the After Visit Summary.  An After Visit Summary and PPPS were made available to the patient.    Follow Up:   Next Medicare Wellness visit to be scheduled in 1 year.       Additional E&M Note during same encounter follows:  Patient has multiple medical problems which are significant and separately identifiable that require additional work above and beyond the Medicare Wellness Visit.      Chief Complaint  Medicare Wellness-subsequent and Earache (Rt)      HPI  Bebe Steward is also being seen today for Sinusitis  Pt was told that she had a sinus infection and she was given flonase and doxyxcline and she still has some pain in her ear and sinus drainage and she reports the Z-pack usually works better for her.     HTN- Pt BP today is 208/106 which is not well controlled.  Pt did not take her medication today and was rushing to take a friend to an appointment. No side effects to the medication.       Objective   Vital Signs:  BP (!) 208/106 (BP Location: Right arm, Patient Position: Sitting) Comment: forgot to take meds for BP today  Pulse 88   Temp 97.6 °F (36.4 °C)   Resp 14   Ht 165.1 cm (65\")   Wt 119 kg (263 lb)   SpO2 98%   BMI 43.77 kg/m²     Physical Exam  Vitals reviewed.   Constitutional:       Appearance: Normal appearance.   HENT:      Right Ear: A middle ear effusion is present.      Left Ear: " Tympanic membrane normal.      Nose: Nose normal.   Eyes:      Extraocular Movements: Extraocular movements intact.      Conjunctiva/sclera: Conjunctivae normal.      Pupils: Pupils are equal, round, and reactive to light.   Cardiovascular:      Rate and Rhythm: Normal rate and regular rhythm.   Pulmonary:      Effort: Pulmonary effort is normal.      Breath sounds: Normal breath sounds.   Abdominal:      General: Bowel sounds are normal.   Musculoskeletal:         General: Normal range of motion.      Cervical back: Normal range of motion.   Skin:     General: Skin is warm and dry.   Neurological:      General: No focal deficit present.      Mental Status: She is alert and oriented to person, place, and time.   Psychiatric:         Mood and Affect: Mood normal.         Behavior: Behavior normal.                     Assessment and PlanDiagnoses and all orders for this visit:    1. Subacute maxillary sinusitis (Primary)  -     azithromycin (Zithromax Z-Jose F) 250 MG tablet; Take 2 tablets by mouth on day 1, then 1 tablet daily on days 2-5  Dispense: 6 tablet; Refill: 0    2. Encounter for subsequent annual wellness visit (AWV) in Medicare patient           I spent 45 minutes caring for Bebe on this date of service. This time includes time spent by me in the following activities:reviewing tests  Follow Up Return in about 3 months (around 4/19/2023).  Patient was given instructions and counseling regarding her condition or for health maintenance advice. Please see specific information pulled into the AVS if appropriate.

## 2023-01-26 ENCOUNTER — TREATMENT (OUTPATIENT)
Dept: PHYSICAL THERAPY | Facility: CLINIC | Age: 43
End: 2023-01-26
Payer: MEDICARE

## 2023-01-26 DIAGNOSIS — S83.411D SPRAIN OF MEDIAL COLLATERAL LIGAMENT OF RIGHT KNEE, SUBSEQUENT ENCOUNTER: Primary | ICD-10-CM

## 2023-01-26 DIAGNOSIS — M25.561 CHRONIC PAIN OF RIGHT KNEE: ICD-10-CM

## 2023-01-26 DIAGNOSIS — G89.29 CHRONIC PAIN OF RIGHT KNEE: ICD-10-CM

## 2023-01-26 DIAGNOSIS — R26.2 DIFFICULTY WALKING: ICD-10-CM

## 2023-01-26 PROCEDURE — 97161 PT EVAL LOW COMPLEX 20 MIN: CPT | Performed by: PHYSICAL THERAPIST

## 2023-01-26 PROCEDURE — 97110 THERAPEUTIC EXERCISES: CPT | Performed by: PHYSICAL THERAPIST

## 2023-01-26 NOTE — PROGRESS NOTES
Physical Therapy Initial Evaluation and Plan of Care  [address] [facility], [address] [place] [address]        Patient: Bebe Steward   : 1980  Diagnosis/ICD-10 Code:  Sprain of medial collateral ligament of right knee, subsequent encounter [S83.411D]  Referring practitioner: Uriel Sears*  Date of Initial Visit: 2023  Today's Date: 2023  Patient seen for 1 sessions           Subjective Questionnaire: LEFS:       Subjective Evaluation    History of Present Illness  Mechanism of injury: Pt is a L below the knee amputee.  Pt reports that she has had R knee pain for several years.  Pt reports that she started having increased R knee pain since last November.  Pt reports that she remembers no particular MARSHALL for worsening pain.   Pt reports that going up and down stairs, prolonged walking, sit to stand activities increase her R knee pain.  Pt reports that she intermittently has swelling in R knee.  Pt reports that pain is localized in medial R knee.  Pt reports that she is taking hydrocodone and gabapentin for pain relief.  Pt reports that she goes to pain management for her R knee, phantom limb pain and lower back pain.  Pt reports that she lives on a 2nd level apartment.  Pt reports that it is difficult to get into and out of bed due to R knee pain.      Pt reports that she is in need of a new stump liner and her prosthesis keep sliding off during gait and stairs due to poor suction.     Pain  Current pain ratin  At best pain rating: 3  At worst pain rating: 10  Quality: dull ache and discomfort  Aggravating factors: squatting, ambulation, stairs, prolonged positioning, repetitive movement, sleeping, standing and movement    Social Support  Lives in: apartment  Lives with: alone    Treatments  Previous treatment: physical therapy  Patient Goals  Patient goals for therapy: decreased pain, increased strength, independence with ADLs/IADLs and return to sport/leisure activities           R  knee MRI results:  1. Moderate tricompartmental osteoarthritis, most pronounced within the patellofemoral compartment.    Full-thickness defects are present with underlying osseous edema.  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  2. Degeneration of the bilateral menisci with no evidence of a focal tear.  3. Acute on chronic sprain of the medial collateral ligament.  4. Small joint effusion.  Objective          Static Posture   General Observations  Shifted right.     Head  Forward.    Shoulders  Rounded.    Thoracic Spine  Hyperkyphosis.    Knee   Knee (Right): Recurvatum.     Tenderness     Right Hip   Tenderness in the greater trochanter.     Right Knee   Tenderness in the MCL (distal), MCL (proximal) and medial joint line. No tenderness in the ITB, lateral joint line, LCL (distal), LCL (proximal), patellar tendon, pes anserinus, quadriceps tendon and tibial tubercle.     Active Range of Motion     Right Knee   Flexion: 130 degrees   Extension: 5 (hyperextension) degrees     Patellar Mobility     Right Knee Hypermobile in the medial, lateral, superior and inferior patellar tendon(s).     Strength/Myotome Testing     Left Hip   Planes of Motion   Flexion: 4+  Extension: 4-  Abduction: 4-    Right Hip   Planes of Motion   Flexion: 4+  Extension: 4-  Abduction: 4-    Right Knee   Flexion: 4+  Extension: 4+  Quadriceps contraction: good    Right Ankle/Foot   Dorsiflexion: 5    Tests     Right Knee   Positive valgus stress test at 30 degrees.   Negative anterior drawer, lateral Kolby, medial Kolby, patellar apprehension, posterior drawer, valgus stress test at 0 degrees, varus stress test at 0 degrees and varus stress test at 30 degrees.     Ambulation     Observational Gait   Decreased walking speed, stride length, left step length and right step length.   Base of support: increased     General Comments     Knee Comments  R hamstring and calf  tightness noted  No swelling noted in R knee          Assessment & Plan     Assessment  Impairments: abnormal gait, abnormal muscle tone, abnormal or restricted ROM, activity intolerance, impaired balance, impaired physical strength, lacks appropriate home exercise program and pain with function  Functional Limitations: lifting, sleeping, walking, uncomfortable because of pain, sitting, standing and unable to perform repetitive tasks  Assessment details: Patient presents with signs and symptoms consistent with R MCL sprain including bilateral hip and R knee weakness, gait abnormalities, positive valgus stress test at 30 degrees and reports of pain limiting function during ADLs as shown on the LEFS.  Patient would benefit from skilled PT services in order to address deficits limiting function at this time.  HEP was given to patient this session and education on HEP/diagnosis provided to patient.             Goals  Plan Goals: 1. The patient reports R knee pain.   LTG 1: 12 weeks:  The patient will report 2/10 or less pain in the R knee in order to allow patient to complete prolonged walking, standing, stairs and other ADLs with decreased pain/difficulty.    STATUS:  New   STG 1a:  6 weeks:  The patient will report 4/10 or less pain in the R knee in order to allow patient to complete prolonged walking, standing, stairs and other ADLs with decreased pain/difficulty.    STATUS:  New    2. The patient has limited strength of the R knee.   LTG 2: 12 weeks: The patient will demonstrate 5/5 strength for R knee flexion and extension and 4+/5 strength for bilateral hip abduction and extension in order to allow patient improved joint stability    STATUS:  New   STG 2a: 6 weeks: The patient will demonstrate 4/5 strength for bilateral hip abduction and extension.    STATUS:  New     3. Mobility: Walking/Moving Around Functional Limitation     LTG 4: 12 weeks:  The patient will demonstrate 20-39 % limitation by achieving a score  of 60 on the LEFS.    STATUS:  New   STG 4 a: 6 weeks:  The patient will demonstrate 30-59 % limitation by achieving a score of 45 on the LEFS.      STATUS:  New    Plan  Therapy options: will be seen for skilled therapy services  Planned modality interventions: TENS, electrical stimulation/Russian stimulation, thermotherapy (hydrocollator packs) and cryotherapy  Planned therapy interventions: manual therapy, ADL retraining, balance/weight-bearing training, neuromuscular re-education, body mechanics training, postural training, soft tissue mobilization, flexibility, spinal/joint mobilization, functional ROM exercises, strengthening, gait training, stretching, home exercise program, therapeutic activities, transfer training, joint mobilization and prosthetic fitting/training  Frequency: 2x week  Duration in weeks: 12  Treatment plan discussed with: patient        Timed:         Manual Therapy:    0     mins  51076;     Therapeutic Exercise:    10     mins  39170;     Neuromuscular Kaiden:    0    mins  52135;    Therapeutic Activity:     0     mins  80246;     Gait Trainin     mins  01572;     Ultrasound:     0     mins  21873;    Ionto                               0    mins   38943  Self-care  __0__ mins 61831    Un-Timed:  Electrical Stimulation:    0     mins  54721 ( );  Traction     0     mins 09423  Low Eval     35     Mins  16748  Mod Eval     0     Mins  08196  High Eval                       0     Mins  98549  Hot pack     0     Mins    Cold pack                       0     Mins      Timed Treatment:   10   mins   Total Treatment:     45   mins    PT SIGNATURE: Dinorah Henderson PT      Electronically signed 2023  [place] License: [id number]    Initial Certification    Certification Period: 2023 thru 2023  NPI: [id number]  I certify that the therapy services are furnished while this patient is under my care.  The services outlined above are required by this patient, and will be reviewed  every 90 days.     PHYSICIAN: Uriel Marcus PA      DATE:     Please sign and return via fax to [phone].. Thank you, [facility].

## 2023-02-01 ENCOUNTER — TREATMENT (OUTPATIENT)
Dept: PHYSICAL THERAPY | Facility: CLINIC | Age: 43
End: 2023-02-01
Payer: MEDICARE

## 2023-02-01 DIAGNOSIS — G89.29 CHRONIC PAIN OF RIGHT KNEE: ICD-10-CM

## 2023-02-01 DIAGNOSIS — M25.561 CHRONIC PAIN OF RIGHT KNEE: ICD-10-CM

## 2023-02-01 DIAGNOSIS — S83.411D SPRAIN OF MEDIAL COLLATERAL LIGAMENT OF RIGHT KNEE, SUBSEQUENT ENCOUNTER: Primary | ICD-10-CM

## 2023-02-01 DIAGNOSIS — R26.2 DIFFICULTY WALKING: ICD-10-CM

## 2023-02-01 PROCEDURE — 97110 THERAPEUTIC EXERCISES: CPT | Performed by: PHYSICAL THERAPIST

## 2023-02-01 PROCEDURE — 97530 THERAPEUTIC ACTIVITIES: CPT | Performed by: PHYSICAL THERAPIST

## 2023-02-01 NOTE — PROGRESS NOTES
Physical Therapy Daily Treatment Note  [address] [facility], [address], [place] [address]      Patient: Bebe Steward   : 1980  Diagnosis/ICD-10 Code:  Sprain of medial collateral ligament of right knee, subsequent encounter [S83.411D]  Referring practitioner: Uriel Sears*  Date of Initial Visit: Type: THERAPY  Noted: 2023  Today's Date: 2023  Patient seen for 2 sessions             Subjective   Bebe Steward reports: mild R knee pain at beginning of session today.  Pt reports compliance of HEP.      Objective   No extensor lag noted during SLR R  See Exercise, Manual, and Modality Logs for complete treatment.       Assessment/Plan  Patient tolerated all exercise progressions and manual therapy well today but continues to demonstrate R knee strength deficits limiting function. Continue to progress per patient tolerance.    Progress per Plan of Care           Timed:  Manual Therapy:    0     mins  50672;  Therapeutic Exercise:    22     mins  72227;     Neuromuscular Kaiden:    0    mins  47842;    Therapeutic Activity:     8     mins  42794;     Gait Trainin     mins  68399;     Ultrasound:     0     mins  98463;    Electrical Stimulation:    0     mins  34608;  Iontophoresis     0     mins  58539    Untimed:  Electrical Stimulation:    0     mins  61922 (MC );  Mechanical Traction:    0     mins  08576;   Fluidotherapy     0     mins  08687  Hot pack     0     Mins    Cold pack                       0     Mins     Timed Treatment:   30   mins   Total Treatment:     30   mins        Dinorah Henderson PT    Electronically signed [unfilled]  [place] License: [id number]  NPI number: [id number]

## 2023-02-03 ENCOUNTER — OFFICE VISIT (OUTPATIENT)
Dept: ORTHOPEDIC SURGERY | Facility: CLINIC | Age: 43
End: 2023-02-03
Payer: MEDICARE

## 2023-02-03 VITALS — OXYGEN SATURATION: 98 % | WEIGHT: 262.35 LBS | HEART RATE: 105 BPM | HEIGHT: 65 IN | BODY MASS INDEX: 43.71 KG/M2

## 2023-02-03 DIAGNOSIS — S83.411D SPRAIN OF MEDIAL COLLATERAL LIGAMENT OF RIGHT KNEE, SUBSEQUENT ENCOUNTER: Primary | ICD-10-CM

## 2023-02-03 DIAGNOSIS — M17.10 PATELLOFEMORAL ARTHRITIS: ICD-10-CM

## 2023-02-03 DIAGNOSIS — M17.11 PRIMARY OSTEOARTHRITIS OF RIGHT KNEE: ICD-10-CM

## 2023-02-03 DIAGNOSIS — M25.561 RIGHT KNEE PAIN, UNSPECIFIED CHRONICITY: ICD-10-CM

## 2023-02-03 PROCEDURE — 99213 OFFICE O/P EST LOW 20 MIN: CPT | Performed by: PHYSICIAN ASSISTANT

## 2023-02-03 RX ORDER — ISOSORBIDE MONONITRATE 120 MG/1
TABLET, EXTENDED RELEASE ORAL
COMMUNITY
Start: 2022-12-28 | End: 2023-03-10

## 2023-02-03 RX ORDER — HYDROCODONE BITARTRATE AND ACETAMINOPHEN 7.5; 325 MG/1; MG/1
1 TABLET ORAL EVERY 8 HOURS PRN
COMMUNITY
Start: 2023-01-20 | End: 2023-04-07 | Stop reason: HOSPADM

## 2023-02-03 NOTE — PROGRESS NOTES
Chief Complaint  Pain and Follow-up of the Right Knee    Subjective          History of Present Illness      Bebe Steward is a 42 y.o. female  presents to [facility] for     Patient presents for follow-up evaluation right knee pain, right knee osteoarthritis, right MCL sprain, patellofemoral arthritis.  We discussed her MRI at last visit.  She states the injection she received helped some she has been using a brace which is also helped she has been attending therapy only for 2 visits she states they cannot fit her in until the last week.  She states her knee is feeling some better but she knows that she is going to need a knee replacement sooner than later.  She states Dr. Dobson told her this when she had her first appointment.  Patient would like to continue conservative treatments she has had viscosupplementation injections in the past.      Allergies   Allergen Reactions   • Monosodium Glutamate Other (See Comments)     Blood pressure drops, pass out    • Penicillins Anaphylaxis   • Cyclobenzaprine Other (See Comments)     Muscle spasms   • Adhesive Tape Itching   • Cefaclor Rash   • Cephalexin Rash   • Erythromycin Rash   • Meperidine Headache   • Morphine Irritability   • Nsaids Rash   • Sulfa Antibiotics Rash   • Sulfamethoxazole-Trimethoprim Rash   • Vancomycin Itching        Social History     Socioeconomic History   • Marital status:    • Number of children: 3   Tobacco Use   • Smoking status: Former     Packs/day: 1.00     Years: 15.00     Pack years: 15.00     Types: Cigarettes     Start date: 1997     Quit date: 2021     Years since quittin.0   • Smokeless tobacco: Never   Vaping Use   • Vaping Use: Never used   Substance and Sexual Activity   • Alcohol use: Yes     Comment: Rarely. 1-2 times yearly   • Drug use: Never   • Sexual activity: Not Currently     Partners: Male     Birth control/protection: Abstinence     Comment: Chemical menopause     "    REVIEW OF SYSTEMS    Constitutional: Denies fevers, chills, weight loss  Cardiovascular: Denies chest pain, shortness of breath  Skin: Denies rashes, acute skin changes  Neurologic: Denies headache, loss of consciousness  MSK: Right knee pain      Objective   Vital Signs:   Pulse 105   Ht 165.1 cm (65\")   Wt 119 kg (262 lb 5.6 oz)   SpO2 98%   BMI 43.66 kg/m²     Body mass index is 43.66 kg/m².    Physical Exam    Right knee: Tender palpation medial knee, at the MCL, crepitus with range of motion, full extension flexion 125, stable to anterior/posterior drawer, stable to varus/valgus stress.    Procedures    Imaging Results (Most Recent)     None           Result Review :   The following data was reviewed by: CADY Ferrer on 02/03/2023:               Assessment and Plan    Diagnoses and all orders for this visit:    1. Sprain of medial collateral ligament of right knee, subsequent encounter (Primary)    2. Right Patellofemoral arthritis    3. Primary osteoarthritis of right knee    4. Right knee pain, unspecified chronicity        Discussed diagnosis and treatment options with the patient we will have her continue physical therapy, we will seek approval for right knee Zilretta injection, follow-up in 6-week for Zilretta    Call or return if worsening symptoms.    Follow Up   Return in about 6 weeks (around 3/17/2023) for Recheck.  Patient was given instructions and counseling regarding her condition or for health maintenance advice. Please see specific information pulled into the AVS if appropriate.       "

## 2023-02-06 ENCOUNTER — TELEPHONE (OUTPATIENT)
Dept: OBSTETRICS AND GYNECOLOGY | Facility: CLINIC | Age: 43
End: 2023-02-06
Payer: MEDICAID

## 2023-02-06 NOTE — TELEPHONE ENCOUNTER
Patient called stating she will be having her hysterectomy sometime in early April. She is scheduled to have a steroid shot in her knee in late March. She was told by her orthopedic provider to check if it is okay to get that before her surgery. Please advise.

## 2023-02-08 ENCOUNTER — TELEPHONE (OUTPATIENT)
Dept: PHYSICAL THERAPY | Facility: CLINIC | Age: 43
End: 2023-02-08

## 2023-02-10 ENCOUNTER — TELEPHONE (OUTPATIENT)
Dept: PHYSICAL THERAPY | Facility: CLINIC | Age: 43
End: 2023-02-10

## 2023-02-14 RX ORDER — HYDROXYZINE HYDROCHLORIDE 25 MG/1
TABLET, FILM COATED ORAL
Qty: 90 TABLET | Refills: 1 | Status: SHIPPED | OUTPATIENT
Start: 2023-02-14

## 2023-02-15 ENCOUNTER — TREATMENT (OUTPATIENT)
Dept: PHYSICAL THERAPY | Facility: CLINIC | Age: 43
End: 2023-02-15
Payer: MEDICARE

## 2023-02-15 DIAGNOSIS — G89.29 CHRONIC PAIN OF RIGHT KNEE: ICD-10-CM

## 2023-02-15 DIAGNOSIS — R26.2 DIFFICULTY WALKING: ICD-10-CM

## 2023-02-15 DIAGNOSIS — S83.411D SPRAIN OF MEDIAL COLLATERAL LIGAMENT OF RIGHT KNEE, SUBSEQUENT ENCOUNTER: Primary | ICD-10-CM

## 2023-02-15 DIAGNOSIS — M25.561 CHRONIC PAIN OF RIGHT KNEE: ICD-10-CM

## 2023-02-15 PROCEDURE — 97530 THERAPEUTIC ACTIVITIES: CPT | Performed by: PHYSICAL THERAPIST

## 2023-02-15 PROCEDURE — 97110 THERAPEUTIC EXERCISES: CPT | Performed by: PHYSICAL THERAPIST

## 2023-02-15 NOTE — PROGRESS NOTES
"Physical Therapy Daily Treatment Note  [address] [facility], [address] [place] [address]        Patient: Bebe Steward   : 1980  Diagnosis/ICD-10 Code:  Sprain of medial collateral ligament of right knee, subsequent encounter [S83.411D]  Referring practitioner: Uriel Sears*  Date of Initial Visit: Type: THERAPY  Noted: 2023  Today's Date: 2/15/2023  Patient seen for 3 sessions             Subjective   Bebe Steward reports having 5/10 pain in her right knee at medial knee and patella.  She reports having to cancel appointments last week secondary to having the \"Flu\".    Objective     Right Hip Abductor Strength:  4/5    See Exercise Logs for complete treatment.       Assessment/Plan     Pt tolerated therapy session well - with progression of therapeutic exercises and  CKC-Functional activities. She has improved, but continues to have deficits in Her Right Hip/Knee ROM,  Strength, and Stability; limiting functional mobility  and ability to perform all ADLs without pain at this time.    Progress per Plan of Care           Timed:  Manual Therapy:    0     mins  78695;  Therapeutic Exercise:    15     mins  50007;     Neuromuscular Kaiden:    0    mins  09665;    Therapeutic Activity:     10     mins  69025;     Gait Trainin     mins  58342;     Ultrasound:     0     mins  57386;    Electrical Stimulation:    0     mins  70540;  Iontophoresis     0     mins  27526    Untimed:  Electrical Stimulation:    0     mins  19789 ( );  Mechanical Traction:    0     mins  94685;   Fluidotherapy     0     mins  96721  Hot pack     0     mins  75803  Cold pack     0     mins  39122    Timed Treatment:   25   mins   Total Treatment:     30   mins        Rajani Germain PTA  Physical Therapist Assistant  "

## 2023-02-21 ENCOUNTER — OFFICE VISIT (OUTPATIENT)
Dept: FAMILY MEDICINE CLINIC | Facility: CLINIC | Age: 43
End: 2023-02-21
Payer: MEDICARE

## 2023-02-21 VITALS
SYSTOLIC BLOOD PRESSURE: 188 MMHG | TEMPERATURE: 98.5 F | OXYGEN SATURATION: 98 % | HEART RATE: 103 BPM | DIASTOLIC BLOOD PRESSURE: 100 MMHG | HEIGHT: 65 IN | BODY MASS INDEX: 43.15 KG/M2 | WEIGHT: 259 LBS

## 2023-02-21 DIAGNOSIS — N18.31 STAGE 3A CHRONIC KIDNEY DISEASE: ICD-10-CM

## 2023-02-21 DIAGNOSIS — F51.01 PRIMARY INSOMNIA: ICD-10-CM

## 2023-02-21 DIAGNOSIS — I10 ESSENTIAL HYPERTENSION: Primary | ICD-10-CM

## 2023-02-21 DIAGNOSIS — K91.89 IRON DEFICIENCY ANEMIA AFTER GASTRECTOMY: ICD-10-CM

## 2023-02-21 DIAGNOSIS — D50.8 IRON DEFICIENCY ANEMIA AFTER GASTRECTOMY: ICD-10-CM

## 2023-02-21 LAB
ALBUMIN SERPL-MCNC: 4.1 G/DL (ref 3.5–5.2)
ALBUMIN/GLOB SERPL: 1.4 G/DL
ALP SERPL-CCNC: 124 U/L (ref 39–117)
ALT SERPL W P-5'-P-CCNC: 12 U/L (ref 1–33)
ANION GAP SERPL CALCULATED.3IONS-SCNC: 9.1 MMOL/L (ref 5–15)
AST SERPL-CCNC: 14 U/L (ref 1–32)
BASOPHILS # BLD AUTO: 0.06 10*3/MM3 (ref 0–0.2)
BASOPHILS NFR BLD AUTO: 0.6 % (ref 0–1.5)
BILIRUB SERPL-MCNC: 0.2 MG/DL (ref 0–1.2)
BUN SERPL-MCNC: 10 MG/DL (ref 6–20)
BUN/CREAT SERPL: 9.4 (ref 7–25)
CALCIUM SPEC-SCNC: 9.2 MG/DL (ref 8.6–10.5)
CHLORIDE SERPL-SCNC: 103 MMOL/L (ref 98–107)
CO2 SERPL-SCNC: 25.9 MMOL/L (ref 22–29)
CREAT SERPL-MCNC: 1.06 MG/DL (ref 0.57–1)
DEPRECATED RDW RBC AUTO: 48.9 FL (ref 37–54)
EGFRCR SERPLBLD CKD-EPI 2021: 66.6 ML/MIN/1.73
EOSINOPHIL # BLD AUTO: 0.27 10*3/MM3 (ref 0–0.4)
EOSINOPHIL NFR BLD AUTO: 2.7 % (ref 0.3–6.2)
ERYTHROCYTE [DISTWIDTH] IN BLOOD BY AUTOMATED COUNT: 15.8 % (ref 12.3–15.4)
GLOBULIN UR ELPH-MCNC: 3 GM/DL
GLUCOSE SERPL-MCNC: 83 MG/DL (ref 65–99)
HCT VFR BLD AUTO: 37.5 % (ref 34–46.6)
HGB BLD-MCNC: 12 G/DL (ref 12–15.9)
IMM GRANULOCYTES # BLD AUTO: 0.03 10*3/MM3 (ref 0–0.05)
IMM GRANULOCYTES NFR BLD AUTO: 0.3 % (ref 0–0.5)
IRON 24H UR-MRATE: 38 MCG/DL (ref 37–145)
IRON SATN MFR SERPL: 7 % (ref 20–50)
LYMPHOCYTES # BLD AUTO: 2.67 10*3/MM3 (ref 0.7–3.1)
LYMPHOCYTES NFR BLD AUTO: 26.9 % (ref 19.6–45.3)
MCH RBC QN AUTO: 27.1 PG (ref 26.6–33)
MCHC RBC AUTO-ENTMCNC: 32 G/DL (ref 31.5–35.7)
MCV RBC AUTO: 84.8 FL (ref 79–97)
MONOCYTES # BLD AUTO: 0.78 10*3/MM3 (ref 0.1–0.9)
MONOCYTES NFR BLD AUTO: 7.8 % (ref 5–12)
NEUTROPHILS NFR BLD AUTO: 6.13 10*3/MM3 (ref 1.7–7)
NEUTROPHILS NFR BLD AUTO: 61.7 % (ref 42.7–76)
NRBC BLD AUTO-RTO: 0.1 /100 WBC (ref 0–0.2)
PLATELET # BLD AUTO: 351 10*3/MM3 (ref 140–450)
PMV BLD AUTO: 10 FL (ref 6–12)
POTASSIUM SERPL-SCNC: 4 MMOL/L (ref 3.5–5.2)
PROT SERPL-MCNC: 7.1 G/DL (ref 6–8.5)
RBC # BLD AUTO: 4.42 10*6/MM3 (ref 3.77–5.28)
SODIUM SERPL-SCNC: 138 MMOL/L (ref 136–145)
TIBC SERPL-MCNC: 511 MCG/DL (ref 298–536)
TRANSFERRIN SERPL-MCNC: 343 MG/DL (ref 200–360)
WBC NRBC COR # BLD: 9.94 10*3/MM3 (ref 3.4–10.8)

## 2023-02-21 PROCEDURE — 83540 ASSAY OF IRON: CPT | Performed by: FAMILY MEDICINE

## 2023-02-21 PROCEDURE — 80053 COMPREHEN METABOLIC PANEL: CPT | Performed by: FAMILY MEDICINE

## 2023-02-21 PROCEDURE — 85025 COMPLETE CBC W/AUTO DIFF WBC: CPT | Performed by: FAMILY MEDICINE

## 2023-02-21 PROCEDURE — 36415 COLL VENOUS BLD VENIPUNCTURE: CPT | Performed by: FAMILY MEDICINE

## 2023-02-21 PROCEDURE — 99214 OFFICE O/P EST MOD 30 MIN: CPT | Performed by: FAMILY MEDICINE

## 2023-02-21 PROCEDURE — 84466 ASSAY OF TRANSFERRIN: CPT | Performed by: FAMILY MEDICINE

## 2023-02-21 RX ORDER — DARIDOREXANT 25 MG/1
TABLET, FILM COATED ORAL
COMMUNITY
Start: 2023-02-09 | End: 2023-02-21

## 2023-02-21 RX ORDER — IRBESARTAN 75 MG/1
75 TABLET ORAL NIGHTLY
Qty: 30 TABLET | Refills: 1 | Status: SHIPPED | OUTPATIENT
Start: 2023-02-21 | End: 2023-04-06

## 2023-02-21 RX ORDER — DARIDOREXANT 25 MG/1
50 TABLET, FILM COATED ORAL
Qty: 30 TABLET | Refills: 0 | Status: CANCELLED | OUTPATIENT
Start: 2023-02-21

## 2023-02-21 RX ORDER — DARIDOREXANT 50 MG/1
50 TABLET, FILM COATED ORAL
Qty: 30 TABLET | Refills: 2 | Status: SHIPPED | OUTPATIENT
Start: 2023-02-21 | End: 2023-03-23

## 2023-02-22 ENCOUNTER — TREATMENT (OUTPATIENT)
Dept: PHYSICAL THERAPY | Facility: CLINIC | Age: 43
End: 2023-02-22
Payer: MEDICARE

## 2023-02-22 DIAGNOSIS — M25.561 CHRONIC PAIN OF RIGHT KNEE: ICD-10-CM

## 2023-02-22 DIAGNOSIS — G89.29 CHRONIC PAIN OF RIGHT KNEE: ICD-10-CM

## 2023-02-22 DIAGNOSIS — R26.2 DIFFICULTY WALKING: ICD-10-CM

## 2023-02-22 DIAGNOSIS — S83.411D SPRAIN OF MEDIAL COLLATERAL LIGAMENT OF RIGHT KNEE, SUBSEQUENT ENCOUNTER: Primary | ICD-10-CM

## 2023-02-22 PROCEDURE — 97110 THERAPEUTIC EXERCISES: CPT | Performed by: PHYSICAL THERAPIST

## 2023-02-22 PROCEDURE — 97530 THERAPEUTIC ACTIVITIES: CPT | Performed by: PHYSICAL THERAPIST

## 2023-02-24 ENCOUNTER — TREATMENT (OUTPATIENT)
Dept: PHYSICAL THERAPY | Facility: CLINIC | Age: 43
End: 2023-02-24
Payer: MEDICARE

## 2023-02-24 DIAGNOSIS — R26.2 DIFFICULTY WALKING: ICD-10-CM

## 2023-02-24 DIAGNOSIS — G89.29 CHRONIC PAIN OF RIGHT KNEE: ICD-10-CM

## 2023-02-24 DIAGNOSIS — S83.411D SPRAIN OF MEDIAL COLLATERAL LIGAMENT OF RIGHT KNEE, SUBSEQUENT ENCOUNTER: Primary | ICD-10-CM

## 2023-02-24 DIAGNOSIS — M25.561 CHRONIC PAIN OF RIGHT KNEE: ICD-10-CM

## 2023-02-24 PROCEDURE — 97110 THERAPEUTIC EXERCISES: CPT | Performed by: PHYSICAL THERAPIST

## 2023-02-24 PROCEDURE — 97530 THERAPEUTIC ACTIVITIES: CPT | Performed by: PHYSICAL THERAPIST

## 2023-02-24 NOTE — PROGRESS NOTES
Physical Therapy Daily Treatment Note  [address], [address] [place] [address]        Patient: Bebe Steward   : 1980  Diagnosis/ICD-10 Code:  Sprain of medial collateral ligament of right knee, subsequent encounter [S83.411D]  Referring practitioner: Uriel Sears*  Date of Initial Visit: Type: THERAPY  Noted: 2023  Today's Date: 2023  Patient seen for 5 sessions             Subjective   Bebe Steward reports having 3-4/10 pain in her right knee upon arrival.  She states that it is sore, but not as sore as last time.    Objective     Strength/Myotome Testing      Right Knee   Flexion: 4+/5  Extension: 4+/5  Quadriceps contraction: good  (Creputis/Grinding noted at patella)    See Exercise  Logs for complete treatment.    Assessment/Plan     Pt tolerated therapy session well - with progression of therapeutic exercises and  CKC-Functional activities. She has improved, but continues to have deficits in Her Right Hip/Knee ROM,  Strength, and Stability; limiting functional mobility  and ability to perform all ADLs without pain at this time.     Progress per Plan of Care                  Timed:  Manual Therapy:    0     mins  62709;  Therapeutic Exercise:    22     mins  41770;     Neuromuscular Kaiden:    0    mins  42468;    Therapeutic Activity:     8     mins  76577;     Gait Trainin     mins  91880;     Ultrasound:     0     mins  86029;    Electrical Stimulation:    0     mins  12017;  Iontophoresis     0     mins  54734    Untimed:  Electrical Stimulation:    0     mins  02988 (MC );  Mechanical Traction:    0     mins  49883;   Fluidotherapy     0     mins  04612  Hot pack     0     mins  26930  Cold pack     0     mins  32264    Timed Treatment:   30   mins   Total Treatment:     35   mins        Rajani Germain PTA  Physical Therapist Assistant

## 2023-02-27 PROBLEM — Z12.11 SCREENING FOR MALIGNANT NEOPLASM OF COLON: Status: ACTIVE | Noted: 2022-06-27

## 2023-03-10 ENCOUNTER — OFFICE VISIT (OUTPATIENT)
Dept: CARDIOLOGY | Facility: CLINIC | Age: 43
End: 2023-03-10
Payer: MEDICARE

## 2023-03-10 VITALS
HEART RATE: 114 BPM | SYSTOLIC BLOOD PRESSURE: 135 MMHG | DIASTOLIC BLOOD PRESSURE: 88 MMHG | WEIGHT: 258 LBS | BODY MASS INDEX: 42.99 KG/M2 | HEIGHT: 65 IN

## 2023-03-10 DIAGNOSIS — E66.01 MORBID (SEVERE) OBESITY DUE TO EXCESS CALORIES: ICD-10-CM

## 2023-03-10 DIAGNOSIS — E78.2 HYPERLIPEMIA, MIXED: ICD-10-CM

## 2023-03-10 DIAGNOSIS — I10 HYPERTENSION, ESSENTIAL: Primary | ICD-10-CM

## 2023-03-10 PROCEDURE — 93000 ELECTROCARDIOGRAM COMPLETE: CPT | Performed by: INTERNAL MEDICINE

## 2023-03-10 PROCEDURE — 1159F MED LIST DOCD IN RCRD: CPT | Performed by: INTERNAL MEDICINE

## 2023-03-10 PROCEDURE — 3075F SYST BP GE 130 - 139MM HG: CPT | Performed by: INTERNAL MEDICINE

## 2023-03-10 PROCEDURE — 99204 OFFICE O/P NEW MOD 45 MIN: CPT | Performed by: INTERNAL MEDICINE

## 2023-03-10 PROCEDURE — 1160F RVW MEDS BY RX/DR IN RCRD: CPT | Performed by: INTERNAL MEDICINE

## 2023-03-10 PROCEDURE — 3079F DIAST BP 80-89 MM HG: CPT | Performed by: INTERNAL MEDICINE

## 2023-03-10 RX ORDER — METHOCARBAMOL 500 MG/1
TABLET, FILM COATED ORAL
COMMUNITY
Start: 2021-06-16 | End: 2023-03-15

## 2023-03-10 NOTE — PROGRESS NOTES
Chief Complaint  Hypertension    Subjective            Bebe Steward presents to [facility] CARDIOLOGY  History of Present Illness    43-year-old white female.  She has no previous cardiac problems in the past.  She has had hypertension, longstanding as well as chronic kidney disease.  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  She was having some increased blood pressure issues over the past several months.  She was taken off of ACE inhibitor and amlodipine and started on Imdur I believe by nephrology.  Her blood pressure was erratic after that.  After seeing her primary care she was recently started on irbesartan which seems to have stabilized her blood pressures.  She has infrequent occasional palpitations but no significant cardiac symptoms otherwise.  She is compliant with her medical therapy.  She is trying to lose weight.  Her kidney function has improved significantly over the past 8 months.    [facility]  Past Medical History:   Diagnosis Date   • Abnormal Pap smear of cervix 07/21/2020   • Allergic 1990   • Allergies    • Anemia    • Ankle sprain    • Anxiety 2014   • Arthritis    • Arthritis of back 2006   • Brain concussion 1995   • Broken bones 08/2014   • Cervical disc disorder 2012    Compressed disc   • Corns and callus    • CTS (carpal tunnel syndrome) 2022    Left wrist   • Essential hypertension 07/21/2020   • Foot pain    • Fracture of ankle 2008    Left   • Fracture of wrist 2011    Left   • Fracture, fibula    • Fracture, tibia and fibula 2008    Left   • Gallstone 2001   • Glaucoma     Elevated eye pressure   • Head injury 1996   • HPV (human papilloma virus) infection    • Hyperlipidemia    • Hypertension    • Ingrown toenail    • Insomnia 07/21/2020   • Kidney problem    • Knee sprain    • Knee swelling 2015   • Left below-knee amputee (HCC) 07/21/2020   • Low back pain 2006   • Low back strain    • Lumbosacral disc disease 2006   •  Migraine    • Muscle spasm 2021   • Neck strain    • Numbness in feet    • Obesity    • 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   • Plantar wart    • Polycystic ovary syndrome    • Renal insufficiency    • Right foot pain 2020   • Right hip pain 2020   • Sexually transmitted disease (STD) 2019   • Sinus trouble    • Stage 3 chronic kidney disease ([facility]) 2020   • Stress fracture    • Subluxation of patella 1996    Left   • Thoracic disc disorder    • Urogenital trichomoniasis    • Visual impairment 1988    Near sighted   • Vitamin D deficiency 2020   • Wrist sprain          SURGICALHX  Past Surgical History:   Procedure Laterality Date   • ABDOMINAL SURGERY     • ANKLE OPEN REDUCTION INTERNAL FIXATION  [id number]    Left 11 surgeries total   • ANKLE SURGERY      3/08, , , 12/10, , , ,    • BACK SURGERY     • BARIATRIC SURGERY     • BELOW KNEE AMPUTATION Left 2014   • CHOLECYSTECTOMY     • ENDOMETRIAL ABLATION     • EPIDURAL BLOCK     • FRACTURE SURGERY     • GALLBLADDER SURGERY     • GASTRIC BYPASS     • GASTROSTOMY     • KNEE SURGERY      left , right 2015   • SPINAL FUSION  10/2008    L5/S1   • SPINE SURGERY      lumbar spine   • TONSILLECTOMY     • TRIGGER POINT INJECTION          SOC  Social History     Socioeconomic History   • Marital status:    • Number of children: 3   Tobacco Use   • Smoking status: Former     Packs/day: 1.00     Years: 15.00     Pack years: 15.00     Types: Cigarettes     Start date: 1997     Quit date: 2021     Years since quittin.1   • Smokeless tobacco: Never   Vaping Use   • Vaping Use: Never used   Substance and Sexual Activity   • Alcohol use: Yes     Comment: Rarely. 1-2 times yearly   • Drug use: Never   • Sexual activity: Not Currently     Partners: Male     Birth control/protection: Abstinence     Comment: Chemical  menopause         FAMHX  Family History   Problem Relation Age of Onset   • Heart disease Father    • Kidney disease Father    • Arthritis Father    • Depression Father    • Hyperlipidemia Father    • Vision loss Father         Glaucoma   • Anesthesia problems Father    • Rheumatologic disease Father         Gout   • Other Father         Gout   • Stroke Mother         3 TIAs   • Other Mother         Fibromyalgia   • Kidney disease Mother         Cause of death   • Anxiety disorder Mother    • Arthritis Mother    • Depression Mother    • Early death Mother         50 yrs old kidney disease   • Hyperlipidemia Mother    • Thyroid disease Mother    • Broken bones Mother         Leg   • Colon cancer Paternal Grandmother 87   • Arthritis Paternal Grandmother    • Cancer Paternal Grandmother         Colon/kidney   • Hyperlipidemia Paternal Grandmother    • Kidney disease Paternal Grandmother    • Osteoporosis Paternal Grandmother    • Miscarriages / Stillbirths Paternal Grandmother         Miscarriage   • Rheumatologic disease Paternal Grandmother         Gout   • Broken bones Paternal Grandmother         Clavicle/shoulder   • Other Paternal Grandmother         Gout   • Melanoma Maternal Grandfather    • Diabetes Maternal Grandfather         Type 2   • Stroke Maternal Grandfather    • Heart disease Maternal Grandfather    • Arthritis Maternal Grandfather    • Cancer Maternal Grandfather         Melanoma   • Hyperlipidemia Maternal Grandfather    • Colon cancer Paternal Aunt    • Cancer Paternal Aunt         Colon/liver   • Developmental Disability Paternal Aunt         Down Syndrome   • Anxiety disorder Maternal Grandmother    • Arthritis Maternal Grandmother    • Depression Maternal Grandmother    • Heart disease Maternal Grandmother    • Hyperlipidemia Maternal Grandmother    • Kidney disease Maternal Grandmother         Cause of death   • Liver disease Maternal Grandmother         Alcohol abuse   • Thyroid disease  Maternal Grandmother    • Osteoporosis Maternal Grandmother    • Miscarriages / Stillbirths Maternal Grandmother         Miscarriage   • Broken bones Maternal Grandmother         Clavicle/shoulder   • Alcohol abuse Maternal Grandmother    • Arthritis Paternal Grandfather    • Heart disease Paternal Grandfather    • Hyperlipidemia Paternal Grandfather    • Kidney disease Paternal Grandfather    • Anxiety disorder Son    • Ovarian cancer Neg Hx    • Uterine cancer Neg Hx    • Breast cancer Neg Hx    • Prostate cancer Neg Hx           ALLERGY  Allergies   Allergen Reactions   • Monosodium Glutamate Other (See Comments)     Blood pressure drops, pass out    • Penicillins Anaphylaxis   • Cyclobenzaprine Other (See Comments)     Muscle spasms   • Adhesive Tape Itching   • Cefaclor Rash   • Cephalexin Rash   • Erythromycin Rash   • Meperidine Headache   • Morphine Irritability   • Nsaids Rash   • Sulfa Antibiotics Rash   • Sulfamethoxazole-Trimethoprim Rash   • Vancomycin Itching        MEDSCURRENT    Current Outpatient Medications:   •  acetaminophen (TYLENOL) 325 MG tablet, Take 2 tablets by mouth Every 6 (Six) Hours As Needed., Disp: , Rfl:   •  atorvastatin (LIPITOR) 20 MG tablet, TAKE ONE TABLET BY MOUTH DAILY, Disp: 30 tablet, Rfl: 1  •  Daridorexant HCl (Quviviq) 50 MG tablet, Take 1 tablet by mouth every night at bedtime for 30 days., Disp: 30 tablet, Rfl: 2  •  docusate sodium (Colace) 100 MG capsule, Take 1 capsule by mouth 2 (Two) Times a Day As Needed for Constipation., Disp: 60 capsule, Rfl: 5  •  fluticasone (FLONASE) 50 MCG/ACT nasal spray, 2 sprays into the nostril(s) as directed by provider Daily., Disp: 18.2 mL, Rfl: 0  •  furosemide (LASIX) 20 MG tablet, Take 1 tablet by mouth Daily. (Patient taking differently: Take 1 tablet by mouth. AS NEEDED), Disp: 90 tablet, Rfl: 1  •  gabapentin (NEURONTIN) 300 MG capsule, Take 1 capsule by mouth 3 (Three) Times a Day for 30 days., Disp: 90 capsule, Rfl: 0  •   "hydrALAZINE (APRESOLINE) 50 MG tablet, Take 1 tablet by mouth 3 (Three) Times a Day., Disp: 90 tablet, Rfl: 5  •  HYDROcodone-acetaminophen (NORCO) 7.5-325 MG per tablet, , Disp: , Rfl:   •  hydrOXYzine (ATARAX) 25 MG tablet, TAKE ONE TABLET BY MOUTH EVERY 8 HOURS AS NEEDED FOR ANXIETY, Disp: 90 tablet, Rfl: 1  •  irbesartan (Avapro) 75 MG tablet, Take 1 tablet by mouth Every Night for 30 days., Disp: 30 tablet, Rfl: 1  •  leuprolide (Lupron Depot, 3-Month,) 11.25 MG injection, Inject 11.25 mg into the appropriate muscle as directed by prescriber Every 3 (Three) Months., Disp: 1 kit, Rfl: 0  •  loratadine (CLARITIN) 10 MG tablet, TAKE ONE TABLET BY MOUTH DAILY, Disp: 30 tablet, Rfl: 1  •  methocarbamol (ROBAXIN) 500 MG tablet, , Disp: , Rfl:   •  ondansetron ODT (ZOFRAN-ODT) 4 MG disintegrating tablet, Place 1 tablet on the tongue Every 8 (Eight) Hours As Needed for Nausea or Vomiting., Disp: 15 tablet, Rfl: 0  •  rizatriptan (MAXALT) 10 MG tablet, TAKE ONE TABLET BY MOUTH AT ONSET OF HEADACHE; MAY REPEAT ONE TABLET IN 2 HOURS IF NEEDED., Disp: 9 tablet, Rfl: 5  •  sertraline (ZOLOFT) 50 MG tablet, TAKE ONE TABLET BY MOUTH DAILY, Disp: 30 tablet, Rfl: 1      Review of Systems   Constitutional: Positive for weight loss.   HENT: Negative.    Eyes: Negative.    Cardiovascular: Positive for irregular heartbeat and palpitations. Negative for chest pain.   Respiratory: Negative.    Endocrine: Negative.    Hematologic/Lymphatic: Negative.    Skin: Negative.    Musculoskeletal: Positive for back pain.   Gastrointestinal: Negative.    Genitourinary: Negative.    Neurological: Negative.    Psychiatric/Behavioral: Negative.         Objective     /88   Pulse 114   Ht 165.1 cm (65\")   Wt 117 kg (258 lb)   BMI 42.93 kg/m²       General Appearance:   · well developed  · well nourished, morbidly obese  HENT:   · oropharynx moist  · lips not cyanotic  Neck:  · thyroid not enlarged  · supple  Respiratory:  · no respiratory " distress  · normal breath sounds  · no rales  Cardiovascular:  · no jugular venous distention  · regular rhythm  · apical impulse normal  · S1 normal, S2 normal  · no S3, no S4   · no murmur  · no rub, no thrill  · carotid pulses normal; no bruit  · pedal pulses normal  · lower extremity edema: none, left leg prosthetic  Musculoskeletal:  · no clubbing of fingers.   · normocephalic, head atraumatic  Skin:   · warm, dry  Psychiatric:  · judgement and insight appropriate  · normal mood and affect      Result Review :     The following data was reviewed by: Desmond Chadwick MD on 03/10/2023:    CMP    CMP 7/5/22 7/5/22 7/5/22 10/10/22 2/21/23    1112 1112 1112     Glucose 103 (A)   106 (A) 83   BUN 13   10 10   Creatinine 1.39 (A)   1.08 (A) 1.06 (A)   eGFR 48.1 (A)   65.1 66.6   Sodium 139   139 138   Potassium 4.1   4.5 4.0   Chloride 109 (A)   104 103   Calcium 8.9   9.3 9.2   Total Protein   6.6     Total Protein  7.2   7.1   Albumin  4.10 3.5 4.50 4.1   Globulin   3.1     Globulin     3.0   Total Bilirubin  <0.2   0.2   Alkaline Phosphatase  73   124 (A)   AST (SGOT)  10   14   ALT (SGPT)  8   12   Albumin/Globulin Ratio     1.4   BUN/Creatinine Ratio 9.4   9.3 9.4   Anion Gap 11.0   14.0 9.1   (A) Abnormal value       Comments are available for some flowsheets but are not being displayed.           CBC    CBC 7/5/22 2/21/23   WBC 8.40 9.94   RBC 3.57 (A) 4.42   Hemoglobin 11.6 (A) 12.0   Hematocrit 33.4 (A) 37.5   MCV 93.6 84.8   MCH 32.5 27.1   MCHC 34.7 32.0   RDW 13.4 15.8 (A)   Platelets 353 351   (A) Abnormal value                      Data reviewed: Primary care records reviewed       ECG 12 Lead    Date/Time: 3/10/2023 3:22 PM  Performed by: ZARA Chadwick MD  Authorized by: ZARA Chadwick MD   Previous ECG: no previous ECG available  Rhythm: sinus rhythm  Conduction: conduction normal  ST Segments: ST segments normal  T Waves: T waves normal  QRS axis: normal  Other findings: left atrial  abnormality    Clinical impression: non-specific ECG                 Assessment and Plan        ASSESSMENT:  Encounter Diagnoses   Name Primary?   • Hypertension, essential Yes   • Morbid (severe) obesity due to excess calories (HCC)    • Hyperlipemia, mixed          PLAN:    1.  Essential hypertension-better control with recent medication changes.  At this time I do not think there is an indication for her to be on the isosorbide.  I typically do not find it to be very effective at hypertension management.  I am weaning her off of that today.  If her blood pressure increases, the next reasonable choice would be to add carvedilol to help with her palpitations and have some vasodilation effects and improve her blood pressure.  The patient will monitor her blood pressure and call me or primary care if averaging greater than 140/90.  2.  Morbid obesity due to excess calories-counseled regarding weight loss, this may be also an opportunity to improve her blood pressure.  3.  Mixed hyperlipidemia-stable, continue statin therapy    The patient will be followed as needed at this time          Patient was given instructions and counseling regarding her condition or for health maintenance advice. Please see specific information pulled into the AVS if appropriate.             ZARA Chadwick MD  3/10/2023    15:21 EST

## 2023-03-15 RX ORDER — METHOCARBAMOL 500 MG/1
TABLET, FILM COATED ORAL
Qty: 180 TABLET | Refills: 0 | Status: SHIPPED | OUTPATIENT
Start: 2023-03-15

## 2023-03-17 ENCOUNTER — TELEPHONE (OUTPATIENT)
Dept: ORTHOPEDIC SURGERY | Facility: CLINIC | Age: 43
End: 2023-03-17

## 2023-03-17 RX ORDER — HYDRALAZINE HYDROCHLORIDE 50 MG/1
TABLET, FILM COATED ORAL
Qty: 90 TABLET | Refills: 5 | Status: SHIPPED | OUTPATIENT
Start: 2023-03-17

## 2023-03-20 RX ORDER — DARIDOREXANT 25 MG/1
TABLET, FILM COATED ORAL
COMMUNITY
Start: 2023-03-15 | End: 2023-03-21

## 2023-03-21 ENCOUNTER — OFFICE VISIT (OUTPATIENT)
Dept: FAMILY MEDICINE CLINIC | Facility: CLINIC | Age: 43
End: 2023-03-21
Payer: MEDICARE

## 2023-03-21 VITALS
WEIGHT: 255 LBS | SYSTOLIC BLOOD PRESSURE: 160 MMHG | HEART RATE: 92 BPM | HEIGHT: 65 IN | DIASTOLIC BLOOD PRESSURE: 84 MMHG | OXYGEN SATURATION: 98 % | TEMPERATURE: 98.3 F | BODY MASS INDEX: 42.49 KG/M2

## 2023-03-21 DIAGNOSIS — R29.898 BILATERAL ARM WEAKNESS: ICD-10-CM

## 2023-03-21 DIAGNOSIS — I10 ESSENTIAL HYPERTENSION: ICD-10-CM

## 2023-03-21 DIAGNOSIS — M54.12 CERVICAL RADICULOPATHY: Primary | ICD-10-CM

## 2023-03-21 DIAGNOSIS — R11.0 NAUSEA: ICD-10-CM

## 2023-03-21 DIAGNOSIS — E61.1 IRON DEFICIENCY: ICD-10-CM

## 2023-03-21 PROCEDURE — 3079F DIAST BP 80-89 MM HG: CPT | Performed by: FAMILY MEDICINE

## 2023-03-21 PROCEDURE — 99214 OFFICE O/P EST MOD 30 MIN: CPT | Performed by: FAMILY MEDICINE

## 2023-03-21 PROCEDURE — 3077F SYST BP >= 140 MM HG: CPT | Performed by: FAMILY MEDICINE

## 2023-03-21 RX ORDER — LORATADINE 10 MG/1
10 TABLET ORAL DAILY
Qty: 90 TABLET | Refills: 3 | Status: SHIPPED | OUTPATIENT
Start: 2023-03-21 | End: 2023-06-19

## 2023-03-21 RX ORDER — ONDANSETRON 4 MG/1
4 TABLET, ORALLY DISINTEGRATING ORAL EVERY 8 HOURS PRN
Qty: 30 TABLET | Refills: 3 | Status: SHIPPED | OUTPATIENT
Start: 2023-03-21 | End: 2023-03-31

## 2023-03-21 NOTE — PROGRESS NOTES
Answers for HPI/ROS submitted by the patient on 3/14/2023  What is the primary reason for your visit?: High Blood Pressure    Chief Complaint  Chief Complaint   Patient presents with   • Bilateral arm pain x 1 week       HPI:  Bebe Steward presents to [facility]     Pt reports that she is having bilateral arm pain that started about a week  ago.  Pt reports she is having more stiffness and by that night she was in so much pain that nothing she was taking would help that pain and she did not know what to do. Pt reports she was told several years ago that she had a compressed disc in her neck after she passed out and fell forward and hit her head on a door but she was  to a soldier at [facility].     Pt reports that she was laying in bed trying to breath deep to take away the pain and she passed out and woke up 4 hours later. Pt reports her arms were not hurting as bad when she woke up and the intense pain was gone. This happened on March the 12th.  We will be checking her neck for pinched nerves.    HTN- Pt BP today is 160/84 which is not well controlled.  Pt is compliant with their medication and will continue their current medication regimen. No side effects to the medication.    Procedures     Past History:  Medical History: has a past medical history of Abnormal Pap smear of cervix (07/21/2020), Allergies, Anemia, Ankle sprain, Anxiety (2014), Arthritis, Arthritis of back (2006), Brain concussion (1995), Broken bones (08/2014), Cervical disc disorder (2012), Colon polyp (03/23/2023), CTS (carpal tunnel syndrome) (2022), Endometriosis, Essential hypertension (07/21/2020), Fracture of ankle (2008), Fracture of wrist (2011), Fracture, fibula, Fracture, tibia and fibula (2008), H/O Polycystic ovary syndrome, Head injury (1996), HPV (human papilloma virus) infection, Hyperlipidemia, Hypoglycemia, Insomnia (07/21/2020), Knee sprain, Knee swelling (2015), Left below-knee  amputee (07/21/2020), Low back pain (2006), Lumbosacral disc disease (2006), Migraine, Muscle spasm (01/04/2021), Neck strain, Numbness in right foot, Obesity, 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   Surgical History: has a past surgical history that includes Abdominal surgery (2010); Leg amputation, lower tibia/fibula (Left, 2014); Ankle surgery; Endometrial ablation (2007); Gastric bypass (2001); Knee surgery; Spine surgery (2008); Tonsillectomy (1994); Spinal fusion (10/2008); Epidural block injection (2007); Trigger point injection (2021); Ankle fracture surgery ([id number]); Cholecystectomy (2001); Fracture surgery (2008); Hysterectomy; and Colonoscopy (N/A, 03/23/2023).   Family History: family history includes Alcohol abuse in her maternal grandmother; Anesthesia problems in her father; Anxiety disorder in her maternal grandmother, mother, and son; Arthritis in her father, maternal grandfather, maternal grandmother, mother, paternal grandfather, and paternal grandmother; Broken bones in her maternal grandmother, mother, and paternal grandmother; Cancer in her maternal grandfather, paternal aunt, and paternal grandmother; Colon cancer in her paternal aunt; Colon cancer (age of onset: 87) in her paternal grandmother; Depression in her father, maternal grandmother, and mother; Developmental Disability in her paternal aunt; Diabetes in her maternal grandfather; Early death in her mother; Heart disease in her father, maternal grandfather, maternal grandmother, and paternal grandfather; Hyperlipidemia in her father, maternal grandfather, maternal grandmother, mother,  paternal grandfather, and paternal grandmother; Hypertension in her father, maternal grandfather, maternal grandmother, mother, paternal grandfather, and paternal grandmother; Kidney disease in her father, maternal grandmother, mother, paternal grandfather, and paternal grandmother; Liver disease in her maternal grandmother; Melanoma in her maternal grandfather; Miscarriages / Stillbirths in her maternal grandmother and paternal grandmother; Osteoporosis in her maternal grandmother and paternal grandmother; Other in her father, mother, and paternal grandmother; Rheumatologic disease in her father and paternal grandmother; Stroke in her maternal grandfather and mother; Thyroid disease in her maternal grandmother and mother; Vision loss in her father.   Social History: reports that she quit smoking about 2 years ago. Her smoking use included cigarettes. She started smoking about 26 years ago. She has a 15.00 pack-year smoking history. She has been exposed to tobacco smoke. She has never used smokeless tobacco. She reports current alcohol use. She reports that she does not use drugs.  Immunization History   Administered Date(s) Administered   • COVID-19 (MODERNA) 1st, 2nd, 3rd Dose Only 12/11/2021, 12/13/2021   • COVID-19 (MODERNA) BOOSTER 11/11/2021, 12/09/2021   • FluLaval/Fluzone >6mos 10/05/2021, 12/16/2022   • Influenza, Unspecified 12/02/2020, 12/16/2022, 12/16/2022         Allergies: Adhesive tape, Cefaclor, Cephalexin, Cyclobenzaprine, Erythromycin, Meperidine, Monosodium glutamate, Morphine, Nsaids, Penicillins, Sulfa antibiotics, Sulfamethoxazole-trimethoprim, and Vancomycin     Medications:  Current Outpatient Medications on File Prior to Visit   Medication Sig Dispense Refill   • acetaminophen (TYLENOL) 325 MG tablet Take 2 tablets by mouth Every 6 (Six) Hours As Needed.     • atorvastatin (LIPITOR) 20 MG tablet TAKE ONE TABLET BY MOUTH DAILY (Patient taking differently: Take 1 tablet by mouth Daily.) 30  tablet 1   • docusate sodium (Colace) 100 MG capsule Take 1 capsule by mouth 2 (Two) Times a Day As Needed for Constipation. 60 capsule 5   • fluticasone (FLONASE) 50 MCG/ACT nasal spray 2 sprays into the nostril(s) as directed by provider Daily. 18.2 mL 0   • furosemide (LASIX) 20 MG tablet Take 1 tablet by mouth Daily. (Patient taking differently: Take 1 tablet by mouth. AS NEEDED not taking) 90 tablet 1   • hydrALAZINE (APRESOLINE) 50 MG tablet TAKE ONE TABLET BY MOUTH THREE TIMES A DAY (Patient taking differently: Take 1 tablet by mouth 3 (Three) Times a Day.) 90 tablet 5   • HYDROcodone-acetaminophen (NORCO) 7.5-325 MG per tablet Take 1 tablet by mouth Every 8 (Eight) Hours As Needed.     • hydrOXYzine (ATARAX) 25 MG tablet TAKE ONE TABLET BY MOUTH EVERY 8 HOURS AS NEEDED FOR ANXIETY (Patient taking differently: Take 1 tablet by mouth As Needed.) 90 tablet 1   • irbesartan (Avapro) 75 MG tablet Take 1 tablet by mouth Every Night for 30 days. 30 tablet 1   • methocarbamol (ROBAXIN) 500 MG tablet TAKE ONE TABLET BY MOUTH TWICE A DAY AS NEEDED FOR MUSCLE SPASMS (Patient taking differently: Take 1 tablet by mouth every night at bedtime.) 180 tablet 0   • rizatriptan (MAXALT) 10 MG tablet TAKE ONE TABLET BY MOUTH AT ONSET OF HEADACHE; MAY REPEAT ONE TABLET IN 2 HOURS IF NEEDED. (Patient taking differently: Take 1 tablet by mouth 1 (One) Time As Needed.) 9 tablet 5   • sertraline (ZOLOFT) 50 MG tablet TAKE ONE TABLET BY MOUTH DAILY (Patient taking differently: Take 1 tablet by mouth Every Night.) 30 tablet 1   • gabapentin (NEURONTIN) 300 MG capsule Take 1 capsule by mouth 3 (Three) Times a Day for 30 days. 90 capsule 0     No current facility-administered medications on file prior to visit.        Health Maintenance Due   Topic Date Due   • TDAP/TD VACCINES (1 - Tdap) Never done   • LIPID PANEL  02/10/2023       Vital Signs:   Vitals:    03/21/23 1113 03/21/23 1118   BP: 158/80 160/84   Pulse: 92    Temp: 98.3 °F  "(36.8 °C)    SpO2: 98%    Weight: 116 kg (255 lb)    Height: 165.1 cm (65\")       Body mass index is 42.43 kg/m².     ROS:  Review of Systems   Constitutional: Negative for fatigue and fever.   HENT: Negative for congestion, ear pain and sinus pressure.    Respiratory: Negative for cough, chest tightness and shortness of breath.    Cardiovascular: Negative for chest pain, palpitations and leg swelling.   Gastrointestinal: Negative for abdominal pain and diarrhea.   Genitourinary: Negative for dysuria and frequency.   Neurological: Negative for speech difficulty, headache and confusion.   Psychiatric/Behavioral: Negative for agitation and behavioral problems.          Physical Exam  Vitals reviewed.   Constitutional:       Appearance: Normal appearance.   HENT:      Right Ear: Tympanic membrane normal.      Left Ear: Tympanic membrane normal.      Nose: Nose normal.   Eyes:      Extraocular Movements: Extraocular movements intact.      Conjunctiva/sclera: Conjunctivae normal.      Pupils: Pupils are equal, round, and reactive to light.   Cardiovascular:      Rate and Rhythm: Normal rate and regular rhythm.   Pulmonary:      Effort: Pulmonary effort is normal.      Breath sounds: Normal breath sounds.   Abdominal:      General: Bowel sounds are normal.   Musculoskeletal:         General: Normal range of motion.      Cervical back: Normal range of motion.   Skin:     General: Skin is warm and dry.   Neurological:      General: No focal deficit present.      Mental Status: She is alert and oriented to person, place, and time.   Psychiatric:         Mood and Affect: Mood normal.         Behavior: Behavior normal.          Result Review        Diagnoses and all orders for this visit:    1. Cervical radiculopathy (Primary)  -     XR Spine Cervical Complete 4 or 5 View; Future  -     MRI Cervical Spine Without Contrast; Future    2. Bilateral arm weakness  -     EMG & Nerve Conduction Test; Future    3. Essential " hypertension    4. Iron deficiency  -     Ambulatory Referral to Hematology / Oncology    5. Nausea  -     ondansetron ODT (ZOFRAN-ODT) 4 MG disintegrating tablet; Place 1 tablet on the tongue Every 8 (Eight) Hours As Needed for Nausea or Vomiting for up to 10 days.  Dispense: 30 tablet; Refill: 3    Other orders  -     loratadine (CLARITIN) 10 MG tablet; Take 1 tablet by mouth Daily for 90 days. (Patient taking differently: Take 1 tablet by mouth Every Night.)  Dispense: 90 tablet; Refill: 3          Smoking Cessation:    Bebe Steward  reports that she quit smoking about 2 years ago. Her smoking use included cigarettes. She started smoking about 26 years ago. She has a 15.00 pack-year smoking history. She has been exposed to tobacco smoke. She has never used smokeless tobacco.          Follow Up   Return in about 4 weeks (around 4/18/2023).  Patient was given instructions and counseling regarding her condition or for health maintenance advice. Please see specific information pulled into the AVS if appropriate.       Kimberly Almaguer MD

## 2023-03-23 ENCOUNTER — ANESTHESIA (OUTPATIENT)
Dept: GASTROENTEROLOGY | Facility: HOSPITAL | Age: 43
End: 2023-03-23
Payer: MEDICARE

## 2023-03-23 ENCOUNTER — ANESTHESIA EVENT (OUTPATIENT)
Dept: GASTROENTEROLOGY | Facility: HOSPITAL | Age: 43
End: 2023-03-23
Payer: MEDICARE

## 2023-03-23 ENCOUNTER — HOSPITAL ENCOUNTER (OUTPATIENT)
Facility: HOSPITAL | Age: 43
Setting detail: HOSPITAL OUTPATIENT SURGERY
Discharge: HOME | End: 2023-03-23
Attending: SURGERY | Admitting: SURGERY
Payer: MEDICARE

## 2023-03-23 VITALS
SYSTOLIC BLOOD PRESSURE: 149 MMHG | HEART RATE: 87 BPM | OXYGEN SATURATION: 97 % | DIASTOLIC BLOOD PRESSURE: 90 MMHG | BODY MASS INDEX: 43.62 KG/M2 | RESPIRATION RATE: 14 BRPM | WEIGHT: 262.13 LBS | TEMPERATURE: 97.9 F

## 2023-03-23 DIAGNOSIS — Z80.0 FAMILY HISTORY OF COLON CANCER: ICD-10-CM

## 2023-03-23 DIAGNOSIS — Z12.11 SCREENING FOR MALIGNANT NEOPLASM OF COLON: ICD-10-CM

## 2023-03-23 LAB
B-HCG UR QL: NEGATIVE
CYTO UR: NORMAL
LAB AP CASE REPORT: NORMAL
LAB AP CLINICAL INFORMATION: NORMAL
PATH REPORT.FINAL DX SPEC: NORMAL
PATH REPORT.GROSS SPEC: NORMAL

## 2023-03-23 PROCEDURE — 25010000002 PROPOFOL 10 MG/ML EMULSION: Performed by: NURSE ANESTHETIST, CERTIFIED REGISTERED

## 2023-03-23 PROCEDURE — 81025 URINE PREGNANCY TEST: CPT | Performed by: ANESTHESIOLOGY

## 2023-03-23 PROCEDURE — 88305 TISSUE EXAM BY PATHOLOGIST: CPT | Performed by: SURGERY

## 2023-03-23 RX ORDER — SODIUM CHLORIDE 0.9 % (FLUSH) 0.9 %
3 SYRINGE (ML) INJECTION EVERY 12 HOURS SCHEDULED
Status: DISCONTINUED | OUTPATIENT
Start: 2023-03-23 | End: 2023-03-23 | Stop reason: HOSPADM

## 2023-03-23 RX ORDER — LIDOCAINE HYDROCHLORIDE 20 MG/ML
INJECTION, SOLUTION EPIDURAL; INFILTRATION; INTRACAUDAL; PERINEURAL AS NEEDED
Status: DISCONTINUED | OUTPATIENT
Start: 2023-03-23 | End: 2023-03-23 | Stop reason: SURG

## 2023-03-23 RX ORDER — PROPOFOL 10 MG/ML
VIAL (ML) INTRAVENOUS AS NEEDED
Status: DISCONTINUED | OUTPATIENT
Start: 2023-03-23 | End: 2023-03-23 | Stop reason: SURG

## 2023-03-23 RX ORDER — SODIUM CHLORIDE, SODIUM LACTATE, POTASSIUM CHLORIDE, CALCIUM CHLORIDE 600; 310; 30; 20 MG/100ML; MG/100ML; MG/100ML; MG/100ML
30 INJECTION, SOLUTION INTRAVENOUS CONTINUOUS
Status: DISCONTINUED | OUTPATIENT
Start: 2023-03-23 | End: 2023-03-23 | Stop reason: HOSPADM

## 2023-03-23 RX ORDER — SODIUM CHLORIDE 0.9 % (FLUSH) 0.9 %
10 SYRINGE (ML) INJECTION AS NEEDED
Status: DISCONTINUED | OUTPATIENT
Start: 2023-03-23 | End: 2023-03-23 | Stop reason: HOSPADM

## 2023-03-23 RX ADMIN — SODIUM CHLORIDE, POTASSIUM CHLORIDE, SODIUM LACTATE AND CALCIUM CHLORIDE: 600; 310; 30; 20 INJECTION, SOLUTION INTRAVENOUS at 07:53

## 2023-03-23 RX ADMIN — LIDOCAINE HYDROCHLORIDE 100 MG: 20 INJECTION, SOLUTION EPIDURAL; INFILTRATION; INTRACAUDAL; PERINEURAL at 07:56

## 2023-03-23 RX ADMIN — PROPOFOL 250 MCG/KG/MIN: 10 INJECTION, EMULSION INTRAVENOUS at 07:56

## 2023-03-23 RX ADMIN — PROPOFOL 80 MG: 10 INJECTION, EMULSION INTRAVENOUS at 07:56

## 2023-03-23 NOTE — H&P
General Surgery/Colorectal Surgery Note    Patient Name:  Bebe Steward  YOB: 1980  [id number]    Referring Provider: Tian Smith, *      Patient Care Team:  Kimberly Almaguer MD as PCP - General (Family Medicine)  Mabel Dean APRN as Nurse Practitioner (Nurse Practitioner)    Subjective .     History of present illness:    HPI   referral from Francie Koch/[facility] for colonoscopy consultation.  Patient denies any abdominal pain, change in bowel habit, rectal bleeding.     Admits to a strong family history of colon cancer with her paternal grandmother; paternal aunt and maternal uncle.     No previous colonoscopy.    History:  Past Medical History:   Diagnosis Date   • Abnormal Pap smear of cervix 07/21/2020   • Allergic 1990   • Allergies    • Amputation of leg (Formerly [facility])     left bka 2014   • Anemia    • Ankle sprain    • Anxiety 2014   • Arthritis    • Arthritis of back 2006   • Brain concussion 1995   • Broken bones 08/2014   • Cervical disc disorder 2012    Compressed disc   • Corns and callus    • CTS (carpal tunnel syndrome) 2022    Left wrist   • Essential hypertension 07/21/2020   • Foot pain    • Fracture of ankle 2008    Left   • Fracture of wrist 2011    Left   • Fracture, fibula    • Fracture, tibia and fibula 2008    Left   • Gallstone 2001   • Glaucoma     Elevated eye pressure   • Head injury 1996   • HPV (human papilloma virus) infection    • Hyperlipidemia    • Hypertension    • Ingrown toenail    • Insomnia 07/21/2020   • Kidney problem    • Knee sprain    • Knee swelling 2015   • Left below-knee amputee (Formerly [facility]) 07/21/2020   • Low back pain 2006   • Low back strain    • Lumbosacral disc disease 2006   • Migraine    • Muscle spasm 01/04/2021   • Neck strain    • Numbness in feet    • Obesity    • Phantom pain after amputation of lower extremity (Formerly [facility]) 02/11/2021   • Plantar wart    • Polycystic ovary syndrome    • Renal insufficiency 2020   • Right foot pain 12/02/2020   •  Right hip pain 07/21/2020   • Sexually transmitted disease (STD) 2019   • Sinus trouble    • Stage 3 chronic kidney disease (HCC) 09/01/2020   • Stress fracture    • Subluxation of patella 1996    Left   • Thoracic disc disorder    • Urogenital trichomoniasis    • Visual impairment 1988    Near sighted   • Vitamin D deficiency 12/02/2020   • Wrist sprain        Past Surgical History:   Procedure Laterality Date   • ABDOMINAL SURGERY  2010   • ANKLE OPEN REDUCTION INTERNAL FIXATION  [id number]    Left 11 surgeries total   • ANKLE SURGERY      3/08, 5/08, 8/08, 12/10, 03/11, 2012, 2013, 2014   • BACK SURGERY     • BARIATRIC SURGERY  2001   • BELOW KNEE AMPUTATION Left 2014   • CHOLECYSTECTOMY  2001   • COLONOSCOPY      Scheduled for 3/23/23   • ENDOMETRIAL ABLATION  2007   • EPIDURAL BLOCK  2007   • FRACTURE SURGERY  2008   • GALLBLADDER SURGERY  2001   • GASTRIC BYPASS  2001   • GASTROSTOMY  2001   • HYSTERECTOMY      Scheduled for 4/6/2023 (total)   • KNEE SURGERY      left 1996/1997, right 2015   • SPINAL FUSION  10/2008    L5/S1   • SPINE SURGERY  2008    lumbar spine   • TONSILLECTOMY  1994   • TRIGGER POINT INJECTION  2021       Family History   Problem Relation Age of Onset   • Stroke Mother         3 TIAs   • Other Mother         Fibromyalgia   • Kidney disease Mother         Cause of death   • Anxiety disorder Mother    • Arthritis Mother    • Depression Mother    • Early death Mother         50 yrs old kidney disease   • Hyperlipidemia Mother    • Thyroid disease Mother    • Broken bones Mother         Leg   • Hypertension Mother    • Heart disease Father    • Kidney disease Father         Currently on dialysis   • Arthritis Father    • Depression Father    • Hyperlipidemia Father    • Vision loss Father         Glaucoma, some vision loss   • Anesthesia problems Father    • Rheumatologic disease Father         Gout   • Other Father         Gout   • Hypertension Father    • Anxiety disorder Son    • Colon  cancer Paternal Aunt    • Cancer Paternal Aunt         Colon/liver   • Developmental Disability Paternal Aunt         Down Syndrome   • Anxiety disorder Maternal Grandmother    • Arthritis Maternal Grandmother    • Depression Maternal Grandmother    • Heart disease Maternal Grandmother    • Hyperlipidemia Maternal Grandmother    • Kidney disease Maternal Grandmother         Cause of death   • Liver disease Maternal Grandmother         Alcohol abuse   • Thyroid disease Maternal Grandmother    • Osteoporosis Maternal Grandmother    • Miscarriages / Stillbirths Maternal Grandmother         Miscarriage   • Broken bones Maternal Grandmother         Clavicle/shoulder   • Alcohol abuse Maternal Grandmother    • Hypertension Maternal Grandmother    • Melanoma Maternal Grandfather    • Diabetes Maternal Grandfather         Type 2   • Stroke Maternal Grandfather    • Heart disease Maternal Grandfather    • Arthritis Maternal Grandfather    • Cancer Maternal Grandfather         Melanoma   • Hyperlipidemia Maternal Grandfather    • Hypertension Maternal Grandfather    • Colon cancer Paternal Grandmother 87   • Arthritis Paternal Grandmother    • Cancer Paternal Grandmother         Colon/kidney   • Hyperlipidemia Paternal Grandmother    • Kidney disease Paternal Grandmother    • Osteoporosis Paternal Grandmother    • Miscarriages / Stillbirths Paternal Grandmother         Miscarriage   • Rheumatologic disease Paternal Grandmother         Gout   • Broken bones Paternal Grandmother         Clavicle/shoulder   • Other Paternal Grandmother         Gout   • Hypertension Paternal Grandmother    • Arthritis Paternal Grandfather    • Heart disease Paternal Grandfather    • Hyperlipidemia Paternal Grandfather    • Kidney disease Paternal Grandfather    • Hypertension Paternal Grandfather    • Ovarian cancer Neg Hx    • Uterine cancer Neg Hx    • Breast cancer Neg Hx    • Prostate cancer Neg Hx    • Malig Hyperthermia Neg Hx        Social  History     Tobacco Use   • Smoking status: Former     Packs/day: 1.00     Years: 15.00     Pack years: 15.00     Types: Cigarettes     Start date: 1997     Quit date: 2021     Years since quittin.2   • Smokeless tobacco: Never   Vaping Use   • Vaping Use: Never used   Substance Use Topics   • Alcohol use: Yes     Comment: Rarely. 1-2 times yearly   • Drug use: Never       Review of Systems: See HPI    Review of Systems            Medications Prior to Admission   Medication Sig Dispense Refill Last Dose   • acetaminophen (TYLENOL) 325 MG tablet Take 2 tablets by mouth Every 6 (Six) Hours As Needed.   Past Month   • atorvastatin (LIPITOR) 20 MG tablet TAKE ONE TABLET BY MOUTH DAILY (Patient taking differently: Take 1 tablet by mouth Daily.) 30 tablet 1 Past Week   • Daridorexant HCl (Quviviq) 50 MG tablet Take 1 tablet by mouth every night at bedtime for 30 days. 30 tablet 2 Past Week   • docusate sodium (Colace) 100 MG capsule Take 1 capsule by mouth 2 (Two) Times a Day As Needed for Constipation. 60 capsule 5 Past Week   • fluticasone (FLONASE) 50 MCG/ACT nasal spray 2 sprays into the nostril(s) as directed by provider Daily. 18.2 mL 0 Past Week   • furosemide (LASIX) 20 MG tablet Take 1 tablet by mouth Daily. (Patient taking differently: Take 1 tablet by mouth. AS NEEDED not taking) 90 tablet 1 Past Week   • gabapentin (NEURONTIN) 300 MG capsule Take 1 capsule by mouth 3 (Three) Times a Day for 30 days. 90 capsule 0 3/23/2023   • hydrALAZINE (APRESOLINE) 50 MG tablet TAKE ONE TABLET BY MOUTH THREE TIMES A DAY (Patient taking differently: Take 1 tablet by mouth 3 (Three) Times a Day.) 90 tablet 5 3/23/2023   • HYDROcodone-acetaminophen (NORCO) 7.5-325 MG per tablet Take 1 tablet by mouth Every 8 (Eight) Hours As Needed.   3/22/2023   • hydrOXYzine (ATARAX) 25 MG tablet TAKE ONE TABLET BY MOUTH EVERY 8 HOURS AS NEEDED FOR ANXIETY (Patient taking differently: Take 1 tablet by mouth As Needed.) 90 tablet 1  Past Month   • irbesartan (Avapro) 75 MG tablet Take 1 tablet by mouth Every Night for 30 days. 30 tablet 1 Past Week   • leuprolide (Lupron Depot, 3-Month,) 11.25 MG injection Inject 11.25 mg into the appropriate muscle as directed by prescriber Every 3 (Three) Months. (Patient taking differently: Inject 11.25 mg into the appropriate muscle as directed by prescriber Every 3 (Three) Months. Due in April 6th) 1 kit 0 Past Week   • loratadine (CLARITIN) 10 MG tablet Take 1 tablet by mouth Daily for 90 days. 90 tablet 3 Past Week   • methocarbamol (ROBAXIN) 500 MG tablet TAKE ONE TABLET BY MOUTH TWICE A DAY AS NEEDED FOR MUSCLE SPASMS (Patient taking differently: Take 1 tablet by mouth every night at bedtime.) 180 tablet 0 Past Week   • ondansetron ODT (ZOFRAN-ODT) 4 MG disintegrating tablet Place 1 tablet on the tongue Every 8 (Eight) Hours As Needed for Nausea or Vomiting for up to 10 days. 30 tablet 3 Past Month   • rizatriptan (MAXALT) 10 MG tablet TAKE ONE TABLET BY MOUTH AT ONSET OF HEADACHE; MAY REPEAT ONE TABLET IN 2 HOURS IF NEEDED. (Patient taking differently: Take 1 tablet by mouth 1 (One) Time As Needed.) 9 tablet 5 Past Week   • sertraline (ZOLOFT) 50 MG tablet TAKE ONE TABLET BY MOUTH DAILY (Patient taking differently: Take 1 tablet by mouth Every Night.) 30 tablet 1 Past Week         Home Meds:      Prior to Admission medications    Medication Sig Start Date End Date Taking? Authorizing Provider   acetaminophen (TYLENOL) 325 MG tablet Take 2 tablets by mouth Every 6 (Six) Hours As Needed.   Yes Provider, MD Adelina   atorvastatin (LIPITOR) 20 MG tablet TAKE ONE TABLET BY MOUTH DAILY  Patient taking differently: Take 1 tablet by mouth Daily. 1/16/23  Yes Kimberly Almaguer MD   Daridorexant HCl (Quviviq) 50 MG tablet Take 1 tablet by mouth every night at bedtime for 30 days. 2/21/23 3/23/23 Yes Kimberly Almaguer MD   docusate sodium (Colace) 100 MG capsule Take 1 capsule by mouth 2 (Two) Times a Day As  Needed for Constipation. 5/16/22  Yes Francie Koch APRN   fluticasone (FLONASE) 50 MCG/ACT nasal spray 2 sprays into the nostril(s) as directed by provider Daily. 12/2/22  Yes Shaun Arzola PA   furosemide (LASIX) 20 MG tablet Take 1 tablet by mouth Daily.  Patient taking differently: Take 1 tablet by mouth. AS NEEDED not taking 5/16/22  Yes Francie Koch APRN   gabapentin (NEURONTIN) 300 MG capsule Take 1 capsule by mouth 3 (Three) Times a Day for 30 days. 8/18/22 3/23/23 Yes Kimberly Almaguer MD   hydrALAZINE (APRESOLINE) 50 MG tablet TAKE ONE TABLET BY MOUTH THREE TIMES A DAY  Patient taking differently: Take 1 tablet by mouth 3 (Three) Times a Day. 3/17/23  Yes Kimberly Almaguer MD   HYDROcodone-acetaminophen (NORCO) 7.5-325 MG per tablet Take 1 tablet by mouth Every 8 (Eight) Hours As Needed. 1/20/23  Yes Adelina De Jesus MD   hydrOXYzine (ATARAX) 25 MG tablet TAKE ONE TABLET BY MOUTH EVERY 8 HOURS AS NEEDED FOR ANXIETY  Patient taking differently: Take 1 tablet by mouth As Needed. 2/14/23  Yes Kimberly Almaguer MD   irbesartan (Avapro) 75 MG tablet Take 1 tablet by mouth Every Night for 30 days. 2/21/23 3/23/23 Yes Kimberly Almaguer MD   leuprolide (Lupron Depot, 3-Month,) 11.25 MG injection Inject 11.25 mg into the appropriate muscle as directed by prescriber Every 3 (Three) Months.  Patient taking differently: Inject 11.25 mg into the appropriate muscle as directed by prescriber Every 3 (Three) Months. Due in April 6th 1/10/23  Yes Yoan Yoo MD   loratadine (CLARITIN) 10 MG tablet Take 1 tablet by mouth Daily for 90 days. 3/21/23 6/19/23 Yes Kimberly Almaguer MD   methocarbamol (ROBAXIN) 500 MG tablet TAKE ONE TABLET BY MOUTH TWICE A DAY AS NEEDED FOR MUSCLE SPASMS  Patient taking differently: Take 1 tablet by mouth every night at bedtime. 3/15/23  Yes Kimberly Almaguer MD   ondansetron ODT (ZOFRAN-ODT) 4 MG disintegrating tablet Place 1 tablet on the tongue Every 8  (Eight) Hours As Needed for Nausea or Vomiting for up to 10 days. 3/21/23 3/31/23 Yes Kimberly Almaguer MD   rizatriptan (MAXALT) 10 MG tablet TAKE ONE TABLET BY MOUTH AT ONSET OF HEADACHE; MAY REPEAT ONE TABLET IN 2 HOURS IF NEEDED.  Patient taking differently: Take 1 tablet by mouth 1 (One) Time As Needed. 12/12/22  Yes Kimberly Almaguer MD   sertraline (ZOLOFT) 50 MG tablet TAKE ONE TABLET BY MOUTH DAILY  Patient taking differently: Take 1 tablet by mouth Every Night. 1/16/23  Yes Kimberly Almaguer MD            Allergies:  Monosodium glutamate, Penicillins, Cyclobenzaprine, Adhesive tape, Cefaclor, Cephalexin, Erythromycin, Meperidine, Morphine, Nsaids, Sulfa antibiotics, Sulfamethoxazole-trimethoprim, and Vancomycin      Objective     Vital Signs   Temp:  [97.2 °F (36.2 °C)] 97.2 °F (36.2 °C)  Heart Rate:  [101] 101  Resp:  [18] 18  BP: (174)/(101) 174/101    Physical Exam:     Physical Exam    NAD, A/O x 4, normal circulation, normal respiration      Result Review :     Assessment & Plan     Diagnoses and all orders for this visit:    1. Family history of colon cancer  Overview:  Added automatically from request for surgery [id number]    Orders:  -     sodium chloride 0.9 % flush 3 mL  -     sodium chloride 0.9 % flush 10 mL    2. Screening for malignant neoplasm of colon  Overview:  Added automatically from request for surgery [id number]    Orders:  -     sodium chloride 0.9 % flush 3 mL  -     sodium chloride 0.9 % flush 10 mL    Other orders  -     Follow Anesthesia Guidelines / Protocol; Standing  -     Verify NPO Status; Standing  -     Obtain Informed Consent; Standing  -     Verify Bowel Prep Was Successful; Standing  -     Give Tap Water Enema If Bowel Prep Insufficient; Standing  -     Insert Peripheral IV; Standing  -     Saline Lock & Maintain IV Access; Standing  -     Follow Anesthesia Guidelines / Protocol  -     Verify NPO Status  -     Obtain Informed Consent  -     Verify Bowel Prep Was  Successful  -     Give Tap Water Enema If Bowel Prep Insufficient  -     Insert Peripheral IV  -     Saline Lock & Maintain IV Access  -     POC Glucose Once; Standing  -     lactated ringers infusion  -     Pregnancy, Urine - Urine, Clean Catch; Standing  -     POC Glucose Once  -     Pregnancy, Urine - Urine, Clean Catch           Risks including bleeding, perforation, pain, infection, missed polyp(s). Benefits, and alternatives of Colonoscopy discussed with patient.  All questions answered.  Consent verified.         Tian Smith MD  03/23/23 07:45 EDT   01-Feb-2013

## 2023-03-23 NOTE — ANESTHESIA POSTPROCEDURE EVALUATION
Patient: Bebe Steward    Procedure Summary     Date: 03/23/23 Room / Location: [facility] ENDOSCOPY 1 / [facility] ENDOSCOPY    Anesthesia Start: 0753 Anesthesia Stop: 0813    Procedure: COLONOSCOPY WITH COLD SNARE POLYPECTOMIES Diagnosis:       Screening for malignant neoplasm of colon      Family history of colon cancer      (Screening for malignant neoplasm of colon [Z12.11])      (Family history of colon cancer [Z80.0])    Surgeons: Tian Smith MD Provider: Toño Christianson MD    Anesthesia Type: general ASA Status: 3          Anesthesia Type: general    Vitals  Vitals Value Taken Time   /80 03/23/23 0820   Temp 36.4 °C (97.6 °F) 03/23/23 0815   Pulse 80 03/23/23 0820   Resp 23 03/23/23 0820   SpO2 96 % 03/23/23 0820           Post Anesthesia Care and Evaluation    Patient location during evaluation: bedside  Patient participation: complete - patient participated  Level of consciousness: awake  Pain management: adequate    Airway patency: patent  PONV Status: none  Cardiovascular status: acceptable and stable  Respiratory status: acceptable  Hydration status: acceptable    Comments: An Anesthesiologist personally participated in the most demanding procedures (including induction and emergence if applicable) in the anesthesia plan, monitored the course of anesthesia administration at frequent intervals and remained physically present and available for immediate diagnosis and treatment of emergencies.

## 2023-03-23 NOTE — ANESTHESIA PREPROCEDURE EVALUATION
Anesthesia Evaluation     Patient summary reviewed and Nursing notes reviewed   no history of anesthetic complications:  NPO Solid Status: > 8 hours  NPO Liquid Status: > 2 hours           Airway   Mallampati: III  TM distance: >3 FB  Neck ROM: full  No difficulty expected  Dental    (+) upper dentures and lower dentures    Pulmonary - negative pulmonary ROS and normal exam    breath sounds clear to auscultation  Cardiovascular - normal exam  Exercise tolerance: good (4-7 METS)    Rhythm: regular  Rate: normal    (+) hypertension, hyperlipidemia,       Neuro/Psych  (+) headaches, numbness, psychiatric history,    GI/Hepatic/Renal/Endo    (+)   renal disease CRI,     Musculoskeletal     Abdominal    Substance History - negative use     OB/GYN negative ob/gyn ROS         Other   arthritis,      ROS/Med Hx Other: PAT Nursing Notes unavailable.                 Anesthesia Plan    ASA 3     general     (Total IV Anesthesia    Patient understands anesthesia not responsible for dental damage.  )  intravenous induction     Anesthetic plan, risks, benefits, and alternatives have been provided, discussed and informed consent has been obtained with: patient.    Plan discussed with CRNA.        CODE STATUS:

## 2023-03-24 ENCOUNTER — HOSPITAL ENCOUNTER (OUTPATIENT)
Dept: ULTRASOUND IMAGING | Facility: HOSPITAL | Age: 43
Discharge: HOME | End: 2023-03-24
Payer: MEDICARE

## 2023-03-24 ENCOUNTER — HOSPITAL ENCOUNTER (OUTPATIENT)
Dept: GENERAL RADIOLOGY | Facility: HOSPITAL | Age: 43
Discharge: HOME | End: 2023-03-24
Payer: MEDICARE

## 2023-03-24 ENCOUNTER — PRE-ADMISSION TESTING (OUTPATIENT)
Dept: PREADMISSION TESTING | Facility: HOSPITAL | Age: 43
End: 2023-03-24
Payer: MEDICARE

## 2023-03-24 VITALS
HEIGHT: 65 IN | WEIGHT: 265.65 LBS | SYSTOLIC BLOOD PRESSURE: 164 MMHG | BODY MASS INDEX: 44.26 KG/M2 | HEART RATE: 71 BPM | TEMPERATURE: 98.6 F | OXYGEN SATURATION: 98 % | DIASTOLIC BLOOD PRESSURE: 102 MMHG | RESPIRATION RATE: 18 BRPM

## 2023-03-24 DIAGNOSIS — G89.29 CHRONIC PELVIC PAIN IN FEMALE: ICD-10-CM

## 2023-03-24 DIAGNOSIS — R10.2 CHRONIC PELVIC PAIN IN FEMALE: ICD-10-CM

## 2023-03-24 DIAGNOSIS — M54.12 CERVICAL RADICULOPATHY: ICD-10-CM

## 2023-03-24 LAB
ABO GROUP BLD: NORMAL
ALBUMIN SERPL-MCNC: 4.1 G/DL (ref 3.5–5.2)
ALBUMIN/GLOB SERPL: 1.3 G/DL
ALP SERPL-CCNC: 131 U/L (ref 39–117)
ALT SERPL W P-5'-P-CCNC: 12 U/L (ref 1–33)
ANION GAP SERPL CALCULATED.3IONS-SCNC: 12.1 MMOL/L (ref 5–15)
AST SERPL-CCNC: 10 U/L (ref 1–32)
BASOPHILS # BLD AUTO: 0.09 10*3/MM3 (ref 0–0.2)
BASOPHILS NFR BLD AUTO: 0.8 % (ref 0–1.5)
BILIRUB SERPL-MCNC: <0.2 MG/DL (ref 0–1.2)
BUN SERPL-MCNC: 8 MG/DL (ref 6–20)
BUN/CREAT SERPL: 6.6 (ref 7–25)
CALCIUM SPEC-SCNC: 8.9 MG/DL (ref 8.6–10.5)
CHLORIDE SERPL-SCNC: 104 MMOL/L (ref 98–107)
CO2 SERPL-SCNC: 20.9 MMOL/L (ref 22–29)
CREAT SERPL-MCNC: 1.21 MG/DL (ref 0.57–1)
DEPRECATED RDW RBC AUTO: 58.9 FL (ref 37–54)
EGFRCR SERPLBLD CKD-EPI 2021: 56.4 ML/MIN/1.73
EOSINOPHIL # BLD AUTO: 0.33 10*3/MM3 (ref 0–0.4)
EOSINOPHIL NFR BLD AUTO: 2.8 % (ref 0.3–6.2)
ERYTHROCYTE [DISTWIDTH] IN BLOOD BY AUTOMATED COUNT: 18.4 % (ref 12.3–15.4)
GLOBULIN UR ELPH-MCNC: 3.1 GM/DL
GLUCOSE SERPL-MCNC: 84 MG/DL (ref 65–99)
HCT VFR BLD AUTO: 37.8 % (ref 34–46.6)
HGB BLD-MCNC: 11.8 G/DL (ref 12–15.9)
IMM GRANULOCYTES # BLD AUTO: 0.03 10*3/MM3 (ref 0–0.05)
IMM GRANULOCYTES NFR BLD AUTO: 0.3 % (ref 0–0.5)
LYMPHOCYTES # BLD AUTO: 3.15 10*3/MM3 (ref 0.7–3.1)
LYMPHOCYTES NFR BLD AUTO: 26.8 % (ref 19.6–45.3)
MCH RBC QN AUTO: 27.3 PG (ref 26.6–33)
MCHC RBC AUTO-ENTMCNC: 31.2 G/DL (ref 31.5–35.7)
MCV RBC AUTO: 87.5 FL (ref 79–97)
MONOCYTES # BLD AUTO: 0.85 10*3/MM3 (ref 0.1–0.9)
MONOCYTES NFR BLD AUTO: 7.2 % (ref 5–12)
NEUTROPHILS NFR BLD AUTO: 62.1 % (ref 42.7–76)
NEUTROPHILS NFR BLD AUTO: 7.3 10*3/MM3 (ref 1.7–7)
NRBC BLD AUTO-RTO: 0 /100 WBC (ref 0–0.2)
PLATELET # BLD AUTO: 393 10*3/MM3 (ref 140–450)
PMV BLD AUTO: 9.4 FL (ref 6–12)
POTASSIUM SERPL-SCNC: 4 MMOL/L (ref 3.5–5.2)
PROT SERPL-MCNC: 7.2 G/DL (ref 6–8.5)
RBC # BLD AUTO: 4.32 10*6/MM3 (ref 3.77–5.28)
RH BLD: POSITIVE
SODIUM SERPL-SCNC: 137 MMOL/L (ref 136–145)
WBC NRBC COR # BLD: 11.75 10*3/MM3 (ref 3.4–10.8)

## 2023-03-24 PROCEDURE — 86901 BLOOD TYPING SEROLOGIC RH(D): CPT

## 2023-03-24 PROCEDURE — 76856 US EXAM PELVIC COMPLETE: CPT

## 2023-03-24 PROCEDURE — 72050 X-RAY EXAM NECK SPINE 4/5VWS: CPT

## 2023-03-24 PROCEDURE — 86900 BLOOD TYPING SEROLOGIC ABO: CPT

## 2023-03-24 PROCEDURE — 76830 TRANSVAGINAL US NON-OB: CPT

## 2023-03-24 PROCEDURE — 85025 COMPLETE CBC W/AUTO DIFF WBC: CPT | Performed by: OBSTETRICS & GYNECOLOGY

## 2023-03-24 PROCEDURE — 80053 COMPREHEN METABOLIC PANEL: CPT | Performed by: OBSTETRICS & GYNECOLOGY

## 2023-03-24 RX ORDER — ERGOCALCIFEROL 1.25 MG/1
50000 CAPSULE ORAL
COMMUNITY

## 2023-03-24 NOTE — DISCHARGE INSTRUCTIONS
IMPORTANT INSTRUCTIONS - PRE-ADMISSION TESTING  DO NOT EAT OR CHEW anything after midnight the night before your procedure.    You may have CLEAR liquids up to 2 hours prior to ARRIVAL time.   Take the following medications the morning of your procedure with JUST A SIP OF WATER:  Colace, Norco, Hydroxyzine, Gabapentin, Atorvastatin, Hydralazine  DO NOT BRING your medications to the hospital with you, UNLESS something has changed since your PRE-Admission Testing appointment.  Hold all vitamins, supplements, and NSAIDS (Non- steroidal anti-inflammatory meds) for one week prior to surgery (you MAY take Tylenol or Acetaminophen).  Make sure you have a ride home and have someone who will stay with you the day of your procedure after you go home.  If you have any questions, please call your Pre-Admission Testing Nurse, Ni at [phone].     You will receive a call the day before surgery with an arrival time.    Clear Liquid Diet        Find out when you need to start a clear liquid diet.   Think of “clear liquids” as anything you could read a newspaper through. This includes things like water, broth, sports drinks, or tea WITHOUT any kind of milk or cream.           Once you are told to start a clear liquid diet, only drink these things until 2 hours before arrival to the hospital or when the hospital says to stop. Total volume limitation: 8 oz.       Clear liquids you CAN drink:   Water   Clear broth: beef, chicken, vegetable, or bone broth with nothing in it   Gatorade   Lemonade or Bong-aid   Soda   Tea, coffee (NO cream or honey)   Jell-O (without fruit)   Popsicles (without fruit or cream)   Italian ices   Juice without pulp: apple, white, grape   You may use salt, pepper, and sugar    Do NOT drink:   Milk or cream   Soy milk, almond milk, coconut milk, or other non-dairy drinks and   creamers   Milkshakes or smoothies   Tomato juice   Orange juice   Grapefruit juice   Cream soups or any other than broth          Clear Liquid Diet:  Do NOT eat any solid food.  Do NOT eat or suck on mints or candy.  Do NOT chew gum.  Do NOT drink thick liquids like milk or juice with pulp in it.  Do NOT add milk, cream, or anything like soy milk or almond milk to coffee or tea.      USE SURGICAL SOAP 1 TIME THE NIGHT PRIOR TO SURGERY OR THE AM OF SURGERY.   USE SOAP FROM NECK TO TOES AVOID  FACE, HAIR, AND PRIVATE PARTS.    NO LOTIONS, DEODORANT, MAKEUP, FINGERNAIL POLISH, PIERCING'S OR JEWELRY ON  DAY OF SURGERY

## 2023-03-28 ENCOUNTER — CONSULT (OUTPATIENT)
Dept: ONCOLOGY | Facility: HOSPITAL | Age: 43
End: 2023-03-28
Payer: MEDICARE

## 2023-03-28 ENCOUNTER — OFFICE VISIT (OUTPATIENT)
Dept: FAMILY MEDICINE CLINIC | Facility: CLINIC | Age: 43
End: 2023-03-28
Payer: MEDICARE

## 2023-03-28 ENCOUNTER — TELEPHONE (OUTPATIENT)
Dept: FAMILY MEDICINE CLINIC | Facility: CLINIC | Age: 43
End: 2023-03-28

## 2023-03-28 ENCOUNTER — LAB (OUTPATIENT)
Dept: ONCOLOGY | Facility: HOSPITAL | Age: 43
End: 2023-03-28
Payer: MEDICARE

## 2023-03-28 VITALS
DIASTOLIC BLOOD PRESSURE: 88 MMHG | HEART RATE: 71 BPM | SYSTOLIC BLOOD PRESSURE: 148 MMHG | OXYGEN SATURATION: 100 % | HEIGHT: 65 IN | BODY MASS INDEX: 43.15 KG/M2 | WEIGHT: 259 LBS | TEMPERATURE: 98 F | RESPIRATION RATE: 14 BRPM

## 2023-03-28 VITALS
OXYGEN SATURATION: 98 % | SYSTOLIC BLOOD PRESSURE: 160 MMHG | RESPIRATION RATE: 16 BRPM | BODY MASS INDEX: 41.82 KG/M2 | HEART RATE: 83 BPM | WEIGHT: 251.32 LBS | DIASTOLIC BLOOD PRESSURE: 88 MMHG | TEMPERATURE: 98.2 F

## 2023-03-28 DIAGNOSIS — Z12.2 SCREENING FOR LUNG CANCER: ICD-10-CM

## 2023-03-28 DIAGNOSIS — D50.9 IRON DEFICIENCY ANEMIA, UNSPECIFIED IRON DEFICIENCY ANEMIA TYPE: Primary | ICD-10-CM

## 2023-03-28 DIAGNOSIS — D50.9 IRON DEFICIENCY ANEMIA, UNSPECIFIED IRON DEFICIENCY ANEMIA TYPE: ICD-10-CM

## 2023-03-28 DIAGNOSIS — L03.116 CELLULITIS OF LEFT LOWER EXTREMITY: Primary | ICD-10-CM

## 2023-03-28 LAB
FERRITIN SERPL-MCNC: 29.7 NG/ML (ref 13–150)
FOLATE SERPL-MCNC: 6.32 NG/ML (ref 4.78–24.2)
IRON 24H UR-MRATE: 24 MCG/DL (ref 37–145)
IRON SATN MFR SERPL: 5 % (ref 20–50)
TIBC SERPL-MCNC: 466 MCG/DL (ref 298–536)
TRANSFERRIN SERPL-MCNC: 313 MG/DL (ref 200–360)
VIT B12 BLD-MCNC: 301 PG/ML (ref 211–946)

## 2023-03-28 PROCEDURE — 3079F DIAST BP 80-89 MM HG: CPT | Performed by: FAMILY MEDICINE

## 2023-03-28 PROCEDURE — 99214 OFFICE O/P EST MOD 30 MIN: CPT | Performed by: INTERNAL MEDICINE

## 2023-03-28 PROCEDURE — 82746 ASSAY OF FOLIC ACID SERUM: CPT

## 2023-03-28 PROCEDURE — G0463 HOSPITAL OUTPT CLINIC VISIT: HCPCS | Performed by: INTERNAL MEDICINE

## 2023-03-28 PROCEDURE — 82607 VITAMIN B-12: CPT

## 2023-03-28 PROCEDURE — 83540 ASSAY OF IRON: CPT

## 2023-03-28 PROCEDURE — 82728 ASSAY OF FERRITIN: CPT

## 2023-03-28 PROCEDURE — 36415 COLL VENOUS BLD VENIPUNCTURE: CPT

## 2023-03-28 PROCEDURE — 1125F AMNT PAIN NOTED PAIN PRSNT: CPT | Performed by: INTERNAL MEDICINE

## 2023-03-28 PROCEDURE — 84466 ASSAY OF TRANSFERRIN: CPT

## 2023-03-28 PROCEDURE — 3077F SYST BP >= 140 MM HG: CPT | Performed by: INTERNAL MEDICINE

## 2023-03-28 PROCEDURE — 3079F DIAST BP 80-89 MM HG: CPT | Performed by: INTERNAL MEDICINE

## 2023-03-28 PROCEDURE — 99213 OFFICE O/P EST LOW 20 MIN: CPT | Performed by: FAMILY MEDICINE

## 2023-03-28 PROCEDURE — 3077F SYST BP >= 140 MM HG: CPT | Performed by: FAMILY MEDICINE

## 2023-03-28 RX ORDER — CLINDAMYCIN HYDROCHLORIDE 150 MG/1
150 CAPSULE ORAL 4 TIMES DAILY
Qty: 28 CAPSULE | Refills: 0 | Status: SHIPPED | OUTPATIENT
Start: 2023-03-28 | End: 2023-04-04

## 2023-03-28 NOTE — TELEPHONE ENCOUNTER
Called the [facility] per Dr. Almaguer's request.  Patient seen in the office today for a pressure wound due to her prosthetic needing to be adjusted.  Spoke with Sandy at the [facility] and she scheduled an apt for the patient on 3/29/23 at 1pm.  Information provided to patient and she will be at the apt.

## 2023-03-28 NOTE — PROGRESS NOTES
Chief Complaint  Chief Complaint   Patient presents with   • Wound Infection     Left leg , prostetic  rubs leg knee        HPI:  Bebe Steward presents to [facility]     Pt presents today for acute issue of pressure ulcer that has formed on the knee of her left leg due to her prosthesis rubbing.  The area is erythematous and tender to touch.  Pt reports there was a little yellowish drainage on her bandage earlier but I was not able to express any in the office today for a culture.  I will be placing her on antibiotics and we have called the [facility] to inform them of her injury and they have worked her in for an appointment in the morning.     Procedures     Past History:  Medical History: has a past medical history of Abnormal Pap smear of cervix (07/21/2020), Allergies, Anemia, Ankle sprain, Anxiety (2014), Arthritis, Arthritis of back (2006), Brain concussion (1995), Broken bones (08/2014), Cervical disc disorder (2012), Colon polyp (03/23/2023), CTS (carpal tunnel syndrome) (2022), Essential hypertension (07/21/2020), Fracture of ankle (2008), Fracture of wrist (2011), Fracture, fibula, Fracture, tibia and fibula (2008), H/O Polycystic ovary syndrome, Head injury (1996), HPV (human papilloma virus) infection, Hyperlipidemia, Hypoglycemia, Insomnia (07/21/2020), Knee sprain, Knee swelling (2015), Left below-knee amputee (HCC) (07/21/2020), Low back pain (2006), Lumbosacral disc disease (2006), Migraine, Muscle spasm (01/04/2021), Neck strain, Numbness in right foot, Obesity, 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   Surgical History: has a past surgical history that includes Abdominal surgery (2010); Leg amputation, lower tibia/fibula (Left, 2014); Ankle surgery; Endometrial ablation (2007); Gastric bypass (2001); Knee surgery; Spine surgery (2008); Tonsillectomy (1994); Spinal fusion (10/2008); Epidural block injection (2007); Trigger point injection (2021); Ankle fracture surgery (1130-7175); Cholecystectomy (2001); Fracture surgery (2008); Hysterectomy; and Colonoscopy (N/A, 03/23/2023).   Family History: family history includes Alcohol abuse in her maternal grandmother; Anesthesia problems in her father; Anxiety disorder in her maternal grandmother, mother, and son; Arthritis in her father, maternal grandfather, maternal grandmother, mother, paternal grandfather, and paternal grandmother; Broken bones in her maternal grandmother, mother, and paternal grandmother; Cancer in her maternal grandfather, paternal aunt, and paternal grandmother; Colon cancer in her paternal aunt; Colon cancer (age of onset: 87) in her paternal grandmother; Depression in her father, maternal grandmother, and mother; Developmental Disability in her paternal aunt; Diabetes in her maternal grandfather; Early death in her mother; Heart disease in her father, maternal grandfather, maternal grandmother, and paternal grandfather; Hyperlipidemia in her father, maternal grandfather, maternal grandmother, mother, paternal grandfather, and paternal grandmother; Hypertension in her father, maternal grandfather, maternal grandmother, mother, paternal grandfather, and paternal grandmother; Kidney disease in her father, maternal grandmother, mother, paternal grandfather, and paternal grandmother; Liver disease in her maternal grandmother; Melanoma in her maternal grandfather; Miscarriages / Stillbirths in her maternal grandmother and paternal grandmother; Osteoporosis in her maternal grandmother and paternal grandmother; Other in her father, mother, and  paternal grandmother; Rheumatologic disease in her father and paternal grandmother; Stroke in her maternal grandfather and mother; Thyroid disease in her maternal grandmother and mother; Vision loss in her father.   Social History: reports that she quit smoking about 2 years ago. Her smoking use included cigarettes. She started smoking about 26 years ago. She has a 15.00 pack-year smoking history. She has been exposed to tobacco smoke. She has never used smokeless tobacco. She reports current alcohol use. She reports that she does not use drugs.  Immunization History   Administered Date(s) Administered   • COVID-19 (MODERNA) 1st, 2nd, 3rd Dose Only 12/11/2021, 12/13/2021   • COVID-19 (MODERNA) BOOSTER 11/11/2021, 12/09/2021   • FluLaval/Fluzone >6mos 10/05/2021, 12/16/2022   • Influenza, Unspecified 12/02/2020, 12/16/2022, 12/16/2022         Allergies: Adhesive tape, Cefaclor, Cephalexin, Cyclobenzaprine, Erythromycin, Meperidine, Monosodium glutamate, Morphine, Nsaids, Penicillins, Sulfa antibiotics, Sulfamethoxazole-trimethoprim, and Vancomycin     Medications:  Current Outpatient Medications on File Prior to Visit   Medication Sig Dispense Refill   • acetaminophen (TYLENOL) 325 MG tablet Take 2 tablets by mouth Every 6 (Six) Hours As Needed.     • atorvastatin (LIPITOR) 20 MG tablet TAKE ONE TABLET BY MOUTH DAILY (Patient taking differently: Take 1 tablet by mouth Daily.) 30 tablet 1   • docusate sodium (Colace) 100 MG capsule Take 1 capsule by mouth 2 (Two) Times a Day As Needed for Constipation. 60 capsule 5   • fluticasone (FLONASE) 50 MCG/ACT nasal spray 2 sprays into the nostril(s) as directed by provider Daily. 18.2 mL 0   • furosemide (LASIX) 20 MG tablet Take 1 tablet by mouth Daily. (Patient taking differently: Take 1 tablet by mouth. AS NEEDED not taking) 90 tablet 1   • gabapentin (NEURONTIN) 300 MG capsule Take 1 capsule by mouth 3 (Three) Times a Day for 30 days. 90 capsule 0   • hydrALAZINE  (APRESOLINE) 50 MG tablet TAKE ONE TABLET BY MOUTH THREE TIMES A DAY (Patient taking differently: Take 1 tablet by mouth 3 (Three) Times a Day.) 90 tablet 5   • HYDROcodone-acetaminophen (NORCO) 7.5-325 MG per tablet Take 1 tablet by mouth Every 8 (Eight) Hours As Needed.     • hydrOXYzine (ATARAX) 25 MG tablet TAKE ONE TABLET BY MOUTH EVERY 8 HOURS AS NEEDED FOR ANXIETY (Patient taking differently: Take 1 tablet by mouth As Needed.) 90 tablet 1   • irbesartan (Avapro) 75 MG tablet Take 1 tablet by mouth Every Night for 30 days. 30 tablet 1   • loratadine (CLARITIN) 10 MG tablet Take 1 tablet by mouth Daily for 90 days. (Patient taking differently: Take 1 tablet by mouth Every Night.) 90 tablet 3   • methocarbamol (ROBAXIN) 500 MG tablet TAKE ONE TABLET BY MOUTH TWICE A DAY AS NEEDED FOR MUSCLE SPASMS (Patient taking differently: Take 1 tablet by mouth every night at bedtime.) 180 tablet 0   • ondansetron ODT (ZOFRAN-ODT) 4 MG disintegrating tablet Place 1 tablet on the tongue Every 8 (Eight) Hours As Needed for Nausea or Vomiting for up to 10 days. 30 tablet 3   • rizatriptan (MAXALT) 10 MG tablet TAKE ONE TABLET BY MOUTH AT ONSET OF HEADACHE; MAY REPEAT ONE TABLET IN 2 HOURS IF NEEDED. (Patient taking differently: Take 1 tablet by mouth 1 (One) Time As Needed.) 9 tablet 5   • sertraline (ZOLOFT) 50 MG tablet TAKE ONE TABLET BY MOUTH DAILY (Patient taking differently: Take 1 tablet by mouth Every Night.) 30 tablet 1   • vitamin D (ERGOCALCIFEROL) 1.25 MG (97346 UT) capsule capsule Take 1 capsule by mouth 1 (One) Time Per Week.       No current facility-administered medications on file prior to visit.        Health Maintenance Due   Topic Date Due   • TDAP/TD VACCINES (1 - Tdap) Never done   • LIPID PANEL  02/10/2023       Vital Signs:   Vitals:    03/28/23 0921   BP: 148/88   BP Location: Left arm   Patient Position: Sitting   Pulse: 71   Resp: 14   Temp: 98 °F (36.7 °C)   SpO2: 100%   Weight: 117 kg (259 lb)  "  Height: 165.1 cm (65\")      Body mass index is 43.1 kg/m².     ROS:  Review of Systems   Constitutional: Negative for fatigue and fever.   HENT: Negative for congestion, ear pain and sinus pressure.    Respiratory: Negative for cough, chest tightness and shortness of breath.    Cardiovascular: Negative for chest pain, palpitations and leg swelling.   Gastrointestinal: Negative for abdominal pain and diarrhea.   Genitourinary: Negative for dysuria and frequency.   Neurological: Negative for speech difficulty, headache and confusion.   Psychiatric/Behavioral: Negative for agitation and behavioral problems.          Physical Exam  Vitals reviewed.   Constitutional:       Appearance: Normal appearance.   HENT:      Right Ear: Tympanic membrane normal.      Left Ear: Tympanic membrane normal.      Nose: Nose normal.   Eyes:      Extraocular Movements: Extraocular movements intact.      Conjunctiva/sclera: Conjunctivae normal.      Pupils: Pupils are equal, round, and reactive to light.   Cardiovascular:      Rate and Rhythm: Normal rate and regular rhythm.   Pulmonary:      Effort: Pulmonary effort is normal.      Breath sounds: Normal breath sounds.   Abdominal:      General: Bowel sounds are normal.   Musculoskeletal:         General: Normal range of motion.      Cervical back: Normal range of motion.   Skin:     General: Skin is warm and dry.   Neurological:      General: No focal deficit present.      Mental Status: She is alert and oriented to person, place, and time.   Psychiatric:         Mood and Affect: Mood normal.         Behavior: Behavior normal.          Result Review   WOUND IMAGE - Left knee (03/28/2023)       Diagnoses and all orders for this visit:    1. Cellulitis of left lower extremity (Primary)  -     clindamycin (CLEOCIN) 150 MG capsule; Take 1 capsule by mouth 4 (Four) Times a Day for 7 days.  Dispense: 28 capsule; Refill: 0          Smoking Cessation:    Bebe Steward  reports that she quit " smoking about 2 years ago. Her smoking use included cigarettes. She started smoking about 26 years ago. She has a 15.00 pack-year smoking history. She has been exposed to tobacco smoke. She has never used smokeless tobacco.          Follow Up   Return if symptoms worsen or fail to improve.  Patient was given instructions and counseling regarding her condition or for health maintenance advice. Please see specific information pulled into the AVS if appropriate.       Kimberly Almaguer MD

## 2023-03-28 NOTE — PROGRESS NOTES
Chief Complaint/Care Team   Kimberly Cárdenas MD Coffie, Ramona N, MD    History of Present Illness     Diagnosis: Iron Deficiency Anemia secondary to gastric bypass surgery    History of B12 deficiency currently on oral B12 tablets  Pt had c-scope in 3/2023, had couple polyps removed    Current Treatment: IV iron infusions as needed, as well as oral iron tablet.  Previous Treatment: Oral iron as well as IV iron infusions.    Bebe Steward is a 43 y.o. female who presents to [facility] HEMATOLOGY [facility] ONCOLOGY for discussion of treatment for iron-deficiency anemia secondary to gastric bypass surgery.    Patient reports receiving IV iron in the past for iron deficiency anemia which resulted from gastric bypass surgery.  She reports she was initially placed on oral iron which did not improve her iron levels resulting in her being placed on IV iron infusions.  She also reports taking sublingual B12, but denies anyone has checked her B12 levels in a while.  She reports receiving IV iron fusion in the past is not sure of the type of IV iron she received at that time.  She only reports experiencing migraine headaches after IV iron infusion.    Family history: Patient reports paternal grandmother with anemia, mother with pernicious anemia as well as both of her parents have an anemia.  She also reports several family members with CKD.      Patient has past medical history significant for chronic kidney disease.  She also had a below the knee amputation secondary to a ankle fracture that required 11 surgeries and failed to be healed.    She reports current ulcer at the area of where her prosthesis does rub against her skin.  She was prescribed an antibiotic, but has not picked up the medication yet.    Patient with a hysterectomy scheduled for next week and reports she has been prescribed medications to induce menopause as result of her heavy menstrual periods.    Patient last colonoscopy was in  March 23, 2023, which revealed 2 polyps which were removed.    Review of Systems   Constitutional: Positive for fatigue. Negative for appetite change, diaphoresis, fever, unexpected weight gain and unexpected weight loss.   HENT: Negative for hearing loss, sore throat and voice change.    Eyes: Negative for blurred vision, double vision, pain, redness and visual disturbance.   Respiratory: Negative for cough, shortness of breath and wheezing.    Cardiovascular: Negative for chest pain, palpitations and leg swelling.   Endocrine: Negative for cold intolerance, heat intolerance, polydipsia and polyuria.   Genitourinary: Negative for decreased urine volume, difficulty urinating, frequency and urinary incontinence.   Musculoskeletal: Negative for arthralgias, back pain, joint swelling and myalgias.   Skin: Negative for color change, rash, skin lesions and wound.   Neurological: Negative for dizziness, seizures, numbness and headache.   Hematological: Negative for adenopathy. Does not bruise/bleed easily.   Psychiatric/Behavioral: Negative for depressed mood. The patient is not nervous/anxious.         Oncology/Hematology History    No history exists.       Objective     Vitals:    03/28/23 1121   BP: 160/88   Pulse: 83   Resp: 16   Temp: 98.2 °F (36.8 °C)   TempSrc: Temporal   SpO2: 98%   Weight: 114 kg (251 lb 5.2 oz)  Comment: took 7 lbs off for prosthesis   PainSc:   5   PainLoc: Generalized     ECOG score: 1         PHQ-9 Total Score:         Physical Exam  Exam conducted with a chaperone present.   Constitutional:       General: She is not in acute distress.     Appearance: Normal appearance.      Comments: Patient with left below the knee amputation with leg prosthesis in place.   HENT:      Head: Normocephalic and atraumatic.   Eyes:      Extraocular Movements: Extraocular movements intact.      Conjunctiva/sclera: Conjunctivae normal.   Cardiovascular:      Pulses: Normal pulses.      Heart sounds: Normal heart  sounds.   Pulmonary:      Effort: Pulmonary effort is normal.      Breath sounds: Normal breath sounds. No rhonchi or rales.   Abdominal:      General: Bowel sounds are normal. There is no distension.      Palpations: Abdomen is soft. There is no mass.      Tenderness: There is no abdominal tenderness.   Musculoskeletal:      Cervical back: Normal range of motion and neck supple.   Skin:     General: Skin is warm and dry.   Neurological:      Mental Status: She is oriented to person, place, and time.           Past Medical History     Past Medical History:   Diagnosis Date   • Abnormal Pap smear of cervix 07/21/2020   • Allergies    • Anemia    • Ankle sprain     multiple   • Anxiety 2014   • Arthritis     knee   • Arthritis of back 2006   • Brain concussion 1995   • Broken bones 08/2014   • Cervical disc disorder 2012    Compressed disc   • Colon polyp 03/23/2023    removed   • CTS (carpal tunnel syndrome) 2022    Left wrist   • Essential hypertension 07/21/2020   • Fracture of ankle 2008    Left   • Fracture of wrist 2011    Left   • Fracture, fibula    • Fracture, tibia and fibula 2008    Left   • H/O Polycystic ovary syndrome    • Head injury 1996   • HPV (human papilloma virus) infection     last 2 pap clear   • Hyperlipidemia    • Hypoglycemia    • Insomnia 07/21/2020   • Knee sprain    • Knee swelling 2015   • Left below-knee amputee (HCC) 07/21/2020   • Low back pain 2006   • Lumbosacral disc disease 2006   • Migraine    • Muscle spasm 01/04/2021   • Neck strain    • Numbness in right foot    • Obesity    • Phantom pain after amputation of lower extremity ([facility]) 02/11/2021   • Renal insufficiency 2020    right kidney atrophy   • Right foot pain 12/02/2020   • Right hip pain 07/21/2020   • Sinus trouble    • Stage 3 chronic kidney disease (HCC) 09/01/2020    currently stage 2 3-24-23   • Stress fracture    • Subluxation of patella 1996    Left   • Thoracic disc disorder    • Urogenital trichomoniasis      resolved   • Visual impairment 1988    Near sighted   • Vitamin D deficiency 12/02/2020   • Wrist sprain      Current Outpatient Medications on File Prior to Visit   Medication Sig Dispense Refill   • acetaminophen (TYLENOL) 325 MG tablet Take 2 tablets by mouth Every 6 (Six) Hours As Needed.     • atorvastatin (LIPITOR) 20 MG tablet TAKE ONE TABLET BY MOUTH DAILY (Patient taking differently: Take 1 tablet by mouth Daily.) 30 tablet 1   • docusate sodium (Colace) 100 MG capsule Take 1 capsule by mouth 2 (Two) Times a Day As Needed for Constipation. 60 capsule 5   • fluticasone (FLONASE) 50 MCG/ACT nasal spray 2 sprays into the nostril(s) as directed by provider Daily. 18.2 mL 0   • furosemide (LASIX) 20 MG tablet Take 1 tablet by mouth Daily. (Patient taking differently: Take 1 tablet by mouth. AS NEEDED not taking) 90 tablet 1   • gabapentin (NEURONTIN) 300 MG capsule Take 1 capsule by mouth 3 (Three) Times a Day for 30 days. 90 capsule 0   • hydrALAZINE (APRESOLINE) 50 MG tablet TAKE ONE TABLET BY MOUTH THREE TIMES A DAY (Patient taking differently: Take 1 tablet by mouth 3 (Three) Times a Day.) 90 tablet 5   • HYDROcodone-acetaminophen (NORCO) 7.5-325 MG per tablet Take 1 tablet by mouth Every 8 (Eight) Hours As Needed.     • hydrOXYzine (ATARAX) 25 MG tablet TAKE ONE TABLET BY MOUTH EVERY 8 HOURS AS NEEDED FOR ANXIETY (Patient taking differently: Take 1 tablet by mouth As Needed.) 90 tablet 1   • irbesartan (Avapro) 75 MG tablet Take 1 tablet by mouth Every Night for 30 days. 30 tablet 1   • loratadine (CLARITIN) 10 MG tablet Take 1 tablet by mouth Daily for 90 days. (Patient taking differently: Take 1 tablet by mouth Every Night.) 90 tablet 3   • methocarbamol (ROBAXIN) 500 MG tablet TAKE ONE TABLET BY MOUTH TWICE A DAY AS NEEDED FOR MUSCLE SPASMS (Patient taking differently: Take 1 tablet by mouth every night at bedtime.) 180 tablet 0   • ondansetron ODT (ZOFRAN-ODT) 4 MG disintegrating tablet Place 1 tablet  on the tongue Every 8 (Eight) Hours As Needed for Nausea or Vomiting for up to 10 days. 30 tablet 3   • rizatriptan (MAXALT) 10 MG tablet TAKE ONE TABLET BY MOUTH AT ONSET OF HEADACHE; MAY REPEAT ONE TABLET IN 2 HOURS IF NEEDED. (Patient taking differently: Take 1 tablet by mouth 1 (One) Time As Needed.) 9 tablet 5   • sertraline (ZOLOFT) 50 MG tablet TAKE ONE TABLET BY MOUTH DAILY (Patient taking differently: Take 1 tablet by mouth Every Night.) 30 tablet 1   • vitamin D (ERGOCALCIFEROL) 1.25 MG (69191 UT) capsule capsule Take 1 capsule by mouth 1 (One) Time Per Week.       No current facility-administered medications on file prior to visit.      Allergies   Allergen Reactions   • Adhesive Tape Itching and Rash   • Cefaclor Rash   • Cephalexin Rash   • Cyclobenzaprine Other (See Comments)     Muscle spasms   • Erythromycin Rash   • Meperidine Headache   • Monosodium Glutamate Other (See Comments)     Blood pressure drops, pass out    • Morphine Irritability   • Nsaids Unknown - High Severity     CKD    • Penicillins Anaphylaxis   • Sulfa Antibiotics Rash   • Sulfamethoxazole-Trimethoprim Rash   • Vancomycin Itching     Past Surgical History:   Procedure Laterality Date   • ABDOMINAL SURGERY  2010    scar tissue removed   • ANKLE OPEN REDUCTION INTERNAL FIXATION  [id number]    Left 11 surgeries total, external fixation   • ANKLE SURGERY      3/08, 5/08, 8/08, 12/10, 03/11, 2012, 2013, 2014   • BELOW KNEE AMPUTATION Left 2014   • CHOLECYSTECTOMY  2001   • COLONOSCOPY N/A 03/23/2023    Procedure: COLONOSCOPY WITH COLD SNARE POLYPECTOMIES;  Surgeon: Tian Smith MD;  Location: [facility] ENDOSCOPY;  Service: General;  Laterality: N/A;  COLON POLYPS   • ENDOMETRIAL ABLATION  2007   • EPIDURAL BLOCK  2007   • FRACTURE SURGERY  2008    left ankle   • GASTRIC BYPASS  2001   • HYSTERECTOMY      Scheduled for 4/6/2023 (total)   • KNEE SURGERY      left 1996,1997 right 2015   • SPINAL FUSION  10/2008    L5/S1   • SPINE  SURGERY      lumbar spine   • TONSILLECTOMY     • TRIGGER POINT INJECTION       Social History     Socioeconomic History   • Marital status:    • Number of children: 3   Tobacco Use   • Smoking status: Former     Packs/day: 1.00     Years: 15.00     Pack years: 15.00     Types: Cigarettes     Start date: 1997     Quit date: 2021     Years since quittin.2     Passive exposure: Current   • Smokeless tobacco: Never   Vaping Use   • Vaping Use: Never used   Substance and Sexual Activity   • Alcohol use: Yes     Comment: Rarely. 1-2 times yearly   • Drug use: Never   • Sexual activity: Not Currently     Partners: Male     Birth control/protection: Abstinence     Comment: Chemical menopause. Hysterectomy scheduled for 21     Family History   Problem Relation Age of Onset   • Stroke Mother         3 TIAs   • Other Mother         Fibromyalgia   • Kidney disease Mother         Cause of death   • Anxiety disorder Mother    • Arthritis Mother    • Depression Mother    • Early death Mother         50 yrs old kidney disease   • Hyperlipidemia Mother    • Thyroid disease Mother    • Broken bones Mother         Leg   • Hypertension Mother    • Heart disease Father    • Kidney disease Father         Currently on dialysis   • Arthritis Father    • Depression Father    • Hyperlipidemia Father    • Vision loss Father         Glaucoma, some vision loss   • Anesthesia problems Father    • Rheumatologic disease Father         Gout   • Other Father         Gout   • Hypertension Father    • Anxiety disorder Son    • Colon cancer Paternal Aunt    • Cancer Paternal Aunt         Colon/liver   • Developmental Disability Paternal Aunt         Down Syndrome   • Anxiety disorder Maternal Grandmother    • Arthritis Maternal Grandmother    • Depression Maternal Grandmother    • Heart disease Maternal Grandmother    • Hyperlipidemia Maternal Grandmother    • Kidney disease Maternal Grandmother         Cause of  death   • Liver disease Maternal Grandmother         Alcohol abuse   • Thyroid disease Maternal Grandmother    • Osteoporosis Maternal Grandmother    • Miscarriages / Stillbirths Maternal Grandmother         Miscarriage   • Broken bones Maternal Grandmother         Clavicle/shoulder   • Alcohol abuse Maternal Grandmother    • Hypertension Maternal Grandmother    • Melanoma Maternal Grandfather    • Diabetes Maternal Grandfather         Type 2   • Stroke Maternal Grandfather    • Heart disease Maternal Grandfather    • Arthritis Maternal Grandfather    • Cancer Maternal Grandfather         Melanoma   • Hyperlipidemia Maternal Grandfather    • Hypertension Maternal Grandfather    • Colon cancer Paternal Grandmother 87   • Arthritis Paternal Grandmother    • Cancer Paternal Grandmother         Colon/kidney   • Hyperlipidemia Paternal Grandmother    • Kidney disease Paternal Grandmother    • Osteoporosis Paternal Grandmother    • Miscarriages / Stillbirths Paternal Grandmother         Miscarriage   • Rheumatologic disease Paternal Grandmother         Gout   • Broken bones Paternal Grandmother         Clavicle/shoulder   • Other Paternal Grandmother         Gout   • Hypertension Paternal Grandmother    • Arthritis Paternal Grandfather    • Heart disease Paternal Grandfather    • Hyperlipidemia Paternal Grandfather    • Kidney disease Paternal Grandfather    • Hypertension Paternal Grandfather    • Ovarian cancer Neg Hx    • Uterine cancer Neg Hx    • Breast cancer Neg Hx    • Prostate cancer Neg Hx    • Malig Hyperthermia Neg Hx        Results     Result Review   The following data was reviewed by: Chavo George MD on 03/28/2023:  Lab Results   Component Value Date    HGB 11.8 (L) 03/24/2023    HCT 37.8 03/24/2023    MCV 87.5 03/24/2023     03/24/2023    WBC 11.75 (H) 03/24/2023    NEUTROABS 7.30 (H) 03/24/2023    LYMPHSABS 3.15 (H) 03/24/2023    MONOSABS 0.85 03/24/2023    EOSABS 0.33 03/24/2023    BASOSABS 0.09  03/24/2023     Lab Results   Component Value Date    GLUCOSE 84 03/24/2023    BUN 8 03/24/2023    CREATININE 1.21 (H) 03/24/2023     03/24/2023    K 4.0 03/24/2023     03/24/2023    CO2 20.9 (L) 03/24/2023    CALCIUM 8.9 03/24/2023    PROTEINTOT 7.2 03/24/2023    ALBUMIN 4.1 03/24/2023    BILITOT <0.2 03/24/2023    ALKPHOS 131 (H) 03/24/2023    AST 10 03/24/2023    ALT 12 03/24/2023     Lab Results   Component Value Date    PHOS 2.6 10/10/2022    FREET4 1.02 08/11/2021    TSH 1.880 08/11/2021           No radiology results for the last day  XR Spine Cervical Complete 4 or 5 View    Result Date: 3/24/2023  Impression:  Normal examination for a patient of this age.      GRAEME ALVAREZ MD       Electronically Signed and Approved By: GRAEME ALVAREZ MD on 3/24/2023 at 15:44             US Pelvis Transvaginal Non OB    Result Date: 3/24/2023  Impression:   1. Suspected sub cm intramural uterine fibroid. 2. Normal bilateral ovaries and endometrium.     BRIAN MILLER MD       Electronically Signed and Approved By: BRIAN MILLER MD on 3/24/2023 at 19:52               Assessment & Plan     Diagnoses and all orders for this visit:    1. Iron deficiency anemia, unspecified iron deficiency anemia type (Primary)  -     Ferritin; Future  -     Iron Profile; Future  -     Vitamin B12; Future  -     Folate; Future  -     CBC & Differential; Future  -     Comprehensive Metabolic Panel; Future  -     Ferritin; Future  -     Iron Profile; Future    2. Screening for lung cancer  -     Cancel: CT Chest Low Dose Follow Up With Contrast; Future  -     Cancel: CT Chest Low Dose Follow Up With Contrast; Future        Bebe Steward is a 43 y.o. female who presents to [facility] HEMATOLOGY [facility] ONCOLOGY for evaluation for iron-deficiency anemia secondary to gastric bypass surgery.      Iron-deficiency anemia  - Most recent CBC on 3/24/2023 revealed: Elevated white blood count 11.7, hemoglobin 11.8, platelet  count 393K, with increase in neutrophil count.  -Patient with an ulcer on her leg from her prosthesis patient prescribed antibiotics by her other healthcare provider and suspect her leukocytosis secondary to this infection.  -Most recent ferritin was 29.7, most recent iron profile revealed a low iron saturation of 5%, and low iron at 24,  -We will arrange patient to receive IV iron infusion    Given history of gastric bypass surgery, we will recheck B12 and folate levels.    Plan patient follow-up in 3 months with CBC, CMP, iron profile, ferritin    Patient will be notified to schedule IV iron infusion.    Please note that portions of this note were completed with a voice recognition program.    Electronically signed by Chavo George MD, 03/28/23, 4:51 PM EDT.          Follow Up     I spent 30 minutes caring for Bebe on this date of service. This time includes time spent by me in the following activities:preparing for the visit, reviewing tests, obtaining and/or reviewing a separately obtained history, performing a medically appropriate examination and/or evaluation , counseling and educating the patient/family/caregiver, ordering medications, tests, or procedures, documenting information in the medical record and care coordination.    This is an acute or chronic illness that poses a threat to life or bodily function. The above treatment plan involves a high risk of complications and/or mortality of patient management.    The patient was seen and examined. Work by the provider also included review and/or ordering of lab tests, review and/or ordering of radiology tests, review and/or ordering of medicine tests, discussion with other physicians or providers, independent review of data, obtaining old records, review/summation of old records, and/or other review.    I have reviewed the family history, social history, and past medical history for this patient. Previous information and data has been reviewed and updated  as needed. I have reviewed and verified the chief complaint, history, and other documentation. The patient was interviewed and examined in the clinic and the chart reviewed. The previous observations, recommendations, and conclusions were reviewed including those of other providers.     The plan was discussed with the patient and/or family. The patient was given time to ask questions and these questions were answered. At the conclusion of their visit they had no additional questions or concerns and all questions were answered to their satisfaction.    Patient was given instructions and counseling regarding her condition or for health maintenance advice. Please see specific information pulled into the AVS if appropriate.

## 2023-03-29 ENCOUNTER — TELEPHONE (OUTPATIENT)
Dept: ONCOLOGY | Facility: HOSPITAL | Age: 43
End: 2023-03-29
Payer: MEDICARE

## 2023-03-29 ENCOUNTER — OFFICE VISIT (OUTPATIENT)
Dept: OBSTETRICS AND GYNECOLOGY | Facility: CLINIC | Age: 43
End: 2023-03-29
Payer: MEDICARE

## 2023-03-29 VITALS
HEART RATE: 74 BPM | BODY MASS INDEX: 43.15 KG/M2 | HEIGHT: 65 IN | WEIGHT: 259 LBS | SYSTOLIC BLOOD PRESSURE: 171 MMHG | DIASTOLIC BLOOD PRESSURE: 77 MMHG

## 2023-03-29 DIAGNOSIS — N93.9 ABNORMAL UTERINE BLEEDING (AUB): ICD-10-CM

## 2023-03-29 DIAGNOSIS — G89.29 CHRONIC PELVIC PAIN IN FEMALE: Primary | ICD-10-CM

## 2023-03-29 DIAGNOSIS — N80.9 ENDOMETRIOSIS: ICD-10-CM

## 2023-03-29 DIAGNOSIS — R10.2 CHRONIC PELVIC PAIN IN FEMALE: Primary | ICD-10-CM

## 2023-03-29 PROBLEM — J34.9 SINUS TROUBLE: Status: RESOLVED | Noted: 2021-07-22 | Resolved: 2023-03-29

## 2023-03-29 PROBLEM — K95.89 IRON DEFICIENCY ANEMIA FOLLOWING BARIATRIC SURGERY: Status: ACTIVE | Noted: 2023-03-29

## 2023-03-29 PROBLEM — D50.8 IRON DEFICIENCY ANEMIA FOLLOWING BARIATRIC SURGERY: Status: ACTIVE | Noted: 2023-03-29

## 2023-03-29 PROCEDURE — 3077F SYST BP >= 140 MM HG: CPT | Performed by: OBSTETRICS & GYNECOLOGY

## 2023-03-29 PROCEDURE — 99214 OFFICE O/P EST MOD 30 MIN: CPT | Performed by: OBSTETRICS & GYNECOLOGY

## 2023-03-29 PROCEDURE — 3078F DIAST BP <80 MM HG: CPT | Performed by: OBSTETRICS & GYNECOLOGY

## 2023-03-29 NOTE — TELEPHONE ENCOUNTER
----- Message from Chavo George MD sent at 3/28/2023  4:52 PM EDT -----  Regarding: labs  Can you please place IV Iron orders for her? It looks like she needs IV Iron infusion also I am awaiting B12 and folate levels but wanted to let you know this aspect of the plan.  Thanks  ----- Message -----  From: Lab, Background User  Sent: 3/28/2023   2:12 PM EDT  To: Chavo George MD

## 2023-03-29 NOTE — TELEPHONE ENCOUNTER
Spoke with patient, advised that iron is low and she will need an iron infusion. Advised that she will receive pre-meds for these infusions and that these cannot be refused to help with prevention of infusion reactions, advised that she will need a  as one of the pre-meds is IV benadryl. Advised that we will work on insurance authorization and that once that is obtained she will receive a call to schedule the infusions. Instructed to maintain all follow up visits and to contact the office with any questions or concerns. Patient verbalized understanding of all information discussed.

## 2023-03-29 NOTE — PROGRESS NOTES
"GYN Visit    CC: Discuss surgery    HPI:   43 y.o. with a history of chronic pelvic pain, abnormal menses and suspected endometriosis who presents to discuss hysterectomy.  The patient has been maintained on Depo-Lupron.  Her pain has been much improved and her bleeding has resolved with the Lupron.  Given the patient cannot continue to utilize Lupron long-term she is interested in proceeding with hysterectomy with BSO.  Patient has no new problems or concerns today.      History: PMHx, Meds, Allergies, PSHx, Social Hx, and POBHx all reviewed and updated.    /77   Pulse 74   Ht 165.1 cm (65\")   Wt 117 kg (259 lb)   Breastfeeding No   BMI 43.10 kg/m²     Physical Exam  Constitutional:       General: She is not in acute distress.     Appearance: Normal appearance. She is obese. She is not ill-appearing.   Abdominal:      General: Abdomen is flat. There is no distension.      Palpations: Abdomen is soft. There is no mass.      Tenderness: There is no abdominal tenderness. There is no guarding or rebound.      Hernia: No hernia is present.   Skin:     General: Skin is warm and dry.      Findings: No rash.   Neurological:      Mental Status: She is alert and oriented to person, place, and time.   Psychiatric:         Mood and Affect: Mood normal.         Behavior: Behavior normal.         Thought Content: Thought content normal.         Judgment: Judgment normal.           ASSESSMENT AND PLAN:  Diagnoses and all orders for this visit:    1. Chronic pelvic pain in female (Primary)  Assessment & Plan:  The patient has a long history of chronic pelvic pain that has been refractory to most treatments.  Depo-Lupron has been effective at controlling her symptoms but we discussed that this medication is not designed for long-term use.  The presumptive diagnosis has been endometriosis in the past.  At this point given the patient's great improvement with the Depo-Lupron she is interested in pursuing surgical " therapy with total laparoscopic hysterectomy and BSO for long-term management.  She understands this would cause permanent surgical menopause.  She understands the long-term risks of early menopause.  We have discussed the risks, benefits, and alternatives to the procedure including the risk of infection, bleeding and hemorrhage, the risk of injury to my structures including bowel, bladder, vasculature, pelvic nerves, the ureters, and other nearby structures.  We discussed the risk of anesthesia, perioperative complications including thromboembolism, myocardial infarction, strokes, and death.  We have discussed the risks that the procedure may need to be converted to an abdominal hysterectomy in order to be completed safely.  We have discussed the changes in the risks of the surgery if were we to convert to an open hysterectomy, as well as the changes in hospitalization and postoperative recovery times.  The patient expresses her understanding of these risks and wishes to proceed.  The patient will continue her Depo-Lupron for now given is controlling her symptoms.  I would anticipate a similar response once the ovaries are removed.  The patient has had multiple abdominal surgeries but mostly in the upper abdomen.  She does repor a history of adhesions in the upper abdomen, however, she has not had any pelvic surgeries and has had multiple vaginal births.  We also have discussed pain management postoperatively.  The patient does have chronic pain and takes hydrocodone 7.5 daily.  Due to her kidney she cannot have NSAIDs such as ibuprofen or Toradol.  We discussed that her pain will likely be a bit harder to control due to her history of pain medication use and limitations with NSAIDs.  The patient is understanding of the difficulties may encounter with pain control.      2. Endometriosis    3. Abnormal uterine bleeding (AUB)      Follow Up:  Return for Next scheduled follow up.      Yoan Yoo,  MD  03/29/2023

## 2023-03-30 PROBLEM — T14.8XXA BROKEN BONES: Status: RESOLVED | Noted: 2021-07-22 | Resolved: 2023-03-30

## 2023-03-30 PROBLEM — D64.9 ANEMIA: Status: RESOLVED | Noted: 2021-07-22 | Resolved: 2023-03-30

## 2023-03-30 PROBLEM — Z12.11 SCREENING FOR MALIGNANT NEOPLASM OF COLON: Status: RESOLVED | Noted: 2022-06-27 | Resolved: 2023-03-30

## 2023-03-30 PROBLEM — K80.20 GALLSTONE: Status: RESOLVED | Noted: 2021-07-22 | Resolved: 2023-03-30

## 2023-03-30 NOTE — ASSESSMENT & PLAN NOTE
The patient has a long history of chronic pelvic pain that has been refractory to most treatments.  Depo-Lupron has been effective at controlling her symptoms but we discussed that this medication is not designed for long-term use.  The presumptive diagnosis has been endometriosis in the past.  At this point given the patient's great improvement with the Depo-Lupron she is interested in pursuing surgical therapy with total laparoscopic hysterectomy and BSO for long-term management.  She understands this would cause permanent surgical menopause.  She understands the long-term risks of early menopause.  We have discussed the risks, benefits, and alternatives to the procedure including the risk of infection, bleeding and hemorrhage, the risk of injury to my structures including bowel, bladder, vasculature, pelvic nerves, the ureters, and other nearby structures.  We discussed the risk of anesthesia, perioperative complications including thromboembolism, myocardial infarction, strokes, and death.  We have discussed the risks that the procedure may need to be converted to an abdominal hysterectomy in order to be completed safely.  We have discussed the changes in the risks of the surgery if were we to convert to an open hysterectomy, as well as the changes in hospitalization and postoperative recovery times.  The patient expresses her understanding of these risks and wishes to proceed.  The patient will continue her Depo-Lupron for now given is controlling her symptoms.  I would anticipate a similar response once the ovaries are removed.  The patient has had multiple abdominal surgeries but mostly in the upper abdomen.  She does repor a history of adhesions in the upper abdomen, however, she has not had any pelvic surgeries and has had multiple vaginal births.  We also have discussed pain management postoperatively.  The patient does have chronic pain and takes hydrocodone 7.5 daily.  Due to her kidney she cannot have  NSAIDs such as ibuprofen or Toradol.  We discussed that her pain will likely be a bit harder to control due to her history of pain medication use and limitations with NSAIDs.  The patient is understanding of the difficulties may encounter with pain control.

## 2023-04-04 ENCOUNTER — OFFICE VISIT (OUTPATIENT)
Dept: ORTHOPEDIC SURGERY | Facility: CLINIC | Age: 43
End: 2023-04-04
Payer: MEDICARE

## 2023-04-04 VITALS — HEIGHT: 65 IN | WEIGHT: 259 LBS | BODY MASS INDEX: 43.15 KG/M2

## 2023-04-04 DIAGNOSIS — S83.411D SPRAIN OF MEDIAL COLLATERAL LIGAMENT OF RIGHT KNEE, SUBSEQUENT ENCOUNTER: ICD-10-CM

## 2023-04-04 DIAGNOSIS — M17.11 PRIMARY OSTEOARTHRITIS OF RIGHT KNEE: Primary | ICD-10-CM

## 2023-04-04 PROCEDURE — 20610 DRAIN/INJ JOINT/BURSA W/O US: CPT | Performed by: ORTHOPAEDIC SURGERY

## 2023-04-04 RX ADMIN — LIDOCAINE HYDROCHLORIDE 5 ML: 10 INJECTION, SOLUTION INFILTRATION; PERINEURAL at 09:00

## 2023-04-04 NOTE — PROGRESS NOTES
"Chief Complaint  Pain and Follow-up of the Right Knee     Subjective      Bebe Steward presents to [facility] for follow up evaluation of the right knee. The patient has been treating her right knee osteoarthritis and MCL sprain conservatively. She has had steroid and Visco injections in the past without relief for longer than 1 month. She is here today for a Zilretta injection.     Allergies   Allergen Reactions   • Adhesive Tape Itching and Rash   • Cefaclor Rash   • Cephalexin Rash   • Cyclobenzaprine Other (See Comments)     Muscle spasms   • Erythromycin Rash   • Meperidine Headache   • Monosodium Glutamate Other (See Comments)     Blood pressure drops, pass out    • Morphine Irritability   • Nsaids Unknown - High Severity     CKD    • Penicillins Anaphylaxis   • Sulfa Antibiotics Rash   • Sulfamethoxazole-Trimethoprim Rash   • Vancomycin Itching        Social History     Socioeconomic History   • Marital status:    • Number of children: 3   Tobacco Use   • Smoking status: Former     Packs/day: 1.00     Years: 15.00     Pack years: 15.00     Types: Cigarettes     Start date: 1997     Quit date: 2021     Years since quittin.2     Passive exposure: Current   • Smokeless tobacco: Never   Vaping Use   • Vaping Use: Never used   Substance and Sexual Activity   • Alcohol use: Yes     Comment: Rarely. 1-2 times yearly   • Drug use: Never   • Sexual activity: Not Currently     Partners: Male     Birth control/protection: Abstinence     Comment: Chemical menopause. Hysterectomy scheduled for 21        Review of Systems     Objective   Vital Signs:   Ht 165.1 cm (65\")   Wt 117 kg (259 lb)   BMI 43.10 kg/m²       Physical Exam  Constitutional:       Appearance: Normal appearance. The patient is well-developed and normal weight.   HENT:      Head: Normocephalic.      Right Ear: Hearing and external ear normal.      Left Ear: Hearing and external ear normal.      " Nose: Nose normal.   Eyes:      Conjunctiva/sclera: Conjunctivae normal.   Cardiovascular:      Rate and Rhythm: Normal rate.   Pulmonary:      Effort: Pulmonary effort is normal.      Breath sounds: No wheezing or rales.   Abdominal:      Palpations: Abdomen is soft.      Tenderness: There is no abdominal tenderness.   Musculoskeletal:      Cervical back: Normal range of motion.   Skin:     Findings: No rash.   Neurological:      Mental Status: The patient is alert and oriented to person, place, and time.   Psychiatric:         Mood and Affect: Mood and affect normal.         Judgment: Judgment normal.       Ortho Exam      Right knee: Tender palpation medial knee, at the MCL, crepitus with range of motion, full extension flexion 125, stable to anterior/posterior drawer, stable to varus/valgus stress. Positive EHL, FHL, GS and TA. Sensation intact to all 5 nerves of the foot. Positive pulses. Calf soft.     Large Joint Arthrocentesis: R knee  Date/Time: 4/4/2023 9:00 AM  Consent given by: patient  Site marked: site marked  Timeout: Immediately prior to procedure a time out was called to verify the correct patient, procedure, equipment, support staff and site/side marked as required   Supporting Documentation  Indications: pain   Procedure Details  Location: knee - R knee  Needle gauge: 21g.  Medications administered: 32 mg Triamcinolone Acetonide 32 MG; 5 mL lidocaine 1 %  Patient tolerance: patient tolerated the procedure well with no immediate complications            Imaging Results (Most Recent)     None           Result Review :       XR Spine Cervical Complete 4 or 5 View    Result Date: 3/24/2023  Narrative: PROCEDURE: XR SPINE CERVICAL COMPLETE 4 OR 5 VW  COMPARISON: None  INDICATIONS: CHRONIC NECK PAIN RADIATING DOWN BOTH SHOULDERS AND ARMS  FINDINGS:   BONES: Normal.  No significant spondylosis, scoliosis, fracture, or visible bony lesion.  DISC SPACES: Normal.  No significant disc height narrowing,  subluxation, or endplate abnormality.  PARASPINOUS: Negative.  No paraspinous abnormality is seen.  OTHER: Negative.       Impression:  Normal examination for a patient of this age.      GREAME ALVAREZ MD       Electronically Signed and Approved By: GRAEME ALVAREZ MD on 3/24/2023 at 15:44             US Pelvis Transvaginal Non OB    Result Date: 3/24/2023  Narrative: PROCEDURE: US PELVIC AND TRANSVAGINAL NONOBSTETRICAL  COMPARISON: [facility], US, PELVIC TRANSVAGINAL, 3/15/2021, 15:43.  INDICATIONS: pelvic pain  TECHNIQUE: Ultrasound examination of the pelvis was performed, using endovaginal technique.   FINDINGS:  The uterus measures point by 4.2 cm.  Uterus is mildly heterogeneous.  There is a 9 x 8 x 9 mm hypoechoic area in the upper uterus myometrium, likely a small leiomyoma.  The cervix is within normal limits. The endometrial stripe measures  mm thick.  The right ovary measures 3 x 1.8 cm and the left ovary measures 2.4 x 2.5 x 1.8  cm. Ovarian vascularity appears intact bilaterally. There is no solid ovarian mass.  There is no significant free fluid in the pelvis.       Impression:   1. Suspected sub cm intramural uterine fibroid. 2. Normal bilateral ovaries and endometrium.     BRIAN MILLER MD       Electronically Signed and Approved By: BRIAN MILLER MD on 3/24/2023 at 19:52                      Assessment and Plan     Diagnoses and all orders for this visit:    1. Primary osteoarthritis of right knee (Primary)    2. Sprain of medial collateral ligament of right knee, subsequent encounter        Discussed the treatment options with the patient, operative vs non-operative. Discussed the risks and benefits of a Right knee Zilretta injection. The patient expressed understanding and wished to proceed. She tolerated the injection well.     Call or return if worsening symptoms.    Follow Up     6 weeks      Patient was given instructions and counseling regarding her condition or for health  maintenance advice. Please see specific information pulled into the AVS if appropriate.     Scribed for Tomy Dobson MD by Mima Kaplan.  04/04/23   08:34 EDT    I have personally performed the services described in this document as scribed by the above individual and it is both accurate and complete. Tomy Dobson MD 04/05/23

## 2023-04-05 ENCOUNTER — TELEPHONE (OUTPATIENT)
Dept: ONCOLOGY | Facility: HOSPITAL | Age: 43
End: 2023-04-05

## 2023-04-05 PROCEDURE — S0260 H&P FOR SURGERY: HCPCS | Performed by: OBSTETRICS & GYNECOLOGY

## 2023-04-05 RX ORDER — LIDOCAINE HYDROCHLORIDE 10 MG/ML
5 INJECTION, SOLUTION INFILTRATION; PERINEURAL
Status: COMPLETED | OUTPATIENT
Start: 2023-04-04 | End: 2023-04-04

## 2023-04-05 NOTE — TELEPHONE ENCOUNTER
Caller: Bebe Steward    Relationship to patient: Self    Best call back number: [phone]    Chief complaint: TRANSPORTATION ISSUE    Type of visit: INFUSION    Requested date: CALL TO R/S     If rescheduling, when is the original appointment: 4/17/2023

## 2023-04-06 ENCOUNTER — ANESTHESIA (OUTPATIENT)
Dept: PERIOP | Facility: HOSPITAL | Age: 43
End: 2023-04-06
Payer: MEDICARE

## 2023-04-06 ENCOUNTER — ANESTHESIA EVENT (OUTPATIENT)
Dept: PERIOP | Facility: HOSPITAL | Age: 43
End: 2023-04-06
Payer: MEDICARE

## 2023-04-06 ENCOUNTER — HOSPITAL ENCOUNTER (OUTPATIENT)
Facility: HOSPITAL | Age: 43
Discharge: HOME | End: 2023-04-07
Attending: OBSTETRICS & GYNECOLOGY | Admitting: OBSTETRICS & GYNECOLOGY
Payer: MEDICARE

## 2023-04-06 DIAGNOSIS — N80.9 ENDOMETRIOSIS: ICD-10-CM

## 2023-04-06 DIAGNOSIS — Z90.710 S/P LAPAROSCOPIC HYSTERECTOMY: Primary | ICD-10-CM

## 2023-04-06 DIAGNOSIS — G89.29 CHRONIC PELVIC PAIN IN FEMALE: ICD-10-CM

## 2023-04-06 DIAGNOSIS — R10.2 CHRONIC PELVIC PAIN IN FEMALE: ICD-10-CM

## 2023-04-06 LAB
ABO GROUP BLD: NORMAL
BLD GP AB SCN SERPL QL: NEGATIVE
HCG SERPL QL: NEGATIVE
HCT VFR BLD AUTO: 35.3 % (ref 34–46.6)
HGB BLD-MCNC: 11.1 G/DL (ref 12–15.9)
RH BLD: POSITIVE
T&S EXPIRATION DATE: NORMAL

## 2023-04-06 PROCEDURE — 84703 CHORIONIC GONADOTROPIN ASSAY: CPT | Performed by: OBSTETRICS & GYNECOLOGY

## 2023-04-06 PROCEDURE — A9270 NON-COVERED ITEM OR SERVICE: HCPCS | Performed by: OBSTETRICS & GYNECOLOGY

## 2023-04-06 PROCEDURE — 63710000001 OXYCODONE 5 MG TABLET: Performed by: OBSTETRICS & GYNECOLOGY

## 2023-04-06 PROCEDURE — 25010000002 DEXAMETHASONE PER 1 MG: Performed by: NURSE ANESTHETIST, CERTIFIED REGISTERED

## 2023-04-06 PROCEDURE — 25010000002 PROPOFOL 10 MG/ML EMULSION: Performed by: NURSE ANESTHETIST, CERTIFIED REGISTERED

## 2023-04-06 PROCEDURE — 63710000001 OXYCODONE 5 MG TABLET: Performed by: NURSE ANESTHETIST, CERTIFIED REGISTERED

## 2023-04-06 PROCEDURE — 25010000002 ONDANSETRON PER 1 MG: Performed by: NURSE ANESTHETIST, CERTIFIED REGISTERED

## 2023-04-06 PROCEDURE — 88307 TISSUE EXAM BY PATHOLOGIST: CPT | Performed by: OBSTETRICS & GYNECOLOGY

## 2023-04-06 PROCEDURE — 86900 BLOOD TYPING SEROLOGIC ABO: CPT | Performed by: OBSTETRICS & GYNECOLOGY

## 2023-04-06 PROCEDURE — 63710000001 FLUTICASONE 50 MCG/ACT SUSPENSION 16 G BOTTLE: Performed by: OBSTETRICS & GYNECOLOGY

## 2023-04-06 PROCEDURE — 25010000002 MIDAZOLAM PER 1MG: Performed by: ANESTHESIOLOGY

## 2023-04-06 PROCEDURE — 85018 HEMOGLOBIN: CPT | Performed by: OBSTETRICS & GYNECOLOGY

## 2023-04-06 PROCEDURE — 63710000001 CLINDAMYCIN 150 MG CAPSULE: Performed by: OBSTETRICS & GYNECOLOGY

## 2023-04-06 PROCEDURE — 63710000001 CETIRIZINE 10 MG TABLET: Performed by: OBSTETRICS & GYNECOLOGY

## 2023-04-06 PROCEDURE — 58571 TLH W/T/O 250 G OR LESS: CPT | Performed by: OBSTETRICS & GYNECOLOGY

## 2023-04-06 PROCEDURE — 85014 HEMATOCRIT: CPT | Performed by: OBSTETRICS & GYNECOLOGY

## 2023-04-06 PROCEDURE — 25010000002 HYDROMORPHONE 1 MG/ML SOLUTION: Performed by: NURSE ANESTHETIST, CERTIFIED REGISTERED

## 2023-04-06 PROCEDURE — 63710000001 ATORVASTATIN 20 MG TABLET: Performed by: OBSTETRICS & GYNECOLOGY

## 2023-04-06 PROCEDURE — 63710000001 SERTRALINE 25 MG TABLET: Performed by: OBSTETRICS & GYNECOLOGY

## 2023-04-06 PROCEDURE — 25010000002 LEVOFLOXACIN PER 250 MG: Performed by: OBSTETRICS & GYNECOLOGY

## 2023-04-06 PROCEDURE — 63710000001 ACETAMINOPHEN 325 MG TABLET: Performed by: OBSTETRICS & GYNECOLOGY

## 2023-04-06 PROCEDURE — 86901 BLOOD TYPING SEROLOGIC RH(D): CPT | Performed by: OBSTETRICS & GYNECOLOGY

## 2023-04-06 PROCEDURE — 86850 RBC ANTIBODY SCREEN: CPT | Performed by: OBSTETRICS & GYNECOLOGY

## 2023-04-06 PROCEDURE — 63710000001 GABAPENTIN 300 MG CAPSULE: Performed by: OBSTETRICS & GYNECOLOGY

## 2023-04-06 PROCEDURE — A9270 NON-COVERED ITEM OR SERVICE: HCPCS | Performed by: NURSE ANESTHETIST, CERTIFIED REGISTERED

## 2023-04-06 DEVICE — ABSORBABLE WOUND CLOSURE DEVICE
Type: IMPLANTABLE DEVICE | Site: UTERUS | Status: FUNCTIONAL
Brand: V-LOC 180

## 2023-04-06 RX ORDER — SERTRALINE HYDROCHLORIDE 25 MG/1
50 TABLET, FILM COATED ORAL DAILY
Status: DISCONTINUED | OUTPATIENT
Start: 2023-04-06 | End: 2023-04-07 | Stop reason: HOSPADM

## 2023-04-06 RX ORDER — DEXMEDETOMIDINE HYDROCHLORIDE 100 UG/ML
INJECTION, SOLUTION INTRAVENOUS AS NEEDED
Status: DISCONTINUED | OUTPATIENT
Start: 2023-04-06 | End: 2023-04-06 | Stop reason: SURG

## 2023-04-06 RX ORDER — ACETAMINOPHEN 500 MG
1000 TABLET ORAL ONCE
Status: COMPLETED | OUTPATIENT
Start: 2023-04-06 | End: 2023-04-06

## 2023-04-06 RX ORDER — PROPOFOL 10 MG/ML
VIAL (ML) INTRAVENOUS AS NEEDED
Status: DISCONTINUED | OUTPATIENT
Start: 2023-04-06 | End: 2023-04-06 | Stop reason: SURG

## 2023-04-06 RX ORDER — ONDANSETRON 4 MG/1
4 TABLET, FILM COATED ORAL EVERY 6 HOURS PRN
Status: DISCONTINUED | OUTPATIENT
Start: 2023-04-06 | End: 2023-04-07 | Stop reason: HOSPADM

## 2023-04-06 RX ORDER — SODIUM CHLORIDE, SODIUM LACTATE, POTASSIUM CHLORIDE, CALCIUM CHLORIDE 600; 310; 30; 20 MG/100ML; MG/100ML; MG/100ML; MG/100ML
125 INJECTION, SOLUTION INTRAVENOUS CONTINUOUS
Status: DISCONTINUED | OUTPATIENT
Start: 2023-04-06 | End: 2023-04-07 | Stop reason: HOSPADM

## 2023-04-06 RX ORDER — DEXAMETHASONE SODIUM PHOSPHATE 4 MG/ML
INJECTION, SOLUTION INTRA-ARTICULAR; INTRALESIONAL; INTRAMUSCULAR; INTRAVENOUS; SOFT TISSUE AS NEEDED
Status: DISCONTINUED | OUTPATIENT
Start: 2023-04-06 | End: 2023-04-06 | Stop reason: SURG

## 2023-04-06 RX ORDER — NALOXONE HCL 0.4 MG/ML
0.1 VIAL (ML) INJECTION
Status: DISCONTINUED | OUTPATIENT
Start: 2023-04-06 | End: 2023-04-07 | Stop reason: HOSPADM

## 2023-04-06 RX ORDER — LEVOFLOXACIN 5 MG/ML
500 INJECTION, SOLUTION INTRAVENOUS ONCE
Status: COMPLETED | OUTPATIENT
Start: 2023-04-06 | End: 2023-04-06

## 2023-04-06 RX ORDER — OXYCODONE HYDROCHLORIDE 5 MG/1
10 TABLET ORAL EVERY 4 HOURS PRN
Status: DISCONTINUED | OUTPATIENT
Start: 2023-04-06 | End: 2023-04-07 | Stop reason: HOSPADM

## 2023-04-06 RX ORDER — MIDAZOLAM HYDROCHLORIDE 2 MG/2ML
2 INJECTION, SOLUTION INTRAMUSCULAR; INTRAVENOUS ONCE
Status: COMPLETED | OUTPATIENT
Start: 2023-04-06 | End: 2023-04-06

## 2023-04-06 RX ORDER — ONDANSETRON 2 MG/ML
INJECTION INTRAMUSCULAR; INTRAVENOUS AS NEEDED
Status: DISCONTINUED | OUTPATIENT
Start: 2023-04-06 | End: 2023-04-06 | Stop reason: SURG

## 2023-04-06 RX ORDER — ROCURONIUM BROMIDE 10 MG/ML
INJECTION, SOLUTION INTRAVENOUS AS NEEDED
Status: DISCONTINUED | OUTPATIENT
Start: 2023-04-06 | End: 2023-04-06 | Stop reason: SURG

## 2023-04-06 RX ORDER — METRONIDAZOLE 500 MG/100ML
500 INJECTION, SOLUTION INTRAVENOUS ONCE
Status: COMPLETED | OUTPATIENT
Start: 2023-04-06 | End: 2023-04-06

## 2023-04-06 RX ORDER — KETAMINE HCL IN NACL, ISO-OSM 100MG/10ML
SYRINGE (ML) INJECTION AS NEEDED
Status: DISCONTINUED | OUTPATIENT
Start: 2023-04-06 | End: 2023-04-06 | Stop reason: SURG

## 2023-04-06 RX ORDER — ONDANSETRON 2 MG/ML
4 INJECTION INTRAMUSCULAR; INTRAVENOUS ONCE AS NEEDED
Status: DISCONTINUED | OUTPATIENT
Start: 2023-04-06 | End: 2023-04-06 | Stop reason: HOSPADM

## 2023-04-06 RX ORDER — ONDANSETRON 2 MG/ML
4 INJECTION INTRAMUSCULAR; INTRAVENOUS EVERY 6 HOURS PRN
Status: DISCONTINUED | OUTPATIENT
Start: 2023-04-06 | End: 2023-04-07 | Stop reason: HOSPADM

## 2023-04-06 RX ORDER — OXYCODONE HYDROCHLORIDE 5 MG/1
15 TABLET ORAL EVERY 4 HOURS PRN
Status: DISCONTINUED | OUTPATIENT
Start: 2023-04-06 | End: 2023-04-07 | Stop reason: HOSPADM

## 2023-04-06 RX ORDER — BUPIVACAINE HYDROCHLORIDE AND EPINEPHRINE 5; 5 MG/ML; UG/ML
INJECTION, SOLUTION EPIDURAL; INTRACAUDAL; PERINEURAL AS NEEDED
Status: DISCONTINUED | OUTPATIENT
Start: 2023-04-06 | End: 2023-04-06 | Stop reason: HOSPADM

## 2023-04-06 RX ORDER — ACETAMINOPHEN 325 MG/1
650 TABLET ORAL EVERY 6 HOURS
Status: DISCONTINUED | OUTPATIENT
Start: 2023-04-06 | End: 2023-04-07 | Stop reason: HOSPADM

## 2023-04-06 RX ORDER — GLYCOPYRROLATE 0.2 MG/ML
INJECTION INTRAMUSCULAR; INTRAVENOUS AS NEEDED
Status: DISCONTINUED | OUTPATIENT
Start: 2023-04-06 | End: 2023-04-06 | Stop reason: SURG

## 2023-04-06 RX ORDER — ACETAMINOPHEN 650 MG/1
650 SUPPOSITORY RECTAL EVERY 6 HOURS
Status: DISCONTINUED | OUTPATIENT
Start: 2023-04-06 | End: 2023-04-07 | Stop reason: HOSPADM

## 2023-04-06 RX ORDER — CETIRIZINE HYDROCHLORIDE 10 MG/1
10 TABLET ORAL DAILY
Status: DISCONTINUED | OUTPATIENT
Start: 2023-04-06 | End: 2023-04-07 | Stop reason: HOSPADM

## 2023-04-06 RX ORDER — PROMETHAZINE HYDROCHLORIDE 12.5 MG/1
25 TABLET ORAL ONCE AS NEEDED
Status: DISCONTINUED | OUTPATIENT
Start: 2023-04-06 | End: 2023-04-06 | Stop reason: HOSPADM

## 2023-04-06 RX ORDER — CLINDAMYCIN HYDROCHLORIDE 150 MG/1
150 CAPSULE ORAL 4 TIMES DAILY
COMMUNITY

## 2023-04-06 RX ORDER — LIDOCAINE HYDROCHLORIDE 20 MG/ML
INJECTION, SOLUTION EPIDURAL; INFILTRATION; INTRACAUDAL; PERINEURAL AS NEEDED
Status: DISCONTINUED | OUTPATIENT
Start: 2023-04-06 | End: 2023-04-06 | Stop reason: SURG

## 2023-04-06 RX ORDER — ATORVASTATIN CALCIUM 20 MG/1
20 TABLET, FILM COATED ORAL NIGHTLY
Status: DISCONTINUED | OUTPATIENT
Start: 2023-04-06 | End: 2023-04-07 | Stop reason: HOSPADM

## 2023-04-06 RX ORDER — MAGNESIUM HYDROXIDE 1200 MG/15ML
LIQUID ORAL AS NEEDED
Status: DISCONTINUED | OUTPATIENT
Start: 2023-04-06 | End: 2023-04-06 | Stop reason: HOSPADM

## 2023-04-06 RX ORDER — PROMETHAZINE HYDROCHLORIDE 25 MG/1
25 SUPPOSITORY RECTAL ONCE AS NEEDED
Status: DISCONTINUED | OUTPATIENT
Start: 2023-04-06 | End: 2023-04-06 | Stop reason: HOSPADM

## 2023-04-06 RX ORDER — CLINDAMYCIN HYDROCHLORIDE 150 MG/1
150 CAPSULE ORAL 4 TIMES DAILY
Status: DISCONTINUED | OUTPATIENT
Start: 2023-04-06 | End: 2023-04-07 | Stop reason: HOSPADM

## 2023-04-06 RX ORDER — MEPERIDINE HYDROCHLORIDE 25 MG/ML
12.5 INJECTION INTRAMUSCULAR; INTRAVENOUS; SUBCUTANEOUS
Status: DISCONTINUED | OUTPATIENT
Start: 2023-04-06 | End: 2023-04-06 | Stop reason: HOSPADM

## 2023-04-06 RX ORDER — FLUTICASONE PROPIONATE 50 MCG
2 SPRAY, SUSPENSION (ML) NASAL DAILY
Status: DISCONTINUED | OUTPATIENT
Start: 2023-04-06 | End: 2023-04-07 | Stop reason: HOSPADM

## 2023-04-06 RX ORDER — GABAPENTIN 300 MG/1
300 CAPSULE ORAL 3 TIMES DAILY
Status: DISCONTINUED | OUTPATIENT
Start: 2023-04-06 | End: 2023-04-07 | Stop reason: HOSPADM

## 2023-04-06 RX ORDER — OXYCODONE HYDROCHLORIDE 5 MG/1
5 TABLET ORAL
Status: DISCONTINUED | OUTPATIENT
Start: 2023-04-06 | End: 2023-04-06 | Stop reason: HOSPADM

## 2023-04-06 RX ORDER — DOCUSATE SODIUM 100 MG/1
100 CAPSULE, LIQUID FILLED ORAL 2 TIMES DAILY PRN
Status: DISCONTINUED | OUTPATIENT
Start: 2023-04-06 | End: 2023-04-07 | Stop reason: HOSPADM

## 2023-04-06 RX ORDER — SODIUM CHLORIDE, SODIUM LACTATE, POTASSIUM CHLORIDE, CALCIUM CHLORIDE 600; 310; 30; 20 MG/100ML; MG/100ML; MG/100ML; MG/100ML
9 INJECTION, SOLUTION INTRAVENOUS CONTINUOUS PRN
Status: DISCONTINUED | OUTPATIENT
Start: 2023-04-06 | End: 2023-04-06 | Stop reason: HOSPADM

## 2023-04-06 RX ADMIN — ATORVASTATIN CALCIUM 20 MG: 20 TABLET, FILM COATED ORAL at 20:49

## 2023-04-06 RX ADMIN — LIDOCAINE HYDROCHLORIDE 100 MG: 20 INJECTION, SOLUTION EPIDURAL; INFILTRATION; INTRACAUDAL; PERINEURAL at 08:15

## 2023-04-06 RX ADMIN — CETIRIZINE HYDROCHLORIDE 10 MG: 10 TABLET, FILM COATED ORAL at 12:45

## 2023-04-06 RX ADMIN — DEXMEDETOMIDINE HYDROCHLORIDE 8 MCG: 100 INJECTION, SOLUTION, CONCENTRATE INTRAVENOUS at 08:10

## 2023-04-06 RX ADMIN — DEXMEDETOMIDINE HYDROCHLORIDE 10 MCG: 100 INJECTION, SOLUTION, CONCENTRATE INTRAVENOUS at 08:30

## 2023-04-06 RX ADMIN — PROPOFOL 50 MG: 10 INJECTION, EMULSION INTRAVENOUS at 08:24

## 2023-04-06 RX ADMIN — ACETAMINOPHEN 650 MG: 325 TABLET ORAL at 12:44

## 2023-04-06 RX ADMIN — PROPOFOL 150 MG: 10 INJECTION, EMULSION INTRAVENOUS at 07:33

## 2023-04-06 RX ADMIN — SUGAMMADEX 400 MG: 100 INJECTION, SOLUTION INTRAVENOUS at 09:26

## 2023-04-06 RX ADMIN — DEXMEDETOMIDINE HYDROCHLORIDE 8 MCG: 100 INJECTION, SOLUTION, CONCENTRATE INTRAVENOUS at 08:36

## 2023-04-06 RX ADMIN — DEXAMETHASONE SODIUM PHOSPHATE 4 MG: 4 INJECTION, SOLUTION INTRA-ARTICULAR; INTRALESIONAL; INTRAMUSCULAR; INTRAVENOUS; SOFT TISSUE at 07:35

## 2023-04-06 RX ADMIN — ROCURONIUM BROMIDE 30 MG: 10 INJECTION, SOLUTION INTRAVENOUS at 09:00

## 2023-04-06 RX ADMIN — SERTRALINE 50 MG: 25 TABLET, FILM COATED ORAL at 12:45

## 2023-04-06 RX ADMIN — SODIUM CHLORIDE, POTASSIUM CHLORIDE, SODIUM LACTATE AND CALCIUM CHLORIDE: 600; 310; 30; 20 INJECTION, SOLUTION INTRAVENOUS at 08:41

## 2023-04-06 RX ADMIN — GLYCOPYRROLATE 0.2 MG: 0.2 INJECTION INTRAMUSCULAR; INTRAVENOUS at 07:32

## 2023-04-06 RX ADMIN — CLINDAMYCIN HYDROCHLORIDE 150 MG: 150 CAPSULE ORAL at 12:44

## 2023-04-06 RX ADMIN — Medication 20 MG: at 08:20

## 2023-04-06 RX ADMIN — DEXMEDETOMIDINE HYDROCHLORIDE 16 MCG: 100 INJECTION, SOLUTION, CONCENTRATE INTRAVENOUS at 07:34

## 2023-04-06 RX ADMIN — HYDROMORPHONE HYDROCHLORIDE 0.25 MG: 1 INJECTION, SOLUTION INTRAMUSCULAR; INTRAVENOUS; SUBCUTANEOUS at 09:45

## 2023-04-06 RX ADMIN — CLINDAMYCIN HYDROCHLORIDE 150 MG: 150 CAPSULE ORAL at 20:49

## 2023-04-06 RX ADMIN — CLINDAMYCIN HYDROCHLORIDE 150 MG: 150 CAPSULE ORAL at 18:25

## 2023-04-06 RX ADMIN — GABAPENTIN 300 MG: 300 CAPSULE ORAL at 16:56

## 2023-04-06 RX ADMIN — DEXMEDETOMIDINE HYDROCHLORIDE 8 MCG: 100 INJECTION, SOLUTION, CONCENTRATE INTRAVENOUS at 07:55

## 2023-04-06 RX ADMIN — HYDROMORPHONE HYDROCHLORIDE 0.25 MG: 1 INJECTION, SOLUTION INTRAMUSCULAR; INTRAVENOUS; SUBCUTANEOUS at 09:50

## 2023-04-06 RX ADMIN — Medication 30 MG: at 07:43

## 2023-04-06 RX ADMIN — ONDANSETRON 4 MG: 2 INJECTION INTRAMUSCULAR; INTRAVENOUS at 09:24

## 2023-04-06 RX ADMIN — METRONIDAZOLE 500 MG: 500 INJECTION, SOLUTION INTRAVENOUS at 07:54

## 2023-04-06 RX ADMIN — MIDAZOLAM HYDROCHLORIDE 2 MG: 1 INJECTION, SOLUTION INTRAMUSCULAR; INTRAVENOUS at 07:10

## 2023-04-06 RX ADMIN — DEXMEDETOMIDINE HYDROCHLORIDE 10 MCG: 100 INJECTION, SOLUTION, CONCENTRATE INTRAVENOUS at 08:23

## 2023-04-06 RX ADMIN — SODIUM CHLORIDE, POTASSIUM CHLORIDE, SODIUM LACTATE AND CALCIUM CHLORIDE 9 ML/HR: 600; 310; 30; 20 INJECTION, SOLUTION INTRAVENOUS at 07:00

## 2023-04-06 RX ADMIN — OXYCODONE 15 MG: 5 TABLET ORAL at 16:56

## 2023-04-06 RX ADMIN — SODIUM CHLORIDE, POTASSIUM CHLORIDE, SODIUM LACTATE AND CALCIUM CHLORIDE 125 ML/HR: 600; 310; 30; 20 INJECTION, SOLUTION INTRAVENOUS at 12:54

## 2023-04-06 RX ADMIN — ROCURONIUM BROMIDE 70 MG: 10 INJECTION, SOLUTION INTRAVENOUS at 07:35

## 2023-04-06 RX ADMIN — OXYCODONE 5 MG: 5 TABLET ORAL at 10:01

## 2023-04-06 RX ADMIN — GABAPENTIN 300 MG: 300 CAPSULE ORAL at 12:43

## 2023-04-06 RX ADMIN — LEVOFLOXACIN 500 MG: 500 INJECTION, SOLUTION INTRAVENOUS at 07:26

## 2023-04-06 RX ADMIN — OXYCODONE 15 MG: 5 TABLET ORAL at 20:48

## 2023-04-06 RX ADMIN — ACETAMINOPHEN 1000 MG: 500 TABLET ORAL at 07:09

## 2023-04-06 RX ADMIN — OXYCODONE 10 MG: 5 TABLET ORAL at 12:44

## 2023-04-06 RX ADMIN — HYDROMORPHONE HYDROCHLORIDE 0.5 MG: 1 INJECTION, SOLUTION INTRAMUSCULAR; INTRAVENOUS; SUBCUTANEOUS at 09:55

## 2023-04-06 RX ADMIN — GABAPENTIN 300 MG: 300 CAPSULE ORAL at 20:49

## 2023-04-06 RX ADMIN — ACETAMINOPHEN 650 MG: 325 TABLET ORAL at 18:27

## 2023-04-06 RX ADMIN — LIDOCAINE HYDROCHLORIDE 100 MG: 20 INJECTION, SOLUTION EPIDURAL; INFILTRATION; INTRACAUDAL; PERINEURAL at 07:34

## 2023-04-06 NOTE — ANESTHESIA POSTPROCEDURE EVALUATION
Patient: Bebe Steward    Procedure Summary     Date: 04/06/23 Room / Location: [facility] OR 05 / [facility] MAIN OR    Anesthesia Start: 0726 Anesthesia Stop: 0946    Procedures:       TOTAL LAPAROSCOPIC HYSTERECTOMY BILATERAL SALPINGO OOPHORECTOMY (Abdomen)      CYSTOSCOPY (Urethra) Diagnosis:       Endometriosis      Chronic pelvic pain in female      Abnormal uterine bleeding      (Endometriosis [N80.9]Chronic pelvic pain in female [R10.2, G89.29], abnormal uterine bleeding)    Surgeons: Yoan Yoo MD Provider: Maureen Irvin MD    Anesthesia Type: general ASA Status: 3          Anesthesia Type: general    Vitals  Vitals Value Taken Time   /78 04/06/23 1020   Temp 36.1 °C (97 °F) 04/06/23 1015   Pulse 82 04/06/23 1023   Resp 16 04/06/23 1020   SpO2 95 % 04/06/23 1023   Vitals shown include unvalidated device data.        Post Anesthesia Care and Evaluation    Patient location during evaluation: bedside  Patient participation: complete - patient participated  Level of consciousness: awake  Pain management: adequate    Airway patency: patent  PONV Status: none  Cardiovascular status: acceptable and stable  Respiratory status: acceptable  Hydration status: acceptable    Comments: An Anesthesiologist personally participated in the most demanding procedures (including induction and emergence if applicable) in the anesthesia plan, monitored the course of anesthesia administration at frequent intervals and remained physically present and available for immediate diagnosis and treatment of emergencies.

## 2023-04-06 NOTE — DISCHARGE INSTRUCTIONS
No driving while taking narcotic pain medications  No lifting more than 15 to 20 pounds   No intercourse for 8 weeks  No tub baths, no tampons, no douching and nothing in the vagina for 8 weeks  Okay to shower  Keep the incisions clean and dry  Remove outer bandage 24 hours after surgery  Remove inner bandage/Steri-Strips 1 week from surgery  Call for temperature greater than 100 °F, shortness of breath or chest pain, heavy vaginal bleeding soaking a pad in less than 1 hour, redness swelling or drainage from the incisions, excessive nausea or vomiting, or pain that is worsening despite current pain medications

## 2023-04-06 NOTE — OP NOTE
TOTAL LAPAROSCOPIC HYSTERECTOMY  Procedure Report    Patient Name:  Bebe Steward  YOB: 1980    Date of Surgery:  4/6/2023     Pre-op Diagnosis:   Endometriosis [N80.9]Chronic pelvic pain in female [R10.2, G89.29], abnormal uterine bleeding       Post-Op Diagnosis Codes:     * Endometriosis [N80.9]     * Chronic pelvic pain in female [R10.2, G89.29]     * Abnormal uterine bleeding [N93.9]    Procedure/CPT® Codes:      Procedure(s):  TOTAL LAPAROSCOPIC HYSTERECTOMY BILATERAL SALPINGO OOPHORECTOMY    Staff:  Surgeon(s):  Yoan Yoo MD Holt, Shannon L, MD      Anesthesia: General    Estimated Blood Loss: 75 mL      Specimen:          Specimens     ID Source Type Tests Collected By Collected At Frozen?    A Uterus with Cervix, Bilateral Tubes and Ovaries Tissue · TISSUE PATHOLOGY EXAM   Yoan Yoo MD 4/6/23 0900 No    Description: Uterus, Cervix, Bilateral Fallopian Tubes, and Bilateral Ovaries    Comment: Vaginally Removed  Weighs: 87grams              Findings: There were adhesions of the omentum and small bowel in the upper abdomen well above the umbilicus.  The pelvis was free of adhesions.  The uterus was normal size and shape.  There is a small 1 cm right ovarian cyst.  Otherwise the ovaries and fallopian tubes both appeared normal.  There was some evidence of old endometriosis throughout the pelvis.  Post hysterectomy cystoscopy revealed a normal bladder.  There is no evidence of injury or suture within the bladder.  Effluxing urine was noted from each ureteral orifice    Complications: None    Description of Procedure: After reviewing the informed consent, including the risks, benefits, and alternatives to the procedure, the patient expressed her understanding and desired to proceed.  She was taken to the operating room with an IV in place and running.  She was placed on the operating table in the dorsal supine position.  The patient was given a general anesthetic and  intubated endotracheally.  The patient was repositioned into the dorsal lithotomy position.  Her arms were tucked to the side, and her legs were placed in yellow-fin stirrups.  We took care in positioning both arms and legs, padding any potential pressure points.  After a surgical time-out was performed, the patient was prepped and draped in the usual sterile fashion.  I proceeded below, and placed a sterile Pugh catheter.  I inserted a Zhao retractor into the posterior vagina, and a Khloe retractor into the anterior vagina.  I used a single-tooth tenaculum to grasp the anterior lip of the cervix.  The uterus was sounded to 7 centimeters.  The cervix was then dilated to 8 mm.  I called for the 3.5 centimeter LISA cup, and a 6 centimeter LISA tip.  The LISA uterine manipulator was assembled in standard fashion.  I placed a 0 Vicryl stay suture at the 12:00 position on the anterior lip of the cervix.  The single-tooth tenaculum was removed, and I placed the LISA uterine manipulator in standard fashion.  The [place]ank and Zhao retractors were removed.  I changed my gloves and proceeded to the abdomen.  After assuring the patient was flat, and had an orogastric tube in place, I infiltrated in the planned incision sites with half percent Marcaine with epinephrine.  I made a vertical infraumbilical skin incision with the scalpel.  With anterior traction on the anterior abdominal wall, I inserted the Veress needle into the peritoneal cavity without difficulty.  Free-flowing saline through the Veress needle confirmed by intraperitoneal location.  The opening pressures were less than 7 mm of mercury.  The abdomen was insufflated to 25 mm of mercury. I used a 5 mm Optiview trocar to enter the peritoneal cavity under direct visualization.  I surveyed the area below the trocar insertion site, and there was no evidence of any bowel or vascular injury.  I placed 5 mm trochars, in the left lower quadrant and right lower quadrant,  under direct visualization.  I placed an 11 mm trocar in the suprapubic location, again under direct visualization.  The patient was placed in steep Trendelenburg, the intra-abdominal pressures were lowered to 15 mm mercury, and the bowel was evacuated out of the pelvis with a blunt probe.     The uterus was normal shape and size.  There was a small 1 cm cyst on the right ovary.  Otherwise the ovaries and fallopian tubes appeared normal.  There was no pelvic adhesions.  There were some adhesions in the upper abdomen well above the umbilicus involving both small bowel and omentum.  There was some old endometriosis scattered throughout the lower part of the pelvis.  I identified the course of the ureters through both sides of the pelvis.  The left fallopian tube was grasped, and the left ovary tented up.  This allowed exposure of the left IP ligament.  The left IP ligament was sealed and divided, well below the ovary and above the ureter.  I carried this dissection to the left round ligament, sealing and dividing the left round ligament.  The broad ligament was then opened into an anterior posterior portion.  The anterior portion of the broad ligament was then taken down, and a bladder flap created without difficulty.  I took down the posterior portion, coming across the area cervix just above the level of the uterosacral ligament.  The uterine vessels were skeletonized on the left side.  The uterine vessels were then sealed and divided just above the level of the colpotomy cup.  I took successive bites inside the initial pedicle, lateralizing the vessels past the colpotomy cup.  The right fallopian tube was then lifted up, exposing the right IP ligament.  I sealed divided the IP ligament above the ureter and below the ovary.  This was carried to the right round ligament, which was sealed divided.  The broad ligament was then opened into an anterior posterior portion.  The anterior portion of broad ligament was taken  down, connecting with the previously created bladder flap.  The posterior portion was taken down as well.  The uterine vessels were skeletonized on the right.  The vessels were then sealed and divided just above the level of the colpotomy cup.  I took successive bites inside the initial pedicle, lateralizing the vessels past the colpotomy cup.  I made a circumferential colpotomy with the active blade Harmonic scalpel without difficulty.  The uterus, cervix, bilateral fallopian tubes and ovaries were removed through the vagina.  A sterile blue nasal syringe bulb was placed in the vagina to maintain pneumoperitoneum.  The pelvis was copiously irrigated.  There was excellent hemostasis on the uterine vessel pedicles, and the vaginal cuff.  I used a 2-0 VLoc suture to close the vagina in a running fashion.  I started at the right vaginal angle, and works the left vaginal angle.  I was sure to incorporate uterosacral ligament into the cuff closure for vaginal support.  Once making it to the left vaginal angle, I closed the second layer over the first using the same suture, and working from left to right.     After the vaginal cuff was closed, the pelvis was copiously irrigated. I surveyed all of my pedicles for any potential bleeding.  The abdominal pressures were lowered to 5 mm of mercury, and another systematic survey of all of my pedicles did not reveal any bleeding. I removed the intra-abdominal instruments, and proceeded below to perform cystoscopy.  The patient's Pugh catheter was back- clamped, and the bladder was backfilled with 150 mL of sterile saline.  The Pugh catheter was then removed, and using a 5 mm 30° laparoscope, I was surveyed the bladder.  There was no evidence of any bladder injury or suture within the bladder.  I visualized both ureters and saw good spill of urine from each side.  The laparoscope was removed from the bladder.  I removed the blue bulb from the vagina.  I used an open sided  speculum to inspect the vaginal cuff.  The vaginal cuff was well approximated, with good apical support.  The vaginal cuff was copiously irrigated with sterile saline, and blotted dry with a sponge stick.  There was no evidence of any bleeding from the vaginal cuff.  The open sided speculum was removed, I changed my gloves, and proceeded back to the abdomen.  The abdominal cavity was re-insufflated to 15 mmHg.  A 5 mm 0° laparoscope was then reinserted into the umbilical trocar, and the surgical bed was again reinspected.  There was no evidence of any bleeding at this time, so I began to close the 11 mm port site.      With the aid of the Wilmar Endo Close the suprapubic port site fascia was closed with a 0 Vicryl suture under direct visualization.  I removed the umbilical and right lower quadrant trochars under direct visualization.  The camera was removed.  Finally, the seat to the left lower quadrant trocar was removed, allowing evacuation of all the patient's pneumoperitoneum.  Once the pneumoperitoneum was evacuated, the trocar cannula was removed.  The skin sites were then reapproximated with simple, interrupted, subcuticular 4-0 Monocryl sutures.  The incisions were then reinforced with Mastisol and Steri-Strips, and covered with a coverlet.  The patient was taken out of lithotomy position, awoken from her general anesthesia, and taken to the recovery room in satisfactory condition.  All counts were correct x2, and the patient received Levaquin and Flagyl as her preoperative antibiotics.    Dr. Chloe Stallings served as my surgical assistant for this procedure.  She assisted with holding the camera, retraction, suction, irrigation, and ligation of the uterine artery on her side of the procedure.    Yoan Yoo MD     Date: 4/6/2023  Time: 09:44 EDT

## 2023-04-06 NOTE — ANESTHESIA PREPROCEDURE EVALUATION
Anesthesia Evaluation     Patient summary reviewed and Nursing notes reviewed   no history of anesthetic complications:  NPO Solid Status: > 8 hours  NPO Liquid Status: > 2 hours           Airway   Mallampati: III  TM distance: >3 FB  Neck ROM: full  No difficulty expected  Dental    (+) edentulous    Pulmonary - normal exam    breath sounds clear to auscultation  (+) a smoker Former,   Cardiovascular - normal exam  Exercise tolerance: good (4-7 METS)    Rhythm: regular  Rate: normal    (+) hypertension, hyperlipidemia,       Neuro/Psych  (+) headaches, numbness, psychiatric history Anxiety,    GI/Hepatic/Renal/Endo    (+) obesity,   renal disease CRI,     Musculoskeletal     Abdominal    Substance History - negative use     OB/GYN negative ob/gyn ROS         Other   arthritis,      ROS/Med Hx Other: PAT Nursing Notes unavailable.                   Anesthesia Plan    ASA 3     general     (Patient understands anesthesia not responsible for dental damage.)  intravenous induction     Anesthetic plan, risks, benefits, and alternatives have been provided, discussed and informed consent has been obtained with: patient.    Use of blood products discussed with patient .   Plan discussed with CRNA.        CODE STATUS:

## 2023-04-07 VITALS
DIASTOLIC BLOOD PRESSURE: 82 MMHG | BODY MASS INDEX: 43.2 KG/M2 | OXYGEN SATURATION: 100 % | HEART RATE: 77 BPM | WEIGHT: 259.26 LBS | SYSTOLIC BLOOD PRESSURE: 164 MMHG | HEIGHT: 65 IN | TEMPERATURE: 98.6 F | RESPIRATION RATE: 16 BRPM

## 2023-04-07 LAB
ANION GAP SERPL CALCULATED.3IONS-SCNC: 9.5 MMOL/L (ref 5–15)
BASOPHILS # BLD AUTO: 0.03 10*3/MM3 (ref 0–0.2)
BASOPHILS NFR BLD AUTO: 0.2 % (ref 0–1.5)
BUN SERPL-MCNC: 14 MG/DL (ref 6–20)
BUN/CREAT SERPL: 9.9 (ref 7–25)
CALCIUM SPEC-SCNC: 9.1 MG/DL (ref 8.6–10.5)
CHLORIDE SERPL-SCNC: 105 MMOL/L (ref 98–107)
CO2 SERPL-SCNC: 23.5 MMOL/L (ref 22–29)
CREAT SERPL-MCNC: 1.42 MG/DL (ref 0.57–1)
DEPRECATED RDW RBC AUTO: 61.1 FL (ref 37–54)
EGFRCR SERPLBLD CKD-EPI 2021: 46.6 ML/MIN/1.73
EOSINOPHIL # BLD AUTO: 0.04 10*3/MM3 (ref 0–0.4)
EOSINOPHIL NFR BLD AUTO: 0.3 % (ref 0.3–6.2)
ERYTHROCYTE [DISTWIDTH] IN BLOOD BY AUTOMATED COUNT: 18.6 % (ref 12.3–15.4)
GLUCOSE SERPL-MCNC: 108 MG/DL (ref 65–99)
HCT VFR BLD AUTO: 33 % (ref 34–46.6)
HGB BLD-MCNC: 10.6 G/DL (ref 12–15.9)
IMM GRANULOCYTES # BLD AUTO: 0.04 10*3/MM3 (ref 0–0.05)
IMM GRANULOCYTES NFR BLD AUTO: 0.3 % (ref 0–0.5)
LYMPHOCYTES # BLD AUTO: 2.91 10*3/MM3 (ref 0.7–3.1)
LYMPHOCYTES NFR BLD AUTO: 23.6 % (ref 19.6–45.3)
MCH RBC QN AUTO: 28.3 PG (ref 26.6–33)
MCHC RBC AUTO-ENTMCNC: 32.1 G/DL (ref 31.5–35.7)
MCV RBC AUTO: 88.2 FL (ref 79–97)
MONOCYTES # BLD AUTO: 0.73 10*3/MM3 (ref 0.1–0.9)
MONOCYTES NFR BLD AUTO: 5.9 % (ref 5–12)
NEUTROPHILS NFR BLD AUTO: 69.7 % (ref 42.7–76)
NEUTROPHILS NFR BLD AUTO: 8.6 10*3/MM3 (ref 1.7–7)
NRBC BLD AUTO-RTO: 0 /100 WBC (ref 0–0.2)
PLATELET # BLD AUTO: 333 10*3/MM3 (ref 140–450)
PMV BLD AUTO: 9.9 FL (ref 6–12)
POTASSIUM SERPL-SCNC: 4 MMOL/L (ref 3.5–5.2)
RBC # BLD AUTO: 3.74 10*6/MM3 (ref 3.77–5.28)
SODIUM SERPL-SCNC: 138 MMOL/L (ref 136–145)
WBC NRBC COR # BLD: 12.35 10*3/MM3 (ref 3.4–10.8)

## 2023-04-07 PROCEDURE — 63710000001 CETIRIZINE 10 MG TABLET: Performed by: OBSTETRICS & GYNECOLOGY

## 2023-04-07 PROCEDURE — A9270 NON-COVERED ITEM OR SERVICE: HCPCS | Performed by: OBSTETRICS & GYNECOLOGY

## 2023-04-07 PROCEDURE — 63710000001 HYDRALAZINE 50 MG TABLET: Performed by: OBSTETRICS & GYNECOLOGY

## 2023-04-07 PROCEDURE — 63710000001 SERTRALINE 25 MG TABLET: Performed by: OBSTETRICS & GYNECOLOGY

## 2023-04-07 PROCEDURE — 85025 COMPLETE CBC W/AUTO DIFF WBC: CPT | Performed by: OBSTETRICS & GYNECOLOGY

## 2023-04-07 PROCEDURE — 80048 BASIC METABOLIC PNL TOTAL CA: CPT | Performed by: OBSTETRICS & GYNECOLOGY

## 2023-04-07 PROCEDURE — 63710000001 GABAPENTIN 300 MG CAPSULE: Performed by: OBSTETRICS & GYNECOLOGY

## 2023-04-07 PROCEDURE — 63710000001 ACETAMINOPHEN 325 MG TABLET: Performed by: OBSTETRICS & GYNECOLOGY

## 2023-04-07 PROCEDURE — 63710000001 OXYCODONE 5 MG TABLET: Performed by: OBSTETRICS & GYNECOLOGY

## 2023-04-07 PROCEDURE — 63710000001 CLINDAMYCIN 150 MG CAPSULE: Performed by: OBSTETRICS & GYNECOLOGY

## 2023-04-07 RX ORDER — HYDRALAZINE HYDROCHLORIDE 50 MG/1
50 TABLET, FILM COATED ORAL EVERY 8 HOURS SCHEDULED
Status: DISCONTINUED | OUTPATIENT
Start: 2023-04-07 | End: 2023-04-07 | Stop reason: HOSPADM

## 2023-04-07 RX ORDER — NALOXONE HYDROCHLORIDE 4 MG/.1ML
SPRAY NASAL
Qty: 2 EACH | Refills: 0 | Status: SHIPPED | OUTPATIENT
Start: 2023-04-07

## 2023-04-07 RX ORDER — OXYCODONE AND ACETAMINOPHEN 10; 325 MG/1; MG/1
1-2 TABLET ORAL EVERY 6 HOURS PRN
Qty: 26 TABLET | Refills: 0 | Status: SHIPPED | OUTPATIENT
Start: 2023-04-07

## 2023-04-07 RX ORDER — FUROSEMIDE 20 MG/1
20 TABLET ORAL DAILY PRN
Qty: 30 TABLET | Refills: 0 | Status: SHIPPED | OUTPATIENT
Start: 2023-04-07

## 2023-04-07 RX ADMIN — OXYCODONE 15 MG: 5 TABLET ORAL at 04:41

## 2023-04-07 RX ADMIN — SERTRALINE 50 MG: 25 TABLET, FILM COATED ORAL at 08:39

## 2023-04-07 RX ADMIN — HYDRALAZINE HYDROCHLORIDE 50 MG: 50 TABLET, FILM COATED ORAL at 08:39

## 2023-04-07 RX ADMIN — OXYCODONE 15 MG: 5 TABLET ORAL at 01:02

## 2023-04-07 RX ADMIN — CETIRIZINE HYDROCHLORIDE 10 MG: 10 TABLET, FILM COATED ORAL at 08:39

## 2023-04-07 RX ADMIN — CLINDAMYCIN HYDROCHLORIDE 150 MG: 150 CAPSULE ORAL at 08:39

## 2023-04-07 RX ADMIN — GABAPENTIN 300 MG: 300 CAPSULE ORAL at 08:39

## 2023-04-07 RX ADMIN — ACETAMINOPHEN 650 MG: 325 TABLET ORAL at 01:03

## 2023-04-07 RX ADMIN — ACETAMINOPHEN 650 MG: 325 TABLET ORAL at 07:13

## 2023-04-07 NOTE — PLAN OF CARE
Goal Outcome Evaluation:                 Patient is alert and oriented x4.  Vital signs stable.  Patient complained of pain this morning and was medicated per MAR.  Patient to be discharged home with self-care.  Discharge instructions provided to patient.  Patient verbalized understanding of discharge instructions.  IV removed with tip intact.

## 2023-04-07 NOTE — PLAN OF CARE
Goal Outcome Evaluation:  Plan of Care Reviewed With: patient        Progress: improving  Outcome Evaluation: Patient alert and oriented up ad escobar to bathroom. vital signs stable. voiding well.  medicated for pain as per MD order. continue plan of care.

## 2023-04-07 NOTE — PROGRESS NOTES
"GYN postop progress note    Subjective   The patient reports that her pain is controlled.  She is still having some pain.  She denies any vaginal bleeding.  She is voiding without difficulty.  She is passing flatus.  Tolerating regular diet without nausea or vomiting.  Desires discharge.    Objective   Physical Exam:  Incision: dressed, only very slight staining of blood on the bandages   Lungs: clear to auscultation   Abdomen:  The abdomen is appropriately tender to palpation around the incision   Extremities:  edema 1+ RLE           Vital Sign Min/Max    Temp  Min: 97 °F (36.1 °C)  Max: 98.8 °F (37.1 °C)   Pulse  Min: 66  Max: 91   Resp  Min: 14  Max: 21   BP  Min: 126/67  Max: 186/84   No data recorded   SpO2  Min: 91 %  Max: 100 %   Flow (L/min)  Min: 2  Max: 5     Flowsheet Rows    Flowsheet Row First Filed Value   Admission Height 165.1 cm (65\") Documented at 04/06/2023 0616   Admission Weight 118 kg (259 lb 4.2 oz) Documented at 04/06/2023 0616          Intake/Output last 24 hours:    Intake/Output Summary (Last 24 hours) at 4/7/2023 0803  Last data filed at 4/7/2023 0700  Gross per 24 hour   Intake 2360 ml   Output 2075 ml   Net 285 ml       Intake/Output this shift:  No intake/output data recorded.    Labs/Studies reviewed:  Yes   Radiology studies reviewed:   Not applicable  Medications reviewed:   Yes     Assessment & Plan     S/P laparoscopic hysterectomy    Endometriosis    Chronic pelvic pain in female    Abnormal uterine bleeding (AUB)      Post op day 1 Procedure(s):  TOTAL LAPAROSCOPIC HYSTERECTOMY BILATERAL SALPINGO OOPHORECTOMY  CYSTOSCOPY Doing well postoperatively..    Plan:   Stable postop course  Continue routine postop care  Plan for discharge home.  Discharge medications reviewed  Postop instructions/restrictions reviewed  Follow-up scheduled      Yoan Yoo MD  04/07/23  08:03 EDT          "

## 2023-04-10 LAB
CYTO UR: NORMAL
LAB AP CASE REPORT: NORMAL
LAB AP CLINICAL INFORMATION: NORMAL
PATH REPORT.FINAL DX SPEC: NORMAL
PATH REPORT.GROSS SPEC: NORMAL

## 2023-04-13 ENCOUNTER — TELEPHONE (OUTPATIENT)
Dept: OBSTETRICS AND GYNECOLOGY | Facility: CLINIC | Age: 43
End: 2023-04-13
Payer: MEDICARE

## 2023-04-13 DIAGNOSIS — Z90.710 S/P LAPAROSCOPIC HYSTERECTOMY: Primary | ICD-10-CM

## 2023-04-13 RX ORDER — TRAMADOL HYDROCHLORIDE 50 MG/1
50 TABLET ORAL EVERY 6 HOURS PRN
Qty: 26 TABLET | Refills: 0 | Status: SHIPPED | OUTPATIENT
Start: 2023-04-13 | End: 2024-04-12

## 2023-04-13 NOTE — TELEPHONE ENCOUNTER
Pt had a hysterectomy with you one week ago. She states she is unable to take nsaids and was told her pain may be somewhat harder to manage. She has completed her RX for percocet and states her pain is better but is still there.. She was wanting to know if something else could be called in. She stated she doesn't need anything as strong as the percocet. Please advise.

## 2023-04-14 RX ORDER — SODIUM CHLORIDE 9 MG/ML
250 INJECTION, SOLUTION INTRAVENOUS ONCE
OUTPATIENT
Start: 2023-04-24

## 2023-04-14 RX ORDER — ACETAMINOPHEN 325 MG/1
650 TABLET ORAL ONCE
OUTPATIENT
Start: 2023-04-24

## 2023-04-14 RX ORDER — FAMOTIDINE 10 MG/ML
20 INJECTION, SOLUTION INTRAVENOUS ONCE
Start: 2023-04-24 | End: 2023-04-17

## 2023-04-17 ENCOUNTER — TELEPHONE (OUTPATIENT)
Dept: ONCOLOGY | Facility: HOSPITAL | Age: 43
End: 2023-04-17
Payer: MEDICARE

## 2023-04-17 NOTE — TELEPHONE ENCOUNTER
Spoke with patient, advised that it should not be an issue to have both the iron infusion and the MRI on the same day, but to keep in mind that they are now giving IV benadryl with the type of iron infusion that she is getting and she will therefore need a . Patient verbalized understanding of the information discussed.

## 2023-04-17 NOTE — TELEPHONE ENCOUNTER
Caller: Bebe Steward    Relationship: Self    Best call back number: [phone]  What is the best time to reach you:     ANYTIME. LEAVE VM IF NEEDED.  Who are you requesting to speak with (clinical staff, provider,  specific staff member): FRED        What was the call regarding:PATIENT HAS AN MRI SCHEDULED AFTER HER IRON INFUSION ON 4/24/23. SHE WAS CALLING TO GET CLARIFICATION TO SEE IF HER IRON INFUSION WOULD AFFECT HER MRI AT ALL?.    Do you require a callback: YES

## 2023-04-18 ENCOUNTER — OFFICE VISIT (OUTPATIENT)
Dept: SURGERY | Facility: CLINIC | Age: 43
End: 2023-04-18
Payer: MEDICARE

## 2023-04-18 VITALS — HEART RATE: 76 BPM | HEIGHT: 65 IN | WEIGHT: 249 LBS | BODY MASS INDEX: 41.48 KG/M2

## 2023-04-18 DIAGNOSIS — D36.9 TUBULAR ADENOMA: ICD-10-CM

## 2023-04-18 DIAGNOSIS — Z98.890 STATUS POST COLONOSCOPY WITH POLYPECTOMY: Primary | ICD-10-CM

## 2023-04-18 RX ORDER — HYDROCODONE BITARTRATE AND ACETAMINOPHEN 7.5; 325 MG/1; MG/1
1 TABLET ORAL EVERY 6 HOURS PRN
COMMUNITY

## 2023-04-19 NOTE — PROGRESS NOTES
Chief Complaint: Post-op Follow-up    Subjective      Follow-up visit       History of Present Illness  Bebe Steward is a 43 y.o. female presents to [facility] for follow-up visit.    Patient presents today for follow-up visit after undergoing a colonoscopy on 3/23/2023 performed by Dr. Tian Smith.    Patient was with a tubular adenoma of the ascending and descending colon colonoscopy/pathology.  Resection and retrieval of both polyps was complete.    Pathology:  Clinical Information    Screening for malignant neoplasm of colon  Family history of colon cancer   Final Diagnosis   1.  Ascending colon polyp, biopsy:                            -Tubular adenoma        2.  Descending colon polyp, biopsy:                            -Tubular adenoma            Electronically signed by Albert Jarquin MD on 3/23/2023     The colonoscopy is performed without difficulty.  The patient tolerated the procedure well    Patient denies any postoperative complications.    Objective     Past Medical History:   Diagnosis Date   • Abnormal Pap smear of cervix 07/21/2020   • Allergies    • Anemia    • Ankle sprain     multiple   • Anxiety 2014   • Arthritis     knee   • Arthritis of back 2006   • Brain concussion 1995   • Broken bones 08/2014   • Cervical disc disorder 2012    Compressed disc   • Colon polyp 03/23/2023    removed   • CTS (carpal tunnel syndrome) 2022    Left wrist   • Endometriosis    • Essential hypertension 07/21/2020   • Fracture of ankle 2008    Left   • Fracture of wrist 2011    Left   • Fracture, fibula    • Fracture, tibia and fibula 2008    Left   • H/O Polycystic ovary syndrome    • Head injury 1996   • HPV (human papilloma virus) infection     last 2 pap clear   • Hyperlipidemia    • Hypoglycemia    • Insomnia 07/21/2020   • Knee sprain    • Knee swelling 2015   • Left below-knee amputee 07/21/2020   • Low back pain 2006   • Lumbosacral disc disease 2006   • Migraine    •  Muscle spasm 01/04/2021   • Neck strain    • Numbness in right foot    • Obesity    • 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   • Renal insufficiency 2020    right kidney atrophy   • Right foot pain 12/02/2020   • Right hip pain 07/21/2020   • Sinus trouble    • Stage 3 chronic kidney disease 09/01/2020    currently stage 2 3-24-23   • Stress fracture    • Subluxation of patella 1996    Left   • Thoracic disc disorder    • Urogenital trichomoniasis     resolved   • Visual impairment 1988    Near sighted   • Vitamin D deficiency 12/02/2020   • Wrist sprain        Past Surgical History:   Procedure Laterality Date   • ABDOMINAL SURGERY  2010    scar tissue removed   • ANKLE OPEN REDUCTION INTERNAL FIXATION  1612-2214    Left 11 surgeries total, external fixation   • ANKLE SURGERY      3/08, 5/08, 8/08, 12/10, 03/11, 2012, 2013, 2014   • BELOW KNEE AMPUTATION Left 2014   • CHOLECYSTECTOMY  2001   • COLONOSCOPY N/A 03/23/2023    Procedure: COLONOSCOPY WITH COLD SNARE POLYPECTOMIES;  Surgeon: Tian Smith MD;  Location: [facility];  Service: General;  Laterality: N/A;  COLON POLYPS   • CYSTOSCOPY N/A 4/6/2023    Procedure: CYSTOSCOPY;  Surgeon: Yoan Yoo MD;  Location: [facility];  Service: Gynecology;  Laterality: N/A;   • ENDOMETRIAL ABLATION  2007   • EPIDURAL BLOCK  2007   • FRACTURE SURGERY  2008    left ankle   • GASTRIC BYPASS  2001   • HYSTERECTOMY      Scheduled for 4/6/2023 (total)   • KNEE SURGERY      left 1996,1997 right 2015   • SPINAL FUSION  10/2008    L5/S1   • SPINE SURGERY  2008    lumbar spine   • TONSILLECTOMY  1994   • TOTAL LAPAROSCOPIC HYSTERECTOMY N/A 4/6/2023    Procedure: TOTAL LAPAROSCOPIC HYSTERECTOMY BILATERAL SALPINGO OOPHORECTOMY;  Surgeon: Yoan Yoo MD;  Location: [facility];  Service: Gynecology;  Laterality: N/A;   • TRIGGER POINT INJECTION  2021       Outpatient Medications Marked as Taking for the 4/18/23 encounter  (Office Visit) with Mabel Dean APRN   Medication Sig Dispense Refill   • atorvastatin (LIPITOR) 20 MG tablet TAKE ONE TABLET BY MOUTH DAILY (Patient taking differently: Take 1 tablet by mouth Daily.) 30 tablet 1   • docusate sodium (Colace) 100 MG capsule Take 1 capsule by mouth 2 (Two) Times a Day As Needed for Constipation. 60 capsule 5   • furosemide (LASIX) 20 MG tablet Take 1 tablet by mouth Daily As Needed (swelling). 30 tablet 0   • hydrALAZINE (APRESOLINE) 50 MG tablet TAKE ONE TABLET BY MOUTH THREE TIMES A DAY (Patient taking differently: Take 1 tablet by mouth 3 (Three) Times a Day.) 90 tablet 5   • HYDROcodone-acetaminophen (NORCO) 7.5-325 MG per tablet Take 1 tablet by mouth Every 6 (Six) Hours As Needed for Moderate Pain.     • hydrOXYzine (ATARAX) 25 MG tablet TAKE ONE TABLET BY MOUTH EVERY 8 HOURS AS NEEDED FOR ANXIETY (Patient taking differently: Take 1 tablet by mouth As Needed.) 90 tablet 1   • loratadine (CLARITIN) 10 MG tablet Take 1 tablet by mouth Daily for 90 days. (Patient taking differently: Take 1 tablet by mouth Every Night.) 90 tablet 3   • methocarbamol (ROBAXIN) 500 MG tablet TAKE ONE TABLET BY MOUTH TWICE A DAY AS NEEDED FOR MUSCLE SPASMS (Patient taking differently: Take 1 tablet by mouth every night at bedtime.) 180 tablet 0   • rizatriptan (MAXALT) 10 MG tablet TAKE ONE TABLET BY MOUTH AT ONSET OF HEADACHE; MAY REPEAT ONE TABLET IN 2 HOURS IF NEEDED. (Patient taking differently: Take 1 tablet by mouth 1 (One) Time As Needed.) 9 tablet 5   • sertraline (ZOLOFT) 50 MG tablet TAKE ONE TABLET BY MOUTH DAILY 30 tablet 1   • vitamin D (ERGOCALCIFEROL) 1.25 MG (70436 UT) capsule capsule Take 1 capsule by mouth Every 7 (Seven) Days. On Wednesdays         Allergies   Allergen Reactions   • Adhesive Tape Itching and Rash   • Cefaclor Rash   • Cephalexin Rash   • Cyclobenzaprine Other (See Comments)     Muscle spasms   • Erythromycin Rash   • Meperidine Headache   • Monosodium Glutamate  Other (See Comments)     Blood pressure drops, pass out    • Morphine Irritability   • Nsaids Unknown - High Severity     CKD    • Penicillins Anaphylaxis     1. When was your reaction? < 10 years ago  2. Did your reaction happen after the first dose? Do not know  3. Did your reaction happen after several doses? Do not know  4. Did your reaction require ED or hospital care to manage your reaction? Do not know  5. Did your reaction require treatment with epinephrine? Do not know  6. Have you taken amoxicillin (Amoxil) or amoxicillin-clavulanate (Augmentin) without issue since? no  7. Have you taken cephalexin (Keflex) without issue since?  No    • Sulfa Antibiotics Rash   • Sulfamethoxazole-Trimethoprim Rash   • Vancomycin Itching        Family History   Problem Relation Age of Onset   • Stroke Mother         3 TIAs   • Other Mother         Fibromyalgia   • Kidney disease Mother         Cause of death   • Anxiety disorder Mother    • Arthritis Mother    • Depression Mother    • Early death Mother         50 yrs old kidney disease   • Hyperlipidemia Mother    • Thyroid disease Mother    • Broken bones Mother         Leg   • Hypertension Mother    • Heart disease Father    • Kidney disease Father         Currently on dialysis   • Arthritis Father    • Depression Father    • Hyperlipidemia Father    • Vision loss Father         Glaucoma, some vision loss   • Anesthesia problems Father    • Rheumatologic disease Father         Gout   • Other Father         Gout   • Hypertension Father    • Anxiety disorder Son    • Colon cancer Paternal Aunt    • Cancer Paternal Aunt         Colon/liver   • Developmental Disability Paternal Aunt         Down Syndrome   • Anxiety disorder Maternal Grandmother    • Arthritis Maternal Grandmother    • Depression Maternal Grandmother    • Heart disease Maternal Grandmother    • Hyperlipidemia Maternal Grandmother    • Kidney disease Maternal Grandmother         Cause of death   • Liver  disease Maternal Grandmother         Alcohol abuse   • Thyroid disease Maternal Grandmother    • Osteoporosis Maternal Grandmother    • Miscarriages / Stillbirths Maternal Grandmother         Miscarriage   • Broken bones Maternal Grandmother         Clavicle/shoulder   • Alcohol abuse Maternal Grandmother    • Hypertension Maternal Grandmother    • Melanoma Maternal Grandfather    • Diabetes Maternal Grandfather         Type 2   • Stroke Maternal Grandfather    • Heart disease Maternal Grandfather    • Arthritis Maternal Grandfather    • Cancer Maternal Grandfather         Melanoma   • Hyperlipidemia Maternal Grandfather    • Hypertension Maternal Grandfather    • Colon cancer Paternal Grandmother 87   • Arthritis Paternal Grandmother    • Cancer Paternal Grandmother         Colon/kidney   • Hyperlipidemia Paternal Grandmother    • Kidney disease Paternal Grandmother    • Osteoporosis Paternal Grandmother    • Miscarriages / Stillbirths Paternal Grandmother         Miscarriage   • Rheumatologic disease Paternal Grandmother         Gout   • Broken bones Paternal Grandmother         Clavicle/shoulder   • Other Paternal Grandmother         Gout   • Hypertension Paternal Grandmother    • Arthritis Paternal Grandfather    • Heart disease Paternal Grandfather    • Hyperlipidemia Paternal Grandfather    • Kidney disease Paternal Grandfather    • Hypertension Paternal Grandfather    • Ovarian cancer Neg Hx    • Uterine cancer Neg Hx    • Breast cancer Neg Hx    • Prostate cancer Neg Hx    • Malig Hyperthermia Neg Hx        Social History     Socioeconomic History   • Marital status:    • Number of children: 3   Tobacco Use   • Smoking status: Former     Packs/day: 1.00     Years: 15.00     Pack years: 15.00     Types: Cigarettes     Start date: 1997     Quit date: 2021     Years since quittin.2     Passive exposure: Current   • Smokeless tobacco: Never   Vaping Use   • Vaping Use: Never used   Substance  "and Sexual Activity   • Alcohol use: Yes     Comment: Rarely. 1-2 times yearly   • Drug use: Never   • Sexual activity: Not Currently     Partners: Male     Birth control/protection: Abstinence     Comment: Chemical menopause. Hysterectomy scheduled for 4/6/23       Review of Systems   Constitutional: Negative for chills and fever.   Gastrointestinal: Negative for abdominal distention, abdominal pain, anal bleeding, blood in stool, constipation, diarrhea and rectal pain.        Vital Signs:   Pulse 76   Ht 165.1 cm (65\")   Wt 113 kg (249 lb)   BMI 41.44 kg/m²      Physical Exam  Vitals and nursing note reviewed.   Constitutional:       General: She is not in acute distress.     Appearance: Normal appearance.   HENT:      Head: Normocephalic.   Cardiovascular:      Rate and Rhythm: Normal rate.   Pulmonary:      Effort: Pulmonary effort is normal.   Abdominal:      Palpations: Abdomen is soft.      Tenderness: There is no guarding.   Musculoskeletal:         General: No deformity. Normal range of motion.   Skin:     General: Skin is warm and dry.      Coloration: Skin is not jaundiced.   Neurological:      General: No focal deficit present.      Mental Status: She is alert and oriented to person, place, and time.   Psychiatric:         Mood and Affect: Mood normal.         Thought Content: Thought content normal.          Result Review :          []  Laboratory  []  Radiology  []  Pathology  []  Microbiology  []  EKG/Telemetry   []  Cardiology/Vascular   []  Old records  Today I reviewed Dr. Smith's operative and pathology report.     Assessment and Plan    Diagnoses and all orders for this visit:    1. Status post colonoscopy with polypectomy (Primary)    2. Tubular adenoma        Follow Up   Return for Rescreen colon in 5 years; follow-up in the interim as needed.     Avoid high fat diets    Increase fiber intake    Per AGA guidelines patient will follow-up for colonoscopy surveillance as directed.    Patient " was given instructions and counseling regarding her condition or for health maintenance advice. Please see specific information pulled into the AVS if appropriate.

## 2023-04-24 ENCOUNTER — HOSPITAL ENCOUNTER (OUTPATIENT)
Dept: MRI IMAGING | Facility: HOSPITAL | Age: 43
Discharge: HOME | End: 2023-04-24
Admitting: FAMILY MEDICINE
Payer: MEDICARE

## 2023-04-24 DIAGNOSIS — M54.12 CERVICAL RADICULOPATHY: ICD-10-CM

## 2023-04-24 PROCEDURE — 72141 MRI NECK SPINE W/O DYE: CPT

## 2023-04-25 ENCOUNTER — OFFICE VISIT (OUTPATIENT)
Dept: OBSTETRICS AND GYNECOLOGY | Facility: CLINIC | Age: 43
End: 2023-04-25
Payer: MEDICARE

## 2023-04-25 VITALS
DIASTOLIC BLOOD PRESSURE: 91 MMHG | SYSTOLIC BLOOD PRESSURE: 132 MMHG | WEIGHT: 250 LBS | BODY MASS INDEX: 41.6 KG/M2 | HEART RATE: 85 BPM

## 2023-04-25 DIAGNOSIS — Z90.710 S/P LAPAROSCOPIC HYSTERECTOMY: ICD-10-CM

## 2023-04-25 DIAGNOSIS — Z48.89 POSTOPERATIVE VISIT: Primary | ICD-10-CM

## 2023-04-25 DIAGNOSIS — R30.0 DYSURIA: ICD-10-CM

## 2023-04-25 PROBLEM — R10.2 CHRONIC PELVIC PAIN IN FEMALE: Status: RESOLVED | Noted: 2023-01-11 | Resolved: 2023-04-25

## 2023-04-25 PROBLEM — G89.29 CHRONIC PELVIC PAIN IN FEMALE: Status: RESOLVED | Noted: 2023-01-11 | Resolved: 2023-04-25

## 2023-04-25 PROBLEM — R87.619 ABNORMAL PAP SMEAR OF CERVIX: Status: RESOLVED | Noted: 2020-07-21 | Resolved: 2023-04-25

## 2023-04-25 PROBLEM — N93.9 ABNORMAL UTERINE BLEEDING (AUB): Status: RESOLVED | Noted: 2023-01-11 | Resolved: 2023-04-25

## 2023-04-25 PROCEDURE — 3075F SYST BP GE 130 - 139MM HG: CPT | Performed by: OBSTETRICS & GYNECOLOGY

## 2023-04-25 PROCEDURE — 3080F DIAST BP >= 90 MM HG: CPT | Performed by: OBSTETRICS & GYNECOLOGY

## 2023-04-25 PROCEDURE — 87147 CULTURE TYPE IMMUNOLOGIC: CPT | Performed by: OBSTETRICS & GYNECOLOGY

## 2023-04-25 PROCEDURE — 87186 SC STD MICRODIL/AGAR DIL: CPT | Performed by: OBSTETRICS & GYNECOLOGY

## 2023-04-25 PROCEDURE — 87086 URINE CULTURE/COLONY COUNT: CPT | Performed by: OBSTETRICS & GYNECOLOGY

## 2023-04-25 PROCEDURE — 99024 POSTOP FOLLOW-UP VISIT: CPT | Performed by: OBSTETRICS & GYNECOLOGY

## 2023-04-25 RX ORDER — PHENAZOPYRIDINE HYDROCHLORIDE 200 MG/1
200 TABLET, FILM COATED ORAL 3 TIMES DAILY PRN
Qty: 20 TABLET | Refills: 0 | Status: SHIPPED | OUTPATIENT
Start: 2023-04-25 | End: 2024-04-24

## 2023-04-25 NOTE — PROGRESS NOTES
Post Operative Visit      CC: Post operative follow up    HPI:   Pain: Yes, mostly after urination that radiates up the left side.  Vaginal bleeding:  No  Vaginal discharge:  No  Fever/chills:  No  Good appetite:  Yes  Normal bladder function:  Yes, but some pain with urination   Normal bowel function:  Yes  Hot flashes: Occasional    Operative report, surgical findings and any pathology reviewed.    /91   Pulse 85   Wt 113 kg (250 lb)   LMP 05/19/2022   Breastfeeding No   BMI 41.60 kg/m²     Physical Exam  Vitals and nursing note reviewed.   Constitutional:       General: She is not in acute distress.     Appearance: Normal appearance. She is obese. She is not ill-appearing.   Abdominal:      General: Abdomen is flat. There is no distension.      Palpations: Abdomen is soft. There is no mass.      Tenderness: There is no abdominal tenderness. There is no guarding or rebound.      Hernia: No hernia is present.      Comments: The incisions are clean, dry, intact and well-healing.  There are no signs of infection.   Skin:     General: Skin is warm and dry.   Neurological:      Mental Status: She is alert and oriented to person, place, and time.   Psychiatric:         Mood and Affect: Mood normal.         Behavior: Behavior normal.         Thought Content: Thought content normal.         Judgment: Judgment normal.           ASSESSMENT:  Post-operative exam    Diagnoses and all orders for this visit:    1. Postoperative visit (Primary)  Assessment & Plan:  Stable postoperative course.  Suspect that the patient's symptoms are most likely related to normal postoperative changes post hysterectomy.  We will check urine culture to evaluate for urinary tract infection.  We will try Pyridium to see if this will improve the patient's symptoms  The patient will resume her Norco 7.5 that she uses for chronic pain  Continue pelvic rest  May resume other normal activities      2. Dysuria  -     Urine Culture - Urine,  Urine, Clean Catch; Future  -     phenazopyridine (Pyridium) 200 MG tablet; Take 1 tablet by mouth 3 (Three) Times a Day As Needed for Bladder Spasms.  Dispense: 20 tablet; Refill: 0  -     Urine Culture - Urine, Urine, Clean Catch    3. S/P laparoscopic hysterectomy        PLAN:    Any available photos and/or pathology were reviewed.  All questions answered.     Ok to resume normal activities  Abstinence until 8 weeks postop     Follow Up:  Return in about 3 weeks (around 5/16/2023) for Recheck.    Yoan Yoo MD  04/25/2023

## 2023-04-25 NOTE — ASSESSMENT & PLAN NOTE
Stable postoperative course.  Suspect that the patient's symptoms are most likely related to normal postoperative changes post hysterectomy.  We will check urine culture to evaluate for urinary tract infection.  We will try Pyridium to see if this will improve the patient's symptoms  The patient will resume her Norco 7.5 that she uses for chronic pain  Continue pelvic rest  May resume other normal activities

## 2023-04-27 DIAGNOSIS — N30.00 ACUTE CYSTITIS WITHOUT HEMATURIA: Primary | ICD-10-CM

## 2023-04-27 LAB — BACTERIA SPEC AEROBE CULT: ABNORMAL

## 2023-04-27 RX ORDER — CLINDAMYCIN HYDROCHLORIDE 300 MG/1
300 CAPSULE ORAL 3 TIMES DAILY
Qty: 21 CAPSULE | Refills: 0 | Status: SHIPPED | OUTPATIENT
Start: 2023-04-27 | End: 2023-05-04

## 2023-04-27 RX ORDER — LEVOFLOXACIN 250 MG/1
250 TABLET ORAL DAILY
Qty: 5 TABLET | Refills: 0 | Status: SHIPPED | OUTPATIENT
Start: 2023-04-27 | End: 2023-05-02

## 2023-05-01 ENCOUNTER — HOSPITAL ENCOUNTER (OUTPATIENT)
Dept: ONCOLOGY | Facility: HOSPITAL | Age: 43
Discharge: HOME | End: 2023-05-01
Admitting: INTERNAL MEDICINE
Payer: MEDICARE

## 2023-05-01 VITALS
DIASTOLIC BLOOD PRESSURE: 70 MMHG | HEART RATE: 68 BPM | SYSTOLIC BLOOD PRESSURE: 121 MMHG | RESPIRATION RATE: 18 BRPM | OXYGEN SATURATION: 97 % | TEMPERATURE: 98.1 F

## 2023-05-01 DIAGNOSIS — K95.89 IRON DEFICIENCY ANEMIA FOLLOWING BARIATRIC SURGERY: Primary | ICD-10-CM

## 2023-05-01 DIAGNOSIS — D50.8 IRON DEFICIENCY ANEMIA FOLLOWING BARIATRIC SURGERY: Primary | ICD-10-CM

## 2023-05-01 DIAGNOSIS — I10 ESSENTIAL HYPERTENSION: ICD-10-CM

## 2023-05-01 PROCEDURE — 63710000001 ACETAMINOPHEN 325 MG TABLET: Performed by: INTERNAL MEDICINE

## 2023-05-01 PROCEDURE — 96366 THER/PROPH/DIAG IV INF ADDON: CPT

## 2023-05-01 PROCEDURE — 25010000002 NA FERRIC GLUC CPLX PER 12.5 MG: Performed by: INTERNAL MEDICINE

## 2023-05-01 PROCEDURE — 96375 TX/PRO/DX INJ NEW DRUG ADDON: CPT

## 2023-05-01 PROCEDURE — 25010000002 DIPHENHYDRAMINE PER 50 MG: Performed by: INTERNAL MEDICINE

## 2023-05-01 PROCEDURE — 96365 THER/PROPH/DIAG IV INF INIT: CPT

## 2023-05-01 PROCEDURE — A9270 NON-COVERED ITEM OR SERVICE: HCPCS | Performed by: INTERNAL MEDICINE

## 2023-05-01 RX ORDER — SODIUM CHLORIDE 9 MG/ML
250 INJECTION, SOLUTION INTRAVENOUS ONCE
Status: COMPLETED | OUTPATIENT
Start: 2023-05-01 | End: 2023-05-01

## 2023-05-01 RX ORDER — ACETAMINOPHEN 325 MG/1
650 TABLET ORAL ONCE
Status: COMPLETED | OUTPATIENT
Start: 2023-05-01 | End: 2023-05-01

## 2023-05-01 RX ORDER — FAMOTIDINE 10 MG/ML
20 INJECTION, SOLUTION INTRAVENOUS ONCE
Status: COMPLETED | OUTPATIENT
Start: 2023-05-01 | End: 2023-05-01

## 2023-05-01 RX ADMIN — ACETAMINOPHEN 650 MG: 325 TABLET ORAL at 13:17

## 2023-05-01 RX ADMIN — FAMOTIDINE 20 MG: 10 INJECTION INTRAVENOUS at 13:18

## 2023-05-01 RX ADMIN — SODIUM CHLORIDE 250 MG: 9 INJECTION, SOLUTION INTRAVENOUS at 13:40

## 2023-05-01 RX ADMIN — SODIUM CHLORIDE 250 ML: 9 INJECTION, SOLUTION INTRAVENOUS at 13:17

## 2023-05-01 RX ADMIN — DIPHENHYDRAMINE HYDROCHLORIDE 25 MG: 50 INJECTION, SOLUTION INTRAMUSCULAR; INTRAVENOUS at 13:21

## 2023-05-02 RX ORDER — IRBESARTAN 75 MG/1
TABLET ORAL
Qty: 30 TABLET | Refills: 1 | Status: SHIPPED | OUTPATIENT
Start: 2023-05-02

## 2023-05-04 RX ORDER — ACETAMINOPHEN 325 MG/1
650 TABLET ORAL ONCE
OUTPATIENT
Start: 2023-05-22

## 2023-05-04 RX ORDER — SODIUM CHLORIDE 9 MG/ML
250 INJECTION, SOLUTION INTRAVENOUS ONCE
OUTPATIENT
Start: 2023-05-22

## 2023-05-04 RX ORDER — FAMOTIDINE 10 MG/ML
20 INJECTION, SOLUTION INTRAVENOUS ONCE
Start: 2023-05-22 | End: 2023-05-22

## 2023-05-04 RX ORDER — ACETAMINOPHEN 325 MG/1
650 TABLET ORAL ONCE
Status: CANCELLED | OUTPATIENT
Start: 2023-05-15

## 2023-05-04 RX ORDER — FAMOTIDINE 10 MG/ML
20 INJECTION, SOLUTION INTRAVENOUS ONCE
Status: CANCELLED
Start: 2023-05-15 | End: 2023-05-15

## 2023-05-04 RX ORDER — SODIUM CHLORIDE 9 MG/ML
250 INJECTION, SOLUTION INTRAVENOUS ONCE
Status: CANCELLED | OUTPATIENT
Start: 2023-05-15

## 2023-05-05 RX ORDER — RIZATRIPTAN BENZOATE 10 MG/1
10 TABLET ORAL ONCE AS NEEDED
Qty: 9 TABLET | Refills: 5 | Status: SHIPPED | OUTPATIENT
Start: 2023-05-05

## 2023-05-08 ENCOUNTER — HOSPITAL ENCOUNTER (OUTPATIENT)
Dept: ONCOLOGY | Facility: HOSPITAL | Age: 43
Discharge: HOME | End: 2023-05-08
Admitting: NURSE PRACTITIONER
Payer: MEDICARE

## 2023-05-08 VITALS
OXYGEN SATURATION: 99 % | RESPIRATION RATE: 18 BRPM | HEART RATE: 71 BPM | TEMPERATURE: 97.6 F | DIASTOLIC BLOOD PRESSURE: 75 MMHG | SYSTOLIC BLOOD PRESSURE: 148 MMHG

## 2023-05-08 DIAGNOSIS — D50.8 IRON DEFICIENCY ANEMIA FOLLOWING BARIATRIC SURGERY: Primary | ICD-10-CM

## 2023-05-08 DIAGNOSIS — K95.89 IRON DEFICIENCY ANEMIA FOLLOWING BARIATRIC SURGERY: Primary | ICD-10-CM

## 2023-05-08 PROCEDURE — 96365 THER/PROPH/DIAG IV INF INIT: CPT

## 2023-05-08 PROCEDURE — 96366 THER/PROPH/DIAG IV INF ADDON: CPT

## 2023-05-08 PROCEDURE — 25010000002 NA FERRIC GLUC CPLX PER 12.5 MG: Performed by: NURSE PRACTITIONER

## 2023-05-08 PROCEDURE — 25010000002 DIPHENHYDRAMINE PER 50 MG: Performed by: NURSE PRACTITIONER

## 2023-05-08 PROCEDURE — 63710000001 ACETAMINOPHEN 325 MG TABLET: Performed by: NURSE PRACTITIONER

## 2023-05-08 PROCEDURE — 96375 TX/PRO/DX INJ NEW DRUG ADDON: CPT

## 2023-05-08 PROCEDURE — A9270 NON-COVERED ITEM OR SERVICE: HCPCS | Performed by: NURSE PRACTITIONER

## 2023-05-08 RX ORDER — FAMOTIDINE 10 MG/ML
20 INJECTION, SOLUTION INTRAVENOUS ONCE
Status: COMPLETED | OUTPATIENT
Start: 2023-05-08 | End: 2023-05-08

## 2023-05-08 RX ORDER — ACETAMINOPHEN 325 MG/1
650 TABLET ORAL ONCE
Status: COMPLETED | OUTPATIENT
Start: 2023-05-08 | End: 2023-05-08

## 2023-05-08 RX ORDER — SODIUM CHLORIDE 9 MG/ML
250 INJECTION, SOLUTION INTRAVENOUS ONCE
Status: COMPLETED | OUTPATIENT
Start: 2023-05-08 | End: 2023-05-08

## 2023-05-08 RX ADMIN — FAMOTIDINE 20 MG: 10 INJECTION INTRAVENOUS at 13:22

## 2023-05-08 RX ADMIN — SODIUM CHLORIDE 250 ML: 9 INJECTION, SOLUTION INTRAVENOUS at 13:15

## 2023-05-08 RX ADMIN — ACETAMINOPHEN 650 MG: 325 TABLET ORAL at 13:21

## 2023-05-08 RX ADMIN — SODIUM CHLORIDE 250 MG: 9 INJECTION, SOLUTION INTRAVENOUS at 13:50

## 2023-05-08 RX ADMIN — DIPHENHYDRAMINE HYDROCHLORIDE 25 MG: 50 INJECTION, SOLUTION INTRAMUSCULAR; INTRAVENOUS at 13:25

## 2023-05-15 ENCOUNTER — HOSPITAL ENCOUNTER (OUTPATIENT)
Dept: ONCOLOGY | Facility: HOSPITAL | Age: 43
Discharge: HOME | End: 2023-05-15
Admitting: NURSE PRACTITIONER
Payer: MEDICARE

## 2023-05-15 VITALS
SYSTOLIC BLOOD PRESSURE: 132 MMHG | DIASTOLIC BLOOD PRESSURE: 68 MMHG | OXYGEN SATURATION: 96 % | TEMPERATURE: 98 F | RESPIRATION RATE: 18 BRPM | HEART RATE: 74 BPM

## 2023-05-15 DIAGNOSIS — D50.8 IRON DEFICIENCY ANEMIA FOLLOWING BARIATRIC SURGERY: Primary | ICD-10-CM

## 2023-05-15 DIAGNOSIS — K95.89 IRON DEFICIENCY ANEMIA FOLLOWING BARIATRIC SURGERY: Primary | ICD-10-CM

## 2023-05-15 PROCEDURE — 25010000002 NA FERRIC GLUC CPLX PER 12.5 MG: Performed by: NURSE PRACTITIONER

## 2023-05-15 PROCEDURE — 96366 THER/PROPH/DIAG IV INF ADDON: CPT

## 2023-05-15 PROCEDURE — 96375 TX/PRO/DX INJ NEW DRUG ADDON: CPT

## 2023-05-15 PROCEDURE — 25010000002 DIPHENHYDRAMINE PER 50 MG: Performed by: NURSE PRACTITIONER

## 2023-05-15 PROCEDURE — A9270 NON-COVERED ITEM OR SERVICE: HCPCS | Performed by: NURSE PRACTITIONER

## 2023-05-15 PROCEDURE — 96365 THER/PROPH/DIAG IV INF INIT: CPT

## 2023-05-15 PROCEDURE — 63710000001 ACETAMINOPHEN 325 MG TABLET: Performed by: NURSE PRACTITIONER

## 2023-05-15 RX ORDER — ACETAMINOPHEN 325 MG/1
650 TABLET ORAL ONCE
Status: COMPLETED | OUTPATIENT
Start: 2023-05-15 | End: 2023-05-15

## 2023-05-15 RX ORDER — SODIUM CHLORIDE 9 MG/ML
250 INJECTION, SOLUTION INTRAVENOUS ONCE
Status: COMPLETED | OUTPATIENT
Start: 2023-05-15 | End: 2023-05-15

## 2023-05-15 RX ORDER — FAMOTIDINE 10 MG/ML
20 INJECTION, SOLUTION INTRAVENOUS ONCE
Status: COMPLETED | OUTPATIENT
Start: 2023-05-15 | End: 2023-05-15

## 2023-05-15 RX ADMIN — DIPHENHYDRAMINE HYDROCHLORIDE 25 MG: 50 INJECTION, SOLUTION INTRAMUSCULAR; INTRAVENOUS at 13:41

## 2023-05-15 RX ADMIN — FAMOTIDINE 20 MG: 10 INJECTION INTRAVENOUS at 13:36

## 2023-05-15 RX ADMIN — SODIUM CHLORIDE 250 ML: 9 INJECTION, SOLUTION INTRAVENOUS at 13:33

## 2023-05-15 RX ADMIN — ACETAMINOPHEN 650 MG: 325 TABLET ORAL at 13:33

## 2023-05-15 RX ADMIN — SODIUM CHLORIDE 250 MG: 9 INJECTION, SOLUTION INTRAVENOUS at 14:00

## 2023-05-16 ENCOUNTER — OFFICE VISIT (OUTPATIENT)
Dept: FAMILY MEDICINE CLINIC | Facility: CLINIC | Age: 43
End: 2023-05-16
Payer: MEDICARE

## 2023-05-16 VITALS
OXYGEN SATURATION: 99 % | BODY MASS INDEX: 42.92 KG/M2 | WEIGHT: 257.6 LBS | TEMPERATURE: 97.9 F | HEIGHT: 65 IN | SYSTOLIC BLOOD PRESSURE: 148 MMHG | HEART RATE: 84 BPM | DIASTOLIC BLOOD PRESSURE: 88 MMHG | RESPIRATION RATE: 14 BRPM

## 2023-05-16 DIAGNOSIS — F51.01 PRIMARY INSOMNIA: ICD-10-CM

## 2023-05-16 DIAGNOSIS — G43.711 INTRACTABLE CHRONIC MIGRAINE WITHOUT AURA AND WITH STATUS MIGRAINOSUS: Primary | ICD-10-CM

## 2023-05-16 DIAGNOSIS — M54.12 CERVICAL RADICULOPATHY: ICD-10-CM

## 2023-05-16 RX ORDER — DARIDOREXANT 50 MG/1
TABLET, FILM COATED ORAL
COMMUNITY
End: 2023-05-18 | Stop reason: SDUPTHER

## 2023-05-16 NOTE — PROGRESS NOTES
Chief Complaint  Chief Complaint   Patient presents with   • Follow-up     Post-op hysterectomy   • Hypertension   • Neck Pain     mRI results    • Med Refill     Pain meds        HPI:  Bebe Steward presents to [facility]     Pt reports she had the hysterectomy and everything went well and she had a UTI and everything was normal and she had antibiotics for that.  Pt reports she will be getting a vaginal exam tomorrow and get checked.     Pt has 2 medications that insurance will not cover and one is Quvivq and she reports it was helping and she needs to have it sent to a specialty phamarcy.     Pt report she also needs to have maxalt for her migraines but insurance will not cover it.   We will be trying something else for her migraines.    Pt was fired from [facility]  For having percocet in her system.  Pt is getting Norco and gabapentin and admits she has taken her father percocet occasionally when the pain gets severe.     Cervical radiculopathy- pt MRI shows moderate degenerative disease with moderate left neural foraminal stenosis.  Pt reports of having radiating symptoms down her shoulders bilaterally.     Procedures     Past History:  Medical History: has a past medical history of Abnormal Pap smear of cervix (07/21/2020), Allergies, Anemia, Ankle sprain, Anxiety (2014), Arthritis, Arthritis of back (2006), Brain concussion (1995), Broken bones (08/2014), Cervical disc disorder (2012), Cholelithiasis (2001), Colon polyp (03/23/2023), CTS (carpal tunnel syndrome) (2022), Endometriosis, Essential hypertension (07/21/2020), Fracture of ankle (2008), Fracture of wrist (2011), Fracture, fibula, Fracture, tibia and fibula (2008), H/O Polycystic ovary syndrome, Head injury (1996), HPV (human papilloma virus) infection, Hyperlipidemia, Hypoglycemia, Insomnia (07/21/2020), Knee sprain, Knee swelling (2015), Left below-knee amputee (07/21/2020), Low back pain (2006),  Lumbosacral disc disease (2006), Migraine, Muscle spasm (01/04/2021), Neck strain, Numbness in right foot, Obesity, 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   Surgical History: has a past surgical history that includes Abdominal surgery (2010); Leg amputation, lower tibia/fibula (Left, 2014); Ankle surgery; Endometrial ablation (2007); Gastric bypass (2001); Knee surgery; Spine surgery (2008); Tonsillectomy (1994); Spinal fusion (10/2008); Epidural block injection (2007); Trigger point injection (2021); Ankle fracture surgery (3480-6596); Cholecystectomy (2001); Fracture surgery (2008); Hysterectomy; Colonoscopy (N/A, 03/23/2023); total laparoscopic hysterectomy (N/A, 04/06/2023); Cystoscopy (N/A, 04/06/2023); and Bariatric Surgery (03/01).   Family History: family history includes Alcohol abuse in her maternal grandmother; Anesthesia problems in her father; Anxiety disorder in her maternal grandmother, mother, and son; Arthritis in her father, maternal grandfather, maternal grandmother, mother, paternal grandfather, and paternal grandmother; Broken bones in her maternal grandmother, mother, and paternal grandmother; Cancer in her maternal grandfather, paternal aunt, and paternal grandmother; Colon cancer in her paternal aunt; Colon cancer (age of onset: 87) in her paternal grandmother; Depression in her father, maternal grandmother, and mother; Developmental Disability in her paternal aunt; Diabetes in her maternal grandfather; Early death in her mother; Heart disease in her father, maternal grandfather, maternal grandmother, and paternal grandfather; Hyperlipidemia in her  father, maternal grandfather, maternal grandmother, mother, paternal grandfather, and paternal grandmother; Hypertension in her father, maternal grandfather, maternal grandmother, mother, paternal grandfather, and paternal grandmother; Kidney disease in her father, maternal grandmother, mother, paternal grandfather, and paternal grandmother; Liver disease in her maternal grandmother; Melanoma in her maternal grandfather; Miscarriages / Stillbirths in her maternal grandmother and paternal grandmother; Osteoporosis in her maternal grandmother and paternal grandmother; Other in her father, mother, and paternal grandmother; Rheumatologic disease in her father and paternal grandmother; Stroke in her maternal grandfather and mother; Thyroid disease in her maternal grandmother and mother; Vision loss in her father.   Social History: reports that she quit smoking about 2 years ago. Her smoking use included cigarettes. She started smoking about 26 years ago. She has a 15.00 pack-year smoking history. She has been exposed to tobacco smoke. She has never used smokeless tobacco. She reports current alcohol use. She reports that she does not use drugs.  Immunization History   Administered Date(s) Administered   • COVID-19 (MODERNA) 1st,2nd,3rd Dose Monovalent 12/11/2021, 12/13/2021   • COVID-19 (MODERNA) Monovalent Original Booster 11/11/2021, 12/09/2021   • FluLaval/Fluzone >6mos 10/05/2021, 12/16/2022   • Influenza, Unspecified 12/02/2020, 12/16/2022, 12/16/2022         Allergies: Adhesive tape, Cefaclor, Cephalexin, Cyclobenzaprine, Erythromycin, Meperidine, Monosodium glutamate, Morphine, Nsaids, Penicillins, Sulfa antibiotics, Sulfamethoxazole-trimethoprim, and Vancomycin     Medications:  Current Outpatient Medications on File Prior to Visit   Medication Sig Dispense Refill   • atorvastatin (LIPITOR) 20 MG tablet TAKE ONE TABLET BY MOUTH DAILY (Patient taking differently: Take 1 tablet by mouth Daily.) 30 tablet 1   •  "docusate sodium (Colace) 100 MG capsule Take 1 capsule by mouth 2 (Two) Times a Day As Needed for Constipation. 60 capsule 5   • furosemide (LASIX) 20 MG tablet Take 1 tablet by mouth Daily As Needed (swelling). 30 tablet 0   • gabapentin (NEURONTIN) 300 MG capsule Take 1 capsule by mouth 3 (Three) Times a Day for 30 days. 90 capsule 0   • hydrALAZINE (APRESOLINE) 50 MG tablet TAKE ONE TABLET BY MOUTH THREE TIMES A DAY (Patient taking differently: Take 1 tablet by mouth 3 (Three) Times a Day.) 90 tablet 5   • hydrOXYzine (ATARAX) 25 MG tablet TAKE ONE TABLET BY MOUTH EVERY 8 HOURS AS NEEDED FOR ANXIETY (Patient taking differently: Take 1 tablet by mouth As Needed.) 90 tablet 1   • irbesartan (AVAPRO) 75 MG tablet TAKE ONE TABLET BY MOUTH ONCE NIGHTLY 30 tablet 1   • loratadine (CLARITIN) 10 MG tablet Take 1 tablet by mouth Daily for 90 days. (Patient taking differently: Take 1 tablet by mouth Every Night.) 90 tablet 3   • methocarbamol (ROBAXIN) 500 MG tablet TAKE ONE TABLET BY MOUTH TWICE A DAY AS NEEDED FOR MUSCLE SPASMS (Patient taking differently: Take 1 tablet by mouth every night at bedtime.) 180 tablet 0   • sertraline (ZOLOFT) 50 MG tablet TAKE ONE TABLET BY MOUTH DAILY 30 tablet 1   • vitamin D (ERGOCALCIFEROL) 1.25 MG (59811 UT) capsule capsule Take 1 capsule by mouth Every 7 (Seven) Days. On Wednesdays       No current facility-administered medications on file prior to visit.        Health Maintenance Due   Topic Date Due   • TDAP/TD VACCINES (1 - Tdap) Never done   • LIPID PANEL  02/10/2023       Vital Signs:   Vitals:    05/16/23 1434   BP: 148/88   BP Location: Right arm   Patient Position: Sitting   Pulse: 84   Resp: 14   Temp: 97.9 °F (36.6 °C)   SpO2: 99%   Weight: 117 kg (257 lb 9.6 oz)   Height: 165.1 cm (65\")      Body mass index is 42.87 kg/m².     ROS:  Review of Systems   Constitutional: Negative for fatigue and fever.   HENT: Negative for congestion, ear pain and sinus pressure.  "   Respiratory: Negative for cough, chest tightness and shortness of breath.    Cardiovascular: Negative for chest pain, palpitations and leg swelling.   Gastrointestinal: Negative for abdominal pain and diarrhea.   Genitourinary: Negative for dysuria and frequency.   Neurological: Negative for speech difficulty, headache and confusion.   Psychiatric/Behavioral: Negative for agitation and behavioral problems.          Physical Exam  Vitals reviewed.   Constitutional:       Appearance: Normal appearance.   HENT:      Right Ear: Tympanic membrane normal.      Left Ear: Tympanic membrane normal.      Nose: Nose normal.   Eyes:      Extraocular Movements: Extraocular movements intact.      Conjunctiva/sclera: Conjunctivae normal.      Pupils: Pupils are equal, round, and reactive to light.   Cardiovascular:      Rate and Rhythm: Normal rate and regular rhythm.   Pulmonary:      Effort: Pulmonary effort is normal.      Breath sounds: Normal breath sounds.   Abdominal:      General: Bowel sounds are normal.   Musculoskeletal:         General: Normal range of motion.      Cervical back: Normal range of motion.   Skin:     General: Skin is warm and dry.   Neurological:      General: No focal deficit present.      Mental Status: She is alert and oriented to person, place, and time.   Psychiatric:         Mood and Affect: Mood normal.         Behavior: Behavior normal.          Result Review   MRI Cervical Spine Without Contrast (04/24/2023 16:56)       Diagnoses and all orders for this visit:    1. Intractable chronic migraine without aura and with status migrainosus (Primary)    2. Primary insomnia    3. Cervical radiculopathy  -     Ambulatory Referral to Neurosurgery          Smoking Cessation:    Bebe Steward  reports that she quit smoking about 2 years ago. Her smoking use included cigarettes. She started smoking about 26 years ago. She has a 15.00 pack-year smoking history. She has been exposed to tobacco smoke. She  has never used smokeless tobacco.          Follow Up   Return in about 3 months (around 8/16/2023).  Patient was given instructions and counseling regarding her condition or for health maintenance advice. Please see specific information pulled into the AVS if appropriate.       Kimberly Almaguer MD

## 2023-05-17 DIAGNOSIS — M54.12 CERVICAL RADICULOPATHY: Primary | ICD-10-CM

## 2023-05-17 RX ORDER — GABAPENTIN 400 MG/1
400 CAPSULE ORAL 3 TIMES DAILY
Qty: 90 CAPSULE | Refills: 0 | Status: SHIPPED | OUTPATIENT
Start: 2023-05-17

## 2023-05-18 DIAGNOSIS — F51.01 PRIMARY INSOMNIA: Primary | ICD-10-CM

## 2023-05-19 RX ORDER — DARIDOREXANT 50 MG/1
50 TABLET, FILM COATED ORAL NIGHTLY
Qty: 60 TABLET | Refills: 2 | Status: SHIPPED | OUTPATIENT
Start: 2023-05-19 | End: 2023-07-18

## 2023-05-22 ENCOUNTER — HOSPITAL ENCOUNTER (OUTPATIENT)
Dept: ONCOLOGY | Facility: HOSPITAL | Age: 43
Discharge: HOME | End: 2023-05-22
Admitting: NURSE PRACTITIONER
Payer: MEDICARE

## 2023-05-22 VITALS
HEART RATE: 76 BPM | RESPIRATION RATE: 17 BRPM | TEMPERATURE: 97.5 F | OXYGEN SATURATION: 97 % | DIASTOLIC BLOOD PRESSURE: 63 MMHG | SYSTOLIC BLOOD PRESSURE: 106 MMHG

## 2023-05-22 DIAGNOSIS — D50.8 IRON DEFICIENCY ANEMIA FOLLOWING BARIATRIC SURGERY: Primary | ICD-10-CM

## 2023-05-22 DIAGNOSIS — K95.89 IRON DEFICIENCY ANEMIA FOLLOWING BARIATRIC SURGERY: Primary | ICD-10-CM

## 2023-05-22 PROCEDURE — A9270 NON-COVERED ITEM OR SERVICE: HCPCS | Performed by: NURSE PRACTITIONER

## 2023-05-22 PROCEDURE — 25010000002 DIPHENHYDRAMINE PER 50 MG: Performed by: NURSE PRACTITIONER

## 2023-05-22 PROCEDURE — 96366 THER/PROPH/DIAG IV INF ADDON: CPT

## 2023-05-22 PROCEDURE — 96365 THER/PROPH/DIAG IV INF INIT: CPT

## 2023-05-22 PROCEDURE — 25010000002 NA FERRIC GLUC CPLX PER 12.5 MG: Performed by: NURSE PRACTITIONER

## 2023-05-22 PROCEDURE — 96375 TX/PRO/DX INJ NEW DRUG ADDON: CPT

## 2023-05-22 PROCEDURE — 63710000001 ACETAMINOPHEN 325 MG TABLET: Performed by: NURSE PRACTITIONER

## 2023-05-22 RX ORDER — ACETAMINOPHEN 325 MG/1
650 TABLET ORAL ONCE
Status: COMPLETED | OUTPATIENT
Start: 2023-05-22 | End: 2023-05-22

## 2023-05-22 RX ORDER — FAMOTIDINE 10 MG/ML
20 INJECTION, SOLUTION INTRAVENOUS ONCE
Status: COMPLETED | OUTPATIENT
Start: 2023-05-22 | End: 2023-05-22

## 2023-05-22 RX ORDER — SODIUM CHLORIDE 9 MG/ML
250 INJECTION, SOLUTION INTRAVENOUS ONCE
Status: COMPLETED | OUTPATIENT
Start: 2023-05-22 | End: 2023-05-22

## 2023-05-22 RX ADMIN — FAMOTIDINE 20 MG: 10 INJECTION INTRAVENOUS at 13:32

## 2023-05-22 RX ADMIN — ACETAMINOPHEN 650 MG: 325 TABLET ORAL at 13:31

## 2023-05-22 RX ADMIN — SODIUM CHLORIDE 250 ML: 9 INJECTION, SOLUTION INTRAVENOUS at 13:31

## 2023-05-22 RX ADMIN — SODIUM CHLORIDE 250 MG: 9 INJECTION, SOLUTION INTRAVENOUS at 13:53

## 2023-05-22 RX ADMIN — DIPHENHYDRAMINE HYDROCHLORIDE 25 MG: 50 INJECTION, SOLUTION INTRAMUSCULAR; INTRAVENOUS at 13:36

## 2023-05-30 ENCOUNTER — OFFICE VISIT (OUTPATIENT)
Dept: OBSTETRICS AND GYNECOLOGY | Facility: CLINIC | Age: 43
End: 2023-05-30

## 2023-05-30 VITALS
HEIGHT: 65 IN | DIASTOLIC BLOOD PRESSURE: 83 MMHG | HEART RATE: 88 BPM | BODY MASS INDEX: 40.98 KG/M2 | SYSTOLIC BLOOD PRESSURE: 119 MMHG | WEIGHT: 246 LBS

## 2023-05-30 DIAGNOSIS — Z90.710 S/P LAPAROSCOPIC HYSTERECTOMY: ICD-10-CM

## 2023-05-30 DIAGNOSIS — N95.1 MENOPAUSAL SYMPTOMS: ICD-10-CM

## 2023-05-30 DIAGNOSIS — Z48.89 POSTOPERATIVE VISIT: Primary | ICD-10-CM

## 2023-05-30 PROCEDURE — 3079F DIAST BP 80-89 MM HG: CPT | Performed by: OBSTETRICS & GYNECOLOGY

## 2023-05-30 PROCEDURE — 3074F SYST BP LT 130 MM HG: CPT | Performed by: OBSTETRICS & GYNECOLOGY

## 2023-05-30 PROCEDURE — 99024 POSTOP FOLLOW-UP VISIT: CPT | Performed by: OBSTETRICS & GYNECOLOGY

## 2023-05-30 NOTE — ASSESSMENT & PLAN NOTE
Patient is complaining of severe hot flashes and night sweats.  Status post TLH and BSO for pelvic pain and endometriosis.  Now surgically menopausal.  Was symptom-free with Aygestin for add back therapy with Depo-Lupron.  We will start with Aygestin and if we do not have adequate symptom control then we could progress to estrogen replacement therapy.

## 2023-05-30 NOTE — ASSESSMENT & PLAN NOTE
Stable postop course  May resume normal activities  Continue pelvic rest for 8 weeks postop  Continue OTC Tylenol and or ibuprofen for any further postoperative pain

## 2023-06-01 RX ORDER — DOCUSATE SODIUM 100 MG/1
CAPSULE, LIQUID FILLED ORAL
Qty: 60 CAPSULE | Refills: 5 | Status: SHIPPED | OUTPATIENT
Start: 2023-06-01

## 2023-06-05 DIAGNOSIS — G89.29 CHRONIC PAIN OF RIGHT KNEE: ICD-10-CM

## 2023-06-05 DIAGNOSIS — Z89.512 HISTORY OF LEFT BELOW KNEE AMPUTATION: ICD-10-CM

## 2023-06-05 DIAGNOSIS — M25.561 CHRONIC PAIN OF RIGHT KNEE: ICD-10-CM

## 2023-06-05 DIAGNOSIS — M54.50 CHRONIC BILATERAL LOW BACK PAIN, UNSPECIFIED WHETHER SCIATICA PRESENT: ICD-10-CM

## 2023-06-05 DIAGNOSIS — M54.12 CERVICAL RADICULOPATHY: Primary | ICD-10-CM

## 2023-06-05 DIAGNOSIS — F51.01 PRIMARY INSOMNIA: ICD-10-CM

## 2023-06-05 DIAGNOSIS — G89.29 CHRONIC BILATERAL LOW BACK PAIN, UNSPECIFIED WHETHER SCIATICA PRESENT: ICD-10-CM

## 2023-06-06 ENCOUNTER — PATIENT MESSAGE (OUTPATIENT)
Dept: NEUROSURGERY | Facility: CLINIC | Age: 43
End: 2023-06-06
Payer: MEDICARE

## 2023-06-07 ENCOUNTER — OFFICE VISIT (OUTPATIENT)
Dept: NEUROSURGERY | Facility: CLINIC | Age: 43
End: 2023-06-07
Payer: MEDICARE

## 2023-06-07 VITALS
SYSTOLIC BLOOD PRESSURE: 131 MMHG | WEIGHT: 246 LBS | BODY MASS INDEX: 40.98 KG/M2 | DIASTOLIC BLOOD PRESSURE: 69 MMHG | HEART RATE: 78 BPM | HEIGHT: 65 IN

## 2023-06-07 DIAGNOSIS — M54.2 CERVICALGIA: ICD-10-CM

## 2023-06-07 DIAGNOSIS — M50.222 HERNIATED NUCLEUS PULPOSUS, C5-6: ICD-10-CM

## 2023-06-07 DIAGNOSIS — M50.221 HERNIATED NUCLEUS PULPOSUS, C4-5 LEFT: Primary | ICD-10-CM

## 2023-06-07 NOTE — PROGRESS NOTES
"Chief Complaint  Neck Pain, Shoulder Pain (Bilateral (left)), Arm Pain (Bilateral (left) leg arm to finger tips), and Numbness (Left hand)    Subjective          Bebe Steward who is a 43 y.o. year old female who presents to [facility] for Evaluation of the Spine.     The patient complains of pain located in the Cervical Spine.  Patients states the pain has been present for 4 months.  The pain came on acutely.  The pain scaled level is 5.  The pain does radiate. Dermatomes are located bilaterally Cervical at: always in the left shoulder, sometimes down both arms to the fingers on the left and to the wrist on the right.  The pain is constant and waxing/waning and described as burning.  The pain is worse at no particular time of day. Patient states nothing improves the pain.  Patient states Lifting makes the pain worse.    Associated Symptoms Include: Weakness in both hands intermittently. Numbness in left hand.  Conservative Interventions Include: Gabapentin that was ineffective. PT 10 years ago was ineffective for her left hand , but she did not have pain at that time.    Was this the result of an injury or accident? : No    History of Previous Spinal Surgery?: Yes.  Lumbar, Date 2008, L5-S1 fusion.     reports that she quit smoking about 2 years ago. Her smoking use included cigarettes. She started smoking about 26 years ago. She has a 15.00 pack-year smoking history. She has been exposed to tobacco smoke. She has never used smokeless tobacco.    Review of Systems   Musculoskeletal:  Positive for neck pain.      Objective   Vital Signs:   /69   Pulse 78   Ht 165.1 cm (65\")   Wt 112 kg (246 lb)   BMI 40.94 kg/m²       Physical Exam  Constitutional:       Appearance: Normal appearance. She is obese.   Pulmonary:      Effort: Pulmonary effort is normal.   Musculoskeletal:         General: Tenderness (midline cervical spine and bilateral cervical paraspinals, " trapezius and rhomboid) present.      Comments: SLR negative,  Cespedes's negative bilaterally,  Tinel's positive at the left wrist   Neurological:      General: No focal deficit present.      Mental Status: She is alert and oriented to person, place, and time.      Sensory: No sensory deficit.      Motor: Weakness (left hand ) present.      Deep Tendon Reflexes: Reflexes normal.   Psychiatric:         Mood and Affect: Mood normal.         Behavior: Behavior normal.      Neurologic Exam     Mental Status   Oriented to person, place, and time.      Result Review     I have personally reviewed the MRI of the cervical spine without contrast from 4/24/2023 which shows left paracentral disc bulging at C4-C5 with moderate left foraminal stenosis.  There is diffuse disc bulging at C5-C6 with moderate bilateral foraminal stenosis.  There is no significant central canal narrowing.     Assessment and Plan    Diagnoses and all orders for this visit:    1. Herniated nucleus pulposus, C4-5 left (Primary)    2. Herniated nucleus pulposus, C5-6    3. Cervicalgia    She has non-specific pain to all the fingers in the left arm.    She has some moderate left foraminal stenosis at C4-C5, which is the most significant change in the cervical MRI. She has some bilateral foraminal stenosis to a lesser degree at C5-C6. Overall, I don't feel that these changes explain her symptoms well and expect that surgery is less likely to help her arm pain.    She may consider physical therapy or CESB trial.    She has muscle tenderness around the bilateral cervical areas and may also benefit from TPI.    She is establishing with pain management. I would recommend that she discuss these treatment options with them.    She is deferring PT at this time.    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    The patient was counseled on basic recommendations for the reduction and prevention of back, neck, or spine pain in association with spinal disorders, including: cessation/avoidance of nicotine use, maintenance of a healthy BMI and weight, focusing on building/maintaining core strength through core exercise, and avoidance of activities which worsen the pain. The patient will monitor for changes in symptoms and notify our clinic of these changes as needed.    She will follow-up here after her NCV/EMG testing in July to reassess.    Follow Up   Return in about 6 weeks (around 7/19/2023).  Patient was given instructions and counseling regarding her condition or for health maintenance advice. Please see specific information pulled into the AVS if appropriate.

## 2023-06-08 ENCOUNTER — PATIENT MESSAGE (OUTPATIENT)
Dept: NEUROSURGERY | Facility: CLINIC | Age: 43
End: 2023-06-08
Payer: MEDICARE

## 2023-06-08 DIAGNOSIS — M54.2 CERVICALGIA: ICD-10-CM

## 2023-06-08 DIAGNOSIS — M50.221 HERNIATED NUCLEUS PULPOSUS, C4-5 LEFT: Primary | ICD-10-CM

## 2023-06-08 DIAGNOSIS — M50.222 HERNIATED NUCLEUS PULPOSUS, C5-6: ICD-10-CM

## 2023-06-08 RX ORDER — ATORVASTATIN CALCIUM 20 MG/1
TABLET, FILM COATED ORAL
Qty: 90 TABLET | Refills: 2 | Status: SHIPPED | OUTPATIENT
Start: 2023-06-08

## 2023-06-08 RX ORDER — METHOCARBAMOL 500 MG/1
TABLET, FILM COATED ORAL
Qty: 180 TABLET | Refills: 2 | Status: SHIPPED | OUTPATIENT
Start: 2023-06-08

## 2023-06-08 NOTE — TELEPHONE ENCOUNTER
Laura Figueroa 6/8/2023 11:21 AM EDT      ----- Message -----  From: Bebe Steward  Sent: 6/8/2023 11:13 AM EDT  To: [facility]  Subject: Referral     In my appt yesterday you said you would send a referral for my neck to pain management. My family doctor did not include my neck in her referral. Can you please send a referral for my neck to [facility]? Thank you so much.

## 2023-06-12 RX ORDER — DARIDOREXANT 50 MG/1
50 TABLET, FILM COATED ORAL NIGHTLY
Qty: 90 TABLET | Refills: 0 | Status: SHIPPED | OUTPATIENT
Start: 2023-06-12 | End: 2023-09-10

## 2023-06-13 DIAGNOSIS — F51.01 PRIMARY INSOMNIA: ICD-10-CM

## 2023-06-14 ENCOUNTER — OFFICE VISIT (OUTPATIENT)
Dept: PODIATRY | Facility: CLINIC | Age: 43
End: 2023-06-14
Payer: MEDICARE

## 2023-06-14 VITALS
TEMPERATURE: 97.3 F | DIASTOLIC BLOOD PRESSURE: 88 MMHG | BODY MASS INDEX: 40.32 KG/M2 | SYSTOLIC BLOOD PRESSURE: 138 MMHG | HEIGHT: 65 IN | HEART RATE: 87 BPM | OXYGEN SATURATION: 98 % | WEIGHT: 242 LBS

## 2023-06-14 DIAGNOSIS — L89.891 PRESSURE INJURY OF RIGHT FOOT, STAGE 1: ICD-10-CM

## 2023-06-14 DIAGNOSIS — L74.4 ANHIDROSIS: ICD-10-CM

## 2023-06-14 DIAGNOSIS — B35.1 ONYCHOMYCOSIS: ICD-10-CM

## 2023-06-14 DIAGNOSIS — M20.11 HALLUX VALGUS OF RIGHT FOOT: ICD-10-CM

## 2023-06-14 DIAGNOSIS — Z89.512 LEFT BELOW-KNEE AMPUTEE: ICD-10-CM

## 2023-06-14 DIAGNOSIS — M54.12 CERVICAL RADICULOPATHY: ICD-10-CM

## 2023-06-14 DIAGNOSIS — M79.671 FOOT PAIN, RIGHT: Primary | ICD-10-CM

## 2023-06-14 DIAGNOSIS — L60.0 ONYCHOCRYPTOSIS: ICD-10-CM

## 2023-06-14 RX ORDER — AMMONIUM LACTATE 12 G/100G
LOTION TOPICAL 2 TIMES DAILY
Qty: 225 G | Refills: 11 | Status: SHIPPED | OUTPATIENT
Start: 2023-06-14

## 2023-06-14 NOTE — PROGRESS NOTES
[facility] AVILES - PODIATRY    Today's Date: 06/14/23    Patient Name: Bebe Steward  MRN: [id number]  CSN: [id number]  PCP: Kimberly Almaguer MD, Last PCP Visit: 16 May 2023  Referring Provider: No ref. provider found    SUBJECTIVE     Chief Complaint   Patient presents with    Right Foot - Bunions, Ingrown Toenail     Began having discomfotr over the past couple of weeks over the bunion      HPI: Bebe Steward, a 43 y.o.female, comes to clinic.    New, Established, New Problem:  New    Location:  Toenails    Duration:   Greater than one month    Onset:  Gradual    Nature:  sore with palpation.    Stable, worsening, improving:   Worsening    Aggravating factors:  Pain with shoe gear and ambulation.    Previous Treatment:  unable to trim their own toenails    Complains of significant incurvated medial and lateral nail borders on the right first toe which is bothered her for a long period of time.  _______________________________________________________________________    New, Established, New Problem:  New, second problem    Location:  hyperkeratotic lesion(s) on plantar left fifth metatarsal head area    Duration:  Greater than one month    Onset:  gradual    Nature:  sore    Stable, worsening, improving:  worsening    Aggravating factors:  Patient reports lesion(s) is painful with shoegear and ambulation.      Previous Treatment:   None  _______________________________________________________________________    New, Established, New Problem: Third new problem  Location: Bilateral feet  Duration:   Greater than 1 month  Onset: Insidious  Nature: Dry skin  Stable, worsening, improving:  Worsening  Aggravating factors:     Previous Treatment:  Slowly worsening despite using OTC skin lotion.    Patient relates left below-knee amputation due to multiple failed surgeries for comminuted left ankle fracture.    Patient denies any fevers, chills, nausea, vomiting, shortness of breath, nor any other  constitutional signs nor symptoms.       Past Medical History:   Diagnosis Date    Abnormal Pap smear of cervix 07/21/2020    Allergies     Anemia     Ankle sprain     multiple    Anxiety 2014    Arthritis     knee    Arthritis of back 2006    Brain concussion 1995    Broken bones 08/2014    Callus     Cervical disc disorder 2012    Compressed disc    Cholelithiasis 2001    Gallbladder surgery 11/01    Colon polyp 03/23/2023    removed    CTS (carpal tunnel syndrome) 2022    Left wrist    Difficulty walking     Endometriosis     Essential hypertension 07/21/2020    Fracture of ankle 2008    Left    Fracture of wrist 2011    Left    Fracture, fibula     Fracture, tibia and fibula 2008    Left    H/O Polycystic ovary syndrome     Head injury 1996    HPV (human papilloma virus) infection     last 2 pap clear    Hyperlipidemia     Hypoglycemia     Ingrown toenail     Insomnia 07/21/2020    Knee sprain     Knee swelling 2015    Left below-knee amputee 07/21/2020    Low back pain 2006    Lumbosacral disc disease 2006    Migraine     Muscle spasm 01/04/2021    Neck strain     Numbness in right foot     Obesity     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    Renal insufficiency 2020    right kidney atrophy    Right foot pain 12/02/2020    Right hip pain 07/21/2020    Sinus trouble     Stage 3 chronic kidney disease 09/01/2020    currently stage 2 3-24-23    Stress fracture     Subluxation of patella 1996    Left    Thoracic disc disorder     Urogenital trichomoniasis     resolved    Visual impairment 1988    Near sighted    Vitamin D deficiency 12/02/2020    Wrist sprain      Past Surgical History:   Procedure Laterality Date    ABDOMINAL SURGERY  2010    scar tissue removed    ANKLE OPEN REDUCTION INTERNAL FIXATION  [id number]    Left 11 surgeries total, external fixation    ANKLE SURGERY      3/08, 5/08, 8/08, 12/10, 03/11, 2012, 2013, 2014    BACK SURGERY  10/08    BARIATRIC SURGERY  03/01    BELOW KNEE  AMPUTATION Left 2014    CHOLECYSTECTOMY  2001    COLONOSCOPY N/A 03/23/2023    Procedure: COLONOSCOPY WITH COLD SNARE POLYPECTOMIES;  Surgeon: Tian Smith MD;  Location: [facility] ENDOSCOPY;  Service: General;  Laterality: N/A;  COLON POLYPS    CYSTOSCOPY N/A 04/06/2023    Procedure: CYSTOSCOPY;  Surgeon: Yoan Yoo MD;  Location: [facility] MAIN OR;  Service: Gynecology;  Laterality: N/A;    ENDOMETRIAL ABLATION  2007    EPIDURAL BLOCK  2007    FRACTURE SURGERY  2008    left ankle    GASTRIC BYPASS  2001    HYSTERECTOMY      Scheduled for 4/6/2023 (total)    KNEE SURGERY      left 1996,1997 right 2015    SPINAL FUSION  10/2008    L5/S1    SPINE SURGERY  2008    lumbar spine    TONSILLECTOMY  1994    TOTAL LAPAROSCOPIC HYSTERECTOMY N/A 04/06/2023    Procedure: TOTAL LAPAROSCOPIC HYSTERECTOMY BILATERAL SALPINGO OOPHORECTOMY;  Surgeon: Yoan Yoo MD;  Location: [facility] MAIN OR;  Service: Gynecology;  Laterality: N/A;    TRIGGER POINT INJECTION  2021     Family History   Problem Relation Age of Onset    Stroke Mother         3 TIAs    Other Mother         Fibromyalgia    Kidney disease Mother         Cause of death    Anxiety disorder Mother     Arthritis Mother     Depression Mother     Early death Mother         50 yrs old kidney disease    Hyperlipidemia Mother     Thyroid disease Mother     Broken bones Mother         Leg    Hypertension Mother     Heart disease Father     Kidney disease Father         Currently on dialysis    Arthritis Father     Depression Father     Hyperlipidemia Father     Vision loss Father         Glaucoma, some vision loss    Anesthesia problems Father     Rheumatologic disease Father         Gout    Other Father         Gout    Hypertension Father     Anxiety disorder Son     Colon cancer Paternal Aunt     Cancer Paternal Aunt         Colon/liver    Developmental Disability Paternal Aunt         Down Syndrome    Anxiety disorder Maternal Grandmother     Arthritis  Maternal Grandmother     Depression Maternal Grandmother     Heart disease Maternal Grandmother     Hyperlipidemia Maternal Grandmother     Kidney disease Maternal Grandmother         Cause of death    Liver disease Maternal Grandmother         Alcohol abuse    Thyroid disease Maternal Grandmother     Osteoporosis Maternal Grandmother     Miscarriages / Stillbirths Maternal Grandmother         Miscarriage    Broken bones Maternal Grandmother         Clavicle/shoulder    Alcohol abuse Maternal Grandmother     Hypertension Maternal Grandmother     Melanoma Maternal Grandfather     Diabetes Maternal Grandfather         Type 2    Stroke Maternal Grandfather     Heart disease Maternal Grandfather     Arthritis Maternal Grandfather     Cancer Maternal Grandfather         Melanoma    Hyperlipidemia Maternal Grandfather     Hypertension Maternal Grandfather     Colon cancer Paternal Grandmother 87    Arthritis Paternal Grandmother     Cancer Paternal Grandmother         Colon/kidney    Hyperlipidemia Paternal Grandmother     Kidney disease Paternal Grandmother     Osteoporosis Paternal Grandmother     Miscarriages / Stillbirths Paternal Grandmother         Miscarriage    Rheumatologic disease Paternal Grandmother         Gout    Broken bones Paternal Grandmother         Clavicle/shoulder    Other Paternal Grandmother         Gout    Hypertension Paternal Grandmother     Arthritis Paternal Grandfather     Heart disease Paternal Grandfather     Hyperlipidemia Paternal Grandfather     Kidney disease Paternal Grandfather     Hypertension Paternal Grandfather     Ovarian cancer Neg Hx     Uterine cancer Neg Hx     Breast cancer Neg Hx     Prostate cancer Neg Hx     Malig Hyperthermia Neg Hx      Social History     Socioeconomic History    Marital status:     Number of children: 3   Tobacco Use    Smoking status: Former     Packs/day: 1.00     Years: 15.00     Pack years: 15.00     Types: Cigarettes     Start date:  1997     Quit date: 2021     Years since quittin.4     Passive exposure: Current    Smokeless tobacco: Never   Vaping Use    Vaping Use: Never used   Substance and Sexual Activity    Alcohol use: Yes     Comment: Rarely. 1-2 times yearly    Drug use: Never    Sexual activity: Not Currently     Partners: Male     Birth control/protection: Abstinence, Hysterectomy     Comment: Chemical menopause. Hysterectomy scheduled for 21     Allergies   Allergen Reactions    Adhesive Tape Itching and Rash    Cefaclor Rash    Cephalexin Rash    Cyclobenzaprine Other (See Comments)     Muscle spasms    Erythromycin Rash    Meperidine Headache    Monosodium Glutamate Other (See Comments)     Blood pressure drops, pass out     Morphine Irritability    Nsaids Unknown - High Severity     CKD     Penicillins Anaphylaxis     1. When was your reaction? < 10 years ago  2. Did your reaction happen after the first dose? Do not know  3. Did your reaction happen after several doses? Do not know  4. Did your reaction require ED or hospital care to manage your reaction? Do not know  5. Did your reaction require treatment with epinephrine? Do not know  6. Have you taken amoxicillin (Amoxil) or amoxicillin-clavulanate (Augmentin) without issue since? no  7. Have you taken cephalexin (Keflex) without issue since?  No     Sulfa Antibiotics Rash    Sulfamethoxazole-Trimethoprim Rash    Vancomycin Itching     Current Outpatient Medications   Medication Sig Dispense Refill    atorvastatin (LIPITOR) 20 MG tablet TAKE ONE TABLET BY MOUTH DAILY 90 tablet 2    Daridorexant HCl (Quviviq) 50 MG tablet Take 1 tablet by mouth Every Night for 90 days. 90 tablet 0    docusate sodium (COLACE) 100 MG capsule TAKE ONE CAPSULE BY MOUTH TWICE A DAY AS NEEDED FOR CONSTIPATION 60 capsule 5    furosemide (LASIX) 20 MG tablet Take 1 tablet by mouth Daily As Needed (swelling). 30 tablet 0    gabapentin (NEURONTIN) 400 MG capsule Take 1 capsule by mouth 3  (Three) Times a Day. 90 capsule 0    irbesartan (AVAPRO) 75 MG tablet TAKE ONE TABLET BY MOUTH ONCE NIGHTLY 30 tablet 1    methocarbamol (ROBAXIN) 500 MG tablet TAKE ONE TABLET BY MOUTH TWICE A DAY AS NEEDED FOR MUSCLE SPASMS 180 tablet 2    norethindrone (Aygestin) 5 MG tablet Take 1 tablet by mouth Daily. 30 tablet 3    sertraline (ZOLOFT) 50 MG tablet TAKE ONE TABLET BY MOUTH DAILY 90 tablet 2    vitamin D (ERGOCALCIFEROL) 1.25 MG (54454 UT) capsule capsule Take 1 capsule by mouth Every 7 (Seven) Days. On Wednesdays      ammonium lactate (AmLactin) 12 % lotion Apply  topically to the appropriate area as directed 2 (Two) Times a Day. 225 g 11     No current facility-administered medications for this visit.     Review of Systems   Constitutional: Negative.    Skin:         Painful toenails and hyperkeratotic lesions.   All other systems reviewed and are negative.    OBJECTIVE     Vitals:    06/14/23 0740   BP: 138/88   Pulse: 87   Temp: 97.3 °F (36.3 °C)   SpO2: 98%       Patient seen in no apparent distress.      PHYSICAL EXAM:     Foot/Ankle Exam    GENERAL  Appearance:  appears stated age and obese  Orientation:  AAOx3  Affect:  appropriate  Assistance:  independent (Left below-knee amputation prosthetic)  Right shoe gear: casual shoe  Left shoe gear: casual shoe    VASCULAR     Right Foot Vascularity   Dorsalis pedis:  2+  Posterior tibial:  2+  Skin temperature:  warm  Edema grading:  None  CFT:  < 3 seconds  Pedal hair growth:  Present  Varicosities:  mild varicosities     NEUROLOGIC     Right Foot Neurologic   Normal sensation    Light touch sensation: normal  Vibratory sensation: normal  Hot/Cold sensation: normal  Protective Sensation using [place] Monofilament:   Sites intact: 10  Sites tested: 10    MUSCULOSKELETAL     Right Foot Musculoskeletal   Hallux valgus: Yes       Left Foot Musculoskeletal    Amputation   Below left knee: Yes      MUSCLE STRENGTH     Right Foot Muscle Strength   Foot  dorsiflexion:  4  Foot plantar flexion:  4  Foot inversion:  4  Foot eversion:  4    RANGE OF MOTION     Right Foot Range of Motion   Foot and ankle ROM within normal limits       Left Foot Range of Motion   Foot and ankle ROM within normal limits      DERMATOLOGIC      Right Foot Dermatologic   Skin  Positive for dryness and ulcer.   Nails  1.  Positive for elongated, onychomycosis, abnormal thickness, subungual debris and ingrown toenail.  2.  Positive for elongated, onychomycosis, abnormal thickness, subungual debris and ingrown toenail.  3.  Positive for elongated, onychomycosis, abnormal thickness, subungual debris and ingrown toenail.  4.  Positive for elongated, onychomycosis, abnormal thickness, subungual debris and ingrown toenail.  5.  Positive for elongated, onychomycosis, abnormal thickness, subungual debris and ingrown toenail.  Nails comment:  Toenails 1, 2, 3, 4, and 5     Left Foot Dermatologic   Nails comment:  Toenails 1, 2, 3, 4, and 5     Right foot additional comments: Stage 1 ulceration on the plantar fifth metatarsal head area of the foot.  Hyperkeratotic tissue with subepidermal hemosiderin deposition is present.  No surrounding, edema, erythema, lymphangitis, fluctuance, nor signs of infection.  No drainage present.  17 mm x 12 mm x 3 mm.  This area was debrided via excisional partial thickness debridement with a 10 scalpel blade.  Post debridement measurements were 15 mm x 10 mm x 1 mm in depth.    XR Foot 3+ View Right    Result Date: 6/14/2023  Narrative: IN-OFFICE IMAGING:  Standing, weightbearing, 3 view, AP, MO, Lateral, right foot Indication: Foot pain Findings: Medial deviation of the first metatarsal with associated lateral deviation of the hallux at the metatarsal phalangeal joint.  Mildly contracted lesser digits.  No periosteal reactions nor osteolytic changes seen.  No occult fractures seen. Comparison: No comparison views available.       ASSESSMENT/PLAN     Diagnoses and all  orders for this visit:    1. Foot pain, right (Primary)    2. Hallux valgus of right foot    3. Left below-knee amputee    4. Onychomycosis    5. Onychocryptosis    6. Pressure injury of right foot, stage 1    7. Anhidrosis  -     ammonium lactate (AmLactin) 12 % lotion; Apply  topically to the appropriate area as directed 2 (Two) Times a Day.  Dispense: 225 g; Refill: 11        Comprehensive lower extremity examination and evaluation was performed.    Discussed findings and treatment plan including risks, benefits, and treatment options with patient in detail. Patient agreed with treatment plan.    Right first through fifth toenails were debrided in thickness and length and then smoothed with a Dremel Tool.  Tolerated the procedure well without complications.    Due to the patient's left below-knee amputation and prosthetic status, I would not recommend any surgical correction of her right hallux abductovalgus deformity.  Recommend OTC silicone padding as needed for this area.  The patient states understanding and agreement with this plan.    An After Visit Summary was printed and given to the patient at discharge, including (if requested) any available informative/educational handouts regarding diagnosis, treatment, or medications. All questions were answered to patient/family satisfaction. Should symptoms fail to improve or worsen they agree to call or return to clinic or to go to the Emergency Department. Discussed the importance of following up with any needed screening tests/labs/specialist appointments and any requested follow-up recommended by me today. Importance of maintaining follow-up discussed and patient accepts that missed appointments can delay diagnosis and potentially lead to worsening of conditions.    Return in about 1 week (around 6/21/2023) for P and A Procedure on right great total toenail; 9 weeks for nail care & ulcerative lesion., or sooner if acute issues arise.    This document has been  electronically signed by William Marti DPM on June 14, 2023 08:37 EDT

## 2023-06-15 DIAGNOSIS — F51.01 PRIMARY INSOMNIA: ICD-10-CM

## 2023-06-15 RX ORDER — DARIDOREXANT 50 MG/1
50 TABLET, FILM COATED ORAL NIGHTLY
Qty: 90 TABLET | Refills: 0 | Status: SHIPPED | OUTPATIENT
Start: 2023-06-15 | End: 2023-06-15 | Stop reason: SDUPTHER

## 2023-06-15 RX ORDER — DARIDOREXANT 50 MG/1
50 TABLET, FILM COATED ORAL NIGHTLY
Qty: 90 TABLET | Refills: 0 | Status: SHIPPED | OUTPATIENT
Start: 2023-06-15 | End: 2023-09-13

## 2023-06-15 RX ORDER — GABAPENTIN 400 MG/1
CAPSULE ORAL
Qty: 90 CAPSULE | Refills: 2 | Status: SHIPPED | OUTPATIENT
Start: 2023-06-15 | End: 2023-07-15

## 2023-07-10 PROBLEM — G56.03 BILATERAL CARPAL TUNNEL SYNDROME: Status: ACTIVE | Noted: 2023-07-10

## 2023-07-10 PROBLEM — G89.29 CHRONIC NECK PAIN: Status: ACTIVE | Noted: 2023-07-10

## 2023-07-10 PROBLEM — M54.2 CHRONIC NECK PAIN: Status: ACTIVE | Noted: 2023-07-10

## 2023-07-10 PROBLEM — G56.02 CARPAL TUNNEL SYNDROME OF LEFT WRIST: Status: RESOLVED | Noted: 2022-01-10 | Resolved: 2023-07-10

## 2023-07-20 PROBLEM — M25.461 EFFUSION OF RIGHT KNEE: Status: ACTIVE | Noted: 2023-07-20

## 2023-07-20 PROBLEM — S89.91XA RIGHT KNEE INJURY, INITIAL ENCOUNTER: Status: ACTIVE | Noted: 2022-12-16

## 2023-07-24 ENCOUNTER — LAB (OUTPATIENT)
Dept: ONCOLOGY | Facility: HOSPITAL | Age: 43
End: 2023-07-24
Payer: MEDICARE

## 2023-07-24 DIAGNOSIS — D50.9 IRON DEFICIENCY ANEMIA, UNSPECIFIED IRON DEFICIENCY ANEMIA TYPE: ICD-10-CM

## 2023-07-24 LAB
ALBUMIN SERPL-MCNC: 4.1 G/DL (ref 3.5–5.2)
ALBUMIN/GLOB SERPL: 1.5 G/DL
ALP SERPL-CCNC: 90 U/L (ref 39–117)
ALT SERPL W P-5'-P-CCNC: 13 U/L (ref 1–33)
ANION GAP SERPL CALCULATED.3IONS-SCNC: 6.8 MMOL/L (ref 5–15)
AST SERPL-CCNC: 11 U/L (ref 1–32)
BASOPHILS # BLD AUTO: 0.02 10*3/MM3 (ref 0–0.2)
BASOPHILS NFR BLD AUTO: 0.2 % (ref 0–1.5)
BILIRUB SERPL-MCNC: <0.2 MG/DL (ref 0–1.2)
BUN SERPL-MCNC: 16 MG/DL (ref 6–20)
BUN/CREAT SERPL: 13.7 (ref 7–25)
CALCIUM SPEC-SCNC: 8.9 MG/DL (ref 8.6–10.5)
CHLORIDE SERPL-SCNC: 103 MMOL/L (ref 98–107)
CO2 SERPL-SCNC: 24.2 MMOL/L (ref 22–29)
CREAT SERPL-MCNC: 1.17 MG/DL (ref 0.57–1)
DEPRECATED RDW RBC AUTO: 70.3 FL (ref 37–54)
EGFRCR SERPLBLD CKD-EPI 2021: 58.8 ML/MIN/1.73
EOSINOPHIL # BLD AUTO: 0.27 10*3/MM3 (ref 0–0.4)
EOSINOPHIL NFR BLD AUTO: 2.8 % (ref 0.3–6.2)
ERYTHROCYTE [DISTWIDTH] IN BLOOD BY AUTOMATED COUNT: 18.1 % (ref 12.3–15.4)
FERRITIN SERPL-MCNC: 286.3 NG/ML (ref 13–150)
GLOBULIN UR ELPH-MCNC: 2.8 GM/DL
GLUCOSE SERPL-MCNC: 90 MG/DL (ref 65–99)
HCT VFR BLD AUTO: 39.5 % (ref 34–46.6)
HGB BLD-MCNC: 12.2 G/DL (ref 12–15.9)
IMM GRANULOCYTES # BLD AUTO: 0.02 10*3/MM3 (ref 0–0.05)
IMM GRANULOCYTES NFR BLD AUTO: 0.2 % (ref 0–0.5)
IRON 24H UR-MRATE: 96 MCG/DL (ref 37–145)
IRON SATN MFR SERPL: 27 % (ref 20–50)
LYMPHOCYTES # BLD AUTO: 3.46 10*3/MM3 (ref 0.7–3.1)
LYMPHOCYTES NFR BLD AUTO: 36.1 % (ref 19.6–45.3)
MCH RBC QN AUTO: 32.3 PG (ref 26.6–33)
MCHC RBC AUTO-ENTMCNC: 30.9 G/DL (ref 31.5–35.7)
MCV RBC AUTO: 104.5 FL (ref 79–97)
MONOCYTES # BLD AUTO: 0.52 10*3/MM3 (ref 0.1–0.9)
MONOCYTES NFR BLD AUTO: 5.4 % (ref 5–12)
NEUTROPHILS NFR BLD AUTO: 5.29 10*3/MM3 (ref 1.7–7)
NEUTROPHILS NFR BLD AUTO: 55.3 % (ref 42.7–76)
PLATELET # BLD AUTO: 405 10*3/MM3 (ref 140–450)
PMV BLD AUTO: 9.5 FL (ref 6–12)
POTASSIUM SERPL-SCNC: 4.5 MMOL/L (ref 3.5–5.2)
PROT SERPL-MCNC: 6.9 G/DL (ref 6–8.5)
RBC # BLD AUTO: 3.78 10*6/MM3 (ref 3.77–5.28)
SODIUM SERPL-SCNC: 134 MMOL/L (ref 136–145)
TIBC SERPL-MCNC: 356 MCG/DL (ref 298–536)
TRANSFERRIN SERPL-MCNC: 239 MG/DL (ref 200–360)
WBC NRBC COR # BLD: 9.58 10*3/MM3 (ref 3.4–10.8)

## 2023-07-24 PROCEDURE — 83540 ASSAY OF IRON: CPT

## 2023-07-24 PROCEDURE — 36415 COLL VENOUS BLD VENIPUNCTURE: CPT

## 2023-07-24 PROCEDURE — 85025 COMPLETE CBC W/AUTO DIFF WBC: CPT

## 2023-07-24 PROCEDURE — 84466 ASSAY OF TRANSFERRIN: CPT

## 2023-07-24 PROCEDURE — 80053 COMPREHEN METABOLIC PANEL: CPT

## 2023-07-24 PROCEDURE — 82728 ASSAY OF FERRITIN: CPT

## 2023-07-26 ENCOUNTER — OFFICE VISIT (OUTPATIENT)
Dept: ONCOLOGY | Facility: HOSPITAL | Age: 43
End: 2023-07-26
Payer: MEDICARE

## 2023-07-26 VITALS
TEMPERATURE: 97.5 F | BODY MASS INDEX: 40.96 KG/M2 | OXYGEN SATURATION: 98 % | SYSTOLIC BLOOD PRESSURE: 130 MMHG | WEIGHT: 245.81 LBS | RESPIRATION RATE: 18 BRPM | HEIGHT: 65 IN | DIASTOLIC BLOOD PRESSURE: 73 MMHG | HEART RATE: 73 BPM

## 2023-07-26 DIAGNOSIS — Z80.0 FAMILY HISTORY OF COLON CANCER: ICD-10-CM

## 2023-07-26 DIAGNOSIS — K95.89 IRON DEFICIENCY ANEMIA FOLLOWING BARIATRIC SURGERY: Primary | ICD-10-CM

## 2023-07-26 DIAGNOSIS — D50.8 IRON DEFICIENCY ANEMIA FOLLOWING BARIATRIC SURGERY: Primary | ICD-10-CM

## 2023-07-26 PROBLEM — M48.02 FORAMINAL STENOSIS OF CERVICAL REGION: Status: ACTIVE | Noted: 2023-07-06

## 2023-07-26 PROBLEM — G54.6 PHANTOM LIMB PAIN: Status: ACTIVE | Noted: 2023-07-06

## 2023-07-26 PROBLEM — M54.12 CERVICAL RADICULOPATHY: Status: ACTIVE | Noted: 2023-07-06

## 2023-07-26 PROCEDURE — G0463 HOSPITAL OUTPT CLINIC VISIT: HCPCS | Performed by: INTERNAL MEDICINE

## 2023-07-26 NOTE — PROGRESS NOTES
Chief Complaint/Care Team   IRON DEFICIENCY     Kimberly Almaguer MD Coffie, Ramona N, MD    History of Present Illness     Diagnosis: Iron Deficiency Anemia secondary to gastric bypass surgery    History of B12 deficiency currently on oral B12 tablets  Pt had c-scope in 3/2023, had couple polyps removed    Current Treatment: IV iron infusions as needed, as well as oral iron tablet.  Previous Treatment: Oral iron as well as IV iron infusions.    Bebe Steward is a 43 y.o. female who presents to [facility]AT[facility]OGY for discussion of treatment for iron-deficiency anemia secondary to gastric bypass surgery.    Patient reports receiving IV iron in the past for iron deficiency anemia which resulted from gastric bypass surgery.  She reports she was initially placed on oral iron which did not improve her iron levels resulting in her being placed on IV iron infusions.  She also reports taking sublingual B12, but denies anyone has checked her B12 levels in a while.  She reports receiving IV iron fusion in the past is not sure of the type of IV iron she received at that time.  She only reports experiencing migraine headaches after IV iron infusion.    Family history: Patient reports paternal grandmother with anemia, mother with pernicious anemia as well as both of her parents have an anemia.  She also reports several family members with CKD.      Patient has past medical history significant for chronic kidney disease.  She also had a below the knee amputation secondary to a ankle fracture that required 11 surgeries and failed to be healed.    She reports current ulcer at the area of where her prosthesis does rub against her skin.  She was prescribed an antibiotic, but has not picked up the medication yet.    Patient with a hysterectomy scheduled for next week and reports she has been prescribed medications to induce menopause as result of her heavy menstrual periods.    Patient last  colonoscopy was in March 23, 2023, which revealed 2 polyps which were removed.    Interval history: Patient to follow-up regarding iron-deficiency anemia secondary to malabsorption due to gastric bypass surgery.  Patient reports her father was diagnosed with a condition that retains iron too much.  Last IV infusion was in May 2023.  Patient underwent hysterectomy in April 2023.  Patient denies any blood loss or melena.  Patient underwent colonoscopy in March 2023 which revealed 2 polyps    Review of Systems   Constitutional:  Positive for fatigue. Negative for appetite change, diaphoresis, fever, unexpected weight gain and unexpected weight loss.   HENT:  Negative for hearing loss, mouth sores, sore throat, swollen glands, trouble swallowing and voice change.    Eyes:  Negative for blurred vision, double vision, pain, redness and visual disturbance.   Respiratory:  Negative for cough, shortness of breath and wheezing.    Cardiovascular:  Negative for chest pain, palpitations and leg swelling.   Gastrointestinal:  Negative for abdominal pain, blood in stool, constipation, diarrhea, nausea and vomiting.   Endocrine: Negative for cold intolerance, heat intolerance, polydipsia and polyuria.   Genitourinary:  Negative for decreased urine volume, difficulty urinating, dysuria, frequency, hematuria and urinary incontinence.   Musculoskeletal:  Negative for arthralgias, back pain, joint swelling and myalgias.   Skin:  Negative for color change, rash, skin lesions and wound.   Neurological:  Negative for dizziness, seizures, weakness, numbness and headache.   Hematological:  Negative for adenopathy. Does not bruise/bleed easily.   Psychiatric/Behavioral:  Negative for depressed mood. The patient is not nervous/anxious.    All other systems reviewed and are negative.     Oncology/Hematology History    No history exists.       Objective     Vitals:    07/26/23 1010   BP: 130/73   Pulse: 73   Resp: 18   Temp: 97.5 °F (36.4 °C)  "  TempSrc: Temporal   SpO2: 98%   Weight: 112 kg (245 lb 13 oz)   Height: 165.1 cm (65\")   PainSc: Comment: no value     ECOG score: 0         PHQ-9 Total Score:         Physical Exam  Vitals reviewed. Exam conducted with a chaperone present.   Constitutional:       General: She is not in acute distress.     Appearance: Normal appearance.   HENT:      Head: Normocephalic and atraumatic.   Eyes:      Extraocular Movements: Extraocular movements intact.      Conjunctiva/sclera: Conjunctivae normal.   Pulmonary:      Effort: Pulmonary effort is normal.   Musculoskeletal:      Cervical back: Normal range of motion and neck supple.   Skin:     General: Skin is warm and dry.      Findings: No bruising.   Neurological:      Mental Status: She is oriented to person, place, and time.         Past Medical History     Past Medical History:   Diagnosis Date    Abnormal Pap smear of cervix 07/21/2020    Allergies     Anemia     Ankle sprain     multiple    Anxiety 2014    Arthritis     knee    Arthritis of back 2006    Bilateral carpal tunnel syndrome 07/10/2023    Brain concussion 1995    Broken bones 08/2014    Callus     Cervical disc disorder 2012    Compressed disc    Cholelithiasis 2001    Gallbladder surgery 11/01    Colon polyp 03/23/2023    removed    CTS (carpal tunnel syndrome) 2022    Left wrist    Difficulty walking     Endometriosis     Essential hypertension 07/21/2020    Fracture of ankle 2008    Left    Fracture of wrist 2011    Left    Fracture, fibula     Fracture, tibia and fibula 2008    Left    H/O Polycystic ovary syndrome     Head injury 1996    HPV (human papilloma virus) infection     last 2 pap clear    Hyperlipidemia     Hypoglycemia     Ingrown toenail     Insomnia 07/21/2020    Knee sprain     Knee swelling 2015    Left below-knee amputee 07/21/2020    Low back pain 2006    Low back strain     Lumbosacral disc disease 2006    Migraine     Muscle spasm 01/04/2021    Neck strain     Numbness in right " foot     Obesity     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    Renal insufficiency 2020    right kidney atrophy    Right foot pain 12/02/2020    Right hip pain 07/21/2020    Sinus trouble     Stage 3 chronic kidney disease 09/01/2020    currently stage 2 3-24-23    Stress fracture     Subluxation of patella 1996    Left    Tear of meniscus of knee 1996    Thoracic disc disorder     Urogenital trichomoniasis     resolved    Visual impairment 1988    Near sighted    Vitamin D deficiency 12/02/2020    Wrist sprain      Current Outpatient Medications on File Prior to Visit   Medication Sig Dispense Refill    ammonium lactate (AmLactin) 12 % lotion Apply  topically to the appropriate area as directed 2 (Two) Times a Day. 225 g 11    atorvastatin (LIPITOR) 20 MG tablet TAKE ONE TABLET BY MOUTH DAILY 90 tablet 2    docusate sodium (COLACE) 100 MG capsule TAKE ONE CAPSULE BY MOUTH TWICE A DAY AS NEEDED FOR CONSTIPATION 60 capsule 5    furosemide (LASIX) 20 MG tablet Take 1 tablet by mouth Daily As Needed (swelling). 30 tablet 0    hydrALAZINE (APRESOLINE) 50 MG tablet Take 1 tablet by mouth 3 (Three) Times a Day.      hydrOXYzine (ATARAX) 25 MG tablet Take 1 tablet by mouth 3 (Three) Times a Day As Needed for Anxiety (1 or 2 as needed for anxiety).      irbesartan (AVAPRO) 75 MG tablet TAKE ONE TABLET BY MOUTH ONCE NIGHTLY 90 tablet 1    loratadine (CLARITIN) 10 MG tablet Take 1 tablet by mouth Daily.      methocarbamol (ROBAXIN) 500 MG tablet TAKE ONE TABLET BY MOUTH TWICE A DAY AS NEEDED FOR MUSCLE SPASMS 180 tablet 2    norethindrone (Aygestin) 5 MG tablet Take 1 tablet by mouth Daily. 30 tablet 3    sertraline (Zoloft) 100 MG tablet Take 1 tablet by mouth Daily for 90 days. 90 tablet 1    traMADol (ULTRAM) 50 MG tablet Take 1 tablet by mouth Every 6 (Six) Hours As Needed for Moderate Pain. 21 tablet 0    vitamin D (ERGOCALCIFEROL) 1.25 MG (37911 UT) capsule capsule Take 1 capsule by mouth Every 7  (Seven) Days. On Wednesdays      zolpidem CR (Ambien CR) 6.25 MG CR tablet Take 1 tablet by mouth At Night As Needed for Sleep for up to 90 days. 90 tablet 1    gabapentin (NEURONTIN) 400 MG capsule TAKE ONE CAPSULE BY MOUTH THREE TIMES A DAY 90 capsule 2     No current facility-administered medications on file prior to visit.      Allergies   Allergen Reactions    Adhesive Tape Itching and Rash    Cefaclor Rash    Cephalexin Rash    Cyclobenzaprine Other (See Comments)     Muscle spasms    Erythromycin Rash    Meperidine Headache    Monosodium Glutamate Other (See Comments)     Blood pressure drops, pass out     Morphine Irritability    Nsaids Unknown - High Severity     CKD     Penicillins Anaphylaxis     1. When was your reaction? < 10 years ago  2. Did your reaction happen after the first dose? Do not know  3. Did your reaction happen after several doses? Do not know  4. Did your reaction require ED or hospital care to manage your reaction? Do not know  5. Did your reaction require treatment with epinephrine? Do not know  6. Have you taken amoxicillin (Amoxil) or amoxicillin-clavulanate (Augmentin) without issue since? no  7. Have you taken cephalexin (Keflex) without issue since?  No     Sulfa Antibiotics Rash    Sulfamethoxazole-Trimethoprim Rash    Vancomycin Itching     Past Surgical History:   Procedure Laterality Date    ABDOMINAL SURGERY  2010    scar tissue removed    ACHILLES TENDON SURGERY      ANKLE OPEN REDUCTION INTERNAL FIXATION  [id number]    Left 11 surgeries total, external fixation    ANKLE SURGERY      3/08, 5/08, 8/08, 12/10, 03/11, 2012, 2013, 2014    AVULSION TOENAIL PLATE Right 06/29/2023    BIG TOE    BACK SURGERY  10/08    BARIATRIC SURGERY  03/01    BELOW KNEE AMPUTATION Left 2014    CHOLECYSTECTOMY  2001    COLONOSCOPY N/A 03/23/2023    Procedure: COLONOSCOPY WITH COLD SNARE POLYPECTOMIES;  Surgeon: Tian Smith MD;  Location: [facility] ENDOSCOPY;  Service: General;  Laterality:  N/A;  COLON POLYPS    CYSTOSCOPY N/A 2023    Procedure: CYSTOSCOPY;  Surgeon: Yoan Yoo MD;  Location: [facility] MAIN OR;  Service: Gynecology;  Laterality: N/A;    ENDOMETRIAL ABLATION      EPIDURAL BLOCK      FRACTURE SURGERY      left ankle    GASTRIC BYPASS      HYSTERECTOMY      Scheduled for 2023 (total)    KNEE SURGERY      left ,1997 right     SPINAL FUSION  10/2008    L5/S1    SPINE SURGERY      lumbar spine    TONSILLECTOMY      TOTAL LAPAROSCOPIC HYSTERECTOMY N/A 2023    Procedure: TOTAL LAPAROSCOPIC HYSTERECTOMY BILATERAL SALPINGO OOPHORECTOMY;  Surgeon: Yoan Yoo MD;  Location: [facility] MAIN OR;  Service: Gynecology;  Laterality: N/A;    TRIGGER POINT INJECTION       Social History     Socioeconomic History    Marital status:     Number of children: 3   Tobacco Use    Smoking status: Former     Packs/day: 1.00     Years: 15.00     Pack years: 15.00     Types: Cigarettes     Start date: 1997     Quit date: 2021     Years since quittin.5     Passive exposure: Current    Smokeless tobacco: Never   Vaping Use    Vaping Use: Never used   Substance and Sexual Activity    Alcohol use: Yes     Comment: Rarely. 1-2 times yearly    Drug use: Never    Sexual activity: Not Currently     Partners: Male     Birth control/protection: Abstinence, Hysterectomy     Comment: Chemical menopause. Hysterectomy scheduled for 23     Family History   Problem Relation Age of Onset    Stroke Mother         3 TIAs    Other Mother         Fibromyalgia    Kidney disease Mother         Cause of death    Anxiety disorder Mother     Arthritis Mother     Depression Mother     Early death Mother         50 yrs old kidney disease    Hyperlipidemia Mother     Thyroid disease Mother     Broken bones Mother         Leg    Hypertension Mother     Heart disease Father     Kidney disease Father         Currently on dialysis    Arthritis Father      Depression Father     Hyperlipidemia Father     Vision loss Father         Glaucoma, some vision loss    Anesthesia problems Father     Rheumatologic disease Father         Gout    Other Father         Gout    Hypertension Father     Anxiety disorder Son     Colon cancer Paternal Aunt     Cancer Paternal Aunt         Colon/liver    Developmental Disability Paternal Aunt         Down Syndrome    Anxiety disorder Maternal Grandmother     Arthritis Maternal Grandmother     Depression Maternal Grandmother     Heart disease Maternal Grandmother     Hyperlipidemia Maternal Grandmother     Kidney disease Maternal Grandmother         Cause of death    Liver disease Maternal Grandmother         Alcohol abuse    Thyroid disease Maternal Grandmother     Osteoporosis Maternal Grandmother     Miscarriages / Stillbirths Maternal Grandmother         Miscarriage    Broken bones Maternal Grandmother         Clavicle/shoulder    Alcohol abuse Maternal Grandmother     Hypertension Maternal Grandmother     Melanoma Maternal Grandfather     Diabetes Maternal Grandfather         Type 2    Stroke Maternal Grandfather     Heart disease Maternal Grandfather     Arthritis Maternal Grandfather     Cancer Maternal Grandfather         Melanoma    Hyperlipidemia Maternal Grandfather     Hypertension Maternal Grandfather     Colon cancer Paternal Grandmother 87    Arthritis Paternal Grandmother     Cancer Paternal Grandmother         Colon/kidney    Hyperlipidemia Paternal Grandmother     Kidney disease Paternal Grandmother     Osteoporosis Paternal Grandmother     Miscarriages / Stillbirths Paternal Grandmother         Miscarriage    Rheumatologic disease Paternal Grandmother         Gout    Broken bones Paternal Grandmother         Clavicle/shoulder    Other Paternal Grandmother         Gout    Hypertension Paternal Grandmother     Arthritis Paternal Grandfather     Heart disease Paternal Grandfather     Hyperlipidemia Paternal Grandfather      Kidney disease Paternal Grandfather     Hypertension Paternal Grandfather     Ovarian cancer Neg Hx     Uterine cancer Neg Hx     Breast cancer Neg Hx     Prostate cancer Neg Hx     Malig Hyperthermia Neg Hx        Results     Result Review   The following data was reviewed by: Chavo George MD on 03/28/2023:  Lab Results   Component Value Date    HGB 12.2 07/24/2023    HCT 39.5 07/24/2023    .5 (H) 07/24/2023     07/24/2023    WBC 9.58 07/24/2023    NEUTROABS 5.29 07/24/2023    LYMPHSABS 3.46 (H) 07/24/2023    MONOSABS 0.52 07/24/2023    EOSABS 0.27 07/24/2023    BASOSABS 0.02 07/24/2023     Lab Results   Component Value Date    GLUCOSE 90 07/24/2023    BUN 16 07/24/2023    CREATININE 1.17 (H) 07/24/2023     (L) 07/24/2023    K 4.5 07/24/2023     07/24/2023    CO2 24.2 07/24/2023    CALCIUM 8.9 07/24/2023    PROTEINTOT 6.9 07/24/2023    ALBUMIN 4.1 07/24/2023    BILITOT <0.2 07/24/2023    ALKPHOS 90 07/24/2023    AST 11 07/24/2023    ALT 13 07/24/2023     Lab Results   Component Value Date    PHOS 3.7 06/27/2023    FREET4 1.02 08/11/2021    TSH 1.880 08/11/2021             MRI Knee Right Without Contrast    Result Date: 7/21/2023  Impression:   1. Tricompartmental osteoarthritis, as before.  Arthropathy is most advanced at the lateral patellofemoral compartment where there is diffuse full-thickness cartilage loss, mild osseous remodeling, and subchondral edema. 2. Moderate to large knee effusion with findings of synovitis. 3. Meniscal degeneration with suspected small longitudinal tear at the anterior horn of the lateral meniscus.     LILLY MICHAELS MD       Electronically Signed and Approved By: LILLY MICHAELS MD on 7/21/2023 at 13:13                Assessment & Plan     Diagnoses and all orders for this visit:    1. Iron deficiency anemia following bariatric surgery (Primary)  -     CBC & Differential; Future  -     Ferritin; Future  -     Iron Profile; Future  -     Vitamin B12; Future  -      Folate; Future    2. Family history of colon cancer  -     Ambulatory Referral to Genetic Counseling/Testing        Bebe Steward is a 43 y.o. female who presents to [facility] HEMATOLOGY [facility] ONCOLOGY for evaluation for iron-deficiency anemia secondary to gastric bypass surgery.      Iron-deficiency anemia  - Secondary to malabsorption due to gastric bypass surgery  -Patient underwent hysterectomy in April 2023  - No report of additional blood loss or melena today  - Patient underwent colonoscopy in March 2023 which revealed 2 polyps  -Reviewed results of lab work from 7/24/2023    Elevated ferritin  - Patient reports family history of father with condition where he retains iron, patient does not recall if he has been diagnosed with hemochromatosis  - Discussed plan to recheck iron studies at next clinic appointment and will consider obtaining hemochromatosis gene mutation testing if ferritin remains elevated or increases.    Given history of gastric bypass surgery, we will recheck B12 and folate levels.    Plan patient follow-up in 3 months with CBC, CMP, iron profile, ferritin, B12 and folate    Please note that portions of this note were completed with a voice recognition program.    Electronically signed by Chavo George MD, 07/26/23, 5:20 PM EDT.      Follow Up     I spent 30 minutes caring for Bebe on this date of service. This time includes time spent by me in the following activities:preparing for the visit, reviewing tests, obtaining and/or reviewing a separately obtained history, performing a medically appropriate examination and/or evaluation , counseling and educating the patient/family/caregiver, ordering medications, tests, or procedures, documenting information in the medical record and care coordination.    This is an acute or chronic illness that poses a threat to life or bodily function. The above treatment plan involves a high risk of complications and/or mortality of patient  management.    The patient was seen and examined. Work by the provider also included review and/or ordering of lab tests, review and/or ordering of radiology tests, review and/or ordering of medicine tests, discussion with other physicians or providers, independent review of data, obtaining old records, review/summation of old records, and/or other review.    I have reviewed the family history, social history, and past medical history for this patient. Previous information and data has been reviewed and updated as needed. I have reviewed and verified the chief complaint, history, and other documentation. The patient was interviewed and examined in the clinic and the chart reviewed. The previous observations, recommendations, and conclusions were reviewed including those of other providers.     The plan was discussed with the patient and/or family. The patient was given time to ask questions and these questions were answered. At the conclusion of their visit they had no additional questions or concerns and all questions were answered to their satisfaction.    Patient was given instructions and counseling regarding her condition or for health maintenance advice. Please see specific information pulled into the AVS if appropriate.

## 2023-08-01 RX ORDER — HYDROXYZINE HYDROCHLORIDE 25 MG/1
TABLET, FILM COATED ORAL
Qty: 90 TABLET | Refills: 1 | Status: SHIPPED | OUTPATIENT
Start: 2023-08-01

## 2023-08-22 ENCOUNTER — OFFICE VISIT (OUTPATIENT)
Dept: FAMILY MEDICINE CLINIC | Facility: CLINIC | Age: 43
End: 2023-08-22
Payer: MEDICARE

## 2023-08-22 VITALS
SYSTOLIC BLOOD PRESSURE: 152 MMHG | DIASTOLIC BLOOD PRESSURE: 92 MMHG | TEMPERATURE: 99.6 F | HEART RATE: 98 BPM | BODY MASS INDEX: 40.98 KG/M2 | HEIGHT: 65 IN | WEIGHT: 246 LBS | OXYGEN SATURATION: 98 %

## 2023-08-22 DIAGNOSIS — J34.89 SINUS PRESSURE: Primary | ICD-10-CM

## 2023-08-22 DIAGNOSIS — H66.90 SUBACUTE OTITIS MEDIA, UNSPECIFIED OTITIS MEDIA TYPE: ICD-10-CM

## 2023-08-22 DIAGNOSIS — R51.9 NONINTRACTABLE HEADACHE, UNSPECIFIED CHRONICITY PATTERN, UNSPECIFIED HEADACHE TYPE: ICD-10-CM

## 2023-08-22 LAB
EXPIRATION DATE: NORMAL
FLUAV AG UPPER RESP QL IA.RAPID: NOT DETECTED
FLUBV AG UPPER RESP QL IA.RAPID: NOT DETECTED
INTERNAL CONTROL: NORMAL
Lab: NORMAL
SARS-COV-2 AG UPPER RESP QL IA.RAPID: NOT DETECTED

## 2023-08-22 PROCEDURE — 99214 OFFICE O/P EST MOD 30 MIN: CPT | Performed by: FAMILY MEDICINE

## 2023-08-22 PROCEDURE — 3080F DIAST BP >= 90 MM HG: CPT | Performed by: FAMILY MEDICINE

## 2023-08-22 PROCEDURE — 1160F RVW MEDS BY RX/DR IN RCRD: CPT | Performed by: FAMILY MEDICINE

## 2023-08-22 PROCEDURE — 87428 SARSCOV & INF VIR A&B AG IA: CPT | Performed by: FAMILY MEDICINE

## 2023-08-22 PROCEDURE — 1159F MED LIST DOCD IN RCRD: CPT | Performed by: FAMILY MEDICINE

## 2023-08-22 PROCEDURE — 3077F SYST BP >= 140 MM HG: CPT | Performed by: FAMILY MEDICINE

## 2023-08-22 RX ORDER — AZITHROMYCIN 250 MG/1
TABLET, FILM COATED ORAL
Qty: 6 TABLET | Refills: 0 | Status: SHIPPED | OUTPATIENT
Start: 2023-08-22 | End: 2023-09-10

## 2023-08-22 RX ORDER — ONDANSETRON 4 MG/1
4 TABLET, ORALLY DISINTEGRATING ORAL
COMMUNITY
Start: 2023-07-31

## 2023-08-22 RX ORDER — RIZATRIPTAN BENZOATE 10 MG/1
10 TABLET ORAL ONCE AS NEEDED
COMMUNITY
Start: 2023-08-01

## 2023-08-22 NOTE — PROGRESS NOTES
Chief Complaint  Chief Complaint   Patient presents with    Sinus pressure    Sinus drainage    Earache    Headache       HPI:  Bebe Steward presents to [facility]    The patient is a 43-year-old female who presents for sinus pressure and drainage, earache, and headache.    The patients blood pressure is elevated today at 152/92 mmHg and her temperature is 99.6 degrees Fahrenheit. She has been experiencing neck pain and sinus pain and pressure. She is experiencing bilateral ear pain with the right being worse than the left. She denies coughing. She is able to feel drainage in the back of her throat. She reports having a very severe reaction to the COVID-19 vaccine and she is wondering if the medication to treat COVID-19 would make her sick as well, in the event that her COVID-19 test came back positive. She was advised not to receive any COVID-19 boosters.     Pertaining to her Zoloft, she is unsure if it is not effective or if her stressors have increased so much that she is unable to feel it helping. She feels like she has a good support system with friends.     Procedures     Past History:  Medical History: has a past medical history of Abnormal Pap smear of cervix (07/21/2020), Allergies, Anemia, Ankle sprain, Anxiety (2014), Arthritis, Arthritis of back (2006), Bilateral carpal tunnel syndrome (07/10/2023), Brain concussion (1995), Broken bones (08/2014), Callus, Cervical disc disorder (2012), Cholelithiasis (2001), Colon polyp (03/23/2023), CTS (carpal tunnel syndrome) (2022), Difficulty walking, Endometriosis, Essential hypertension (07/21/2020), Fracture of ankle (2008), Fracture of wrist (2011), Fracture, fibula, Fracture, tibia and fibula (2008), H/O Polycystic ovary syndrome, Head injury (1996), HPV (human papilloma virus) infection, Hyperlipidemia, Hypoglycemia, Ingrown toenail, Insomnia (07/21/2020), Knee sprain, Knee swelling (2015), Left below-knee amputee  (07/21/2020), Low back pain (2006), Low back strain, Lumbosacral disc disease (2006), Migraine, Muscle spasm (01/04/2021), Neck strain, Numbness in right foot, Obesity, 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   Surgical History: has a past surgical history that includes Abdominal surgery (2010); Leg amputation, lower tibia/fibula (Left, 2014); Ankle surgery; Endometrial ablation (2007); Gastric bypass (2001); Knee surgery; Spine surgery (2008); Tonsillectomy (1994); Spinal fusion (10/2008); Epidural block injection (2007); Trigger point injection (2021); Ankle fracture surgery (1347-1509); Cholecystectomy (2001); Fracture surgery (2008); Hysterectomy; Colonoscopy (N/A, 03/23/2023); total laparoscopic hysterectomy (N/A, 04/06/2023); Cystoscopy (N/A, 04/06/2023); Bariatric Surgery (03/01); Back surgery (10/08); Avulsion toenail plate (Right, 06/29/2023); and Achilles tendon surgery.   Family History: family history includes Alcohol abuse in her maternal grandmother; Anesthesia problems in her father; Anxiety disorder in her maternal grandmother, mother, and son; Arthritis in her father, maternal grandfather, maternal grandmother, mother, paternal grandfather, and paternal grandmother; Broken bones in her maternal grandmother, mother, and paternal grandmother; Cancer in her maternal grandfather, paternal aunt, and paternal grandmother; Colon cancer in her paternal aunt; Colon cancer (age of onset: 87) in her paternal grandmother; Depression in her father, maternal grandmother, and mother; Developmental Disability in her paternal aunt; Diabetes  in her maternal grandfather; Early death in her mother; Heart disease in her father, maternal grandfather, maternal grandmother, and paternal grandfather; Hyperlipidemia in her father, maternal grandfather, maternal grandmother, mother, paternal grandfather, and paternal grandmother; Hypertension in her father, maternal grandfather, maternal grandmother, mother, paternal grandfather, and paternal grandmother; Kidney disease in her father, maternal grandmother, mother, paternal grandfather, and paternal grandmother; Liver disease in her maternal grandmother; Melanoma in her maternal grandfather; Miscarriages / Stillbirths in her maternal grandmother and paternal grandmother; Osteoporosis in her maternal grandmother and paternal grandmother; Other in her father, mother, and paternal grandmother; Rheumatologic disease in her father and paternal grandmother; Stroke in her maternal grandfather and mother; Thyroid disease in her maternal grandmother and mother; Vision loss in her father.   Social History: reports that she quit smoking about 2 years ago. Her smoking use included cigarettes. She started smoking about 26 years ago. She has a 15.00 pack-year smoking history. She has been exposed to tobacco smoke. She has never used smokeless tobacco. She reports current alcohol use. She reports that she does not use drugs.  Immunization History   Administered Date(s) Administered    COVID-19 (MODERNA) 1st,2nd,3rd Dose Monovalent 12/11/2021, 12/13/2021    COVID-19 (MODERNA) Monovalent Original Booster 11/11/2021, 12/09/2021    Fluzone >6mos 10/05/2021, 12/16/2022    Influenza, Unspecified 12/02/2020, 12/16/2022, 12/16/2022         Allergies: Adhesive tape, Cefaclor, Cephalexin, Cyclobenzaprine, Erythromycin, Meperidine, Monosodium glutamate, Morphine, Nsaids, Penicillins, Sulfa antibiotics, Sulfamethoxazole-trimethoprim, and Vancomycin     Medications:  Current Outpatient Medications on File Prior to Visit   Medication Sig  Dispense Refill    ammonium lactate (AmLactin) 12 % lotion Apply  topically to the appropriate area as directed 2 (Two) Times a Day. 225 g 11    atorvastatin (LIPITOR) 20 MG tablet TAKE ONE TABLET BY MOUTH DAILY 90 tablet 2    docusate sodium (COLACE) 100 MG capsule TAKE ONE CAPSULE BY MOUTH TWICE A DAY AS NEEDED FOR CONSTIPATION 60 capsule 5    furosemide (LASIX) 20 MG tablet Take 1 tablet by mouth Daily As Needed (swelling). 30 tablet 0    hydrALAZINE (APRESOLINE) 50 MG tablet Take 1 tablet by mouth 3 (Three) Times a Day.      hydrOXYzine (ATARAX) 25 MG tablet TAKE ONE TABLET BY MOUTH EVERY 8 HOURS AS NEEDED FOR ANXIETY 90 tablet 1    irbesartan (AVAPRO) 75 MG tablet TAKE ONE TABLET BY MOUTH ONCE NIGHTLY 90 tablet 1    loratadine (CLARITIN) 10 MG tablet Take 1 tablet by mouth Daily.      methocarbamol (ROBAXIN) 500 MG tablet TAKE ONE TABLET BY MOUTH TWICE A DAY AS NEEDED FOR MUSCLE SPASMS 180 tablet 2    norethindrone (Aygestin) 5 MG tablet Take 1 tablet by mouth Daily. 30 tablet 3    ondansetron ODT (ZOFRAN-ODT) 4 MG disintegrating tablet Place 1 tablet on the tongue.      rizatriptan (MAXALT) 10 MG tablet Take 1 tablet by mouth 1 (One) Time As Needed.      sertraline (Zoloft) 100 MG tablet Take 1 tablet by mouth Daily for 90 days. 90 tablet 1    vitamin D (ERGOCALCIFEROL) 1.25 MG (01005 UT) capsule capsule Take 1 capsule by mouth Every 7 (Seven) Days. On Wednesdays      zolpidem CR (Ambien CR) 6.25 MG CR tablet Take 1 tablet by mouth At Night As Needed for Sleep for up to 90 days. 90 tablet 1    gabapentin (NEURONTIN) 400 MG capsule TAKE ONE CAPSULE BY MOUTH THREE TIMES A DAY 90 capsule 2     No current facility-administered medications on file prior to visit.        Health Maintenance Due   Topic Date Due    TDAP/TD VACCINES (1 - Tdap) Never done    LIPID PANEL  02/10/2023       Vital Signs:   Vitals:    08/22/23 1338 08/22/23 1342   BP: 150/90 152/92   Pulse: 98    Temp: 99.6 °F (37.6 °C)    SpO2: 98%   "  Weight: 112 kg (246 lb)    Height: 165.1 cm (65\")       Body mass index is 40.94 kg/m².     ROS:  Review of Systems   Constitutional:  Negative for fatigue and fever.   HENT:  Negative for congestion, ear pain and sinus pressure.    Respiratory:  Negative for cough, chest tightness and shortness of breath.    Cardiovascular:  Negative for chest pain, palpitations and leg swelling.   Gastrointestinal:  Negative for abdominal pain and diarrhea.   Genitourinary:  Negative for dysuria and frequency.   Neurological:  Negative for speech difficulty, headache and confusion.   Psychiatric/Behavioral:  Negative for agitation and behavioral problems.         Physical Exam  Vitals reviewed.   Constitutional:       Appearance: Normal appearance.   HENT:      Right Ear: Tympanic membrane normal.      Left Ear: Tympanic membrane normal.      Nose: Nose normal.   Eyes:      Extraocular Movements: Extraocular movements intact.      Conjunctiva/sclera: Conjunctivae normal.      Pupils: Pupils are equal, round, and reactive to light.   Cardiovascular:      Rate and Rhythm: Normal rate and regular rhythm.   Pulmonary:      Effort: Pulmonary effort is normal.      Breath sounds: Normal breath sounds.   Abdominal:      General: Bowel sounds are normal.   Musculoskeletal:         General: Normal range of motion.      Cervical back: Normal range of motion.   Skin:     General: Skin is warm and dry.   Neurological:      General: No focal deficit present.      Mental Status: She is alert and oriented to person, place, and time.   Psychiatric:         Mood and Affect: Mood normal.         Behavior: Behavior normal.        Result Review        Diagnoses and all orders for this visit:    1. Sinus pressure (Primary)  -     POCT SARS-CoV-2 Antigen KAROLINA + Flu    2. Nonintractable headache, unspecified chronicity pattern, unspecified headache type  -     POCT SARS-CoV-2 Antigen KAROLINA + Flu    3. Subacute otitis media, unspecified otitis media " type  -     azithromycin (Zithromax Z-Jose F) 250 MG tablet; Take 2 tablets by mouth on day 1, then 1 tablet daily on days 2-5  Dispense: 6 tablet; Refill: 0    Sinus pressure:  - Fu and COVID-19 swabs completed in office today.     Subacute otitis media:  - Prescription sent for azithromycin 250 mg to take as directed.       Smoking Cessation:    Bebe Steward  reports that she quit smoking about 2 years ago. Her smoking use included cigarettes. She started smoking about 26 years ago. She has a 15.00 pack-year smoking history. She has been exposed to tobacco smoke. She has never used smokeless tobacco.          Follow Up   Return in about 3 months (around 11/22/2023).  Patient was given instructions and counseling regarding her condition or for health maintenance advice. Please see specific information pulled into the AVS if appropriate.       Clement Phelan submitted by the patient for this visit:  Other (Submitted on 8/15/2023)  Please describe your symptoms.: Med check  Have you had these symptoms before?: No  How long have you been having these symptoms?: Greater than 2 weeks  Please list any medications you are currently taking for this condition.: Zoloft 100mg  Please describe any probable cause for these symptoms. : Anxiety/depression  Primary Reason for Visit (Submitted on 8/15/2023)  What is the primary reason for your visit?: Other    Transcribed from ambient dictation for Kimberly Almaguer MD by Amy Huerta.  08/22/23   15:07 EDT    Patient or patient representative verbalized consent to the visit recording.  I have personally performed the services described in this document as transcribed by the above individual, and it is both accurate and complete.

## 2023-09-15 ENCOUNTER — OFFICE VISIT (OUTPATIENT)
Dept: ORTHOPEDIC SURGERY | Facility: CLINIC | Age: 43
End: 2023-09-15
Payer: MEDICARE

## 2023-09-15 VITALS
HEART RATE: 86 BPM | BODY MASS INDEX: 41.14 KG/M2 | OXYGEN SATURATION: 98 % | SYSTOLIC BLOOD PRESSURE: 152 MMHG | WEIGHT: 246.91 LBS | HEIGHT: 65 IN | DIASTOLIC BLOOD PRESSURE: 94 MMHG

## 2023-09-15 DIAGNOSIS — M17.11 PRIMARY OSTEOARTHRITIS OF RIGHT KNEE: Primary | ICD-10-CM

## 2023-09-15 DIAGNOSIS — M25.461 EFFUSION OF RIGHT KNEE: ICD-10-CM

## 2023-09-15 DIAGNOSIS — S83.281D TEAR OF LATERAL MENISCUS OF RIGHT KNEE, CURRENT, UNSPECIFIED TEAR TYPE, SUBSEQUENT ENCOUNTER: ICD-10-CM

## 2023-09-15 PROBLEM — S83.281A TEAR OF LATERAL MENISCUS OF RIGHT KNEE, CURRENT: Status: ACTIVE | Noted: 2023-09-15

## 2023-09-15 NOTE — PROGRESS NOTES
Chief Complaint  Follow-up of the Right Knee    Subjective          History of Present Illness      Bebe Steward is a 43 y.o. female  presents to [facility] for     Patient presents for follow-up evaluation of right knee osteoarthritis, right knee effusion and lateral meniscus tear.  She had MRI reviewed with her at last visit with Dr. Dobson on 2023.  He did aspiration injection of the knee which she states decreased her pain decreased her swelling she states she has full range of motion and strength and no pain in the knee, no new complaints      Allergies   Allergen Reactions    Adhesive Tape Itching and Rash    Cefaclor Rash    Cephalexin Rash    Cyclobenzaprine Other (See Comments)     Muscle spasms    Erythromycin Rash    Meperidine Headache    Monosodium Glutamate Other (See Comments)     Blood pressure drops, pass out     Morphine Irritability    Nsaids Unknown - High Severity     CKD     Penicillins Anaphylaxis and Rash     1. When was your reaction? < 10 years ago    2. Did your reaction happen after the first dose? Do not know    3. Did your reaction happen after several doses? Do not know    4. Did your reaction require ED or hospital care to manage your reaction? Do not know    5. Did your reaction require treatment with epinephrine? Do not know    6. Have you taken amoxicillin (Amoxil) or amoxicillin-clavulanate (Augmentin) without issue since? no    7. Have you taken cephalexin (Keflex) without issue since?  No    Sulfa Antibiotics Rash    Sulfamethoxazole-Trimethoprim Rash    Vancomycin Itching        Social History     Socioeconomic History    Marital status:     Number of children: 3   Tobacco Use    Smoking status: Former     Packs/day: 1.00     Years: 15.00     Pack years: 15.00     Types: Cigarettes     Start date: 1997     Quit date: 2021     Years since quittin.7     Passive exposure: Current    Smokeless tobacco: Never   Vaping Use  "   Vaping Use: Never used   Substance and Sexual Activity    Alcohol use: Yes     Comment: Rarely. 1-2 times yearly    Drug use: Never    Sexual activity: Not Currently     Partners: Male     Birth control/protection: Abstinence, Hysterectomy     Comment: Chemical menopause. Hysterectomy scheduled for 4/6/23        REVIEW OF SYSTEMS    Constitutional: Denies fevers, chills, weight loss  Cardiovascular: Denies chest pain, shortness of breath  Skin: Denies rashes, acute skin changes  Neurologic: Denies headache, loss of consciousness  MSK: Right knee pain      Objective   Vital Signs:   /94   Pulse 86   Ht 165.1 cm (65\")   Wt 112 kg (246 lb 14.6 oz)   SpO2 98%   BMI 41.09 kg/m²     Body mass index is 41.09 kg/m².    Physical Exam         Right knee: Skin is intact, no erythema, no ecchymosis, no swelling, no effusion, no signs of infection, full extension, flexion 120, stable anterior/posterior drawer, stable to varus/valgus stress.  Nontender to palpation, no pain with range of motion. Negative Kolby, Negative Lachman.        Procedures    Imaging Results (Most Recent)       None             Result Review :   The following data was reviewed by: CADY Ferrer on 09/15/2023:               Assessment and Plan    Diagnoses and all orders for this visit:    1. Primary osteoarthritis of right knee (Primary)    2. Effusion of right knee    3. Tear of lateral meniscus of right knee, current, unspecified tear type, subsequent encounter        Discussed diagnosis and treatment options with the patient she was advised of future treatment options she states she would like to continue conservative treatment, follow-up as needed    Call or return if worsening symptoms.    Follow Up   Return if symptoms worsen or fail to improve.  Patient was given instructions and counseling regarding her condition or for health maintenance advice. Please see specific information pulled into the AVS if appropriate. "

## 2023-09-19 ENCOUNTER — OFFICE VISIT (OUTPATIENT)
Dept: ORTHOPEDIC SURGERY | Facility: CLINIC | Age: 43
End: 2023-09-19
Payer: MEDICARE

## 2023-09-19 VITALS
BODY MASS INDEX: 40.98 KG/M2 | DIASTOLIC BLOOD PRESSURE: 81 MMHG | SYSTOLIC BLOOD PRESSURE: 122 MMHG | HEART RATE: 79 BPM | WEIGHT: 246 LBS | HEIGHT: 65 IN | OXYGEN SATURATION: 97 %

## 2023-09-19 DIAGNOSIS — M75.82 ROTATOR CUFF TENDONITIS, LEFT: Primary | ICD-10-CM

## 2023-09-19 DIAGNOSIS — M75.51 ACUTE BURSITIS OF RIGHT SHOULDER: ICD-10-CM

## 2023-09-19 NOTE — PROGRESS NOTES
Chief Complaint  Pain and Initial Evaluation of the Left Shoulder     Subjective      Bebe Steward presents to [facility] for evaluation of the left shoulder. The patient reports left shoulder pain. She reports she was told her neck pain was radiating to her shoulder. She locates pain to the trapezius region and posterior shoulder. She reports decreased ROM to the shoulder. She has tried treatment for her neck without relief.     Allergies   Allergen Reactions    Adhesive Tape Itching and Rash    Cefaclor Rash    Cephalexin Rash    Cyclobenzaprine Other (See Comments)     Muscle spasms    Erythromycin Rash    Meperidine Headache    Monosodium Glutamate Other (See Comments)     Blood pressure drops, pass out     Morphine Irritability    Nsaids Unknown - High Severity     CKD     Penicillins Anaphylaxis and Rash     1. When was your reaction? < 10 years ago    2. Did your reaction happen after the first dose? Do not know    3. Did your reaction happen after several doses? Do not know    4. Did your reaction require ED or hospital care to manage your reaction? Do not know    5. Did your reaction require treatment with epinephrine? Do not know    6. Have you taken amoxicillin (Amoxil) or amoxicillin-clavulanate (Augmentin) without issue since? no    7. Have you taken cephalexin (Keflex) without issue since?  No    Sulfa Antibiotics Rash    Sulfamethoxazole-Trimethoprim Rash    Vancomycin Itching        Social History     Socioeconomic History    Marital status:     Number of children: 3   Tobacco Use    Smoking status: Former     Packs/day: 1.00     Years: 15.00     Pack years: 15.00     Types: Cigarettes     Start date: 1997     Quit date: 2021     Years since quittin.7     Passive exposure: Current    Smokeless tobacco: Never   Vaping Use    Vaping Use: Never used   Substance and Sexual Activity    Alcohol use: Yes     Comment: Rarely. 1-2 times yearly    Drug use:  "Never    Sexual activity: Not Currently     Partners: Male     Birth control/protection: Abstinence, Hysterectomy     Comment: Chemical menopause. Hysterectomy scheduled for 4/6/23        I reviewed the patient's chief complaint, history of present illness, review of systems, past medical history, surgical history, family history, social history, medications, and allergy list.     Review of Systems     Constitutional: Denies fevers, chills, weight loss  Cardiovascular: Denies chest pain, shortness of breath  Skin: Denies rashes, acute skin changes  Neurologic: Denies headache, loss of consciousness  MSK: Left shoulder pain      Vital Signs:   /81   Pulse 79   Ht 165.1 cm (65\")   Wt 112 kg (246 lb)   SpO2 97%   BMI 40.94 kg/m²          Physical Exam  General: Alert. No acute distress    Ortho Exam      Left shoulder- pain with cervical motion. Forward elevation 140 with pain. Abduction 140. External Rotation 90. Internal rotation 70. 4+ supraspinatus strength with pain. 5/5 infraspinatus and subscapularis. Internal rotation to L-4. Positive impingement signs and cross arm adduction     Procedures      Imaging Results (Most Recent)       None             Result Review :       XR Shoulder 2+ View Left    Result Date: 9/12/2023  Narrative: PROCEDURE: XR SHOULDER 2+ VW LEFT  COMPARISON: None  INDICATIONS: shoulder pain no trauma  FINDINGS:  There is no left shoulder fracture or dislocation.  Glenohumeral joint space appears preserved without spurring.  Degenerative or erosive changes are suggested at the acromioclavicular joint.  There is no acromioclavicular or coracoclavicular separation.  Imaged left ribs are intact, and the imaged left lung appears clear.      Impression:  Mild degenerative or erosive changes are suggested at the left acromioclavicular joint.      AVEL VENTURA MD       Electronically Signed and Approved By: AVEL VENTURA MD on 9/12/2023 at 15:10                     Assessment and Plan "     Diagnoses and all orders for this visit:    1. Rotator cuff tendonitis, left (Primary)    2. Acute bursitis of right shoulder        Discussed the treatment plan with the patient.  I reviewed the previous x-rays with the patient. Plan for MRI of the right shoulder to evaluate the rotator cuff. The patient expressed understanding and wished to proceed.     Call or return if worsening symptoms.    Follow Up     MRI results      Patient was given instructions and counseling regarding her condition or for health maintenance advice. Please see specific information pulled into the AVS if appropriate.     Scribed for Tomy Dobson MD by Mima Kaplan.  09/19/23   10:33 EDT    I have personally performed the services described in this document as scribed by the above individual and it is both accurate and complete. Tomy Dobson MD 09/20/23

## 2023-09-21 ENCOUNTER — TRANSCRIBE ORDERS (OUTPATIENT)
Dept: PHYSICAL THERAPY | Facility: CLINIC | Age: 43
End: 2023-09-21
Payer: MEDICARE

## 2023-09-21 DIAGNOSIS — M54.2 PAIN, NECK: Primary | ICD-10-CM

## 2023-09-27 DIAGNOSIS — M54.12 CERVICAL RADICULOPATHY: ICD-10-CM

## 2023-09-28 RX ORDER — GABAPENTIN 400 MG/1
400 CAPSULE ORAL 3 TIMES DAILY
Qty: 90 CAPSULE | Refills: 2 | Status: SHIPPED | OUTPATIENT
Start: 2023-09-28 | End: 2023-12-27

## 2023-10-04 ENCOUNTER — OFFICE VISIT (OUTPATIENT)
Dept: OBSTETRICS AND GYNECOLOGY | Facility: CLINIC | Age: 43
End: 2023-10-04
Payer: MEDICARE

## 2023-10-04 ENCOUNTER — TELEPHONE (OUTPATIENT)
Dept: OBSTETRICS AND GYNECOLOGY | Facility: CLINIC | Age: 43
End: 2023-10-04

## 2023-10-04 VITALS
HEIGHT: 65 IN | HEART RATE: 71 BPM | DIASTOLIC BLOOD PRESSURE: 99 MMHG | SYSTOLIC BLOOD PRESSURE: 157 MMHG | WEIGHT: 245 LBS | BODY MASS INDEX: 40.82 KG/M2

## 2023-10-04 DIAGNOSIS — Z90.710 S/P LAPAROSCOPIC HYSTERECTOMY: Primary | ICD-10-CM

## 2023-10-04 DIAGNOSIS — N95.1 MENOPAUSAL SYMPTOMS: ICD-10-CM

## 2023-10-04 PROBLEM — Z48.89 POSTOPERATIVE VISIT: Status: RESOLVED | Noted: 2023-04-25 | Resolved: 2023-10-04

## 2023-10-04 RX ORDER — ESTRADIOL 1 MG/1
1 TABLET ORAL DAILY
Qty: 30 TABLET | Refills: 11 | Status: SHIPPED | OUTPATIENT
Start: 2023-10-04

## 2023-10-04 NOTE — PROGRESS NOTES
"GYN Visit    CC: Discuss hot flashes    HPI:   43 y.o. who presents to discuss hot flashes and night sweats.  The patient has a history of endometriosis.  She underwent total laparoscopic hysterectomy with BSO for definitive therapy.  She reports that since her surgery she has been doing very well.  She is pain-free.  She has been on Aygestin to help with her vasomotor symptoms.  The Aygestin does help but she still has significant hot flashes and night sweats.  She has been out of her medication for several days, and symptoms of been much more severe.  No other problems or concerns    History: PMHx, Meds, Allergies, PSHx, Social Hx, and POBHx all reviewed and updated.    /99   Pulse 71   Ht 165.1 cm (65\")   Wt 111 kg (245 lb)   LMP 2022   Breastfeeding No   BMI 40.77 kg/m²     Physical Exam  Vitals and nursing note reviewed.   Constitutional:       General: She is not in acute distress.     Appearance: Normal appearance. She is obese. She is not ill-appearing.   Neurological:      Mental Status: She is alert and oriented to person, place, and time.   Psychiatric:         Mood and Affect: Mood normal.         Behavior: Behavior normal.         Thought Content: Thought content normal.         Judgment: Judgment normal.         ASSESSMENT AND PLAN:  Diagnoses and all orders for this visit:    1. S/P laparoscopic hysterectomy (Primary)    2. Menopausal symptoms  Assessment & Plan:  The patient has surgical menopause due to total laparoscopic hysterectomy and BSO for endometriosis.  The patient has been managed with Aygestin up until now.  Symptoms were inadequately controlled on the Aygestin.  We discussed considering estrogen replacement therapy.  The risk, benefits, alternatives were discussed including risks of VTE.  The patient wishes to proceed.  We will start estradiol 1 mg oral daily.  I think this will provide much better control of her symptoms.    Orders:  -     estradiol (Estrace) 1 " MG tablet; Take 1 tablet by mouth Daily.  Dispense: 30 tablet; Refill: 11        Counseling: ERT- I recommend the lowest dose possible, for the shortest period of time.  Risks ERT NLT CVA, MI, JAVI.      Follow Up:  Return in about 6 months (around 4/4/2024) for Annual physical.      Yoan Yoo MD  10/04/2023

## 2023-10-04 NOTE — ASSESSMENT & PLAN NOTE
The patient has surgical menopause due to total laparoscopic hysterectomy and BSO for endometriosis.  The patient has been managed with Aygestin up until now.  Symptoms were inadequately controlled on the Aygestin.  We discussed considering estrogen replacement therapy.  The risk, benefits, alternatives were discussed including risks of VTE.  The patient wishes to proceed.  We will start estradiol 1 mg oral daily.  I think this will provide much better control of her symptoms.

## 2023-10-04 NOTE — TELEPHONE ENCOUNTER
Called pharmacy informed Predisone, Lidocaine discontinued therapy complete per Dr Yoan Yoo 10/4/23

## 2023-10-05 ENCOUNTER — HOSPITAL ENCOUNTER (OUTPATIENT)
Dept: MRI IMAGING | Facility: HOSPITAL | Age: 43
Discharge: HOME | End: 2023-10-05
Admitting: ORTHOPAEDIC SURGERY
Payer: MEDICARE

## 2023-10-05 DIAGNOSIS — M75.82 ROTATOR CUFF TENDONITIS, LEFT: ICD-10-CM

## 2023-10-05 DIAGNOSIS — M75.51 ACUTE BURSITIS OF RIGHT SHOULDER: ICD-10-CM

## 2023-10-05 PROCEDURE — 73221 MRI JOINT UPR EXTREM W/O DYE: CPT

## 2023-10-10 ENCOUNTER — OFFICE VISIT (OUTPATIENT)
Dept: NEUROSURGERY | Facility: CLINIC | Age: 43
End: 2023-10-10
Payer: MEDICARE

## 2023-10-10 VITALS
BODY MASS INDEX: 40.82 KG/M2 | HEIGHT: 65 IN | SYSTOLIC BLOOD PRESSURE: 177 MMHG | HEART RATE: 79 BPM | WEIGHT: 245 LBS | DIASTOLIC BLOOD PRESSURE: 99 MMHG

## 2023-10-10 DIAGNOSIS — M50.221 HERNIATED NUCLEUS PULPOSUS, C4-5 LEFT: Primary | ICD-10-CM

## 2023-10-10 DIAGNOSIS — M54.2 CERVICALGIA: ICD-10-CM

## 2023-10-10 DIAGNOSIS — M50.222 HERNIATED NUCLEUS PULPOSUS, C5-6: ICD-10-CM

## 2023-10-10 NOTE — PROGRESS NOTES
"Patient being seen for today for Follow-up  .    Subjective    Beeb Steward is a 43 y.o. female that presents with Follow-up  .    HPI  Previously:  Last seen on 7/19/2023 for nonspecific pain to the fingers and left arm with moderate left foraminal stenosis at the C4-C5 level and bilaterally at C5-C6 to a lesser degree.  There was discussion that surgery was unlikely to help given the nonspecific pain complaints and mild to moderate changes in the cervical spine.  There was plan to continue with pain management plan of physical therapy then possible trigger point injections followed by epidural steroid injections if ineffective.  Signs of cervical radiculopathy.  There was some mild bilateral carpal tunnel disease for which I recommended nightly wrist bracing.  There was plan to follow-up as needed.    Today: She continues to have pain in the neck and shooting down both arms, left worse than the right, all the way to the fingers.    She reports that in general this seems to be getting worse.    The pain is intermittent and \"hits\" her randomly, but starts with stiffness and tingling into the shoulder blades. She has weakness that accompanies the pain. She reports burning in the chest accompanies the pain. This usually happens 20-25 minutes at a time, but recently she had an episode lasting for 1 and a half hours about 2 weeks ago. She reports 4 episodes of \"passing out\" with the pain. She reports \"dry heaving\" on top of this.    She reports new tingling and numbness in the left shoulder blade, which was not present until recently, starting about 2 weeks ago.     reports that she quit smoking about 2 years ago. Her smoking use included cigarettes. She started smoking about 26 years ago. She has a 15.00 pack-year smoking history. She has been exposed to tobacco smoke. She has never used smokeless tobacco.    Review of Systems   Musculoskeletal:  Positive for neck pain.   Neurological:  Positive for weakness and " numbness.       Objective   Vitals:    10/10/23 1316   BP: 177/99   Pulse: 79        Physical Exam  Constitutional:       Appearance: Normal appearance. She is obese.   Pulmonary:      Effort: Pulmonary effort is normal.   Musculoskeletal:         General: Tenderness (cervical area) present.      Comments: Cespedes's negative bilaterally   Neurological:      General: No focal deficit present.      Mental Status: She is alert and oriented to person, place, and time.      Sensory: Sensory deficit (reduced in left arm) present.      Motor: Weakness (right hand ) present.      Deep Tendon Reflexes: Reflexes normal.   Psychiatric:         Mood and Affect: Mood normal.         Behavior: Behavior normal.          Result Review   I have again reviewed the MRI of cervical spine from 4/24/2023 which shows moderate left foraminal stenosis at C4-C5 and mild to moderate bilateral foraminal narrowing at C5-C6.     Assessment and Plan {CC Problem List  Visit Diagnosis  ROS  Review (Popup)  [facility]  Quality  BestPractice  Medications  SmartSets  SnapShot Encounters  Media :23}   Diagnoses and all orders for this visit:    1. Herniated nucleus pulposus, C4-5 left (Primary)  -     Ambulatory Referral to Pain Management    2. Herniated nucleus pulposus, C5-6  -     Ambulatory Referral to Pain Management    3. Cervicalgia  -     Ambulatory Referral to Pain Management      Her left arm complaints to all the fingers is more non-specific to the findings on the MRI which are moderate left foraminal narrowing at C4-C5 and mild to moderate bilateral foraminal narrowing at C5-C6.    I suspect surgery is less likely to help given the nonspecific pain and mild to moderate changes in the cervical spine.    If surgery would help anything, it would be the left arm pain to the elbow (C5 pattern) or to the 1st and 2nd digit (C6 pattern). Given the mild to moderate changes in the cervical spine at those levels I am less  confident, in general, that it will be helpful at all. I certainly do not expect surgery to be helpful for pain in the neck.    At a minimum I expect she should consider a cervical epidural steroid block trial to assess benefit for left arm pain with this conservative treatment prior to pursuing a less conservative treatment approach that could include surgery in the cervical spine.    Outside of CESBs, I expect supportive measures to be helpful in the long-term - avoiding nicotine, maintaining a healthy weight, and strengthening the core muscles around the spine and neck.    Conservatively, she could benefit from physical therapy in that it can help to strengthen the muscles around the neck and spine, providing more stability and less stress to those areas.    She failed trigger point injections.    She was consulting with pain management at [facility] previously, who was planning to have her complete physical therapy and possibly trigger point injections prior to considering CESB.    I am happy to refer her for a 2nd opinion with a different pain specialist. I will refer her to [facility] in [place] to discuss CESB trial in particular.    The patient was counseled on basic recommendations for the reduction and prevention of back, neck, or spine pain in association with spinal disorders, including: cessation/avoidance of nicotine use, maintenance of a healthy BMI and weight, focusing on building/maintaining core strength through core exercise, and avoidance of activities which worsen the pain. The patient will monitor for changes in symptoms and notify our clinic of these changes as needed.    She will follow-up here PRN.  Follow Up {Instructions Charge Capture  Follow-up Communications :23}   Return if symptoms worsen or fail to improve.

## 2023-10-12 ENCOUNTER — TREATMENT (OUTPATIENT)
Dept: PHYSICAL THERAPY | Facility: CLINIC | Age: 43
End: 2023-10-12
Payer: MEDICARE

## 2023-10-12 DIAGNOSIS — M54.2 PAIN, NECK: Primary | ICD-10-CM

## 2023-10-12 PROCEDURE — 97110 THERAPEUTIC EXERCISES: CPT | Performed by: PHYSICAL THERAPIST

## 2023-10-12 PROCEDURE — 97162 PT EVAL MOD COMPLEX 30 MIN: CPT | Performed by: PHYSICAL THERAPIST

## 2023-10-12 NOTE — PROGRESS NOTES
Physical Therapy Initial Evaluation and Plan of Care  [address] [address]      Patient: Bebe Steward   : 1980  Diagnosis/ICD-10 Code:  Pain, neck [M54.2]  Referring practitioner: Travon Cameron MD  Date of Initial Visit: 10/12/2023  Today's Date: 10/12/2023  Patient seen for 1 sessions           Subjective Questionnaire: NDI:26/50=52% limitation      Subjective Evaluation    History of Present Illness  Mechanism of injury: Patient is a 43 yr old female referred to physical therapy with diagnosis of neck pain.  Patient reports neck pain for about a year.  Patient reports has random shooting pain throughout body; left arm the most painful.  Patient reports having left shoulder pain.  Patient reports neck getting stiff and has pain from base of skull to middle of shoulder blades.  Patient reports has pain so bad that she has passed out 4x from the pain.      MRI: Moderate degenerative disc disease at C4-5 and C5-6.    Pain  Current pain ratin  At best pain ratin  At worst pain rating: 10  Location: neck to scapulas and left shoulder mostly  Quality: radiating and dull ache  Relieving factors: medications, relaxation, rest and change in position    Patient Goals  Patient goals for therapy: decreased pain, independence with ADLs/IADLs and increased motion           Objective          Postural Observations    Additional Postural Observation Details  Forward head/forward rolled shoulders with protracted scapula    Active Range of Motion   Cervical/Thoracic Spine   Cervical    Flexion: WFL and with pain  Extension: WFL  Left rotation: 45 degrees   Right rotation: 50 degrees     Strength/Myotome Testing     Left Shoulder     Planes of Motion   Flexion: 3   Extension: 3   Abduction: 3-   External rotation at 0ø: 3   Internal rotation at 0ø: 3-     Right Shoulder     Planes of Motion   Flexion: 3   Extension: 3   Abduction: 3-   External rotation at 0ø: 3   Internal rotation at 0ø: 3-            Assessment & Plan       Assessment  Impairments: activity intolerance, impaired physical strength, lacks appropriate home exercise program and pain with function   Functional limitations: carrying objects, lifting, pulling, pushing, uncomfortable because of pain and unable to perform repetitive tasks   Assessment details: Pt presents with limitations, noted by evaluation that impede patient's ability to tolerate functional activity/mobility/ADLs.  The skills of a therapist will be required to safely and effectively implement the following treatment plan to restore maximal level of function.      Prognosis: good    Goals  Plan Goals: 1. The patient has limited ROM.    LTG 1: 12 weeks:  The patient will demonstrate 60ø of bilateral cervical spine rotation in order to adequately monitor blind spots while driving and improve ability to perform activities of daily living.   STATUS:  New   STG 1a: 6 weeks:  The patient will demonstrate 50ø of left cervical spine rotation  in order to adequately monitor blind spots while driving and improve ability to perform activities of daily living.   STATUS:  New      2. The patient has complaints of pain.   LTG 2: 12 weeks:  The patient will report 2/10 pain in order to more easily tolerate activities of daily living and improve sleep quality.   STATUS:  New   STG 2a: 6weeks:  The patient will report 4/10 pain.   STATUS:  New      3. The patient reports radicular symptoms in the left upper extremity.   LTG 3: 12 weeks:  The patient will report a decrease in radicular symptoms in the left upper extremity by 50%.   STATUS:  New   STG 3a: 6 weeks:  The patient will report a decrease in radicular symptoms in the left upper extremity by 25%.   STATUS:  New      4. Carrying, Moving, and Handling Objects Functional Limitation     LTG 4: 12 weeks:  The patient will demonstrate 30% limitation by achieving a score of 15/50 on the Neck Disability Index.   STATUS:  New   STG 4a: 6  weeks:  The patient will demonstrate 40% limitation by achieving a score of 20/50 on the Neck Disability Index.     STATUS:  New       Plan  Therapy options: will be seen for skilled therapy services  Planned modality interventions: cryotherapy, TENS and thermotherapy (hydrocollator packs)  Planned therapy interventions: manual therapy, postural training, neuromuscular re-education, soft tissue mobilization, spinal/joint mobilization, strengthening, stretching, therapeutic activities, home exercise program, flexibility and functional ROM exercises  Frequency: 1x week  Duration in weeks: 12  Treatment plan discussed with: patient    History # of Personal Factors and/or Comorbidities: MODERATE (1-2)  Examination of Body System(s): # of elements: MODERATE (3)  Clinical Presentation: STABLE   Clinical Decision Making: MODERATE      Visit Diagnoses:    ICD-10-CM ICD-9-CM   1. Pain, neck  M54.2 723.1       Timed:  Manual Therapy:         mins  82016;  Therapeutic Exercise:    9     mins  43228;     Neuromuscular Kaiden:        mins  03286;    Therapeutic Activity:          mins  53796;     Gait Training:           mins  49755;     Ultrasound:          mins  78664;    Electrical Stimulation:         mins  48940 ( );    Untimed:  Electrical Stimulation:         mins  57166 ( );  Mechanical Traction:         mins  43537;    PT evaluation     Low Eval                           Mins  02957  Mod Eval                        32     Mins  32516  High Eval                           Mins  41008    Timed Treatment:   9   mins   Total Treatment:     41   mins    PT SIGNATURE: Kimberly Wright PT     Electronically singed 10/12/2023    [place] PT license: [id number]        Initial Certification  Certification Period: 10/12/2023 thru 1/9/2024  I certify that the therapy services are furnished while this patient is under my care.  The services outlined above are required by this patient, and will be reviewed every 90  days.    PHYSICIAN: Travon Cameron MD  NPI: [id number]                                      DATE:     Please sign and return via fax to [phone]  Thank you, [facility].

## 2023-10-19 ENCOUNTER — OFFICE VISIT (OUTPATIENT)
Dept: ORTHOPEDIC SURGERY | Facility: CLINIC | Age: 43
End: 2023-10-19
Payer: MEDICARE

## 2023-10-19 VITALS
SYSTOLIC BLOOD PRESSURE: 141 MMHG | HEART RATE: 84 BPM | DIASTOLIC BLOOD PRESSURE: 91 MMHG | HEIGHT: 65 IN | BODY MASS INDEX: 40.82 KG/M2 | OXYGEN SATURATION: 98 % | WEIGHT: 245 LBS

## 2023-10-19 DIAGNOSIS — M75.82 ROTATOR CUFF TENDONITIS, LEFT: ICD-10-CM

## 2023-10-19 DIAGNOSIS — M89.512 OSTEOLYSIS OF ACROMIAL END OF LEFT CLAVICLE: Primary | ICD-10-CM

## 2023-10-19 DIAGNOSIS — M54.12 CERVICAL RADICULOPATHY: ICD-10-CM

## 2023-10-19 RX ORDER — LIDOCAINE HYDROCHLORIDE 10 MG/ML
1 INJECTION, SOLUTION INFILTRATION; PERINEURAL
Status: COMPLETED | OUTPATIENT
Start: 2023-10-19 | End: 2023-10-19

## 2023-10-19 RX ORDER — TRIAMCINOLONE ACETONIDE 40 MG/ML
40 INJECTION, SUSPENSION INTRA-ARTICULAR; INTRAMUSCULAR
Status: COMPLETED | OUTPATIENT
Start: 2023-10-19 | End: 2023-10-19

## 2023-10-19 RX ADMIN — TRIAMCINOLONE ACETONIDE 40 MG: 40 INJECTION, SUSPENSION INTRA-ARTICULAR; INTRAMUSCULAR at 14:29

## 2023-10-19 RX ADMIN — LIDOCAINE HYDROCHLORIDE 1 ML: 10 INJECTION, SOLUTION INFILTRATION; PERINEURAL at 14:29

## 2023-10-19 NOTE — PROGRESS NOTES
Chief Complaint  Pain and Follow-up of the Left Shoulder     Subjective      Bebe Steward presents to [facility] for follow up evaluation of the left shoulder. The patient recently had an MRI and is here today for those results. To review, She reports she was told her neck pain was radiating to her shoulder. She locates pain to the trapezius region and posterior shoulder. She reports decreased ROM to the shoulder. She has tried treatment for her neck without relief.      Allergies   Allergen Reactions    Adhesive Tape Itching and Rash    Cefaclor Rash    Cephalexin Rash    Cyclobenzaprine Other (See Comments)     Muscle spasms    Erythromycin Rash    Meperidine Headache    Monosodium Glutamate Other (See Comments)     Blood pressure drops, pass out     Morphine Irritability    Nsaids Unknown - High Severity     CKD     Penicillins Anaphylaxis and Rash     1. When was your reaction? < 10 years ago    2. Did your reaction happen after the first dose? Do not know    3. Did your reaction happen after several doses? Do not know    4. Did your reaction require ED or hospital care to manage your reaction? Do not know    5. Did your reaction require treatment with epinephrine? Do not know    6. Have you taken amoxicillin (Amoxil) or amoxicillin-clavulanate (Augmentin) without issue since? no    7. Have you taken cephalexin (Keflex) without issue since?  No    Sulfa Antibiotics Rash    Sulfamethoxazole-Trimethoprim Rash    Vancomycin Itching        Social History     Socioeconomic History    Marital status:     Number of children: 3   Tobacco Use    Smoking status: Former     Packs/day: 1.00     Years: 15.00     Additional pack years: 0.00     Total pack years: 15.00     Types: Cigarettes     Start date: 1997     Quit date: 2021     Years since quittin.7     Passive exposure: Current    Smokeless tobacco: Never   Vaping Use    Vaping Use: Never used   Substance and Sexual  "Activity    Alcohol use: Yes     Comment: Rarely. 1-2 times yearly    Drug use: Never    Sexual activity: Not Currently     Partners: Male     Birth control/protection: Abstinence, Hysterectomy     Comment: Chemical menopause. Hysterectomy scheduled for 4/6/23        I reviewed the patient's chief complaint, history of present illness, review of systems, past medical history, surgical history, family history, social history, medications, and allergy list.     Review of Systems     Constitutional: Denies fevers, chills, weight loss  Cardiovascular: Denies chest pain, shortness of breath  Skin: Denies rashes, acute skin changes  Neurologic: Denies headache, loss of consciousness  MSK: left shoulder pain      Vital Signs:   /91   Pulse 84   Ht 165.1 cm (65\")   Wt 111 kg (245 lb)   SpO2 98%   BMI 40.77 kg/m²          Physical Exam  General: Alert. No acute distress    Ortho Exam      Left shoulder- pain with cervical motion. Forward elevation 140 with pain. Abduction 140. External Rotation 90. Internal rotation 70. 4+ supraspinatus strength with pain. 5/5 infraspinatus and subscapularis. Internal rotation to L-4. Positive impingement signs and cross arm adduction  tender to the posterior lateral shoulder and acromioclavicular joint     Left AC trigger point  Date/Time: 10/19/2023 2:29 PM  Consent given by: patient  Site marked: site marked  Timeout: Immediately prior to procedure a time out was called to verify the correct patient, procedure, equipment, support staff and site/side marked as required   Supporting Documentation  Indications: pain   Procedure Details  Location: shoulder -   Needle gauge: 21G.  Medications administered: 1 mL lidocaine 1 %; 40 mg triamcinolone acetonide 40 MG/ML  Patient tolerance: patient tolerated the procedure well with no immediate complications            Imaging Results (Most Recent)       None             Result Review :       MRI Shoulder Left Without Contrast    Result " Date: 10/8/2023  Narrative: PROCEDURE: MRI SHOULDER LEFT WO CONTRAST  COMPARISON: [facility], CR, XR SHOULDER 2+ VW LEFT, 9/12/2023, 14:24.  INDICATIONS: LEFT SHOULDER PAIN X1 YEAR. LIMITED RANGE OF MOTION. FALL 01/2023.      TECHNIQUE: A variety of imaging planes and parameters were utilized for visualization of suspected pathology.  Images were performed without contrast.   FINDINGS:   There is irregularity of the distal clavicular and adjacent acromial articular surfaces.  A trace AC joint effusion is noted.  Mild marrow edema is noted in the distal clavicle and adjacent acromion.  A type 2 acromion is present.  No fracture or malalignment is seen.  A trace amount of free fluid is noted in the subdeltoid/subacromial bursa.  Mild supraspinatus tendinopathy is noted.  The rotator cuff otherwise appears unremarkable.  No muscle body atrophy or signal abnormality is noted surrounding the shoulder girdle.  The biceps long head tendon and its attachment to the superior labrum are intact.  There is separation of the anterosuperior labrum from the underlying glenoid, suspected to represent a sub labral foramen.  No convincing labral tear is identified.  No glenohumeral joint effusion or loose body is seen.  Cartilage in the joint appears intact.      Impression:   1. Irregularity of the acromioclavicular joint articular surfaces.  Correlate clinically for a history of erosive arthropathy versus osteolysis related to chronic repetitive type injury 2. Mild supraspinatus tendinopathy      Jay Shetty M.D.       Electronically Signed and Approved By: Jay Shetty M.D. on 10/08/2023 at 11:15                     Assessment and Plan     Diagnoses and all orders for this visit:    1. 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 Rotator cuff tendonitis, left  -     Left AC trigger point    3. Cervical radiculopathy        Discussed the treatment plan with the patient.  I reviewed the  MRI with the patient. Plan for conservative treatment at this time. Plan to continue therapy at this time, new order given today. Discussed the risks and benefits of a left acromioclavicular joint injection. The patient expressed understanding and wished to proceed. She tolerated the injection well. I advised her some of her pain is likely related to her neck.     Call or return if worsening symptoms.    Follow Up     6 weeks      Patient was given instructions and counseling regarding her condition or for health maintenance advice. Please see specific information pulled into the AVS if appropriate.     Scribed for Tomy Dobson MD by Mima Kaplan.  10/19/23   14:07 EDT    I have personally performed the services described in this document as scribed by the above individual and it is both accurate and complete. Tomy Dobson MD 10/19/23

## 2023-10-20 ENCOUNTER — TREATMENT (OUTPATIENT)
Dept: PHYSICAL THERAPY | Facility: CLINIC | Age: 43
End: 2023-10-20
Payer: MEDICARE

## 2023-10-20 DIAGNOSIS — M25.512 LEFT SHOULDER PAIN, UNSPECIFIED CHRONICITY: ICD-10-CM

## 2023-10-20 DIAGNOSIS — M54.2 PAIN, NECK: Primary | ICD-10-CM

## 2023-10-20 DIAGNOSIS — M75.82 ROTATOR CUFF TENDONITIS, LEFT: ICD-10-CM

## 2023-10-20 PROCEDURE — 97110 THERAPEUTIC EXERCISES: CPT | Performed by: PHYSICAL THERAPIST

## 2023-10-20 PROCEDURE — 97530 THERAPEUTIC ACTIVITIES: CPT | Performed by: PHYSICAL THERAPIST

## 2023-10-20 NOTE — PROGRESS NOTES
Physical Therapy Daily Treatment Note      Patient: Bebe Steward   : 1980  Referring practitioner: Travon Cameron MD  Date of Initial Visit: Type: THERAPY  Noted: 10/12/2023  Today's Date: 10/20/2023  Patient seen for 2 sessions           Subjective   Bebe Steward reports: left shoulder pain /10      Objective   See Exercise, Manual, and Modality Logs for complete treatment.       Assessment & Plan       Assessment  Assessment details: Patient demonstrating more discomfort/weakness in left shoulder with strengthening exercises today.       Visit Diagnoses:    ICD-10-CM ICD-9-CM   1. Pain, neck  M54.2 723.1   2. Left shoulder pain, unspecified chronicity  M25.512 719.41   3. Rotator cuff tendonitis, left  M75.82 726.10       Progress per Plan of Care and Progress strengthening /stabilization /functional activity           Timed:  Manual Therapy:    12     mins  89412;  Therapeutic Exercise:    26     mins  96252;     Neuromuscular Kaiden:        mins  90092;    Therapeutic Activity:          mins  84591;     Gait Training:           mins  96738;     Ultrasound:          mins  27177;    Electrical Stimulation:         mins  71986 ( );    Untimed:  Electrical Stimulation:         mins  25590 ( );  Mechanical Traction:         mins  21353;     Timed Treatment:   38   mins   Total Treatment:     38   mins  Kimberly Wright PT    Electronically singed 10/20/2023      [place] PT license: [id number]  Physical Therapist

## 2023-10-24 ENCOUNTER — TREATMENT (OUTPATIENT)
Dept: PHYSICAL THERAPY | Facility: CLINIC | Age: 43
End: 2023-10-24
Payer: MEDICARE

## 2023-10-24 DIAGNOSIS — M54.2 PAIN, NECK: Primary | ICD-10-CM

## 2023-10-24 DIAGNOSIS — M75.82 ROTATOR CUFF TENDONITIS, LEFT: ICD-10-CM

## 2023-10-24 DIAGNOSIS — M25.512 LEFT SHOULDER PAIN, UNSPECIFIED CHRONICITY: ICD-10-CM

## 2023-10-24 PROCEDURE — 97110 THERAPEUTIC EXERCISES: CPT | Performed by: PHYSICAL THERAPIST

## 2023-10-24 PROCEDURE — 97140 MANUAL THERAPY 1/> REGIONS: CPT | Performed by: PHYSICAL THERAPIST

## 2023-10-24 NOTE — PROGRESS NOTES
Physical Therapy Daily Treatment Note      Patient: Bebe Steward   : 1980  Referring practitioner: Travon Cameron MD  Date of Initial Visit: Type: THERAPY  Noted: 10/12/2023  Today's Date: 10/24/2023  Patient seen for 3 sessions           Subjective   Bebe Steward reports: pain 7/10 left shoulder secondary to carry wood up/down steps.       Objective   See Exercise, Manual, and Modality Logs for complete treatment.       Assessment & Plan       Assessment  Assessment details: Patient tolerated progression of scapular strengthening today.     Visit Diagnoses:    ICD-10-CM ICD-9-CM   1. Pain, neck  M54.2 723.1   2. Left shoulder pain, unspecified chronicity  M25.512 719.41   3. Rotator cuff tendonitis, left  M75.82 726.10       Progress per Plan of Care           Timed:  Manual Therapy:    10     mins  98636;  Therapeutic Exercise:    30     mins  06707;     Neuromuscular Kaiden:        mins  75875;    Therapeutic Activity:          mins  28525;     Gait Training:           mins  79212;     Ultrasound:          mins  43449;    Electrical Stimulation:         mins  83947 ( );    Untimed:  Electrical Stimulation:         mins  53338 ( );  Mechanical Traction:         mins  84412;     Timed Treatment:   40   mins   Total Treatment:     40   mins  Kimberly Wright PT    Electronically singed 10/24/2023      [place] PT license: [id number]  Physical Therapist

## 2023-10-25 ENCOUNTER — LAB (OUTPATIENT)
Dept: ONCOLOGY | Facility: HOSPITAL | Age: 43
End: 2023-10-25
Payer: MEDICARE

## 2023-10-25 ENCOUNTER — FLU SHOT (OUTPATIENT)
Dept: FAMILY MEDICINE CLINIC | Facility: CLINIC | Age: 43
End: 2023-10-25
Payer: MEDICARE

## 2023-10-25 DIAGNOSIS — Z23 NEED FOR INFLUENZA VACCINATION: Primary | ICD-10-CM

## 2023-10-25 DIAGNOSIS — D50.8 IRON DEFICIENCY ANEMIA FOLLOWING BARIATRIC SURGERY: ICD-10-CM

## 2023-10-25 DIAGNOSIS — K95.89 IRON DEFICIENCY ANEMIA FOLLOWING BARIATRIC SURGERY: ICD-10-CM

## 2023-10-25 LAB
BASOPHILS # BLD AUTO: 0.04 10*3/MM3 (ref 0–0.2)
BASOPHILS NFR BLD AUTO: 0.5 % (ref 0–1.5)
DEPRECATED RDW RBC AUTO: 59.2 FL (ref 37–54)
EOSINOPHIL # BLD AUTO: 0.21 10*3/MM3 (ref 0–0.4)
EOSINOPHIL NFR BLD AUTO: 2.5 % (ref 0.3–6.2)
ERYTHROCYTE [DISTWIDTH] IN BLOOD BY AUTOMATED COUNT: 14.6 % (ref 12.3–15.4)
FERRITIN SERPL-MCNC: 162.7 NG/ML (ref 13–150)
FOLATE SERPL-MCNC: 3.59 NG/ML (ref 4.78–24.2)
HCT VFR BLD AUTO: 40.4 % (ref 34–46.6)
HGB BLD-MCNC: 12.7 G/DL (ref 12–15.9)
IMM GRANULOCYTES # BLD AUTO: 0.01 10*3/MM3 (ref 0–0.05)
IMM GRANULOCYTES NFR BLD AUTO: 0.1 % (ref 0–0.5)
IRON 24H UR-MRATE: 78 MCG/DL (ref 37–145)
IRON SATN MFR SERPL: 19 % (ref 20–50)
LYMPHOCYTES # BLD AUTO: 3.31 10*3/MM3 (ref 0.7–3.1)
LYMPHOCYTES NFR BLD AUTO: 39 % (ref 19.6–45.3)
MCH RBC QN AUTO: 34.1 PG (ref 26.6–33)
MCHC RBC AUTO-ENTMCNC: 31.4 G/DL (ref 31.5–35.7)
MCV RBC AUTO: 108.6 FL (ref 79–97)
MONOCYTES # BLD AUTO: 0.51 10*3/MM3 (ref 0.1–0.9)
MONOCYTES NFR BLD AUTO: 6 % (ref 5–12)
NEUTROPHILS NFR BLD AUTO: 4.4 10*3/MM3 (ref 1.7–7)
NEUTROPHILS NFR BLD AUTO: 51.9 % (ref 42.7–76)
PLATELET # BLD AUTO: 297 10*3/MM3 (ref 140–450)
PMV BLD AUTO: 9.4 FL (ref 6–12)
RBC # BLD AUTO: 3.72 10*6/MM3 (ref 3.77–5.28)
TIBC SERPL-MCNC: 408 MCG/DL (ref 298–536)
TRANSFERRIN SERPL-MCNC: 274 MG/DL (ref 200–360)
VIT B12 BLD-MCNC: 302 PG/ML (ref 211–946)
WBC NRBC COR # BLD: 8.48 10*3/MM3 (ref 3.4–10.8)

## 2023-10-25 PROCEDURE — 36415 COLL VENOUS BLD VENIPUNCTURE: CPT

## 2023-10-25 PROCEDURE — 83540 ASSAY OF IRON: CPT

## 2023-10-25 PROCEDURE — 85025 COMPLETE CBC W/AUTO DIFF WBC: CPT

## 2023-10-25 PROCEDURE — 84466 ASSAY OF TRANSFERRIN: CPT

## 2023-10-25 PROCEDURE — 82746 ASSAY OF FOLIC ACID SERUM: CPT

## 2023-10-25 PROCEDURE — 82728 ASSAY OF FERRITIN: CPT

## 2023-10-25 PROCEDURE — 82607 VITAMIN B-12: CPT

## 2023-11-02 ENCOUNTER — TREATMENT (OUTPATIENT)
Dept: PHYSICAL THERAPY | Facility: CLINIC | Age: 43
End: 2023-11-02
Payer: MEDICARE

## 2023-11-02 DIAGNOSIS — M54.2 PAIN, NECK: Primary | ICD-10-CM

## 2023-11-02 DIAGNOSIS — M25.512 LEFT SHOULDER PAIN, UNSPECIFIED CHRONICITY: ICD-10-CM

## 2023-11-02 DIAGNOSIS — M75.82 ROTATOR CUFF TENDONITIS, LEFT: ICD-10-CM

## 2023-11-02 PROCEDURE — 97140 MANUAL THERAPY 1/> REGIONS: CPT | Performed by: PHYSICAL THERAPIST

## 2023-11-02 PROCEDURE — 97110 THERAPEUTIC EXERCISES: CPT | Performed by: PHYSICAL THERAPIST

## 2023-11-02 NOTE — PROGRESS NOTES
Physical Therapy Daily Treatment Note      Patient: Bebe Steward   : 1980  Referring practitioner: Travon Cameron MD  Date of Initial Visit: Type: THERAPY  Noted: 10/12/2023  Today's Date: 2023  Patient seen for 4 sessions           Subjective   Bebe Steward reports: left shoulder pain 7/10      Objective   See Exercise, Manual, and Modality Logs for complete treatment.       Assessment/Plan    Visit Diagnoses:    ICD-10-CM ICD-9-CM   1. Pain, neck  M54.2 723.1   2. Left shoulder pain, unspecified chronicity  M25.512 719.41   3. Rotator cuff tendonitis, left  M75.82 726.10       Progress per Plan of Care           Timed:  Manual Therapy:    10     mins  03877;  Therapeutic Exercise:    29     mins  56614;     Neuromuscular Kaiden:        mins  72080;    Therapeutic Activity:          mins  27783;     Gait Training:           mins  37049;     Ultrasound:          mins  24670;    Electrical Stimulation:         mins  45722 ( );    Untimed:  Electrical Stimulation:         mins  29995 ( );  Mechanical Traction:         mins  62572;     Timed Treatment:   39   mins   Total Treatment:     39   mins  Kimberly Wright PT    Electronically singed 2023      [place] PT license: [id number]  Physical Therapist

## 2023-11-06 ENCOUNTER — TREATMENT (OUTPATIENT)
Dept: PHYSICAL THERAPY | Facility: CLINIC | Age: 43
End: 2023-11-06
Payer: MEDICARE

## 2023-11-06 DIAGNOSIS — M75.82 ROTATOR CUFF TENDONITIS, LEFT: ICD-10-CM

## 2023-11-06 DIAGNOSIS — M54.2 PAIN, NECK: Primary | ICD-10-CM

## 2023-11-06 DIAGNOSIS — M25.512 LEFT SHOULDER PAIN, UNSPECIFIED CHRONICITY: ICD-10-CM

## 2023-11-06 PROCEDURE — 97110 THERAPEUTIC EXERCISES: CPT | Performed by: PHYSICAL THERAPIST

## 2023-11-06 PROCEDURE — 97140 MANUAL THERAPY 1/> REGIONS: CPT | Performed by: PHYSICAL THERAPIST

## 2023-11-06 NOTE — PROGRESS NOTES
Physical Therapy Daily Treatment Note      Patient: Bebe Steward   : 1980  Referring practitioner: Travon Cameron MD  Date of Initial Visit: Type: THERAPY  Noted: 10/12/2023  Today's Date: 2023  Patient seen for 5 sessions           Subjective   Bebe Steward reports: pain 8/10 in neck/left shoulder      Objective   See Exercise, Manual, and Modality Logs for complete treatment.       Assessment & Plan       Assessment  Assessment details: Patient reports feeling a little better after therapy session today.     Visit Diagnoses:    ICD-10-CM ICD-9-CM   1. Pain, neck  M54.2 723.1   2. Left shoulder pain, unspecified chronicity  M25.512 719.41   3. Rotator cuff tendonitis, left  M75.82 726.10       Progress per Plan of Care           Timed:  Manual Therapy:    10     mins  19212;  Therapeutic Exercise:    28     mins  30331;     Neuromuscular Kaiden:        mins  99082;    Therapeutic Activity:          mins  01256;     Gait Training:           mins  27249;     Ultrasound:          mins  87088;    Electrical Stimulation:         mins  61073 ( );    Untimed:  Electrical Stimulation:         mins  23560 ( );  Mechanical Traction:         mins  52039;     Timed Treatment:   38   mins   Total Treatment:     38   mins  Kimberly Wright PT    Electronically singed 2023      [place] PT license: [id number]  Physical Therapist

## 2023-11-09 RX ORDER — ERGOCALCIFEROL 1.25 MG/1
50000 CAPSULE ORAL WEEKLY
Qty: 12 CAPSULE | Refills: 3 | Status: SHIPPED | OUTPATIENT
Start: 2023-11-09

## 2023-11-09 RX ORDER — HYDROXYZINE HYDROCHLORIDE 25 MG/1
TABLET, FILM COATED ORAL
Qty: 90 TABLET | Refills: 1 | Status: SHIPPED | OUTPATIENT
Start: 2023-11-09

## 2023-11-10 ENCOUNTER — TELEPHONE (OUTPATIENT)
Dept: ORTHOPEDICS | Facility: OTHER | Age: 43
End: 2023-11-10
Payer: MEDICARE

## 2023-11-30 ENCOUNTER — TREATMENT (OUTPATIENT)
Dept: PHYSICAL THERAPY | Facility: CLINIC | Age: 43
End: 2023-11-30
Payer: MEDICARE

## 2023-11-30 DIAGNOSIS — M54.2 PAIN, NECK: Primary | ICD-10-CM

## 2023-11-30 DIAGNOSIS — M75.82 ROTATOR CUFF TENDONITIS, LEFT: ICD-10-CM

## 2023-11-30 DIAGNOSIS — M25.512 LEFT SHOULDER PAIN, UNSPECIFIED CHRONICITY: ICD-10-CM

## 2023-11-30 PROCEDURE — 97140 MANUAL THERAPY 1/> REGIONS: CPT | Performed by: PHYSICAL THERAPIST

## 2023-11-30 PROCEDURE — 97110 THERAPEUTIC EXERCISES: CPT | Performed by: PHYSICAL THERAPIST

## 2023-11-30 NOTE — PROGRESS NOTES
"Physical Therapy Progress Note  [address] [facility], [address], [place] [address]      Patient: Bebe Steward   : 1980  Referring practitioner: Travon Cameron MD  Date of Initial Visit: Type: THERAPY  Noted: 10/12/2023  Today's Date: 2023  Patient seen for 6 sessions           Subjective   Bebe Steward reports: left shoulder \"popping\" is still painful. Patient states neck/shoulder pain 5/10.  Subjective Questionnaire: NDI:14/50=28% limitation improved from initial score :26/50=52% limitation       Objective          Postural Observations    Additional Postural Observation Details  Forward head/forward rolled shoulders with protracted scapula    Active Range of Motion   Cervical/Thoracic Spine   Cervical    Flexion: WFL and with pain  Extension: WFL  Left rotation: 50 degrees   Right rotation: 60 degrees     Strength/Myotome Testing     Left Shoulder     Planes of Motion   Flexion: 3+   Extension: 4-   Abduction: 3+   External rotation at 0°: 3+   Internal rotation at 0°: 3-     Right Shoulder     Planes of Motion   Flexion: 3+   Extension: 4-   Abduction: 3+   External rotation at 0°: 3+   Internal rotation at 0°: 3+       See Exercise, Manual, and Modality Logs for complete treatment.       Assessment & Plan       Assessment  Impairments: activity intolerance, impaired physical strength, lacks appropriate home exercise program and pain with function   Functional limitations: carrying objects, lifting, pulling, pushing, uncomfortable because of pain and unable to perform repetitive tasks   Assessment details: Patient has made progress with therapy with improved cervical range of motion and bilateral shoulder/scapular strength, but still has c/o pain. Patient to follow up with MD tomorrow; will wait MD orders.   Prognosis: good    Goals  Plan Goals: 1. The patient has limited ROM.    LTG 1: 12 weeks:  The patient will demonstrate 60° of bilateral cervical spine rotation in order to adequately monitor blind " spots while driving and improve ability to perform activities of daily living.   STATUS:  Met to Right  STG 1a: 6 weeks:  The patient will demonstrate 50° of left cervical spine rotation  in order to adequately monitor blind spots while driving and improve ability to perform activities of daily living.   STATUS:  Met bilaterally     2. The patient has complaints of pain.   LTG 2: 12 weeks:  The patient will report 2/10 pain in order to more easily tolerate activities of daily living and improve sleep quality.   STATUS:  not met  STG 2a: 6weeks:  The patient will report 4/10 pain.   STATUS:  Not met     3. The patient reports radicular symptoms in the left upper extremity.   LTG 3: 12 weeks:  The patient will report a decrease in radicular symptoms in the left upper extremity by 50%.   STATUS:  Not met  STG 3a: 6 weeks:  The patient will report a decrease in radicular symptoms in the left upper extremity by 25%.   STATUS:  Met     4. Carrying, Moving, and Handling Objects Functional Limitation     LTG 4: 12 weeks:  The patient will demonstrate 30% limitation by achieving a score of 15/50 on the Neck Disability Index.   STATUS:  Met  STG 4a: 6 weeks:  The patient will demonstrate 40% limitation by achieving a score of 20/50 on the Neck Disability Index.     STATUS:  Met      Plan  Therapy options: will be seen for skilled therapy services  Planned modality interventions: cryotherapy, TENS and thermotherapy (hydrocollator packs)  Planned therapy interventions: manual therapy, postural training, neuromuscular re-education, soft tissue mobilization, spinal/joint mobilization, strengthening, stretching, therapeutic activities, home exercise program, flexibility and functional ROM exercises  Frequency: 1x week  Duration in weeks: 8  Treatment plan discussed with: patient    Visit Diagnoses:    ICD-10-CM ICD-9-CM   1. Pain, neck  M54.2 723.1   2. Left shoulder pain, unspecified chronicity  M25.512 719.41   3. Rotator cuff  tendonitis, left  M75.82 726.10       Awaiting MD orders           Timed:  Manual Therapy:    8     mins  71059;  Therapeutic Exercise:    30     mins  23519;     Neuromuscular [place]:        mins  93054;    Therapeutic Activity:          mins  11373;     Gait Training:           mins  78242;     Ultrasound:          mins  13209;    Electrical Stimulation:         mins  38410 ( );    Untimed:  Electrical Stimulation:         mins  15220 ( );  Mechanical Traction:         mins  57687;     Timed Treatment:   38   mins   Total Treatment:     38   mins  Kimberly Wright PT    Electronically singed 11/30/2023      [place] PT license: [id number]  Physical Therapist

## 2023-12-01 ENCOUNTER — OFFICE VISIT (OUTPATIENT)
Dept: ORTHOPEDIC SURGERY | Facility: CLINIC | Age: 43
End: 2023-12-01
Payer: MEDICARE

## 2023-12-01 VITALS
BODY MASS INDEX: 42.49 KG/M2 | HEART RATE: 87 BPM | SYSTOLIC BLOOD PRESSURE: 178 MMHG | HEIGHT: 65 IN | WEIGHT: 255 LBS | OXYGEN SATURATION: 98 % | DIASTOLIC BLOOD PRESSURE: 107 MMHG

## 2023-12-01 DIAGNOSIS — M89.512 OSTEOLYSIS OF ACROMIAL END OF LEFT CLAVICLE: Primary | ICD-10-CM

## 2023-12-01 NOTE — PROGRESS NOTES
Chief Complaint  Follow-up of the Left Shoulder    Subjective          History of Present Illness      Bebe Steward is a 43 y.o. female  presents to [facility] for     Patient presents for follow-up evaluation of left shoulder pain, she states pain has not improved with the injection she received on 10/19/2023.  Her MRI was reviewed with her at that visit, Dr. Dobson discussed conservative treatment with injection and therapy.  She states therapy has helped certain movements but she states she still has pain with raising her arm above her head or pain with abduction type movements.  She has had history of neck pain and has treated her neck without relief.  She states Dr. Dobson discussed possible surgical intervention at last visit and she would like to discuss this with him.      Allergies   Allergen Reactions    Adhesive Tape Itching and Rash    Cefaclor Rash    Cephalexin Rash    Cyclobenzaprine Other (See Comments)     Muscle spasms    Erythromycin Rash    Meperidine Headache    Monosodium Glutamate Other (See Comments)     Blood pressure drops, pass out     Morphine Irritability    Nsaids Unknown - High Severity     CKD     Penicillins Anaphylaxis and Rash     1. When was your reaction? < 10 years ago    2. Did your reaction happen after the first dose? Do not know    3. Did your reaction happen after several doses? Do not know    4. Did your reaction require ED or hospital care to manage your reaction? Do not know    5. Did your reaction require treatment with epinephrine? Do not know    6. Have you taken amoxicillin (Amoxil) or amoxicillin-clavulanate (Augmentin) without issue since? no    7. Have you taken cephalexin (Keflex) without issue since?  No    Sulfa Antibiotics Rash    Sulfamethoxazole-Trimethoprim Rash    Vancomycin Itching        Social History     Socioeconomic History    Marital status:     Number of children: 3   Tobacco Use    Smoking status:  "Former     Packs/day: 1.00     Years: 15.00     Additional pack years: 0.00     Total pack years: 15.00     Types: Cigarettes     Start date: 1997     Quit date: 2021     Years since quittin.9     Passive exposure: Current    Smokeless tobacco: Never   Vaping Use    Vaping Use: Never used   Substance and Sexual Activity    Alcohol use: Yes     Comment: Rarely. 1-2 times yearly    Drug use: Never    Sexual activity: Not Currently     Partners: Male     Birth control/protection: Abstinence, Hysterectomy     Comment: Chemical menopause. Hysterectomy scheduled for 21        REVIEW OF SYSTEMS    Constitutional: Awake alert and oriented x3, no acute distress, denies fevers, chills, weight loss  Respiratory: No respiratory distress  Vascular: Brisk cap refill, Intact distal pulses, No cyanosis, compartments soft with no signs or symptoms of compartment syndrome or DVT.   Cardiovascular: Denies chest pain, shortness of breath  Skin: Denies rashes, acute skin changes  Neurologic: Denies headache, loss of consciousness  MSK: left shoulder pain      Objective   Vital Signs:   BP (!) 178/107 Comment: Pt aware of BP and advised to contact her PCP or go to the ER  Pulse 87   Ht 165.1 cm (65\")   Wt 116 kg (255 lb)   SpO2 98%   BMI 42.43 kg/m²     Body mass index is 42.43 kg/m².    Physical Exam       Left shoulder- pain with cervical motion. Forward elevation 140 with pain. Abduction 140. External Rotation 90. Internal rotation 70. 4+ supraspinatus strength with pain. 5/5 infraspinatus and subscapularis. Internal rotation to L-4. Positive impingement signs and cross arm adduction  tender to the posterior lateral shoulder and acromioclavicular joint        Procedures    Imaging Results (Most Recent)       None             Result Review :   The following data was reviewed by: CADY Ferrer on 2023:               Assessment and Plan    Diagnoses and all orders for this visit:    1. 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 Dobson due to failing conservative treatment, follow-up when ready to discuss surgery.    Call or return if worsening symptoms.    Follow Up   Return To discuss surgery she will follow-up.  Patient was given instructions and counseling regarding her condition or for health maintenance advice. Please see specific information pulled into the AVS if appropriate.       EMR Dragon/Transcription disclaimer:  Much of this encounter note is an electronic transcription/translation of spoken language to printed text, aka voice recognition.  The electronic translation of spoken language may permit erroneous or at times nonsensical words or phrases to be inadvertently transcribed; although I have reviewed the note for such errors, some may still exist so please interpret based on surrounding text content.

## 2023-12-07 ENCOUNTER — PREP FOR SURGERY (OUTPATIENT)
Dept: OTHER | Facility: HOSPITAL | Age: 43
End: 2023-12-07
Payer: MEDICARE

## 2023-12-07 ENCOUNTER — OFFICE VISIT (OUTPATIENT)
Dept: ORTHOPEDIC SURGERY | Facility: CLINIC | Age: 43
End: 2023-12-07
Payer: MEDICARE

## 2023-12-07 VITALS
OXYGEN SATURATION: 97 % | SYSTOLIC BLOOD PRESSURE: 184 MMHG | BODY MASS INDEX: 42.49 KG/M2 | HEART RATE: 80 BPM | HEIGHT: 65 IN | DIASTOLIC BLOOD PRESSURE: 111 MMHG | WEIGHT: 255 LBS

## 2023-12-07 DIAGNOSIS — M89.512 OSTEOLYSIS OF ACROMIAL END OF LEFT CLAVICLE: Primary | ICD-10-CM

## 2023-12-07 DIAGNOSIS — M75.82 ROTATOR CUFF TENDONITIS, LEFT: ICD-10-CM

## 2023-12-07 RX ORDER — HYDROCODONE BITARTRATE AND ACETAMINOPHEN 5; 325 MG/1; MG/1
TABLET ORAL
COMMUNITY
Start: 2023-11-01

## 2023-12-07 RX ORDER — CLINDAMYCIN PHOSPHATE 900 MG/50ML
900 INJECTION INTRAVENOUS ONCE
OUTPATIENT
Start: 2023-12-07 | End: 2023-12-07

## 2023-12-07 NOTE — PROGRESS NOTES
Chief Complaint  Follow-up of the Left Shoulder     Subjective      Bebe Steward presents to [facility] for follow up evaluation of the left shoulder. The patient has been treating her osteolysis conservatively. She has been attending therapy with some relief. She reports she continues to have popping to the anterior shoulder that is painful. She had an injection without relief. She reports cervical radiculopathy that she sees pain management and Neurosurgery for.     Allergies   Allergen Reactions    Adhesive Tape Itching and Rash    Cefaclor Rash    Cephalexin Rash    Cyclobenzaprine Other (See Comments)     Muscle spasms    Erythromycin Rash    Meperidine Headache    Monosodium Glutamate Other (See Comments)     Blood pressure drops, pass out     Morphine Irritability    Nsaids Unknown - High Severity     CKD     Penicillins Anaphylaxis and Rash     1. When was your reaction? < 10 years ago    2. Did your reaction happen after the first dose? Do not know    3. Did your reaction happen after several doses? Do not know    4. Did your reaction require ED or hospital care to manage your reaction? Do not know    5. Did your reaction require treatment with epinephrine? Do not know    6. Have you taken amoxicillin (Amoxil) or amoxicillin-clavulanate (Augmentin) without issue since? no    7. Have you taken cephalexin (Keflex) without issue since?  No    Sulfa Antibiotics Rash    Sulfamethoxazole-Trimethoprim Rash    Vancomycin Itching        Social History     Socioeconomic History    Marital status:     Number of children: 3   Tobacco Use    Smoking status: Former     Packs/day: 1.00     Years: 15.00     Additional pack years: 0.00     Total pack years: 15.00     Types: Cigarettes     Start date: 1997     Quit date: 2021     Years since quittin.9     Passive exposure: Current    Smokeless tobacco: Never   Vaping Use    Vaping Use: Never used   Substance and Sexual  "Activity    Alcohol use: Yes     Comment: Rarely. 1-2 times yearly    Drug use: Never    Sexual activity: Not Currently     Partners: Male     Birth control/protection: Abstinence, Hysterectomy     Comment: Chemical menopause. Hysterectomy scheduled for 4/6/23        I reviewed the patient's chief complaint, history of present illness, review of systems, past medical history, surgical history, family history, social history, medications, and allergy list.     Review of Systems     Constitutional: Denies fevers, chills, weight loss  Cardiovascular: Denies chest pain, shortness of breath  Skin: Denies rashes, acute skin changes  Neurologic: Denies headache, loss of consciousness  MSK: Left shoulder pain      Vital Signs:   BP (!) 184/111 Comment: pt aware of BP will FU with PCP  Pulse 80   Ht 165.1 cm (65\")   Wt 116 kg (255 lb)   SpO2 97%   BMI 42.43 kg/m²          Physical Exam  General: Alert. No acute distress    Ortho Exam      Left shoulder- tender to the anterior shoulder and acromioclavicular joint, tender to the posterior lateral shoulder. Forward elevation 150. Abduction 135 degrees. External Rotation 90. Internal rotation 70. Positive impingement signs. 4+ supraspinatus strength 5/5 infraspinatus and subscapularis. Ir to L-5. Pain with cross arm adduction. Positive [place].     Procedures      Imaging Results (Most Recent)       None             Result Review :       No results found.           Assessment and Plan     Diagnoses and all orders for this visit:    1. 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 Rotator cuff tendonitis, left        Discussed the treatment options with the patient, operative vs non-operative. Discussed the risks and benefits of a left shoulder arthroscopic rotator cuff debridement, SAD/DCE. The patient expressed understanding and wished to proceed.     Discussed surgery., Risks/benefits discussed with patient including, but not limited to: infection, bleeding, " neurovascular damage, re-rupture, aesthetic deformity, need for further surgery, and death., Discussed with patient the implant type being used during surgery and patient understands., Surgery pamphlet given., and Call or return if worsening symptoms.    Follow Up     2 weeks postoperatively.        Patient was given instructions and counseling regarding her condition or for health maintenance advice. Please see specific information pulled into the AVS if appropriate.     Scribed for Tomy Dobson MD by Mima Kaplan.  12/07/23   15:20 EST    I have personally performed the services described in this document as scribed by the above individual and it is both accurate and complete. Tomy Dobson MD 12/07/23

## 2023-12-08 RX ORDER — HYDRALAZINE HYDROCHLORIDE 50 MG/1
50 TABLET, FILM COATED ORAL 3 TIMES DAILY
Qty: 90 TABLET | Refills: 1 | Status: SHIPPED | OUTPATIENT
Start: 2023-12-08

## 2023-12-11 RX ORDER — ZOLPIDEM TARTRATE 6.25 MG/1
6.25 TABLET, FILM COATED, EXTENDED RELEASE ORAL NIGHTLY PRN
COMMUNITY

## 2023-12-11 RX ORDER — TIZANIDINE 4 MG/1
4 TABLET ORAL
COMMUNITY
Start: 2023-12-03

## 2023-12-12 ENCOUNTER — OFFICE VISIT (OUTPATIENT)
Dept: FAMILY MEDICINE CLINIC | Facility: CLINIC | Age: 43
End: 2023-12-12
Payer: MEDICARE

## 2023-12-12 VITALS
SYSTOLIC BLOOD PRESSURE: 154 MMHG | DIASTOLIC BLOOD PRESSURE: 90 MMHG | HEART RATE: 62 BPM | HEIGHT: 65 IN | WEIGHT: 256 LBS | BODY MASS INDEX: 42.65 KG/M2 | OXYGEN SATURATION: 99 % | TEMPERATURE: 98.7 F

## 2023-12-12 DIAGNOSIS — F51.01 PRIMARY INSOMNIA: ICD-10-CM

## 2023-12-12 DIAGNOSIS — R13.12 OROPHARYNGEAL DYSPHAGIA: Primary | ICD-10-CM

## 2023-12-12 DIAGNOSIS — E53.8 VITAMIN B12 DEFICIENCY: ICD-10-CM

## 2023-12-12 DIAGNOSIS — I10 ESSENTIAL HYPERTENSION: ICD-10-CM

## 2023-12-12 DIAGNOSIS — E53.8 FOLIC ACID DEFICIENCY: ICD-10-CM

## 2023-12-12 PROCEDURE — 1160F RVW MEDS BY RX/DR IN RCRD: CPT | Performed by: FAMILY MEDICINE

## 2023-12-12 PROCEDURE — 3077F SYST BP >= 140 MM HG: CPT | Performed by: FAMILY MEDICINE

## 2023-12-12 PROCEDURE — 1159F MED LIST DOCD IN RCRD: CPT | Performed by: FAMILY MEDICINE

## 2023-12-12 PROCEDURE — 99213 OFFICE O/P EST LOW 20 MIN: CPT | Performed by: FAMILY MEDICINE

## 2023-12-12 PROCEDURE — 3080F DIAST BP >= 90 MM HG: CPT | Performed by: FAMILY MEDICINE

## 2023-12-12 RX ORDER — FOLIC ACID 1 MG/1
1 TABLET ORAL DAILY
Qty: 90 TABLET | Refills: 3 | Status: SHIPPED | OUTPATIENT
Start: 2023-12-12 | End: 2024-12-06

## 2023-12-12 NOTE — PROGRESS NOTES
Chief Complaint  Chief Complaint   Patient presents with    Hypertension    Requesting a referral for a swallow test       HPI:  Bebe Steward presents to [facility]    The patient is a 43-year-old female who presents for evaluation of multiple medical concerns.    She is scheduled for left shoulder surgery on 01/15/2024.    She is concerned about her swallowing. The episodes have happened only a couple of times; however, when she went to swallow something, whether it would be medicine, food, or drink, her brain was telling her throat to swallow, but her throat just froze up as if she forgot how to swallow. She has had to spit food out and drinks, and wonders why her throat is not swallowing. Last night, 12/11/2023, she went to take a drink of water, and by the time she took the drink she felt like she had to gasp for air, like she is not breathing enough. This has happened 3 times in the past few weeks. She denies any dysphagia. It occasionally feels like the food is stuck in the back of her mouth. She has tried exhaling before she tries to swallow, she has tried inhaling before she tries to swallow, she has tried tucking her chin down and holding her head up. Her mother used to have to tuck her chin when swallowing .    She does not take folic acid or B12 separately. She has only been able to absorb B12 either sublingually or in a shot.     She has had gastric bypass surgery. She was in the ICU because her sodium and potassium bottomed out and her heart was cramping.     Pain management asked her to get a sleep study done. She has difficulty falling asleep. Once she is asleep, a lot of times she will stay asleep, but sometimes she wakes up every 2 hours. She has never been told that she snores. People have told her that she snores when she is sick.    She has not taken her second dose of hydralazine yet. She took her first dose at 8:00 a.m. She tries to make sure the doses are  evenly spaced.     Her nephrologist wanted her to take Lasix as needed. At her last office visit about 4 months ago, she weighed 246 pounds. She weighs 256 pounds at today's office visit. She can usually tell if she is developing edema and will take her Lasix because she can visibly see the swelling. She last took Lasix approximately 1 month ago.     Her blood pressure has been intermittently elevated. She attributes her elevated blood pressure to staying with her father and stepmother.    Procedures     Past History:  Medical History: has a past medical history of Abnormal Pap smear of cervix (07/21/2020), Allergies, Anemia, Ankle sprain, Anxiety (2014), Arthritis, Arthritis of back (2006), Bilateral carpal tunnel syndrome (07/10/2023), Brain concussion (1995), Broken bones (08/2014), Callus, Cervical disc disorder (2012), Cholelithiasis (2001), Colon polyp (03/23/2023), CTS (carpal tunnel syndrome) (2022), Difficulty walking, Endometriosis, Essential hypertension (07/21/2020), Fracture of ankle (2008), Fracture of wrist (2011), Fracture, fibula, Fracture, tibia and fibula (2008), H/O Polycystic ovary syndrome, Head injury (1996), HPV (human papilloma virus) infection, Hyperlipidemia, Hypoglycemia, Ingrown toenail, Insomnia (07/21/2020), Knee sprain, Knee swelling (2015), Left below-knee amputee (07/21/2020), Low back pain (2006), Low back strain, Lumbosacral disc disease (2006), Migraine, Muscle spasm (01/04/2021), Neck strain, Numbness in right foot, Obesity, 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   Surgical History: has a past surgical  history that includes Abdominal surgery (2010); Leg amputation, lower tibia/fibula (Left, 2014); Ankle surgery; Endometrial ablation (2007); Gastric bypass (2001); Knee surgery; Spine surgery (2008); Tonsillectomy (1994); Spinal fusion (10/2008); Epidural block injection (2007); Trigger point injection (2021); Ankle fracture surgery (5208-3290); Cholecystectomy (2001); Fracture surgery (2008); Hysterectomy; Colonoscopy (N/A, 03/23/2023); total laparoscopic hysterectomy (N/A, 04/06/2023); Cystoscopy (N/A, 04/06/2023); Bariatric Surgery (03/01); Back surgery (10/08); Avulsion toenail plate (Right, 06/29/2023); and Achilles tendon surgery.   Family History: family history includes Alcohol abuse in her maternal grandmother; Anesthesia problems in her father; Anxiety disorder in her maternal grandmother, mother, and son; Arthritis in her father, maternal grandfather, maternal grandmother, mother, paternal grandfather, and paternal grandmother; Broken bones in her maternal grandmother, mother, and paternal grandmother; Cancer in her maternal grandfather, paternal aunt, and paternal grandmother; Colon cancer in her paternal aunt; Colon cancer (age of onset: 87) in her paternal grandmother; Depression in her father, maternal grandmother, and mother; Developmental Disability in her paternal aunt; Diabetes in her maternal grandfather; Early death in her mother; Heart disease in her father, maternal grandfather, maternal grandmother, and paternal grandfather; Hyperlipidemia in her father, maternal grandfather, maternal grandmother, mother, paternal grandfather, and paternal grandmother; Hypertension in her father, maternal grandfather, maternal grandmother, mother, paternal grandfather, and paternal grandmother; Kidney disease in her father, maternal grandmother, mother, paternal grandfather, and paternal grandmother; Liver disease in her maternal grandmother; Melanoma in her maternal grandfather; Miscarriages / Stillbirths in  her maternal grandmother and paternal grandmother; Osteoporosis in her maternal grandmother and paternal grandmother; Other in her father, mother, and paternal grandmother; Rheumatologic disease in her father and paternal grandmother; Stroke in her maternal grandfather and mother; Thyroid disease in her maternal grandmother and mother; Vision loss in her father.   Social History: reports that she quit smoking about 2 years ago. Her smoking use included cigarettes. She started smoking about 26 years ago. She has a 15.00 pack-year smoking history. She has been exposed to tobacco smoke. She has never used smokeless tobacco. She reports current alcohol use. She reports that she does not use drugs.  Immunization History   Administered Date(s) Administered    COVID-19 (MODERNA) 1st,2nd,3rd Dose Monovalent 12/11/2021, 12/13/2021    COVID-19 (MODERNA) Monovalent Original Booster 11/11/2021, 12/09/2021    Fluzone (or Fluarix & Flulaval for VFC) >6mos 10/05/2021, 12/16/2022, 10/25/2023    Influenza, Unspecified 12/02/2020, 12/16/2022, 12/16/2022         Allergies: Adhesive tape, Cefaclor, Cephalexin, Cyclobenzaprine, Erythromycin, Meperidine, Monosodium glutamate, Morphine, Nsaids, Penicillins, Sulfa antibiotics, Sulfamethoxazole-trimethoprim, and Vancomycin     Medications:  Current Outpatient Medications on File Prior to Visit   Medication Sig Dispense Refill    ammonium lactate (AmLactin) 12 % lotion Apply  topically to the appropriate area as directed 2 (Two) Times a Day. 225 g 11    atorvastatin (LIPITOR) 20 MG tablet TAKE ONE TABLET BY MOUTH DAILY 90 tablet 2    docusate sodium (COLACE) 100 MG capsule TAKE ONE CAPSULE BY MOUTH TWICE A DAY AS NEEDED FOR CONSTIPATION 60 capsule 5    estradiol (Estrace) 1 MG tablet Take 1 tablet by mouth Daily. 30 tablet 11    furosemide (LASIX) 20 MG tablet Take 1 tablet by mouth Daily As Needed (swelling). 30 tablet 0    gabapentin (NEURONTIN) 400 MG capsule Take 1 capsule by mouth 3  "(Three) Times a Day for 90 days. 90 capsule 2    hydrALAZINE (APRESOLINE) 50 MG tablet TAKE ONE TABLET BY MOUTH THREE TIMES A DAY 90 tablet 1    HYDROcodone-acetaminophen (NORCO) 5-325 MG per tablet       hydrOXYzine (ATARAX) 25 MG tablet TAKE ONE TABLET BY MOUTH EVERY 8 HOURS AS NEEDED FOR ANXIETY 90 tablet 1    irbesartan (AVAPRO) 75 MG tablet TAKE ONE TABLET BY MOUTH ONCE NIGHTLY 90 tablet 1    loratadine (CLARITIN) 10 MG tablet Take 1 tablet by mouth Daily.      ondansetron ODT (ZOFRAN-ODT) 4 MG disintegrating tablet Place 1 tablet on the tongue.      rizatriptan (MAXALT) 10 MG tablet Take 1 tablet by mouth 1 (One) Time As Needed.      tiZANidine (ZANAFLEX) 4 MG tablet Take 1 tablet by mouth.      vitamin D (ERGOCALCIFEROL) 1.25 MG (13783 UT) capsule capsule TAKE ONE CAPSULE BY MOUTH ONCE WEEKLY 12 capsule 3    zolpidem CR (AMBIEN CR) 6.25 MG CR tablet Take 1 tablet by mouth At Night As Needed.      sertraline (Zoloft) 100 MG tablet Take 1 tablet by mouth Daily for 90 days. 90 tablet 1    [DISCONTINUED] TIZANIDINE HCL PO        No current facility-administered medications on file prior to visit.        Health Maintenance Due   Topic Date Due    TDAP/TD VACCINES (1 - Tdap) Never done    LIPID PANEL  02/10/2023       Vital Signs:   Vitals:    12/12/23 1144 12/12/23 1145   BP: 152/88 154/90   Pulse: 62    Temp: 98.7 °F (37.1 °C)    SpO2: 99%    Weight: 116 kg (256 lb)    Height: 165.1 cm (65\")       Body mass index is 42.6 kg/m².     ROS:  Review of Systems   Constitutional:  Negative for fatigue and fever.   HENT:  Negative for congestion, ear pain and sinus pressure.    Respiratory:  Negative for cough, chest tightness and shortness of breath.    Cardiovascular:  Negative for chest pain, palpitations and leg swelling.   Gastrointestinal:  Negative for abdominal pain and diarrhea.   Genitourinary:  Negative for dysuria and frequency.   Neurological:  Negative for speech difficulty, headache and confusion. "   Psychiatric/Behavioral:  Negative for agitation and behavioral problems.           Physical Exam  Vitals reviewed.   Constitutional:       Appearance: Normal appearance.   HENT:      Right Ear: Tympanic membrane normal.      Left Ear: Tympanic membrane normal.      Nose: Nose normal.   Eyes:      Extraocular Movements: Extraocular movements intact.      Conjunctiva/sclera: Conjunctivae normal.      Pupils: Pupils are equal, round, and reactive to light.   Cardiovascular:      Rate and Rhythm: Normal rate and regular rhythm.   Pulmonary:      Effort: Pulmonary effort is normal.      Breath sounds: Normal breath sounds.   Abdominal:      General: Bowel sounds are normal.   Musculoskeletal:         General: Normal range of motion.      Cervical back: Normal range of motion.   Skin:     General: Skin is warm and dry.   Neurological:      General: No focal deficit present.      Mental Status: She is alert and oriented to person, place, and time.   Psychiatric:         Mood and Affect: Mood normal.         Behavior: Behavior normal.          Result Review   Labs from 10/25/2023 were reviewed.  Her folic acid and vitamin B12 levels are low.     Diagnoses and all orders for this visit:    1. Oropharyngeal dysphagia (Primary)  -     FL Video Swallow With Speech Single Contrast; Future    2. Folic acid deficiency  -     folic acid (FOLVITE) 1 MG tablet; Take 1 tablet by mouth Daily for 360 days.  Dispense: 90 tablet; Refill: 3    3. Vitamin B12 deficiency  -     Cyanocobalamin 1000 MCG/15ML liquid; Take 15 mL by mouth Daily for 30 days.  Dispense: 450 mL; Refill: 5    4. Essential hypertension    5. Primary insomnia  -     Ambulatory Referral to Sleep Medicine      Dysphagia  - I will place an order for a swallow study.    Folic acid and B12 deficiency  - Her folic acid and B12 levels are low.  - She was recommended to take folic acid 1 mg every day.    Hypertension  - Her blood pressure is elevated today.   - She was  recommended to take Lasix when she notices that her weight is trending back up again.          Smoking Cessation:    Bebe Steward  reports that she quit smoking about 2 years ago. Her smoking use included cigarettes. She started smoking about 26 years ago. She has a 15.00 pack-year smoking history. She has been exposed to tobacco smoke. She has never used smokeless tobacco.        Follow Up   Return in about 3 months (around 3/12/2024).  Patient was given instructions and counseling regarding her condition or for health maintenance advice. Please see specific information pulled into the AVS if appropriate.       Kimberly Almaguer MD  Answers submitted by the patient for this visit:  Other (Submitted on 12/5/2023)  Please describe your symptoms.: Swallowing problems  Have you had these symptoms before?: Yes  How long have you been having these symptoms?: Greater than 2 weeks  Primary Reason for Visit (Submitted on 12/5/2023)  What is the primary reason for your visit?: Other    Transcribed from ambient dictation for Kimberly Almaguer MD by Nancy Lindquist.  12/12/23   14:09 EST    Patient or patient representative verbalized consent to the visit recording.  I have personally performed the services described in this document as transcribed by the above individual, and it is both accurate and complete.

## 2023-12-14 ENCOUNTER — TREATMENT (OUTPATIENT)
Dept: PHYSICAL THERAPY | Facility: CLINIC | Age: 43
End: 2023-12-14
Payer: MEDICARE

## 2023-12-14 DIAGNOSIS — M25.512 LEFT SHOULDER PAIN, UNSPECIFIED CHRONICITY: ICD-10-CM

## 2023-12-14 DIAGNOSIS — M54.2 PAIN, NECK: Primary | ICD-10-CM

## 2023-12-14 DIAGNOSIS — M75.82 ROTATOR CUFF TENDONITIS, LEFT: ICD-10-CM

## 2023-12-14 PROCEDURE — 97110 THERAPEUTIC EXERCISES: CPT | Performed by: PHYSICAL THERAPIST

## 2023-12-14 PROCEDURE — 97140 MANUAL THERAPY 1/> REGIONS: CPT | Performed by: PHYSICAL THERAPIST

## 2023-12-14 NOTE — PROGRESS NOTES
"Physical Therapy Daily Treatment Note-Discharge      Patient: Bebe Steward   : 1980  Referring practitioner: Tomy Dobson MD  Date of Initial Visit: Type: THERAPY  Noted: 10/12/2023  Today's Date: 2023  Patient seen for 7 sessions           Subjective   Bebe Steward reports: left shoulder still \"popping\" and going to have surgery in 2024      Objective   See Exercise, Manual, and Modality Logs for complete treatment.       Assessment & Plan       Assessment  Assessment details: Patient able to tolerate progression of scapular strengthening today. Patient to undergo left shoulder surgery on 1/15/23. Plan to discharge from therapy and patient to continue with home program until surgery.     Visit Diagnoses:    ICD-10-CM ICD-9-CM   1. Pain, neck  M54.2 723.1   2. Left shoulder pain, unspecified chronicity  M25.512 719.41   3. Rotator cuff tendonitis, left  M75.82 726.10       Other           Timed:  Manual Therapy:    10     mins  43826;  Therapeutic Exercise:    28     mins  78044;     Neuromuscular Kaiden:        mins  39166;    Therapeutic Activity:          mins  62297;     Gait Training:           mins  90498;     Ultrasound:          mins  98662;    Electrical Stimulation:         mins  20500 ( );    Untimed:  Electrical Stimulation:         mins  49501 ( );  Mechanical Traction:         mins  23663;     Timed Treatment:   38   mins   Total Treatment:     38   mins  Kimberly Wright PT    Electronically singed 2023      [place] PT license: [id number]  Physical Therapist  "

## 2023-12-27 DIAGNOSIS — F33.1 MAJOR DEPRESSIVE DISORDER, RECURRENT, MODERATE: ICD-10-CM

## 2023-12-27 DIAGNOSIS — I10 ESSENTIAL HYPERTENSION: ICD-10-CM

## 2023-12-27 DIAGNOSIS — F41.9 ANXIETY: ICD-10-CM

## 2023-12-28 RX ORDER — SERTRALINE HYDROCHLORIDE 100 MG/1
100 TABLET, FILM COATED ORAL DAILY
Qty: 90 TABLET | Refills: 1 | Status: SHIPPED | OUTPATIENT
Start: 2023-12-28

## 2023-12-28 RX ORDER — IRBESARTAN 75 MG/1
TABLET ORAL
Qty: 90 TABLET | Refills: 1 | Status: SHIPPED | OUTPATIENT
Start: 2023-12-28

## 2024-01-07 NOTE — PROGRESS NOTES
"Post Operative Visit      CC: Post operative follow up    HPI:   Pain:  No  Vaginal bleeding:  No  Vaginal discharge:  No  Fever/chills:  No  Good appetite:  Yes  Normal bladder function:  Yes  Normal bowel function:  Yes  Hot flashes: Yes    Operative report, surgical findings and any pathology reviewed.    /83   Pulse 88   Ht 165.1 cm (65\")   Wt 112 kg (246 lb)   LMP 05/19/2022   Breastfeeding No   BMI 40.94 kg/m²     Physical Exam  Vitals and nursing note reviewed. Exam conducted with a chaperone present.   Constitutional:       Appearance: Normal appearance. She is obese.   HENT:      Head: Normocephalic and atraumatic.   Eyes:      Extraocular Movements: Extraocular movements intact.   Abdominal:      General: Abdomen is flat. There is no distension.      Palpations: Abdomen is soft. There is no mass.      Tenderness: There is no abdominal tenderness. There is no guarding or rebound.      Hernia: No hernia is present.   Genitourinary:     General: Normal vulva.      Exam position: Lithotomy position.      Labia:         Right: No rash, tenderness, lesion or injury.         Left: No rash, tenderness, lesion or injury.       Urethra: No prolapse, urethral pain, urethral swelling or urethral lesion.      Vagina: No signs of injury and foreign body. No vaginal discharge, erythema, tenderness, bleeding or prolapsed vaginal walls.      Uterus: Absent.       Comments: The uterus, cervix, and bilateral fallopian tubes and ovaries are surgically absent.  The vaginal cuff is well approximated.  Some sutures are still visible within the midline.  There is no bleeding noted.  There is no abnormal discharge or odor noted.  Neurological:      Mental Status: She is alert and oriented to person, place, and time.   Psychiatric:         Mood and Affect: Mood normal.         Behavior: Behavior normal.         Thought Content: Thought content normal.         Judgment: Judgment normal.           ASSESSMENT:  " Post-operative exam    Diagnoses and all orders for this visit:    1. Postoperative visit (Primary)  Assessment & Plan:  Stable postop course  May resume normal activities  Continue pelvic rest for 8 weeks postop  Continue OTC Tylenol and or ibuprofen for any further postoperative pain      2. Menopausal symptoms  Assessment & Plan:  Patient is complaining of severe hot flashes and night sweats.  Status post TLH and BSO for pelvic pain and endometriosis.  Now surgically menopausal.  Was symptom-free with Aygestin for add back therapy with Depo-Lupron.  We will start with Aygestin and if we do not have adequate symptom control then we could progress to estrogen replacement therapy.    Orders:  -     norethindrone (Aygestin) 5 MG tablet; Take 1 tablet by mouth Daily.  Dispense: 30 tablet; Refill: 3    3. S/P laparoscopic hysterectomy        PLAN:    Any available photos and/or pathology were reviewed.  All questions answered.     Ok to resume normal activities  Abstinence until 8 weeks postop   Return to school/work without limitations      Follow Up:  Return in about 3 months (around 8/30/2023) for Recheck.      Yoan Yoo MD  05/30/2023       Meri Mcdermott MD

## 2024-01-08 ENCOUNTER — LAB (OUTPATIENT)
Dept: LAB | Facility: HOSPITAL | Age: 44
End: 2024-01-08
Payer: MEDICARE

## 2024-01-08 ENCOUNTER — TRANSCRIBE ORDERS (OUTPATIENT)
Dept: LAB | Facility: HOSPITAL | Age: 44
End: 2024-01-08
Payer: MEDICARE

## 2024-01-08 DIAGNOSIS — M89.512 OSTEOLYSIS OF ACROMIAL END OF LEFT CLAVICLE: ICD-10-CM

## 2024-01-08 DIAGNOSIS — M51.36 DEGENERATION OF LUMBAR INTERVERTEBRAL DISC: ICD-10-CM

## 2024-01-08 DIAGNOSIS — E55.9 VITAMIN D DEFICIENCY: ICD-10-CM

## 2024-01-08 DIAGNOSIS — I12.9 HYPERTENSIVE NEPHROPATHY: ICD-10-CM

## 2024-01-08 DIAGNOSIS — I12.9 HYPERTENSIVE NEPHROPATHY: Primary | ICD-10-CM

## 2024-01-08 DIAGNOSIS — N18.31 CHRONIC KIDNEY DISEASE (CKD) STAGE G3A/A1, MODERATELY DECREASED GLOMERULAR FILTRATION RATE (GFR) BETWEEN 45-59 ML/MIN/1.73 SQUARE METER AND ALBUMINURIA CREATININE RATIO LESS THAN 30 MG/G (CMS/H*: ICD-10-CM

## 2024-01-08 LAB
ALBUMIN SERPL-MCNC: 3.9 G/DL (ref 3.5–5.2)
ANION GAP SERPL CALCULATED.3IONS-SCNC: 10.3 MMOL/L (ref 5–15)
BASOPHILS # BLD AUTO: 0.06 10*3/MM3 (ref 0–0.2)
BASOPHILS NFR BLD AUTO: 0.8 % (ref 0–1.5)
BILIRUB UR QL STRIP: NEGATIVE
BILIRUB UR QL STRIP: NEGATIVE
BUN SERPL-MCNC: 19 MG/DL (ref 6–20)
BUN/CREAT SERPL: 15.2 (ref 7–25)
CALCIUM SPEC-SCNC: 9.3 MG/DL (ref 8.6–10.5)
CHLORIDE SERPL-SCNC: 103 MMOL/L (ref 98–107)
CLARITY UR: CLEAR
CLARITY UR: CLEAR
CO2 SERPL-SCNC: 25.7 MMOL/L (ref 22–29)
COLOR UR: YELLOW
COLOR UR: YELLOW
CREAT SERPL-MCNC: 1.25 MG/DL (ref 0.57–1)
CREAT UR-MCNC: 25.8 MG/DL
DEPRECATED RDW RBC AUTO: 42.3 FL (ref 37–54)
EGFRCR SERPLBLD CKD-EPI 2021: 54.3 ML/MIN/1.73
EOSINOPHIL # BLD AUTO: 0.18 10*3/MM3 (ref 0–0.4)
EOSINOPHIL NFR BLD AUTO: 2.5 % (ref 0.3–6.2)
ERYTHROCYTE [DISTWIDTH] IN BLOOD BY AUTOMATED COUNT: 11.6 % (ref 12.3–15.4)
GLUCOSE SERPL-MCNC: 80 MG/DL (ref 65–99)
GLUCOSE UR STRIP-MCNC: NEGATIVE MG/DL
GLUCOSE UR STRIP-MCNC: NEGATIVE MG/DL
HCT VFR BLD AUTO: 35.8 % (ref 34–46.6)
HGB BLD-MCNC: 12.4 G/DL (ref 12–15.9)
HGB UR QL STRIP.AUTO: NEGATIVE
HGB UR QL STRIP.AUTO: NEGATIVE
HOLD SPECIMEN: NORMAL
IMM GRANULOCYTES # BLD AUTO: 0.02 10*3/MM3 (ref 0–0.05)
IMM GRANULOCYTES NFR BLD AUTO: 0.3 % (ref 0–0.5)
KETONES UR QL STRIP: NEGATIVE
KETONES UR QL STRIP: NEGATIVE
LEUKOCYTE ESTERASE UR QL STRIP.AUTO: NEGATIVE
LEUKOCYTE ESTERASE UR QL STRIP.AUTO: NEGATIVE
LYMPHOCYTES # BLD AUTO: 3.2 10*3/MM3 (ref 0.7–3.1)
LYMPHOCYTES NFR BLD AUTO: 44.3 % (ref 19.6–45.3)
MCH RBC QN AUTO: 34.5 PG (ref 26.6–33)
MCHC RBC AUTO-ENTMCNC: 34.6 G/DL (ref 31.5–35.7)
MCV RBC AUTO: 99.7 FL (ref 79–97)
MONOCYTES # BLD AUTO: 0.41 10*3/MM3 (ref 0.1–0.9)
MONOCYTES NFR BLD AUTO: 5.7 % (ref 5–12)
NEUTROPHILS NFR BLD AUTO: 3.36 10*3/MM3 (ref 1.7–7)
NEUTROPHILS NFR BLD AUTO: 46.4 % (ref 42.7–76)
NITRITE UR QL STRIP: NEGATIVE
NITRITE UR QL STRIP: NEGATIVE
NRBC BLD AUTO-RTO: 0 /100 WBC (ref 0–0.2)
PH UR STRIP.AUTO: 6.5 [PH] (ref 5–8)
PH UR STRIP.AUTO: 6.5 [PH] (ref 5–8)
PHOSPHATE SERPL-MCNC: 3 MG/DL (ref 2.5–4.5)
PLATELET # BLD AUTO: 272 10*3/MM3 (ref 140–450)
PMV BLD AUTO: 10.5 FL (ref 6–12)
POTASSIUM SERPL-SCNC: 4.5 MMOL/L (ref 3.5–5.2)
PROT ?TM UR-MCNC: <4 MG/DL
PROT UR QL STRIP: NEGATIVE
PROT UR QL STRIP: NEGATIVE
PROT/CREAT UR: NORMAL MG/G{CREAT}
RBC # BLD AUTO: 3.59 10*6/MM3 (ref 3.77–5.28)
SODIUM SERPL-SCNC: 139 MMOL/L (ref 136–145)
SP GR UR STRIP: 1.01 (ref 1–1.03)
SP GR UR STRIP: 1.01 (ref 1–1.03)
UROBILINOGEN UR QL STRIP: NORMAL
UROBILINOGEN UR QL STRIP: NORMAL
WBC NRBC COR # BLD AUTO: 7.23 10*3/MM3 (ref 3.4–10.8)

## 2024-01-08 PROCEDURE — 81003 URINALYSIS AUTO W/O SCOPE: CPT

## 2024-01-08 PROCEDURE — 84156 ASSAY OF PROTEIN URINE: CPT

## 2024-01-08 PROCEDURE — 36415 COLL VENOUS BLD VENIPUNCTURE: CPT

## 2024-01-08 PROCEDURE — 80069 RENAL FUNCTION PANEL: CPT

## 2024-01-08 PROCEDURE — 82570 ASSAY OF URINE CREATININE: CPT

## 2024-01-08 PROCEDURE — 85025 COMPLETE CBC W/AUTO DIFF WBC: CPT

## 2024-01-11 ENCOUNTER — TELEPHONE (OUTPATIENT)
Dept: OBSTETRICS AND GYNECOLOGY | Facility: CLINIC | Age: 44
End: 2024-01-11
Payer: MEDICARE

## 2024-01-11 RX ORDER — GABAPENTIN 600 MG/1
600 TABLET ORAL 3 TIMES DAILY
COMMUNITY
Start: 2024-01-03

## 2024-01-11 NOTE — TELEPHONE ENCOUNTER
Received fax from [facility] for a refill request on [id number] MG tablet. Did she request this?

## 2024-01-11 NOTE — PRE-PROCEDURE INSTRUCTIONS
IMPORTANT INSTRUCTIONS - PRE-ADMISSION TESTING  DO NOT EAT OR CHEW anything after midnight the night before your procedure.    You may have CLEAR liquids up to ____2__ hours prior to ARRIVAL time.   Take the following medications the morning of your procedure with JUST A SIP OF WATER:  ____zanaflex, norco as needed, gabapentin, Apresoline  Atarax ___DO NOT BRING your medications to the hospital with you, UNLESS something has changed since your PRE-Admission Testing appointment.  Hold all vitamins, supplements, and NSAIDS (Non- steroidal anti-inflammatory meds) for one week prior to surgery (you MAY take Tylenol or Acetaminophen). 1/8/24  If you are diabetic, check your blood sugar the morning of your procedure. If it is less than 70 or if you are feeling symptomatic, call the following number for further instructions: [phone]  Use your inhalers/nebulizers as usual, the morning of your procedure. BRING YOUR INHALERS with you.   Bring your CPAP or BIPAP to hospital, ONLY IF YOU WILL BE SPENDING THE NIGHT.   Make sure you have a ride home and have someone who will stay with you the day of your procedure after you go home.  If you have any questions, please call your Pre-Admission Testing Nurse, _Lana_ at [phone]      Clear Liquid Diet  No red liquids PREOPERATIVE (BEFORE SURGERY)              BATHING INSTRUCTIONS  Instructions:    You will need to shower 1  utilizing the soap provided; at the times indicated   below:     - AM day of surgery    Wash your hair and face with normal shampoo and soap, rinse it well before using the surgical soap.      In the shower, wet the skin completely with water from your neck to your feet. Apply the cleanser to your   body ONLY FROM THE NECK TO YOUR FEET.     Do NOT USE THE CLEANSER ON YOUR FACE, HEAD, OR GENITAL (PRIVATE) AREAS.   Keep it out of your eyes, ears, and mouth because of the risk of injury to those areas.      Scrub with a clean washcloth for each bath  utilizing the soap provided from the top of your body to the   bottom starting at the neck area.      Pay close attention to your armpits, groin area, and the site of surgery.      Wash your body gently for 5 minutes. Stand outside the stream or turn off the water while scrubbing your   body. Do NOT wash with your regular soap after the surgical cleanser is used.      RINSE THE CLEANSER OFF COMPLETELY with plenty of water. Rinse the area again thoroughly.      Dry off with a clean towel. The surgical soap can cause dryness; however do NOT APPLY LOTION,   CREAM, POWDER, and/or DEODORANT AFTER SHOWERING.     Be sure to where clean clothes after showering.      Ensure CLEAN BED LINENS AFTER FIRST wash with the surgical soap.      NO PETS ALLOWED IN THE BED with you after utilizing the surgical soap.      Find out when you need to start a clear liquid diet.   Think of “clear liquids” as anything you could read a newspaper through. This includes things like water, broth, sports drinks, or tea WITHOUT any kind of milk or cream.           Once you are told to start a clear liquid diet, only drink these things until 2 hours before arrival to the hospital or when the hospital says to stop. Total volume limitation: 8 oz.       Clear liquids you CAN drink:   Water   Clear broth: beef, chicken, vegetable, or bone broth with nothing in it   Gatorade   Lemonade or Bong-aid   Soda   Tea, coffee (NO cream or honey)   Jell-O (without fruit)   Popsicles (without fruit or cream)   Italian ices   Juice without pulp: apple, white, grape   You may use salt, pepper, and sugar    Do NOT drink:   Milk or cream   Soy milk, almond milk, coconut milk, or other non-dairy drinks and   creamers   Milkshakes or smoothies   Tomato juice   Orange juice   Grapefruit juice   Cream soups or any other than broth         Clear Liquid Diet:  Do NOT eat any solid food.  Do NOT eat or suck on mints or candy.  Do NOT chew gum.  Do NOT drink thick liquids  like milk or juice with pulp in it.  Do NOT add milk, cream, or anything like soy milk or almond milk to coffee or tea.   .   Per anesthesia request, do not smoke for 24 hours before your procedure or as instructed by your surgeon.

## 2024-01-12 DIAGNOSIS — E53.8 VITAMIN B12 DEFICIENCY: ICD-10-CM

## 2024-01-12 DIAGNOSIS — F51.01 PRIMARY INSOMNIA: Primary | ICD-10-CM

## 2024-01-12 RX ORDER — ZOLPIDEM TARTRATE 6.25 MG/1
6.25 TABLET, FILM COATED, EXTENDED RELEASE ORAL NIGHTLY PRN
Qty: 30 TABLET | Refills: 2 | Status: SHIPPED | OUTPATIENT
Start: 2024-01-12

## 2024-01-12 RX ORDER — HYDROXYZINE HYDROCHLORIDE 25 MG/1
TABLET, FILM COATED ORAL
Qty: 90 TABLET | Refills: 1 | Status: SHIPPED | OUTPATIENT
Start: 2024-01-12

## 2024-01-12 RX ORDER — FUROSEMIDE 20 MG/1
20 TABLET ORAL DAILY PRN
Qty: 30 TABLET | Refills: 0 | Status: SHIPPED | OUTPATIENT
Start: 2024-01-12

## 2024-01-13 NOTE — H&P
[facility]   HISTORY AND PHYSICAL    Patient Name: Bebe Steward  : 1980  MRN: [id number]  Primary Care Physician:  Kimberly Almaguer MD  Date of admission: (Not on file)    Subjective   Subjective     Chief Complaint: Left shoulder pain    History of Present Illness: The patient is a 43-year-old female with left shoulder pain.  She has left shoulder pain related to the acromioclavicular joint that has been persistent despite attempted nonoperative treatment.  The patient wishes to undergo operative treatment.  She reports pain with reaching and lifting of the arm.    Review of Systems : Negative except for those mentioned in the history of present illness    Personal History     Past Medical History:   Diagnosis Date    Allergies     seasonal and multi drug    Anemia     no current issues    Anxiety     Arthritis     knee    Arthritis of back 2006    Bilateral carpal tunnel syndrome 07/10/2023    Brain concussion     no residual    Callus     Cervical disc disorder     Compressed disc    Colon polyp 2023    removed    Difficulty walking     RBK prosthetic    Essential hypertension 2020    Head injury 1996    HPV (human papilloma virus) infection     last 2 pap clear    Hx of BKA, left     Uses a prosthetic leg    Hyperlipidemia     Hypoglycemia     Insomnia 2020    Left below-knee amputee 2020    Low back pain 2006    Low back strain     Lumbosacral disc disease 2006    Migraine     Muscle spasm 2021    Neck strain     full ROM    Numbness in right foot     Obesity     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     Phantom pain after amputation of lower extremity 2021    Pain management clinic    Renal insufficiency     right kidney atrophy bmi >50    Right foot pain 2020    Right hip pain 2020    Stage 3 chronic kidney disease 2020    currently stage 2 3-24-23    Subluxation of patella 1996    Left    Tear of meniscus of knee      Thoracic disc disorder     Visual impairment 1988    Near sighted    Vitamin D deficiency 12/02/2020       Past Surgical History:   Procedure Laterality Date    ABDOMINAL SURGERY  2010    scar tissue removed    ACHILLES TENDON SURGERY      ANKLE OPEN REDUCTION INTERNAL FIXATION  0220-2716    Left 11 surgeries total, external fixation    ANKLE SURGERY      3/08, 5/08, 8/08, 12/10, 03/11, 2012, 2013, 2014    AVULSION TOENAIL PLATE Right 06/29/2023    BIG TOE    BELOW KNEE AMPUTATION Left 2014    CHOLECYSTECTOMY  2001    COLONOSCOPY N/A 03/23/2023    Procedure: COLONOSCOPY WITH COLD SNARE POLYPECTOMIES;  Surgeon: Tian Smith MD;  Location: [facility] ENDOSCOPY;  Service: General;  Laterality: N/A;  COLON POLYPS    CYSTOSCOPY N/A 04/06/2023    Procedure: CYSTOSCOPY;  Surgeon: Yoan Yoo MD;  Location: [facility] MAIN OR;  Service: Gynecology;  Laterality: N/A;    ENDOMETRIAL ABLATION  2007    EPIDURAL BLOCK  2007    FRACTURE SURGERY  2008    left ankle    GASTRIC BYPASS  2001    open Ruen Y    KNEE SURGERY      left 1996,1997 right 2015 scope on right, open procedure on left x2    SPINAL FUSION  10/2008    L5/S1    TONSILLECTOMY  1994    TOTAL LAPAROSCOPIC HYSTERECTOMY N/A 04/06/2023    Procedure: TOTAL LAPAROSCOPIC HYSTERECTOMY BILATERAL SALPINGO OOPHORECTOMY;  Surgeon: Yoan Yoo MD;  Location: [facility] MAIN OR;  Service: Gynecology;  Laterality: N/A;    TRIGGER POINT INJECTION  2021       Family History: family history includes Alcohol abuse in her maternal grandmother; Anesthesia problems in her father; Anxiety disorder in her maternal grandmother, mother, and son; Arthritis in her father, maternal grandfather, maternal grandmother, mother, paternal grandfather, and paternal grandmother; Broken bones in her maternal grandmother, mother, and paternal grandmother; Cancer in her maternal grandfather, paternal aunt, and paternal grandmother; Colon cancer in her paternal aunt; Colon cancer  (age of onset: 87) in her paternal grandmother; Depression in her father, maternal grandmother, and mother; Developmental Disability in her paternal aunt; Diabetes in her maternal grandfather; Early death in her mother; Heart disease in her father, maternal grandfather, maternal grandmother, and paternal grandfather; Hyperlipidemia in her father, maternal grandfather, maternal grandmother, mother, paternal grandfather, and paternal grandmother; Hypertension in her father, maternal grandfather, maternal grandmother, mother, paternal grandfather, and paternal grandmother; Kidney disease in her father, maternal grandmother, mother, paternal grandfather, and paternal grandmother; Liver disease in her maternal grandmother; Melanoma in her maternal grandfather; Miscarriages / Stillbirths in her maternal grandmother and paternal grandmother; Osteoporosis in her maternal grandmother and paternal grandmother; Other in her father, mother, and paternal grandmother; Rheumatologic disease in her father and paternal grandmother; Stroke in her maternal grandfather and mother; Thyroid disease in her maternal grandmother and mother; Vision loss in her father. Otherwise pertinent FHx was reviewed and not pertinent to current issue.    Social History:  reports that she quit smoking about 3 years ago. Her smoking use included cigarettes. She started smoking about 27 years ago. She has a 15.00 pack-year smoking history. She has been exposed to tobacco smoke. She has never used smokeless tobacco. She reports current alcohol use. She reports that she does not use drugs.    Home Medications:  Cyanocobalamin, HYDROcodone-acetaminophen, ammonium lactate, atorvastatin, docusate sodium, estradiol, folic acid, furosemide, gabapentin, hydrALAZINE, hydrOXYzine, irbesartan, loratadine, ondansetron ODT, rizatriptan, sertraline, tiZANidine, vitamin D, and zolpidem CR    Allergies:  Allergies   Allergen Reactions    Adhesive Tape Itching and Rash     Cefaclor Rash    Cephalexin Rash    Cyclobenzaprine Other (See Comments)     Muscle spasms    Erythromycin Rash    Meperidine Nausea And Vomiting and Headache    Monosodium Glutamate Other (See Comments)     Blood pressure drops, pass out     Morphine Mental Status Change and Irritability    Nsaids Unknown - High Severity     CKD     Penicillins Anaphylaxis and Rash     1. When was your reaction? < 10 years ago    2. Did your reaction happen after the first dose? Do not know    3. Did your reaction happen after several doses? Do not know    4. Did your reaction require ED or hospital care to manage your reaction? Do not know    5. Did your reaction require treatment with epinephrine? Do not know    6. Have you taken amoxicillin (Amoxil) or amoxicillin-clavulanate (Augmentin) without issue since? no    7. Have you taken cephalexin (Keflex) without issue since?  No    Sulfa Antibiotics Rash    Sulfamethoxazole-Trimethoprim Rash    Vancomycin Itching       Objective    Objective     Vitals:        Physical Exam  General: No apparent distress, alert and oriented x 3  HEENT: Normal cephalic/atraumatic  Neck: Supple  Cardiovascular: Regular heart rate  Chest: Unlabored breathing  Abdomen: Soft, nontender and nondistended  Musculoskeletal: Neurovascular intact extremity.  Positive pulses.  Tender to palpation acromioclavicular joint.  Pain with crossarm abduction.  Positive impingement sign's.  Neurological: Grossly intact    Result Review    Result Review:  I have personally reviewed the results from the time of this admission to 1/13/2024 15:08 EST and agree with these findings:  []  Laboratory list / accordion  []  Microbiology  [x]  Radiology  []  EKG/Telemetry   []  Cardiology/Vascular   []  Pathology  []  Old records  []  Other:  Most notable findings include: MRI: Left shoulder acromioclavicular osteoarthritis/osteolysis      Assessment & Plan   Assessment / Plan     Brief Patient Summary:  Bebe Steward is a 43  y.o. 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  Operative versus nonoperative treatment was discussed.  Risks and benefits of surgery were discussed.  Informed consent was obtained and the patient wishes to proceed with left shoulder arthroscopy, distal clavicle resection, subacromial decompression.      DVT prophylaxis:  No DVT prophylaxis order currently exists.    CODE STATUS:       Admission Status:  I believe this patient meets outpatient status.    Tomy Dobson MD

## 2024-01-15 ENCOUNTER — ANESTHESIA EVENT CONVERTED (OUTPATIENT)
Dept: ANESTHESIOLOGY | Facility: HOSPITAL | Age: 44
End: 2024-01-15
Payer: MEDICARE

## 2024-01-15 ENCOUNTER — HOSPITAL ENCOUNTER (OUTPATIENT)
Facility: HOSPITAL | Age: 44
Setting detail: HOSPITAL OUTPATIENT SURGERY
Discharge: HOME | End: 2024-01-15
Attending: ORTHOPAEDIC SURGERY | Admitting: ORTHOPAEDIC SURGERY
Payer: MEDICARE

## 2024-01-15 ENCOUNTER — TELEPHONE (OUTPATIENT)
Dept: ORTHOPEDIC SURGERY | Facility: CLINIC | Age: 44
End: 2024-01-15

## 2024-01-15 ENCOUNTER — ANESTHESIA (OUTPATIENT)
Dept: PERIOP | Facility: HOSPITAL | Age: 44
End: 2024-01-15
Payer: MEDICARE

## 2024-01-15 ENCOUNTER — ANESTHESIA EVENT (OUTPATIENT)
Dept: PERIOP | Facility: HOSPITAL | Age: 44
End: 2024-01-15
Payer: MEDICARE

## 2024-01-15 VITALS
TEMPERATURE: 98 F | WEIGHT: 263.45 LBS | RESPIRATION RATE: 18 BRPM | BODY MASS INDEX: 44.98 KG/M2 | OXYGEN SATURATION: 96 % | DIASTOLIC BLOOD PRESSURE: 77 MMHG | SYSTOLIC BLOOD PRESSURE: 142 MMHG | HEIGHT: 64 IN | HEART RATE: 79 BPM

## 2024-01-15 DIAGNOSIS — Z98.890 STATUS POST ARTHROSCOPY OF LEFT SHOULDER: Primary | ICD-10-CM

## 2024-01-15 DIAGNOSIS — M89.512 OSTEOLYSIS OF ACROMIAL END OF LEFT CLAVICLE: ICD-10-CM

## 2024-01-15 PROCEDURE — 25810000003 LACTATED RINGERS PER 1000 ML: Performed by: ANESTHESIOLOGY

## 2024-01-15 PROCEDURE — 25010000002 HYDRALAZINE PER 20 MG: Performed by: ANESTHESIOLOGY

## 2024-01-15 PROCEDURE — 25010000002 FENTANYL CITRATE (PF) 50 MCG/ML SOLUTION: Performed by: NURSE ANESTHETIST, CERTIFIED REGISTERED

## 2024-01-15 PROCEDURE — 25010000002 CLINDAMYCIN 900 MG/50ML SOLUTION: Performed by: ORTHOPAEDIC SURGERY

## 2024-01-15 PROCEDURE — 25010000002 PROPOFOL 10 MG/ML EMULSION: Performed by: NURSE ANESTHETIST, CERTIFIED REGISTERED

## 2024-01-15 PROCEDURE — 29824 SHO ARTHRS SRG DSTL CLAVICLC: CPT | Performed by: ORTHOPAEDIC SURGERY

## 2024-01-15 PROCEDURE — 25010000002 FENTANYL CITRATE (PF) 50 MCG/ML SOLUTION: Performed by: ANESTHESIOLOGY

## 2024-01-15 PROCEDURE — 25010000002 SUGAMMADEX 200 MG/2ML SOLUTION: Performed by: NURSE ANESTHETIST, CERTIFIED REGISTERED

## 2024-01-15 PROCEDURE — 25010000002 ROPIVACAINE PER 1 MG: Performed by: ANESTHESIOLOGY

## 2024-01-15 PROCEDURE — 25010000002 EPINEPHRINE (ANAPHYLAXIS) 1 MG/ML SOLUTION: Performed by: ORTHOPAEDIC SURGERY

## 2024-01-15 PROCEDURE — 29823 SHO ARTHRS SRG XTNSV DBRDMT: CPT | Performed by: ORTHOPAEDIC SURGERY

## 2024-01-15 PROCEDURE — 25010000002 ONDANSETRON PER 1 MG: Performed by: NURSE ANESTHETIST, CERTIFIED REGISTERED

## 2024-01-15 PROCEDURE — 25010000002 MIDAZOLAM PER 1MG: Performed by: ANESTHESIOLOGY

## 2024-01-15 PROCEDURE — 25010000002 DEXAMETHASONE PER 1 MG: Performed by: NURSE ANESTHETIST, CERTIFIED REGISTERED

## 2024-01-15 RX ORDER — DEXAMETHASONE SODIUM PHOSPHATE 4 MG/ML
INJECTION, SOLUTION INTRA-ARTICULAR; INTRALESIONAL; INTRAMUSCULAR; INTRAVENOUS; SOFT TISSUE AS NEEDED
Status: DISCONTINUED | OUTPATIENT
Start: 2024-01-15 | End: 2024-01-15 | Stop reason: SURG

## 2024-01-15 RX ORDER — PROMETHAZINE HYDROCHLORIDE 25 MG/1
25 SUPPOSITORY RECTAL ONCE AS NEEDED
Status: DISCONTINUED | OUTPATIENT
Start: 2024-01-15 | End: 2024-01-15 | Stop reason: HOSPADM

## 2024-01-15 RX ORDER — MIDAZOLAM HYDROCHLORIDE 2 MG/2ML
2 INJECTION, SOLUTION INTRAMUSCULAR; INTRAVENOUS ONCE
Status: COMPLETED | OUTPATIENT
Start: 2024-01-15 | End: 2024-01-15

## 2024-01-15 RX ORDER — ROPIVACAINE HYDROCHLORIDE 5 MG/ML
INJECTION, SOLUTION EPIDURAL; INFILTRATION; PERINEURAL
Status: COMPLETED | OUTPATIENT
Start: 2024-01-15 | End: 2024-01-15

## 2024-01-15 RX ORDER — LIDOCAINE HYDROCHLORIDE 20 MG/ML
INJECTION, SOLUTION EPIDURAL; INFILTRATION; INTRACAUDAL; PERINEURAL AS NEEDED
Status: DISCONTINUED | OUTPATIENT
Start: 2024-01-15 | End: 2024-01-15 | Stop reason: SURG

## 2024-01-15 RX ORDER — NALOXONE HYDROCHLORIDE 4 MG/.1ML
SPRAY NASAL
Qty: 2 EACH | Refills: 0 | Status: SHIPPED | OUTPATIENT
Start: 2024-01-15

## 2024-01-15 RX ORDER — CLINDAMYCIN PHOSPHATE 900 MG/50ML
900 INJECTION, SOLUTION INTRAVENOUS ONCE
Status: COMPLETED | OUTPATIENT
Start: 2024-01-15 | End: 2024-01-15

## 2024-01-15 RX ORDER — ROCURONIUM BROMIDE 10 MG/ML
INJECTION, SOLUTION INTRAVENOUS AS NEEDED
Status: DISCONTINUED | OUTPATIENT
Start: 2024-01-15 | End: 2024-01-15 | Stop reason: SURG

## 2024-01-15 RX ORDER — ONDANSETRON 2 MG/ML
INJECTION INTRAMUSCULAR; INTRAVENOUS AS NEEDED
Status: DISCONTINUED | OUTPATIENT
Start: 2024-01-15 | End: 2024-01-15 | Stop reason: SURG

## 2024-01-15 RX ORDER — FENTANYL CITRATE 50 UG/ML
INJECTION, SOLUTION INTRAMUSCULAR; INTRAVENOUS AS NEEDED
Status: DISCONTINUED | OUTPATIENT
Start: 2024-01-15 | End: 2024-01-15 | Stop reason: SURG

## 2024-01-15 RX ORDER — EPINEPHRINE 1 MG/ML
INJECTION, SOLUTION INTRAMUSCULAR; SUBCUTANEOUS AS NEEDED
Status: DISCONTINUED | OUTPATIENT
Start: 2024-01-15 | End: 2024-01-15 | Stop reason: HOSPADM

## 2024-01-15 RX ORDER — PROPOFOL 10 MG/ML
VIAL (ML) INTRAVENOUS AS NEEDED
Status: DISCONTINUED | OUTPATIENT
Start: 2024-01-15 | End: 2024-01-15 | Stop reason: SURG

## 2024-01-15 RX ORDER — ACETAMINOPHEN 500 MG
1000 TABLET ORAL ONCE
Status: COMPLETED | OUTPATIENT
Start: 2024-01-15 | End: 2024-01-15

## 2024-01-15 RX ORDER — FENTANYL CITRATE 50 UG/ML
100 INJECTION, SOLUTION INTRAMUSCULAR; INTRAVENOUS
Status: DISCONTINUED | OUTPATIENT
Start: 2024-01-15 | End: 2024-01-15 | Stop reason: HOSPADM

## 2024-01-15 RX ORDER — OXYCODONE HYDROCHLORIDE 5 MG/1
5 TABLET ORAL
Status: COMPLETED | OUTPATIENT
Start: 2024-01-15 | End: 2024-01-15

## 2024-01-15 RX ORDER — HYDRALAZINE HYDROCHLORIDE 20 MG/ML
5 INJECTION INTRAMUSCULAR; INTRAVENOUS ONCE
Status: COMPLETED | OUTPATIENT
Start: 2024-01-15 | End: 2024-01-15

## 2024-01-15 RX ORDER — HYDROCODONE BITARTRATE AND ACETAMINOPHEN 7.5; 325 MG/1; MG/1
1-2 TABLET ORAL EVERY 4 HOURS PRN
Qty: 40 TABLET | Refills: 0 | Status: SHIPPED | OUTPATIENT
Start: 2024-01-15

## 2024-01-15 RX ORDER — ONDANSETRON 2 MG/ML
4 INJECTION INTRAMUSCULAR; INTRAVENOUS ONCE AS NEEDED
Status: DISCONTINUED | OUTPATIENT
Start: 2024-01-15 | End: 2024-01-15 | Stop reason: HOSPADM

## 2024-01-15 RX ORDER — HYDROCODONE BITARTRATE AND ACETAMINOPHEN 7.5; 325 MG/1; MG/1
1 TABLET ORAL EVERY 4 HOURS PRN
Qty: 42 TABLET | Refills: 0 | Status: SHIPPED | OUTPATIENT
Start: 2024-01-15

## 2024-01-15 RX ORDER — PROMETHAZINE HYDROCHLORIDE 12.5 MG/1
25 TABLET ORAL ONCE AS NEEDED
Status: DISCONTINUED | OUTPATIENT
Start: 2024-01-15 | End: 2024-01-15 | Stop reason: HOSPADM

## 2024-01-15 RX ORDER — SODIUM CHLORIDE, SODIUM LACTATE, POTASSIUM CHLORIDE, CALCIUM CHLORIDE 600; 310; 30; 20 MG/100ML; MG/100ML; MG/100ML; MG/100ML
9 INJECTION, SOLUTION INTRAVENOUS CONTINUOUS PRN
Status: DISCONTINUED | OUTPATIENT
Start: 2024-01-15 | End: 2024-01-15 | Stop reason: HOSPADM

## 2024-01-15 RX ADMIN — ROPIVACAINE HYDROCHLORIDE 20 ML: 5 INJECTION, SOLUTION EPIDURAL; INFILTRATION; PERINEURAL at 10:48

## 2024-01-15 RX ADMIN — CLINDAMYCIN IN 5 PERCENT DEXTROSE 900 MG: 18 INJECTION, SOLUTION INTRAVENOUS at 11:03

## 2024-01-15 RX ADMIN — OXYCODONE 5 MG: 5 TABLET ORAL at 12:37

## 2024-01-15 RX ADMIN — LIDOCAINE HYDROCHLORIDE 100 MG: 20 INJECTION, SOLUTION EPIDURAL; INFILTRATION; INTRACAUDAL; PERINEURAL at 11:09

## 2024-01-15 RX ADMIN — HYDRALAZINE HYDROCHLORIDE 5 MG: 20 INJECTION, SOLUTION INTRAMUSCULAR; INTRAVENOUS at 11:07

## 2024-01-15 RX ADMIN — FENTANYL CITRATE 100 MCG: 50 INJECTION, SOLUTION INTRAMUSCULAR; INTRAVENOUS at 11:09

## 2024-01-15 RX ADMIN — ROCURONIUM BROMIDE 50 MG: 10 INJECTION, SOLUTION INTRAVENOUS at 11:09

## 2024-01-15 RX ADMIN — MIDAZOLAM HYDROCHLORIDE 2 MG: 1 INJECTION, SOLUTION INTRAMUSCULAR; INTRAVENOUS at 11:07

## 2024-01-15 RX ADMIN — FENTANYL CITRATE 100 MCG: 50 INJECTION, SOLUTION INTRAMUSCULAR; INTRAVENOUS at 11:06

## 2024-01-15 RX ADMIN — SODIUM CHLORIDE, POTASSIUM CHLORIDE, SODIUM LACTATE AND CALCIUM CHLORIDE: 600; 310; 30; 20 INJECTION, SOLUTION INTRAVENOUS at 10:59

## 2024-01-15 RX ADMIN — PROPOFOL 140 MG: 10 INJECTION, EMULSION INTRAVENOUS at 11:09

## 2024-01-15 RX ADMIN — DEXAMETHASONE SODIUM PHOSPHATE 4 MG: 4 INJECTION, SOLUTION INTRAMUSCULAR; INTRAVENOUS at 11:16

## 2024-01-15 RX ADMIN — ACETAMINOPHEN 1000 MG: 500 TABLET ORAL at 10:40

## 2024-01-15 RX ADMIN — SUGAMMADEX 200 MG: 100 INJECTION, SOLUTION INTRAVENOUS at 12:10

## 2024-01-15 RX ADMIN — OXYCODONE 5 MG: 5 TABLET ORAL at 12:58

## 2024-01-15 RX ADMIN — ONDANSETRON 4 MG: 2 INJECTION INTRAMUSCULAR; INTRAVENOUS at 11:16

## 2024-01-15 NOTE — ANESTHESIA POSTPROCEDURE EVALUATION
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        Post Anesthesia Care and Evaluation    Patient location during evaluation: bedside  Patient participation: complete - patient participated  Level of consciousness: awake  Pain management: adequate    Airway patency: patent  Anesthetic complications: No anesthetic complications  PONV Status: none  Cardiovascular status: acceptable and stable  Respiratory status: acceptable  Hydration status: acceptable    Comments: An Anesthesiologist personally participated in the most demanding procedures (including induction and emergence if applicable) in the anesthesia plan, monitored the course of anesthesia administration at frequent intervals and remained physically present and available for immediate diagnosis and treatment of emergencies.

## 2024-01-15 NOTE — ANESTHESIA PROCEDURE NOTES
Peripheral Block    Pre-sedation assessment completed: 1/15/2024 10:42 AM    Patient reassessed immediately prior to procedure    Patient location during procedure: pre-op  Start time: 1/15/2024 10:46 AM  Stop time: 1/15/2024 10:48 AM  Reason for block: at surgeon's request and post-op pain management  Performed by  Anesthesiologist: Yann Bedoya MD  Preanesthetic Checklist  Completed: patient identified, IV checked, site marked, risks and benefits discussed, surgical consent, monitors and equipment checked, pre-op evaluation and timeout performed  Prep:  Pt Position: supine (HOB elevated)  Sterile barriers:cap, washed/disinfected hands, sterile barriers, gloves, mask, partial drape and alcohol skin prep  Prep: ChloraPrep  Patient monitoring: blood pressure monitoring, continuous pulse oximetry and EKG  Procedure    Sedation: yes  Performed under: local infiltration  Guidance:ultrasound guided and nerve stimulator    ULTRASOUND INTERPRETATION.  Using ultrasound guidance a 22 G gauge needle was placed in close proximity to the brachial plexus nerve, at which point, under ultrasound guidance anesthetic was injected in the area of the nerve and spread of the anesthesia was seen on ultrasound in close proximity thereto.  There were no abnormalities seen on ultrasound; a digital image was taken; and the patient tolerated the procedure with no complications. Images:still images obtained, printed/placed on chart    Laterality:left  Block Type:interscalene  Injection Technique:single-shot  Needle Type:echogenic  Needle Gauge:22 G (2in)  Resistance on Injection: none    Medications Used: ropivacaine (NAROPIN) 0.5 % injection - Injection   20 mL - 1/15/2024 10:48:00 AM      Post Assessment  Injection Assessment: negative aspiration for heme, no paresthesia on injection and incremental injection  Patient Tolerance:comfortable throughout block  Complications:no  Additional Notes  The block or continuous infusion is  requested by the referring physician for management of postoperative pain, or pain related to a procedure. Ultrasound guidance (deemed medically necessary). Painless injection, pt was awake and conversant during the procedure without complications. Needle and surrounding structures visualized throughout procedure. No adverse reactions or complications seen during this period. Post-procedure image showed no signs of complication, and anatomy was consistent with an uncomplicated nerve blockade.

## 2024-01-15 NOTE — OP NOTE
SHOULDER ARTHROSCOPY WITH ROTATOR CUFF REPAIR  Procedure Report    Patient Name:  Bebe Steward  YOB: 1980    Date of Surgery:  1/15/2024     Indications:  Patient has failed conservative treatment and wishes to undergo operative treatment. Risks and benefits of operative treatment including bleeding, infection, damage to neurovascular structures, continued pain and disability, need for additional procedures, among others. Informed consent was obtained and they wished to proceed.    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 [M89.512]    Procedure/CPT® Codes:      Procedure(s):  SHOULDER ARTHROSCOPY DISTAL CLAVICLE RESECTION, ROTATOR CUFF REPAIR DEBRIDEMENT, SUBCROMIAL DECOMPRESSION, biceps tenotomy, chondroplasty    Staff:  Surgeon(s):  Tomy Dobson MD    Assistant: Carlton Lees RN    Anesthesia: General    Estimated Blood Loss: 10 mL    Implants:    Nothing was implanted during the procedure    Specimen:          None        Findings: labral tear, SLAP tear with biceps involvement, grade IV chondromalacia glenohumeral joint, acromioclavicular osteoarthritis    Complications: none    Description of Procedure: The operative site was marked in preoperative holding area.  Interscalene nerve block was performed by anesthesia.  Patient was brought the operating room and general anesthesia was applied.  The patient was placed into the lateral decubitus position and all bony prominences were padded.  The patient was secured with a deflated beanbag and an axillary roll was placed.  SCD boots were placed on the lower extremities and the down arm was placed in an arm board.  The operative extremity was prepped and draped in usual sterile fashion and was placed in a sterile traction arm brush.  Preoperative antibiotic was given and formal timeout was held.    Posterior portal was  established and the arthroscope was inserted into the glenohumeral joint.  An anterior portal was established in the rotator interval and a cannula was inserted.  The glenohumeral joint was inspected and the intra-articular findings included advanced degenerative changes to glenohumeral joint.  There is grade IV chondromalacia of the glenoid with a small cartilaginous loose body around 0.5 cm.  The humeral head cartilage also had chondromalacia with grade 3-4 changes.  The labrum also is also degenerative with torn to the anterior and superior labrum with involvement of the biceps anchor.  The motorized shaver was used to debride the intra-articular loose body as well as the humeral head and glenoid cartilage back to a stable surface for a glenohumeral chondroplasty.    At this point the labrum was debrided with a motorized shaver.  The biceps tendon was released with an ablation probe for later tenotomy.  The arthroscopic camera was then removed and reinserted into the subacromial space.  A lateral portal was established and the motorized shaver and ablation probe were used to clear the subacromial space.  The rotator cuff was intact on the bursal side.      A motorized bur was used to remove a portion of the acromion.  1 cm acromion was removed from lateral to medial and anterior to posterior.  The distal clavicle joint was debrided as well using the motorized bur to remove a portion of the lateral clavicle as well as coplaning the undersurface of the clavicle.    At this point the instruments were removed.  The incisions were closed with nylon suture in figure-of-eight fashion.  A sterile dressing was placed.  Cold therapy and a shoulder sling were placed.  Patient awoke from anesthesia in stable condition.  There were no complications.  All counts are correct and the patient was stable to recovery.    Assistant: Carlton Lees RN  was responsible for performing the following activities: Retraction,  Suction, Irrigation, and Placing Dressing and their skilled assistance was necessary for the success of this case.    Tomy Dobson MD     Date: 1/15/2024  Time: 12:14 EST

## 2024-01-15 NOTE — DISCHARGE INSTRUCTIONS
DISCHARGE INSTRUCTIONS  Post-Operative  Arthroscopic Shoulder Surgery      For your surgery you had:  General anesthesia (you may have a sore throat for the first 24 hours)  You received an anesthesia medication today that can cause hormonal forms of birth control to be ineffective. You should use a different form of birth control (to prevent pregnancy) for 7 days.   IV sedation.  Local anesthesia  Monitored anesthesia care  You may experience dizziness, drowsiness, or light-headedness for several hours following surgery/procedure.  Do not stay alone today or tonight.  Limit your activity for 24 hours.  Resume your diet slowly.  Follow whatever special dietary instructions you may have been given by your doctor.  You should not drive or operate machinery or drink alcohol for 24 hours or while you are taking pain medication.  You should not sign legally binding documents.  Post-Operative Day #1 is counted as the 1st day after surgery.  [x] If you had a regional block, you will not have control of your arm for up to 12 hours.  Protect the arm with a sling or follow your physician's specific instructions.  Post-Operative Day #2  Remove your dressing.  Under the dressing you will either have sutures or staples.  Change dressing once or twice daily.  Place a dry dressing or band-aids over the small incisions.  Prior to dressing change, wash your hands.  Post-Operative Day #2  You may shower Wednesday 1/17/2024.  During the shower, you may let the water hit the incision and roll down but do not scrub or place any soap on the incision.  Do not soak it.  Pat it dry and do not put any ointments on the incision unless directed by surgeon. Then replace the dry dressing.    General Instructions  [x] Use ice pack to shoulder for 72 hours.  This can help with reducing swelling and pain relief.  Apply 20 minutes on and 20 minutes off.  Never place ice directly on skin.  Avoid getting dressing wet.    Keep arm elevated with sling  to decrease swelling and minimize throbbing pain.  The sling will provide support for your shoulder.  It is important to remove the sling 3-5 times daily to work on range-of-motion of the elbow, wrist and hand.  You will receive a prescription for physical therapy, unless otherwise instructed by physician.  After most shoulder surgeries, it is permissible to start exercises by slowing trying to crawl your fingers up a wall until your arm is parallel to the floor.  While doing this support the affected arm at the elbow or closer to the shoulder.  You may also lean over and let the arm and hand do small or large circles or write the alphabet with your hanging arm to keep it loose.  It is normal to have some pain with these gentle exercises.  The exercises help reduce swelling and pain overall and help to avoid a stiff shoulder post-operatively.  It is okay to come out of the sling for showering or for certain activities of daily living but avoid any lifting or carrying with the affected arm until instructed by the physician especially if you have had a repair of the rotator cuff or some type of reconstructive procedure.  It is okay to come out of the sling and support the arm with a pillow if you remain sedentary.  In general, sling use is preferred following a repair or reconstruction for 4 weeks.  If you have had a SAD (sub acromial decompression), continue sling use more liberally because there was not a repair or reconstruction that could be harmed.          DISCHARGE INSTRUCTIONS  Post-Operative   Arthroscopic Shoulder Surgery      Exercise fingers for 10 minutes every hour while awake.  The physician will typically inform the family and the patient whether or not a repair or reconstruction could be harmed.  If you have had a rotator cuff repair or reconstructive procedure, do not let the arm drop down suddenly from an elevated position down to your side despite improvements made in pain and over the 3 months  following repair and reconstruction.  It generally takes 8-12 weeks before the repair or reconstruction does not rely on sutures and anchors that are placed for the repair.  You are generally given a prescription for a narcotic medication.  Take as prescribed.  You may use over-the-counter anti-inflammatory medications such as Motrin or Aleve for additional pain or fever reduction if not allergic.  If you are taking the pain medication prescribed, do not take Tylenol too unless you consult with the pharmacist. The pain medications generally have Tylenol in them and too much Tylenol can be harmful.  Sometimes it is recommended to take an anti-inflammatory in between doses of prescription pain medication if additional pain relief is needed.  Consult with your physician or your pharmacist before taking over-the-counter pain medications/anti-inflammatories.  It is not uncommon to have a fever post-operatively especially in the first 24-48 hours.  Anti-inflammatories can be used for fever reduction also.  It is normal to have some redness or irritation around skin sutures or staples, however, if you have any expanding areas of redness with a persistent fever and increasing pain notify the physician as soon as possible.  The incidence of blood clots is low following arthroscopic procedures, however, if you notice any increasing pain or swelling in your calves or legs, contact the physician as soon as possible.   It is difficult to sleep in the customary fashion following a shoulder surgery.  It is usually necessary to sleep with the shoulder above the level of the heart such as in a recliner, couch or with the head of the bed elevated.  This should slowly improve over the weeks following shoulder surgery.  If unable to urinate 6 to 8 hours after surgery or urinating frequently in small amounts, notify the physician or go to the nearest Emergency Room.  SPECIAL INSTRUCTIONS:        NOTIFY YOUR PHYSICIAN IF YOU  EXPERIENCE:  Numbness of fingers.  Inability to move fingers.  Extreme coldness, paleness or blue dis-coloration of fingers.  Any pain other than from the incision, such as swelling of the arm that blocks circulation of fingers.  Follow verbal instructions from your doctor.  Medications per physician instructions as indicated on Discharge Medication Information Sheet.  You should see  for follow-up care  on     Phone number: __________________________________  Missing your appointment/follow-up could lead to serious complications  If you have an emergency and cannot reach your doctor, go to an Emergency Room nearest your home.        DISCHARGE INSTRUCTIONS  Post-Operative  Arthroscopic Shoulder Surgery      For your surgery you had:  General anesthesia (you may have a sore throat for the first 24 hours)  IV sedation.  Local anesthesia  Monitored anesthesia care  You may experience dizziness, drowsiness, or light-headedness for several hours following surgery/procedure.  Do not stay alone today or tonight.  Limit your activity for 24 hours.  Resume your diet slowly.  Follow whatever special dietary instructions you may have been given by your doctor.  You should not drive or operate machinery or drink alcohol for 24 hours or while you are taking pain medication.  You should not sign legally binding documents.  Post-Operative Day #1 is counted as the 1st day after surgery.  [x] If you had a regional block, you will not have control of your arm for up to 12 hours.  Protect the arm with a sling or follow your physician's specific instructions.  Post-Operative Day #2  Remove your dressing.  Under the dressing you will either have sutures or staples.  Change dressing once or twice daily.  Place a dry dressing or band-aids over the small incisions.  Prior to dressing change, wash your hands.  Post-Operative Day #2  You may shower Wednesday.  During the shower, you may let the water hit the incision and roll  down but do not scrub or place any soap on the incision.  Do not soak it.  Pat it dry and do not put any ointments on the incision unless directed by surgeon. Then replace the dry dressing.    General Instructions  [x] You can wear ice packs continuously, the ice packs stay good for four hours.  The Cold Therapy System can help reduce swelling and decrease pain.  Utilize device for 30-60 minutes per session, with 30-60 minute breaks in between sessions.  It is recommended to use, as directed, for the first 72 hours after surgery until bedtime.  After 72 hours, continue using the device as needed until your follow-up appointment with your physician.  Never place directly on skin.  Please refer to the instruction sheet given.  Keep arm elevated with sling to decrease swelling and minimize throbbing pain.  The sling will provide support for your shoulder.  It is important to remove the sling 3-5 times daily to work on range-of-motion of the elbow, wrist and hand.  You will receive a prescription for physical therapy, unless otherwise instructed by physician.  After most shoulder surgeries, it is permissible to start exercises by slowing trying to crawl your fingers up a wall until your arm is parallel to the floor.  While doing this support the affected arm at the elbow or closer to the shoulder.  You may also lean over and let the arm and hand do small or large circles or write the alphabet with your hanging arm to keep it loose.  It is normal to have some pain with these gentle exercises.  The exercises help reduce swelling and pain overall and help to avoid a stiff shoulder post-operatively.  It is okay to come out of the sling for showering or for certain activities of daily living but avoid any lifting or carrying with the affected arm until instructed by the physician especially if you have had a repair of the rotator cuff or some type of reconstructive procedure.  It is okay to come out of the sling and support  the arm with a pillow if you remain sedentary.  In general, sling use is preferred following a repair or reconstruction for 4 weeks.  If you have had a SAD (sub acromial decompression), continue sling use more liberally because there was not a repair or reconstruction that could be harmed.          DISCHARGE INSTRUCTIONS  Post-Operative   Arthroscopic Shoulder Surgery      Exercise fingers for 10 minutes every hour while awake.  The physician will typically inform the family and the patient whether or not a repair or reconstruction could be harmed.  If you have had a rotator cuff repair or reconstructive procedure, do not let the arm drop down suddenly from an elevated position down to your side despite improvements made in pain and over the 3 months following repair and reconstruction.  It generally takes 8-12 weeks before the repair or reconstruction does not rely on sutures and anchors that are placed for the repair.  You are generally given a prescription for a narcotic medication.  Take as prescribed.  You may use over-the-counter anti-inflammatory medications such as Motrin or Aleve for additional pain or fever reduction if not allergic.  If you are taking the pain medication prescribed, do not take Tylenol too unless you consult with the pharmacist. The pain medications generally have Tylenol in them and too much Tylenol can be harmful.  Sometimes it is recommended to take an anti-inflammatory in between doses of prescription pain medication if additional pain relief is needed.  Consult with your physician or your pharmacist before taking over-the-counter pain medications/anti-inflammatories.  It is not uncommon to have a fever post-operatively especially in the first 24-48 hours.  Anti-inflammatories can be used for fever reduction also.  It is normal to have some redness or irritation around skin sutures or staples, however, if you have any expanding areas of redness with a persistent fever and increasing  pain notify the physician as soon as possible.  The incidence of blood clots is low following arthroscopic procedures, however, if you notice any increasing pain or swelling in your calves or legs, contact the physician as soon as possible.   It is difficult to sleep in the customary fashion following a shoulder surgery.  It is usually necessary to sleep with the shoulder above the level of the heart such as in a recliner, couch or with the head of the bed elevated.  This should slowly improve over the weeks following shoulder surgery.  If unable to urinate 6 to 8 hours after surgery or urinating frequently in small amounts, notify the physician or go to the nearest Emergency Room.  SPECIAL INSTRUCTIONS:        NOTIFY YOUR PHYSICIAN IF YOU EXPERIENCE:  Numbness of fingers.  Inability to move fingers.  Extreme coldness, paleness or blue dis-coloration of fingers.  Any pain other than from the incision, such as swelling of the arm that blocks circulation of fingers.  Follow verbal instructions from your doctor.  Medications per physician instructions as indicated on Discharge Medication Information Sheet.  You should see Uriel Marcus for follow-up care  on   Monday Jan 29, 2024 10:00 AM   Phone number: __________________________________  Missing your appointment/follow-up could lead to serious complications  If you have an emergency and cannot reach your doctor, go to an Emergency Room nearest your home.

## 2024-01-15 NOTE — ANESTHESIA PREPROCEDURE EVALUATION
Anesthesia Evaluation     Patient summary reviewed and Nursing notes reviewed   no history of anesthetic complications:   NPO Solid Status: > 8 hours  NPO Liquid Status: > 2 hours           Airway   Mallampati: III  TM distance: >3 FB  Neck ROM: full  No difficulty expected  Dental    (+) edentulous    Pulmonary - normal exam    breath sounds clear to auscultation  (+) a smoker Former,  Cardiovascular - normal exam  Exercise tolerance: good (4-7 METS)    Rhythm: regular  Rate: normal    (+) hypertension, hyperlipidemia      Neuro/Psych  (+) headaches, numbness, psychiatric history Anxiety  GI/Hepatic/Renal/Endo    (+) obesity, renal disease- CRI    Musculoskeletal     Abdominal   (+) obese   Substance History - negative use     OB/GYN negative ob/gyn ROS         Other   arthritis,                       Anesthesia Plan    ASA 3     general with block     (Patient understands anesthesia not responsible for dental damage.)  intravenous induction     Anesthetic plan, risks, benefits, and alternatives have been provided, discussed and informed consent has been obtained with: patient.    Use of blood products discussed with patient .    Plan discussed with CRNA.        CODE STATUS:

## 2024-01-15 NOTE — TELEPHONE ENCOUNTER
PATIENT HAD SURGERY TODAY AND NEEDS PAIN MEDS TO BE SENT TO [facility].  SERAFINR IS OUT---RX WAS CANCELED.

## 2024-01-16 ENCOUNTER — TELEPHONE (OUTPATIENT)
Dept: FAMILY MEDICINE CLINIC | Facility: CLINIC | Age: 44
End: 2024-01-16

## 2024-01-16 ENCOUNTER — TELEPHONE (OUTPATIENT)
Dept: ORTHOPEDIC SURGERY | Facility: CLINIC | Age: 44
End: 2024-01-16
Payer: MEDICARE

## 2024-01-16 DIAGNOSIS — Z98.890 STATUS POST ARTHROSCOPY OF LEFT SHOULDER: Primary | ICD-10-CM

## 2024-01-16 RX ORDER — OXYCODONE AND ACETAMINOPHEN 7.5; 325 MG/1; MG/1
1 TABLET ORAL EVERY 6 HOURS PRN
Qty: 42 TABLET | Refills: 0 | Status: SHIPPED | OUTPATIENT
Start: 2024-01-16

## 2024-01-16 NOTE — TELEPHONE ENCOUNTER
PATIENT STATES THE HYDROCODNE 7.5/325 IS NOT HELPING WITH HER PAIN. SHE IS TAKING 1 EVERY 4 HOURS AS WRITTEN. SHE CANNOT TAKE NSAIDS DUE TO CHRONIC KIDNEY DISEASE.   PATIENT IS IN PAIN MANAGEMENT AND IS CURRENTLY HOLDING HER SCRIPT FOR NORCO 5/325MG 1 TAB BID. SHE IS REQUESTING SOMETHING DIFFERENT FOR PAIN.    [facility].

## 2024-01-22 ENCOUNTER — TREATMENT (OUTPATIENT)
Dept: PHYSICAL THERAPY | Facility: CLINIC | Age: 44
End: 2024-01-22
Payer: MEDICARE

## 2024-01-22 DIAGNOSIS — M25.512 LEFT SHOULDER PAIN, UNSPECIFIED CHRONICITY: Primary | ICD-10-CM

## 2024-01-22 DIAGNOSIS — M89.512 OSTEOLYSIS OF ACROMIAL END OF LEFT CLAVICLE: ICD-10-CM

## 2024-01-22 PROCEDURE — 97140 MANUAL THERAPY 1/> REGIONS: CPT | Performed by: PHYSICAL THERAPIST

## 2024-01-22 PROCEDURE — 97161 PT EVAL LOW COMPLEX 20 MIN: CPT | Performed by: PHYSICAL THERAPIST

## 2024-01-22 NOTE — PROGRESS NOTES
Physical Therapy Initial Evaluation and Plan of Care  [address], [place] [address]      Patient: Bebe Steward   : 1980  Diagnosis/ICD-10 Code:  Left shoulder pain, unspecified chronicity [M25.512]  Referring practitioner: Tomy Dobson MD  Date of Initial Visit: 2024  Today's Date: 2024  Patient seen for 1 sessions           Subjective Questionnaire: QuickDASH: 47=80-99% limitation      Subjective Evaluation    History of Present Illness  Mechanism of injury: Patient is a 43 yr female referred to physical therapy s/p Left shoulder surgery on 1/15/24. SHOULDER ARTHROSCOPY DISTAL CLAVICLE RESECTION, ROTATOR CUFF REPAIR DEBRIDEMENT, SUBCROMIAL DECOMPRESSION with biceps tenotomy and chondroplasty. Patient reports having much pain in left shoulder with pain medication and compliant with cold packs.     Pain  Current pain ratin  Location: Left shoulder  Quality: dull ache  Relieving factors: medications, change in position, ice, relaxation and rest    Patient Goals  Patient goals for therapy: decreased pain, increased motion, increased strength and independence with ADLs/IADLs           Objective          Passive Range of Motion   Left Shoulder   Flexion: 110 degrees   Abduction: 30 degrees   External rotation 45°: 30 degrees     Strength/Myotome Testing     Additional Strength Details  Left shoulder strength not tested secondary to recent surgery/protocol        Assessment & Plan       Assessment  Impairments: abnormal muscle firing, abnormal or restricted ROM, activity intolerance, impaired physical strength, lacks appropriate home exercise program, pain with function and weight-bearing intolerance   Functional limitations: carrying objects, lifting, sleeping, pulling, pushing, uncomfortable because of pain, reaching behind back, reaching overhead and unable to perform repetitive tasks   Assessment details: Pt presents with limitations, noted by evaluation that impede  patient's ability to use left UE for ADLs.  The skills of a therapist will be required to safely and effectively implement the following treatment plan to restore maximal level of function.      Prognosis: good    Goals  Plan Goals: 1. The patient has limited ROM of the left shoulder.    LTG 1: 12 weeks:  The patient will demonstrate 160 degrees of left shoulder flexion, 150 degrees of shoulder abduction, 70 degrees of shoulder external rotation, and shoulder internal rotation to 70 to allow the patient to reach into upper kitchen cabinets and manipulate clothing behind the back with greater ease.   STATUS:  New   STG 1a: 6 weeks:  The patient will demonstrate 130 degrees of left shoulder flexion, 120 degrees of shoulder abduction, 60 degrees of shoulder external rotation, and shoulder internal rotation to 60 to allow the patient to reach into upper kitchen cabinets and manipulate clothing behind the back with greater ease.   STATUS:  New      2. The patient has limited strength of the left shoulder.   LTG 2: 12 weeks:  The patient will demonstrate 4/5 strength for left shoulder flexion, abduction, external rotation, and internal rotation in order to demonstrate improved shoulder stability.   STATUS:  New   STG 2a: 6 weeks:  The patient will demonstrate 3/5 strength for left shoulder flexion, abduction, external rotation, and internal rotation.   STATUS:  New   STG2b:  4 weeks:  The patient will be independent with home exercises.    STATUS:  New      3. The patient complains of pain to the left shoulder.   LTG 3: 12 weeks:  The patient will report a pain rating of 3/10 or better in order to improve sleep quality and tolerance to performance of activities of daily living.   STATUS:  New   STG 3a: 5 weeks:  The patient will report a pain rating of 5/10 or better.     STATUS:  New      4. Carrying, Moving, and Handling Objects Functional Limitation     LTG 4: 12 weeks:  The patient will demonstrate 1-19% limitation by  achieving a score of 19 on the Quick DASH.   STATUS:  New   STG 4a: 6 weeks:  The patient will demonstrate 40-59% limitation by achieving a score of 35 on the Quick DASH.     STATUS:  New       Plan  Therapy options: will be seen for skilled therapy services  Planned modality interventions: cryotherapy and TENS  Planned therapy interventions: manual therapy, postural training, soft tissue mobilization, strengthening, stretching, therapeutic activities, home exercise program, gait training, joint mobilization, functional ROM exercises, flexibility and balance/weight-bearing training  Frequency: 2x week  Duration in weeks: 12  Treatment plan discussed with: patient  History # of Personal Factors and/or Comorbidities: MODERATE (1-2)  Examination of Body System(s): # of elements: LOW (1-2)  Clinical Presentation: STABLE   Clinical Decision Making: LOW       Visit Diagnoses:    ICD-10-CM ICD-9-CM   1. Left shoulder pain, unspecified chronicity  M25.512 719.41   2. 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  M89.512 733.99       Timed:  Manual Therapy:    10     mins  55058;  Therapeutic Exercise:    8     mins  47022;     Neuromuscular [place]:        mins  17328;    Therapeutic Activity:          mins  21054;     Gait Training:           mins  60567;     Ultrasound:          mins  13611;    Electrical Stimulation:         mins  83264 ( );    Untimed:  Electrical Stimulation:         mins  41469 ( );  Mechanical Traction:         mins  38011;    PT evaluation     Low Eval                        25   Mins  30813  Mod Eval                             Mins  54114  High Eval                           Mins  75073    Timed Treatment:   18   mins   Total Treatment:     43   mins    PT SIGNATURE: Kimberly Wright PT     Electronically singed 1/22/2024    [place] PT license: [id number]        Initial Certification  Certification Period: 1/22/2024 thru 4/20/2024  I certify that the therapy services are furnished while this  patient is under my care.  The services outlined above are required by this patient, and will be reviewed every 90 days.    PHYSICIAN: Tomy Dobson MD  NPI: [id number]                                      DATE:     Please sign and return via fax to [phone]  Thank you, [facility].

## 2024-01-24 ENCOUNTER — OFFICE VISIT (OUTPATIENT)
Dept: SLEEP MEDICINE | Facility: HOSPITAL | Age: 44
End: 2024-01-24
Payer: MEDICARE

## 2024-01-24 VITALS
BODY MASS INDEX: 44.73 KG/M2 | HEART RATE: 78 BPM | OXYGEN SATURATION: 96 % | SYSTOLIC BLOOD PRESSURE: 179 MMHG | WEIGHT: 262 LBS | DIASTOLIC BLOOD PRESSURE: 89 MMHG | HEIGHT: 64 IN

## 2024-01-24 DIAGNOSIS — Z87.891 FORMER SMOKER: ICD-10-CM

## 2024-01-24 DIAGNOSIS — N18.31 STAGE 3A CHRONIC KIDNEY DISEASE: ICD-10-CM

## 2024-01-24 DIAGNOSIS — Z72.821 POOR SLEEP HYGIENE: ICD-10-CM

## 2024-01-24 DIAGNOSIS — G47.00 INSOMNIA, UNSPECIFIED TYPE: ICD-10-CM

## 2024-01-24 DIAGNOSIS — R29.818 SUSPECTED SLEEP APNEA: Primary | ICD-10-CM

## 2024-01-24 DIAGNOSIS — E66.01 OBESITY, MORBID, BMI 40.0-49.9: ICD-10-CM

## 2024-01-24 DIAGNOSIS — G47.10 HYPERSOMNIA: ICD-10-CM

## 2024-01-24 DIAGNOSIS — R51.9 MORNING HEADACHE: ICD-10-CM

## 2024-01-24 DIAGNOSIS — I10 ESSENTIAL HYPERTENSION: ICD-10-CM

## 2024-01-24 PROCEDURE — 3077F SYST BP >= 140 MM HG: CPT | Performed by: INTERNAL MEDICINE

## 2024-01-24 PROCEDURE — G0463 HOSPITAL OUTPT CLINIC VISIT: HCPCS

## 2024-01-24 PROCEDURE — 3079F DIAST BP 80-89 MM HG: CPT | Performed by: INTERNAL MEDICINE

## 2024-01-24 NOTE — PROGRESS NOTES
Sleep Consultation    Patient Name: Bebe Steward  Age/Sex: 43 y.o. female  : 1980  MRN: [id number]    Date of Encounter Visit: 2024  Encounter Provider: Amalia Little MD  Referring Provider: Kimberly Almaguer MD  Place of Service: [facility]  Patient Care Team:  Kimberly Almaguer MD as PCP - General (Family Medicine)  Jermaine as Nurse Practitioner (Nurse Practitioner)  Chavo George MD as Consulting Physician (Hematology and Oncology)    Subjective:     Reason for Consult: Valuate for sleep apnea    History of Present Illness:  Bebe Steward is a 43 y.o. female is here for evaluation of JESUS due to suggestive symptoms.  The chief complaint was difficulty falling asleep and staying asleep     Patient complains of daytime fatigue and sleepiness despite the normal Armstrong Sleepiness Scale (ESS) of 4.  Patient complains of morning headache and waking up with sore dry mouth, difficulty falling asleep and staying asleep with overall restless sleep and frequent arousals  She is on Ambien and she has been on it for several months, and it lost its effect and she does have sleep walking while on it   Tried lunesta and it did not work   She did do better on the past on Restoril but she is not on it   Her weight is down by 40 pounds recently but no significant improvement on the above symptoms  Denies any symptoms of restless leg syndrome.   Patient denies any cataplexy, sleep paralysis or other symptoms to suggest narcolepsy.  Patient denies any parasomnias.  Denies any history of seizure disorder or recent head trauma.  Patient has an adequate sleep hygiene with variable wake up time  Bedtime is around 10 PM wake up time is variable patient feels like she gets 3 to 4 hours of uninterrupted sleep in between and wake up feeling tired  Caffeine intake is usually 1-2 caffeinated beverages per day no active smoking alcohol or illicit substance abuse she is a former smoker quit  at the age of 42  Comorbidities include: Mid migraine, arthritis, hypertension, anxiety, insomnia    Review of Systems:   A twelve-system review was conducted and was negative except for the following: Fatigue, neck pain, heat intolerance and easy bruising, dysphagia.        Past Medical History:  Past Medical History:   Diagnosis Date    Allergies     seasonal and multi drug    Anemia     no current issues    Anxiety 2014    Arthritis     knee    Arthritis of back 2006    Bilateral carpal tunnel syndrome 07/10/2023    Brain concussion 1995    no residual    Callus     Cervical disc disorder 2012    Compressed disc    Colon polyp 03/23/2023    removed    Difficulty walking     RBK prosthetic    Essential hypertension 07/21/2020    Head injury 1996    HPV (human papilloma virus) infection     last 2 pap clear    Hx of BKA, left     Uses a prosthetic leg    Hyperlipidemia     Hypoglycemia     Insomnia 07/21/2020    Left below-knee amputee 07/21/2020    Low back pain 2006    Low back strain     Lumbosacral disc disease 2006    Migraine     Muscle spasm 01/04/2021    Neck strain     full ROM    Numbness in right foot     Obesity     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    Pain management clinic    Renal insufficiency 2020    right kidney atrophy bmi >50    Right foot pain 12/02/2020    Right hip pain 07/21/2020    Stage 3 chronic kidney disease 09/01/2020    currently stage 2 3-24-23    Subluxation of patella 1996    Left    Tear of meniscus of knee 1996    Thoracic disc disorder     Visual impairment 1988    Near sighted    Vitamin D deficiency 12/02/2020       Past Surgical History:   Procedure Laterality Date    ABDOMINAL SURGERY  2010    scar tissue removed    ACHILLES TENDON SURGERY      ANKLE OPEN REDUCTION INTERNAL FIXATION  [id number]    Left 11 surgeries total, external fixation    ANKLE SURGERY      3/08, 5/08, 8/08, 12/10, 03/11, 2012,  2013, 2014    AVULSION TOENAIL PLATE Right 06/29/2023    BIG TOE    BELOW KNEE AMPUTATION Left 2014    CHOLECYSTECTOMY  2001    COLONOSCOPY N/A 03/23/2023    Procedure: COLONOSCOPY WITH COLD SNARE POLYPECTOMIES;  Surgeon: Tian Smith MD;  Location: [facility] END[facility]COPY;  Service: General;  Laterality: N/A;  COLON POLYPS    CYSTOSCOPY N/A 04/06/2023    Procedure: CYSTOSCOPY;  Surgeon: Yoan Yoo MD;  Location: [facility] MAIN OR;  Service: Gynecology;  Laterality: N/A;    ENDOMETRIAL ABLATION  2007    EPIDURAL BLOCK  2007    FRACTURE SURGERY  2008    left ankle    GASTRIC BYPASS  2001    open Ruen Y    KNEE SURGERY      left 1996,1997 right 2015 scope on right, open procedure on left x2    SHOULDER ARTHROSCOPY W/ ROTATOR CUFF REPAIR Left 1/15/2024    Procedure: SHOULDER ARTHROSCOPY DISTAL CLAVICLE RESECTION, ROTATOR CUFF REPAIR DEBRIDEMENT, SUBCROMIAL DECOMPRESSION, biceps tenotomy, chondroplasty;  Surgeon: Tomy Dobson MD;  Location: [facility] OR [facility];  Service: Orthopedics;  Laterality: Left;    SPINAL FUSION  10/2008    L5/S1    TONSILLECTOMY  1994    TOTAL LAPAROSCOPIC HYSTERECTOMY N/A 04/06/2023    Procedure: TOTAL LAPAROSCOPIC HYSTERECTOMY BILATERAL SALPINGO OOPHORECTOMY;  Surgeon: Yoan Yoo MD;  Location: [facility] MAIN OR;  Service: Gynecology;  Laterality: N/A;    TRIGGER POINT INJECTION  2021       Home Medications:     Current Outpatient Medications:     ammonium lactate (AmLactin) 12 % lotion, Apply  topically to the appropriate area as directed 2 (Two) Times a Day., Disp: 225 g, Rfl: 11    atorvastatin (LIPITOR) 20 MG tablet, TAKE ONE TABLET BY MOUTH DAILY (Patient taking differently: Every Night.), Disp: 90 tablet, Rfl: 2    Cyanocobalamin 1000 MCG/15ML liquid, Take 15 mL by mouth Daily for 30 days., Disp: 450 mL, Rfl: 5    docusate sodium (COLACE) 100 MG capsule, TAKE ONE CAPSULE BY MOUTH TWICE A DAY AS NEEDED FOR CONSTIPATION, Disp: 60 capsule, Rfl: 5    estradiol  (Estrace) 1 MG tablet, Take 1 tablet by mouth Daily. (Patient taking differently: Take 1 tablet by mouth Every Night.), Disp: 30 tablet, Rfl: 11    folic acid (FOLVITE) 1 MG tablet, Take 1 tablet by mouth Daily for 360 days., Disp: 90 tablet, Rfl: 3    furosemide (LASIX) 20 MG tablet, Take 1 tablet by mouth Daily As Needed (swelling)., Disp: 30 tablet, Rfl: 0    gabapentin (NEURONTIN) 600 MG tablet, Take 1 tablet by mouth 3 (Three) Times a Day., Disp: , Rfl:     hydrALAZINE (APRESOLINE) 50 MG tablet, TAKE ONE TABLET BY MOUTH THREE TIMES A DAY, Disp: 90 tablet, Rfl: 1    HYDROcodone-acetaminophen (Norco) 7.5-325 MG per tablet, Take 1-2 tablets by mouth Every 4 (Four) Hours As Needed for Moderate Pain., Disp: 40 tablet, Rfl: 0    HYDROcodone-acetaminophen (Norco) 7.5-325 MG per tablet, Take 1 tablet by mouth Every 4 (Four) Hours As Needed for Moderate Pain., Disp: 42 tablet, Rfl: 0    hydrOXYzine (ATARAX) 25 MG tablet, TAKE ONE TABLET BY MOUTH EVERY 8 HOURS AS NEEDED FOR ANXIETY, Disp: 90 tablet, Rfl: 1    irbesartan (AVAPRO) 75 MG tablet, TAKE ONE TABLET BY MOUTH ONCE NIGHTLY, Disp: 90 tablet, Rfl: 1    loratadine (CLARITIN) 10 MG tablet, Take 1 tablet by mouth Every Night., Disp: , Rfl:     ondansetron ODT (ZOFRAN-ODT) 4 MG disintegrating tablet, Place 1 tablet on the tongue., Disp: , Rfl:     oxyCODONE-acetaminophen (PERCOCET) 7.5-325 MG per tablet, Take 1 tablet by mouth Every 6 (Six) Hours As Needed for Moderate Pain., Disp: 42 tablet, Rfl: 0    rizatriptan (MAXALT) 10 MG tablet, Take 1 tablet by mouth 1 (One) Time As Needed., Disp: , Rfl:     sertraline (ZOLOFT) 100 MG tablet, TAKE 1 TABLET BY MOUTH DAILY (Patient taking differently: Take 1 tablet by mouth Every Night.), Disp: 90 tablet, Rfl: 1    tiZANidine (ZANAFLEX) 4 MG tablet, Take 1 tablet by mouth 2 (Two) Times a Day As Needed., Disp: , Rfl:     vitamin D (ERGOCALCIFEROL) 1.25 MG (78254 UT) capsule capsule, TAKE ONE CAPSULE BY MOUTH ONCE WEEKLY (Patient  taking differently: Take 1 capsule by mouth 1 (One) Time Per Week. Last dose 1/3/24), Disp: 12 capsule, Rfl: 3    zolpidem CR (AMBIEN CR) 6.25 MG CR tablet, TAKE ONE TABLET BY MOUTH ONCE NIGHTLY AS NEEDED FOR SLEEP, Disp: 30 tablet, Rfl: 2    naloxone (NARCAN) 4 MG/0.1ML nasal spray, Call 911. Don't prime. Byron in 1 nostril for overdose. Repeat in 2-3 minutes in other nostril if no or minimal breathing/responsiveness., Disp: 2 each, Rfl: 0    Allergies:  Allergies   Allergen Reactions    Adhesive Tape Itching and Rash    Cefaclor Rash    Cephalexin Rash    Cyclobenzaprine Other (See Comments)     Muscle spasms    Erythromycin Rash    Meperidine Nausea And Vomiting and Headache    Monosodium Glutamate Other (See Comments)     Blood pressure drops, pass out     Morphine Mental Status Change and Irritability    Nsaids Unknown - High Severity     CKD     Penicillins Anaphylaxis and Rash     1. When was your reaction? < 10 years ago    2. Did your reaction happen after the first dose? Do not know    3. Did your reaction happen after several doses? Do not know    4. Did your reaction require ED or hospital care to manage your reaction? Do not know    5. Did your reaction require treatment with epinephrine? Do not know    6. Have you taken amoxicillin (Amoxil) or amoxicillin-clavulanate (Augmentin) without issue since? no    7. Have you taken cephalexin (Keflex) without issue since?  No    Sulfa Antibiotics Rash    Sulfamethoxazole-Trimethoprim Rash    Vancomycin Itching       Past Social History:  Social History     Socioeconomic History    Marital status:     Number of children: 3   Tobacco Use    Smoking status: Former     Packs/day: 1.00     Years: 15.00     Additional pack years: 0.00     Total pack years: 15.00     Types: Cigarettes     Start date: 1/1/1997     Quit date: 1/1/2021     Years since quitting: 3.0     Passive exposure: Current    Smokeless tobacco: Never   Vaping Use    Vaping Use: Never used    Substance and Sexual Activity    Alcohol use: Yes     Comment: Rarely. 1-2 times yearly    Drug use: Never    Sexual activity: Defer     Partners: Male     Birth control/protection: Abstinence, Hysterectomy     Comment: Chemical menopause. Hysterectomy scheduled for 4/6/23       Past Family History:  Family History   Problem Relation Age of Onset    Stroke Mother         3 TIAs    Other Mother         Fibromyalgia    Kidney disease Mother         Cause of death    Anxiety disorder Mother     Arthritis Mother     Depression Mother     Early death Mother         50 yrs old kidney disease    Hyperlipidemia Mother     Thyroid disease Mother     Broken bones Mother         Leg    Hypertension Mother     Heart disease Father     Kidney disease Father         Currently on dialysis    Arthritis Father     Depression Father     Hyperlipidemia Father     Vision loss Father         Glaucoma, some vision loss    Anesthesia problems Father     Rheumatologic disease Father         Gout    Other Father         Gout    Hypertension Father     Anxiety disorder Son     Colon cancer Paternal Aunt     Cancer Paternal Aunt         Colon/liver    Developmental Disability Paternal Aunt         Down Syndrome    Anxiety disorder Maternal Grandmother     Arthritis Maternal Grandmother     Depression Maternal Grandmother     Heart disease Maternal Grandmother     Hyperlipidemia Maternal Grandmother     Kidney disease Maternal Grandmother         Cause of death    Liver disease Maternal Grandmother         Alcohol abuse    Thyroid disease Maternal Grandmother     Osteoporosis Maternal Grandmother     Miscarriages / Stillbirths Maternal Grandmother         Miscarriage    Broken bones Maternal Grandmother         Clavicle/shoulder    Alcohol abuse Maternal Grandmother     Hypertension Maternal Grandmother     Melanoma Maternal Grandfather     Diabetes Maternal Grandfather         Type 2    Stroke Maternal Grandfather     Heart disease Maternal  "Grandfather     Arthritis Maternal Grandfather     Cancer Maternal Grandfather         Melanoma    Hyperlipidemia Maternal Grandfather     Hypertension Maternal Grandfather     Colon cancer Paternal Grandmother 87    Arthritis Paternal Grandmother     Cancer Paternal Grandmother         Colon/kidney    Hyperlipidemia Paternal Grandmother     Kidney disease Paternal Grandmother     Osteoporosis Paternal Grandmother     Miscarriages / Stillbirths Paternal Grandmother         Miscarriage    Rheumatologic disease Paternal Grandmother         Gout    Broken bones Paternal Grandmother         Clavicle/shoulder    Other Paternal Grandmother         Gout    Hypertension Paternal Grandmother     Arthritis Paternal Grandfather     Heart disease Paternal Grandfather     Hyperlipidemia Paternal Grandfather     Kidney disease Paternal Grandfather     Hypertension Paternal Grandfather     Ovarian cancer Neg Hx     Uterine cancer Neg Hx     Breast cancer Neg Hx     Prostate cancer Neg Hx     Malig Hyperthermia Neg Hx      Positive family Struve for sleep apnea, restless leg, thyroid disorder, insomnia and obesity  Objective:        Vital Signs:   Visit Vitals  /89 (BP Location: Left arm, Patient Position: Sitting)   Pulse 78   Ht 162.6 cm (64.02\")   Wt 119 kg (262 lb)   LMP 05/19/2022   SpO2 96%   BMI 44.95 kg/m²     Wt Readings from Last 3 Encounters:   01/24/24 119 kg (262 lb)   01/15/24 120 kg (263 lb 7.2 oz)   12/12/23 116 kg (256 lb)     Neck Circumference: 16 inches    Physical Exam:   GEN:  No acute distress, alert, cooperative, well developed, obese   EYES:   Sclerae clear. No icterus. PERRL. Normal EOM  ENT:   External ears/nose normal, no oral lesions, no thrush, mucous membranes moist, Septum midline. Mallampati III-IV   NECK:  Supple, midline trachea, no JVD  LUNGS: Normal chest on inspection, CTAB, no wheezes. No rhonchi. No crackles. Respirations regular, even and unlabored.   CV:  Regular rhythm and rate. " Normal S1/S2. No murmurs, gallops, or rubs noted.  ABD:  Soft, nontender and nondistended. Normal bowel sounds. No guarding,  EXT: . No cyanosis. No redness. No edema,  BKA Left side   Skin: Dry, intact, no bleeding      Diagnostic Data:  No prior sleep study on records     Assessment and Plan:       ICD-10-CM ICD-9-CM   1. Suspected sleep apnea  R29.818 781.99   2. Morning headache  R51.9 784.0   3. Poor sleep hygiene  Z72.821 307.49   4. Obesity, morbid, BMI 40.0-49.9  E66.01 278.01   5. Former smoker  Z87.891 V15.82   6. Hypersomnia  G47.10 780.54   7. Stage 3a chronic kidney disease  N18.31 585.3   8. Essential hypertension  I10 401.9   9. 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 Education also provided about the diagnostic tools for JESUS, including the polysomnography and the treatment modalities, including the CPAP.     If patient has obstructive sleep apnea the recommend treatment is CPAP and will start CPAP and patient will follow up within 31-90 days after starting CPAP for compliance review.   Will address alternative  treatment option if intolerant to CPAP     Adherence to the CPAP is a key factor in successful treatment of JESUS and the patient was encouraged to contact us in case of problem with the CPAP or the mask that can limit the tolerance of the compliance with the therapy.    Patient was educated about the impact of obesity on sleep apnea and the benefit of weight loss and weight loss was recommended    Orders Placed This Encounter   Procedures    Home Sleep Study     No orders of the defined types were placed in this encounter.     Return in about 3 months (around 4/24/2024).    Amalia Little MD   [facility]   01/24/24  13:44 EST    Dictated utilizing Dragon dictation

## 2024-01-25 ENCOUNTER — OFFICE VISIT (OUTPATIENT)
Dept: FAMILY MEDICINE CLINIC | Facility: CLINIC | Age: 44
End: 2024-01-25
Payer: MEDICARE

## 2024-01-25 VITALS
DIASTOLIC BLOOD PRESSURE: 98 MMHG | SYSTOLIC BLOOD PRESSURE: 176 MMHG | WEIGHT: 264 LBS | OXYGEN SATURATION: 100 % | HEIGHT: 64 IN | BODY MASS INDEX: 45.07 KG/M2 | TEMPERATURE: 98.9 F | HEART RATE: 71 BPM

## 2024-01-25 DIAGNOSIS — I10 ESSENTIAL HYPERTENSION: ICD-10-CM

## 2024-01-25 DIAGNOSIS — J01.00 SUBACUTE MAXILLARY SINUSITIS: ICD-10-CM

## 2024-01-25 DIAGNOSIS — Z12.31 BREAST CANCER SCREENING BY MAMMOGRAM: Primary | ICD-10-CM

## 2024-01-25 DIAGNOSIS — F51.01 PRIMARY INSOMNIA: ICD-10-CM

## 2024-01-25 RX ORDER — TEMAZEPAM 7.5 MG/1
7.5 CAPSULE ORAL NIGHTLY PRN
Qty: 30 CAPSULE | Refills: 2 | Status: SHIPPED | OUTPATIENT
Start: 2024-01-25 | End: 2024-02-24

## 2024-01-25 RX ORDER — AZITHROMYCIN 250 MG/1
TABLET, FILM COATED ORAL
Qty: 6 TABLET | Refills: 0 | Status: SHIPPED | OUTPATIENT
Start: 2024-01-25

## 2024-01-25 NOTE — PROGRESS NOTES
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    She is complaining that Ambien 6.25 mg is not working for her. She wishes Ambien would work for her so she could sleep.  Her father takes temazepam for sleep that works for him.    Her blood pressure is elevated in the clinic today, 01/25/2024. She took her blood pressure medicine about 30 minutes ago.     On 01/15/2024, she had rotator cuff repair on her left clavicle with Dr. Dobson and currently in physical therapy. She was having popping in her left arm along with shooting pain that radiated into her chest. She received injections in her left shoulder with no improvement. In 01/2023, she slipped and hit her left arm on the rails of the stairs. She underwent an x-ray of her left shoulder in 09/2023 and MRI of her left shoulder in 10/2023. She thought her left shoulder pain was from her neck. Her neurologist told her she was having referred pain. When she has a flare up of radiculopathy that starts tingling into her neck and into her shoulder blades, she will start to dry heave. She is not able to vomit.  She has noticed when she dry heaves, her chest pain improves.     She started to have sinus drainage with a mild cough 2 to 3 days ago. She is having postnasal drainage. Z-pack usually works well for her.     Procedures     Past History:  Medical History: has a past medical history of Allergies, Anemia, Anxiety (2014), Arthritis, Arthritis of back (2006), Bilateral carpal tunnel syndrome (07/10/2023), Brain  concussion (1995), Callus, Cervical disc disorder (2012), Colon polyp (03/23/2023), Difficulty walking, Essential hypertension (07/21/2020), Head injury (1996), HPV (human papilloma virus) infection, BKA, left, Hyperlipidemia, Hypoglycemia, Insomnia (07/21/2020), Left below-knee amputee (07/21/2020), Low back pain (2006), Low back strain, Lumbosacral disc disease (2006), Migraine, Muscle spasm (01/04/2021), Neck strain, Numbness in right foot, Obesity, 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   Surgical History: has a past surgical history that includes Abdominal surgery (2010); Leg amputation, lower tibia/fibula (Left, 2014); Ankle surgery; Endometrial ablation (2007); Gastric bypass (2001); Knee surgery; Tonsillectomy (1994); Spinal fusion (10/2008); Epidural block injection (2007); Trigger point injection (2021); Ankle fracture surgery (1527-7832); Cholecystectomy (2001); Fracture surgery (2008); Colonoscopy (N/A, 03/23/2023); total laparoscopic hysterectomy (N/A, 04/06/2023); Cystoscopy (N/A, 04/06/2023); Avulsion toenail plate (Right, 06/29/2023); Achilles tendon surgery; and Shoulder arthroscopy w/ rotator cuff repair (Left, 1/15/2024).   Family History: family history includes Alcohol abuse in her maternal grandmother; Anesthesia problems in her father; Anxiety disorder in her maternal grandmother, mother, and son; Arthritis in her father, maternal grandfather, maternal grandmother, mother, paternal grandfather, and paternal grandmother; Broken bones in her maternal grandmother, mother, and paternal grandmother; Cancer in her maternal grandfather, paternal aunt,  and paternal grandmother; Colon cancer in her paternal aunt; Colon cancer (age of onset: 87) in her paternal grandmother; Depression in her father, maternal grandmother, and mother; Developmental Disability in her paternal aunt; Diabetes in her maternal grandfather; Early death in her mother; Heart disease in her father, maternal grandfather, maternal grandmother, and paternal grandfather; Hyperlipidemia in her father, maternal grandfather, maternal grandmother, mother, paternal grandfather, and paternal grandmother; Hypertension in her father, maternal grandfather, maternal grandmother, mother, paternal grandfather, and paternal grandmother; Kidney disease in her father, maternal grandmother, mother, paternal grandfather, and paternal grandmother; Liver disease in her maternal grandmother; Melanoma in her maternal grandfather; Miscarriages / Stillbirths in her maternal grandmother and paternal grandmother; Osteoporosis in her maternal grandmother and paternal grandmother; Other in her father, mother, and paternal grandmother; Rheumatologic disease in her father and paternal grandmother; Stroke in her maternal grandfather and mother; Thyroid disease in her maternal grandmother and mother; Vision loss in her father.   Social History: reports that she quit smoking about 3 years ago. Her smoking use included cigarettes. She started smoking about 27 years ago. She has a 15.00 pack-year smoking history. She has been exposed to tobacco smoke. She has never used smokeless tobacco. She reports current alcohol use. She reports that she does not use drugs.  Immunization History   Administered Date(s) Administered    COVID-19 (MODERNA) 1st,2nd,3rd Dose Monovalent 12/11/2021, 12/13/2021    COVID-19 (MODERNA) Monovalent Original Booster 11/11/2021, 12/09/2021    Fluzone (or Fluarix & Flulaval for VFC) >6mos 10/05/2021, 12/16/2022, 10/25/2023    Influenza, Unspecified 12/02/2020, 12/16/2022, 12/16/2022         Allergies: Adhesive  tape, Cefaclor, Cephalexin, Cyclobenzaprine, Erythromycin, Meperidine, Monosodium glutamate, Morphine, Nsaids, Penicillins, Sulfa antibiotics, Sulfamethoxazole-trimethoprim, and Vancomycin     Medications:  Current Outpatient Medications on File Prior to Visit   Medication Sig Dispense Refill    ammonium lactate (AmLactin) 12 % lotion Apply  topically to the appropriate area as directed 2 (Two) Times a Day. 225 g 11    atorvastatin (LIPITOR) 20 MG tablet TAKE ONE TABLET BY MOUTH DAILY (Patient taking differently: Every Night.) 90 tablet 2    docusate sodium (COLACE) 100 MG capsule TAKE ONE CAPSULE BY MOUTH TWICE A DAY AS NEEDED FOR CONSTIPATION 60 capsule 5    estradiol (Estrace) 1 MG tablet Take 1 tablet by mouth Daily. (Patient taking differently: Take 1 tablet by mouth Every Night.) 30 tablet 11    folic acid (FOLVITE) 1 MG tablet Take 1 tablet by mouth Daily for 360 days. 90 tablet 3    furosemide (LASIX) 20 MG tablet Take 1 tablet by mouth Daily As Needed (swelling). 30 tablet 0    gabapentin (NEURONTIN) 600 MG tablet Take 1 tablet by mouth 3 (Three) Times a Day.      hydrALAZINE (APRESOLINE) 50 MG tablet TAKE ONE TABLET BY MOUTH THREE TIMES A DAY 90 tablet 1    HYDROcodone-acetaminophen (Norco) 7.5-325 MG per tablet Take 1-2 tablets by mouth Every 4 (Four) Hours As Needed for Moderate Pain. 40 tablet 0    hydrOXYzine (ATARAX) 25 MG tablet TAKE ONE TABLET BY MOUTH EVERY 8 HOURS AS NEEDED FOR ANXIETY 90 tablet 1    irbesartan (AVAPRO) 75 MG tablet TAKE ONE TABLET BY MOUTH ONCE NIGHTLY 90 tablet 1    loratadine (CLARITIN) 10 MG tablet Take 1 tablet by mouth Every Night.      ondansetron ODT (ZOFRAN-ODT) 4 MG disintegrating tablet Place 1 tablet on the tongue.      rizatriptan (MAXALT) 10 MG tablet Take 1 tablet by mouth 1 (One) Time As Needed.      sertraline (ZOLOFT) 100 MG tablet TAKE 1 TABLET BY MOUTH DAILY (Patient taking differently: Take 1 tablet by mouth Every Night.) 90 tablet 1    tiZANidine (ZANAFLEX) 4  "MG tablet Take 1 tablet by mouth 2 (Two) Times a Day As Needed.      vitamin D (ERGOCALCIFEROL) 1.25 MG (62361 UT) capsule capsule TAKE ONE CAPSULE BY MOUTH ONCE WEEKLY (Patient taking differently: Take 1 capsule by mouth 1 (One) Time Per Week. Last dose 1/3/24) 12 capsule 3    Cyanocobalamin 1000 MCG/15ML liquid Take 15 mL by mouth Daily for 30 days. (Patient not taking: Reported on 1/25/2024) 450 mL 5     No current facility-administered medications on file prior to visit.        Health Maintenance Due   Topic Date Due    TDAP/TD VACCINES (1 - Tdap) Never done    LIPID PANEL  02/10/2023    ANNUAL WELLNESS VISIT  01/19/2024       Vital Signs:   Vitals:    01/25/24 0835   BP: 176/98   BP Location: Right arm   Patient Position: Sitting   Cuff Size: Adult   Pulse: 71   Temp: 98.9 °F (37.2 °C)   TempSrc: Oral   SpO2: 100%   Weight: 120 kg (264 lb)   Height: 162.6 cm (64\")      Body mass index is 45.32 kg/m².     ROS:  Review of Systems   Constitutional:  Negative for fatigue and fever.   HENT:  Negative for congestion, ear pain and sinus pressure.    Respiratory:  Negative for cough, chest tightness and shortness of breath.    Cardiovascular:  Negative for chest pain, palpitations and leg swelling.   Gastrointestinal:  Negative for abdominal pain and diarrhea.   Genitourinary:  Negative for dysuria and frequency.   Musculoskeletal:  Positive for arthralgias and neck pain.   Neurological:  Negative for speech difficulty, headache and confusion.   Psychiatric/Behavioral:  Positive for sleep disturbance. Negative for agitation and behavioral problems.    All other systems reviewed and are negative.       Cough  Sinus drainage  Post nasal drainage    Physical Exam  Vitals and nursing note reviewed.   Constitutional:       General: She is awake.      Appearance: Normal appearance. She is well-developed and well-groomed.   HENT:      Head: Normocephalic and atraumatic.      Right Ear: Hearing, tympanic membrane, ear canal " and external ear normal.      Left Ear: Hearing, tympanic membrane, ear canal and external ear normal.      Nose: Nose normal.      Mouth/Throat:      Lips: Pink.      Pharynx: Oropharynx is clear.   Eyes:      General: Lids are normal.      Conjunctiva/sclera: Conjunctivae normal.   Cardiovascular:      Rate and Rhythm: Normal rate and regular rhythm.      Heart sounds: Normal heart sounds.   Pulmonary:      Effort: Pulmonary effort is normal.      Breath sounds: Normal breath sounds and air entry.   Musculoskeletal:      Cervical back: Full passive range of motion without pain.      Right lower leg: No edema.      Left lower leg: No edema.   Lymphadenopathy:      Head:      Right side of head: No submental, submandibular or tonsillar adenopathy.      Left side of head: No submental, submandibular or tonsillar adenopathy.   Skin:     General: Skin is warm and dry.   Neurological:      Mental Status: She is alert.      Sensory: Sensation is intact.      Motor: Motor function is intact.      Coordination: Coordination is intact.      Gait: Gait is intact.   Psychiatric:         Attention and Perception: Attention and perception normal.         Mood and Affect: Mood and affect normal.         Speech: Speech normal.         Behavior: Behavior normal. Behavior is cooperative.         Thought Content: Thought content normal.         Cognition and Memory: Cognition and memory normal.         Judgment: Judgment normal.          Result Review   Laboratory Studies- EGR has decreased mildly.      Diagnoses and all orders for this visit:    1. Breast cancer screening by mammogram (Primary)  -     Mammo Screening Digital Tomosynthesis Bilateral With CAD; Future    2. Essential hypertension    3. Primary insomnia  -     temazepam (RESTORIL) 7.5 MG capsule; Take 1 capsule by mouth At Night As Needed for Sleep for up to 30 days.  Dispense: 30 capsule; Refill: 2    4. Subacute maxillary sinusitis  -     azithromycin (Zithromax  Z-Jose F) 250 MG tablet; Take 2 tablets by mouth on day 1, then 1 tablet daily on days 2-5  Dispense: 6 tablet; Refill: 0      1. Hypertension  Her blood pressure is elevated today.    2. Insomnia  Discussed with the patient we can increase the dose of Ambien. We will stop Ambien and start temazepam. A prescription for temazepam was provided to help her sleep better.    3. Sinus drainage  Her chest pain could be due to gastritis. A prescription for azithromycin was provided.    Follow-up  The patient will follow up in 3 months for her annual wellness visit.          Smoking Cessation:    Bebe Steward  reports that she quit smoking about 3 years ago. Her smoking use included cigarettes. She started smoking about 27 years ago. She has a 15.00 pack-year smoking history. She has been exposed to tobacco smoke. She has never used smokeless tobacco.        Follow Up   Return in about 3 months (around 4/25/2024) for Medicare Wellness.  Patient was given instructions and counseling regarding her condition or for health maintenance advice. Please see specific information pulled into the AVS if appropriate.       Kimberly Almaguer MD      Transcribed from ambient dictation for Kimberly Almaguer MD by Ferdinand Welch.  01/25/24   11:18 EST    Patient or patient representative verbalized consent to the visit recording.  I have personally performed the services described in this document as transcribed by the above individual, and it is both accurate and complete.

## 2024-01-26 ENCOUNTER — TREATMENT (OUTPATIENT)
Dept: PHYSICAL THERAPY | Facility: CLINIC | Age: 44
End: 2024-01-26
Payer: MEDICARE

## 2024-01-26 DIAGNOSIS — M89.512 OSTEOLYSIS OF ACROMIAL END OF LEFT CLAVICLE: ICD-10-CM

## 2024-01-26 DIAGNOSIS — M25.512 LEFT SHOULDER PAIN, UNSPECIFIED CHRONICITY: Primary | ICD-10-CM

## 2024-01-26 PROCEDURE — 97140 MANUAL THERAPY 1/> REGIONS: CPT | Performed by: PHYSICAL THERAPIST

## 2024-01-26 PROCEDURE — 97110 THERAPEUTIC EXERCISES: CPT | Performed by: PHYSICAL THERAPIST

## 2024-01-26 NOTE — PROGRESS NOTES
"Physical Therapy Daily Treatment Note      Patient: Bebe Steward   : 1980  Referring practitioner: Tomy Dobson MD  Date of Initial Visit: Type: THERAPY  Noted: 2024  Today's Date: 2024  Patient seen for 2 sessions           Subjective   Bebe Steward reports: left shoulder pain 8.5/10 stating shoulder \"popped\" last night in sitting position.       Objective   See Exercise, Manual, and Modality Logs for complete treatment.       Assessment & Plan       Assessment  Assessment details: Patient tolerated progression per protocol    Visit Diagnoses:    ICD-10-CM ICD-9-CM   1. Left shoulder pain, unspecified chronicity  M25.512 719.41   2. 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  M89.512 733.99       Progress per Plan of Care           Timed:  Manual Therapy:    10     mins  86719;  Therapeutic Exercise:    18     mins  23135;     Neuromuscular Kaiden:        mins  72853;    Therapeutic Activity:          mins  44519;     Gait Training:           mins  40004;     Ultrasound:          mins  61612;    Electrical Stimulation:         mins  38757 ( );    Untimed:  Electrical Stimulation:         mins  69270 ( );  Mechanical Traction:         mins  24384;     Timed Treatment:   28   mins   Total Treatment:     28   mins  Kimberly Wright PT    Electronically singed 2024      [place] PT license: [id number]  Physical Therapist  "

## 2024-01-29 ENCOUNTER — OFFICE VISIT (OUTPATIENT)
Dept: ORTHOPEDIC SURGERY | Facility: CLINIC | Age: 44
End: 2024-01-29
Payer: MEDICARE

## 2024-01-29 ENCOUNTER — TELEPHONE (OUTPATIENT)
Dept: ORTHOPEDIC SURGERY | Facility: CLINIC | Age: 44
End: 2024-01-29
Payer: MEDICARE

## 2024-01-29 VITALS
BODY MASS INDEX: 45.17 KG/M2 | DIASTOLIC BLOOD PRESSURE: 96 MMHG | HEART RATE: 90 BPM | OXYGEN SATURATION: 98 % | SYSTOLIC BLOOD PRESSURE: 171 MMHG | WEIGHT: 264.55 LBS | HEIGHT: 64 IN

## 2024-01-29 DIAGNOSIS — Z47.89 AFTERCARE FOLLOWING SURGERY OF THE MUSCULOSKELETAL SYSTEM: Primary | ICD-10-CM

## 2024-01-29 DIAGNOSIS — M89.512 OSTEOLYSIS OF ACROMIAL END OF LEFT CLAVICLE: ICD-10-CM

## 2024-01-29 PROCEDURE — 1160F RVW MEDS BY RX/DR IN RCRD: CPT | Performed by: PHYSICIAN ASSISTANT

## 2024-01-29 PROCEDURE — 1159F MED LIST DOCD IN RCRD: CPT | Performed by: PHYSICIAN ASSISTANT

## 2024-01-29 PROCEDURE — 3080F DIAST BP >= 90 MM HG: CPT | Performed by: PHYSICIAN ASSISTANT

## 2024-01-29 PROCEDURE — 99024 POSTOP FOLLOW-UP VISIT: CPT | Performed by: PHYSICIAN ASSISTANT

## 2024-01-29 PROCEDURE — 3077F SYST BP >= 140 MM HG: CPT | Performed by: PHYSICIAN ASSISTANT

## 2024-01-29 RX ORDER — CLINDAMYCIN HYDROCHLORIDE 150 MG/1
300 CAPSULE ORAL 3 TIMES DAILY
Qty: 42 CAPSULE | Refills: 0 | Status: SHIPPED | OUTPATIENT
Start: 2024-01-29 | End: 2024-02-05

## 2024-01-29 NOTE — PROGRESS NOTES
Chief Complaint  Pain and Follow-up of the Left Shoulder and Suture / Staple Removal    Subjective          History of Present Illness      Bebe Steward is a 43 y.o. female  presents to [facility] for     Patient presents for 2-week postop evaluation of left shoulder arthroscopic distal clavicle resection, rotator cuff debridement, subacromial decompression, bicep tenotomy, chondroplasty, 1/15/2024.  Patient presents without her sling she states she has not been wearing it since right after her surgery.  She states that someone at the hospital told her she did not have to wear it therefore she has not been wearing it.  She has been to physical therapy but they did not advise her to wear her sling.  She is attending therapy at [facility] with [facility].  She is taking pain medication and will call for refill soon.  She states the incisions have been healing well there is some redness surrounding the incision she denies drainage to the incisions.  She states she has had some popping episodes in the shoulder she points to the lateral shoulder as an area of popping sensation.      Allergies   Allergen Reactions    Adhesive Tape Itching and Rash    Cefaclor Rash    Cephalexin Rash    Cyclobenzaprine Other (See Comments)     Muscle spasms    Erythromycin Rash    Meperidine Nausea And Vomiting and Headache    Monosodium Glutamate Other (See Comments)     Blood pressure drops, pass out     Morphine Mental Status Change and Irritability    Nsaids Unknown - High Severity     CKD     Penicillins Anaphylaxis and Rash     1. When was your reaction? < 10 years ago    2. Did your reaction happen after the first dose? Do not know    3. Did your reaction happen after several doses? Do not know    4. Did your reaction require ED or hospital care to manage your reaction? Do not know    5. Did your reaction require treatment with epinephrine? Do not know    6. Have you taken amoxicillin (Amoxil) or  "amoxicillin-clavulanate (Augmentin) without issue since? no    7. Have you taken cephalexin (Keflex) without issue since?  No    Sulfa Antibiotics Rash    Sulfamethoxazole-Trimethoprim Rash    Vancomycin Itching        Social History     Socioeconomic History    Marital status:     Number of children: 3   Tobacco Use    Smoking status: Former     Packs/day: 1.00     Years: 15.00     Additional pack years: 0.00     Total pack years: 15.00     Types: Cigarettes     Start date: 1/1/1997     Quit date: 1/1/2021     Years since quitting: 3.0     Passive exposure: Current    Smokeless tobacco: Never   Vaping Use    Vaping Use: Never used   Substance and Sexual Activity    Alcohol use: Yes     Comment: Rarely. 1-2 times yearly    Drug use: Never    Sexual activity: Defer     Partners: Male     Birth control/protection: Abstinence, Hysterectomy     Comment: Chemical menopause. Hysterectomy scheduled for 4/6/23        REVIEW OF SYSTEMS    Constitutional: Awake alert and oriented x3, no acute distress, denies fevers, chills, weight loss  Respiratory: No respiratory distress  Vascular: Brisk cap refill, Intact distal pulses, No cyanosis, compartments soft with no signs or symptoms of compartment syndrome or DVT.   Cardiovascular: Denies chest pain, shortness of breath  Skin: Denies rashes, acute skin changes  Neurologic: Denies headache, loss of consciousness  MSK: Left shoulder pain      Objective   Vital Signs:   /96   Pulse 90   Ht 162.6 cm (64\")   Wt 120 kg (264 lb 8.8 oz)   SpO2 98%   BMI 45.41 kg/m²     Body mass index is 45.41 kg/m².    Physical Exam       Left shoulder: Incisions have some erythema there is mild dehiscence to the anterior and posterior incisions, no active drainage there is granulation tissue in the areas of the dehiscence.  No streaking, no fluctuance,.  There is resolving ecchymosis to the lateral shoulder, elbow wrist hand range of motion intact/appropriate shoulder range of " motion appropriate with passive range of motion mild pain with active range of motion, patient able to wiggle fingers and thumb, sensation intact light touch      Procedures    Imaging Results (Most Recent)       None             Result Review :   The following data was reviewed by: CADY Ferrer on 01/29/2024:               Assessment and Plan    Diagnoses and all orders for this visit:    1. Aftercare following surgery of LEFT SHOULDER ARTHROSCOPY DISTAL CLAVICLE RESECTION, ROTATOR CUFF REPAIR DEBRIDEMENT, SUBCROMIAL DECOMPRESSION, biceps tenotomy, chondroplasty 1/15/24 (Primary)    Other orders  -     clindamycin (Cleocin) 150 MG capsule; Take 2 capsules by mouth 3 (Three) Times a Day for 7 days.  Dispense: 42 capsule; Refill: 0        Reviewed operative images, operative report with the patient discussed diagnosis and treatment options with her she was advised of incision care, with the mild dehiscence she was advised to keep Steri-Strips on for about 7 days, she may change these check progress.  With the mild erythema surrounding the incisions she was advised we will start her on a short course of clindamycin take that for 7 days as prescribed.  If worsening symptoms occur call us right away.  She was advised we recommend returning to sling use for the next 2 weeks with activities, continue physical therapy, she will call for pain medication refill follow-up in 4 weeks for recheck.      Call or return if worsening symptoms.    Follow Up   Return in about 4 weeks (around 2/26/2024) for Recheck.  Patient was given instructions and counseling regarding her condition or for health maintenance advice. Please see specific information pulled into the AVS if appropriate.       EMR Dragon/Transcription disclaimer:  Much of this encounter note is an electronic transcription/translation of spoken language to printed text, aka voice recognition.  The electronic translation of spoken language may permit  erroneous or at times nonsensical words or phrases to be inadvertently transcribed; although I have reviewed the note for such errors, some may still exist so please interpret based on surrounding text content.

## 2024-01-30 ENCOUNTER — TREATMENT (OUTPATIENT)
Dept: PHYSICAL THERAPY | Facility: CLINIC | Age: 44
End: 2024-01-30
Payer: MEDICARE

## 2024-01-30 DIAGNOSIS — M89.512 OSTEOLYSIS OF ACROMIAL END OF LEFT CLAVICLE: ICD-10-CM

## 2024-01-30 DIAGNOSIS — M25.512 LEFT SHOULDER PAIN, UNSPECIFIED CHRONICITY: Primary | ICD-10-CM

## 2024-01-30 PROCEDURE — 97140 MANUAL THERAPY 1/> REGIONS: CPT | Performed by: PHYSICAL THERAPIST

## 2024-01-30 PROCEDURE — 97110 THERAPEUTIC EXERCISES: CPT | Performed by: PHYSICAL THERAPIST

## 2024-01-30 RX ORDER — HYDROCODONE BITARTRATE AND ACETAMINOPHEN 7.5; 325 MG/1; MG/1
1 TABLET ORAL EVERY 4 HOURS PRN
Qty: 42 TABLET | Refills: 0 | Status: SHIPPED | OUTPATIENT
Start: 2024-01-30

## 2024-01-30 NOTE — PROGRESS NOTES
Physical Therapy Daily Treatment Note      Patient: Bebe Steward   : 1980  Referring practitioner: Tomy Dobson MD  Date of Initial Visit: Type: THERAPY  Noted: 2024  Today's Date: 2024  Patient seen for 3 sessions           Subjective   Bebe Steward reports: left shoulder pain 7/10      Objective   See Exercise, Manual, and Modality Logs for complete treatment.       Assessment & Plan       Assessment  Assessment details: Patient tolerated progression of left shoulder activity per protocol.    Visit Diagnoses:    ICD-10-CM ICD-9-CM   1. Left shoulder pain, unspecified chronicity  M25.512 719.41   2. 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  M89.512 733.99       Progress per Plan of Care           Timed:  Manual Therapy:    10     mins  65897;  Therapeutic Exercise:    20     mins  87974;     Neuromuscular Kaiden:        mins  40635;    Therapeutic Activity:          mins  16749;     Gait Training:           mins  74011;     Ultrasound:          mins  05714;    Electrical Stimulation:         mins  06979 ( );    Untimed:  Electrical Stimulation:         mins  82478 ( );  Mechanical Traction:         mins  78506;     Timed Treatment:   30   mins   Total Treatment:     30   mins  Kimberly Wright PT    Electronically singed 2024      [place] PT license: [id number]  Physical Therapist

## 2024-01-31 ENCOUNTER — HOSPITAL ENCOUNTER (OUTPATIENT)
Dept: GENERAL RADIOLOGY | Facility: HOSPITAL | Age: 44
Discharge: HOME | End: 2024-01-31
Admitting: FAMILY MEDICINE
Payer: MEDICARE

## 2024-01-31 DIAGNOSIS — R13.12 OROPHARYNGEAL DYSPHAGIA: ICD-10-CM

## 2024-01-31 PROCEDURE — A9270 NON-COVERED ITEM OR SERVICE: HCPCS | Performed by: FAMILY MEDICINE

## 2024-01-31 PROCEDURE — 74230 X-RAY XM SWLNG FUNCJ C+: CPT

## 2024-01-31 PROCEDURE — 63710000001 BARIUM SULFATE 60 % CREAM: Performed by: FAMILY MEDICINE

## 2024-01-31 PROCEDURE — 63710000001 BARIUM SULFATE 40 % RECONSTITUTED SUSPENSION: Performed by: FAMILY MEDICINE

## 2024-01-31 PROCEDURE — 92611 MOTION FLUOROSCOPY/SWALLOW: CPT

## 2024-01-31 RX ADMIN — BARIUM SULFATE 55 ML: 0.81 POWDER, FOR SUSPENSION ORAL at 10:21

## 2024-01-31 RX ADMIN — BARIUM SULFATE 1 TEASPOON(S): 0.6 CREAM ORAL at 10:21

## 2024-02-02 ENCOUNTER — TELEPHONE (OUTPATIENT)
Dept: ORTHOPEDICS | Facility: OTHER | Age: 44
End: 2024-02-02
Payer: MEDICARE

## 2024-02-02 NOTE — MBS/VFSS/FEES
Outpatient  - Speech Language Pathology   Swallow  Modified Barium Swallow  DL Tan     Patient Name: Bebe Steward  : 1980  MRN: [id number]  Today's Date: 2024               Admit Date: 2024    Visit Dx:     ICD-10-CM ICD-9-CM   1. Oropharyngeal dysphagia  R13.12 787.22     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 of lower extremity    Plantar wart    Stage 3 chronic kidney disease    Vitamin D deficiency    Generalized edema    Chronic low back pain    Mixed hyperlipidemia    Obesity, morbid, BMI 40.0-49.9    History of lumbar spinal fusion    Constipation    Degeneration of lumbar intervertebral disc    Right knee pain    Primary osteoarthritis of right knee    Endometriosis    Atrophy of right kidney    Family history of colon cancer    Right knee injury, initial encounter    Patellofemoral arthritis    Iron deficiency anemia following bariatric surgery    S/P laparoscopic hysterectomy    Dysuria    Menopausal symptoms    Bilateral carpal tunnel syndrome    Chronic neck pain    Effusion of right knee    Cervical radiculopathy    Foraminal stenosis of cervical region    Phantom limb pain    Tear of lateral meniscus of right knee, current    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    Hypersomnia    Former smoker    Poor sleep hygiene    Suspected sleep apnea    Morning headache    Aftercare following surgery of SHOULDER ARTHROSCOPY DISTAL CLAVICLE RESECTION, ROTATOR CUFF REPAIR DEBRIDEMENT, SUBCROMIAL DECOMPRESSION, biceps tenotomy, chondroplasty 1/15/24     Past Medical History:   Diagnosis Date    Allergies     seasonal and multi drug    Anemia     no current issues    Anxiety     Arthritis     knee    Arthritis of back 2006    Bilateral carpal  tunnel syndrome 07/10/2023    Brain concussion 1995    no residual    Callus     Cervical disc disorder 2012    Compressed disc    Colon polyp 03/23/2023    removed    Difficulty walking     RBK prosthetic    Essential hypertension 07/21/2020    Head injury 1996    HPV (human papilloma virus) infection     last 2 pap clear    Hx of BKA, left     Uses a prosthetic leg    Hyperlipidemia     Hypoglycemia     Insomnia 07/21/2020    Left below-knee amputee 07/21/2020    Low back pain 2006    Low back strain     Lumbosacral disc disease 2006    Migraine     Muscle spasm 01/04/2021    Neck strain     full ROM    Numbness in right foot     Obesity     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    Pain management clinic    Renal insufficiency 2020    right kidney atrophy bmi >50    Right foot pain 12/02/2020    Right hip pain 07/21/2020    Stage 3 chronic kidney disease 09/01/2020    currently stage 2 3-24-23    Subluxation of patella 1996    Left    Tear of meniscus of knee 1996    Thoracic disc disorder     Visual impairment 1988    Near sighted    Vitamin D deficiency 12/02/2020     Past Surgical History:   Procedure Laterality Date    ABDOMINAL SURGERY  2010    scar tissue removed    ACHILLES TENDON SURGERY      ANKLE OPEN REDUCTION INTERNAL FIXATION  [id number]    Left 11 surgeries total, external fixation    ANKLE SURGERY      3/08, 5/08, 8/08, 12/10, 03/11, 2012, 2013, 2014    AVULSION TOENAIL PLATE Right 06/29/2023    BIG TOE    BELOW KNEE AMPUTATION Left 2014    CHOLECYSTECTOMY  2001    COLONOSCOPY N/A 03/23/2023    Procedure: COLONOSCOPY WITH COLD SNARE POLYPECTOMIES;  Surgeon: Tian Smith MD;  Location: [facility] ENDOSCOPY;  Service: General;  Laterality: N/A;  COLON POLYPS    CYSTOSCOPY N/A 04/06/2023    Procedure: CYSTOSCOPY;  Surgeon: Yoan Yoo MD;  Location: [facility] MAIN OR;  Service: Gynecology;  Laterality: N/A;     ENDOMETRIAL ABLATION  2007    EPIDURAL BLOCK  2007    FRACTURE SURGERY  2008    left ankle    GASTRIC BYPASS  2001    open Ruen Y    KNEE SURGERY      left 1996,1997 right 2015 scope on right, open procedure on left x2    SHOULDER ARTHROSCOPY W/ ROTATOR CUFF REPAIR Left 1/15/2024    Procedure: SHOULDER ARTHROSCOPY DISTAL CLAVICLE RESECTION, ROTATOR CUFF REPAIR DEBRIDEMENT, SUBCROMIAL DECOMPRESSION, biceps tenotomy, chondroplasty;  Surgeon: Tomy Dobson MD;  [place] [facility] [place];  Service: Orthopedics;  Laterality: Left;    SPINAL FUSION  10/2008    L5/S1    TONSILLECTOMY  1994    TOTAL LAPAROSCOPIC HYSTERECTOMY N/A 04/06/2023    Procedure: TOTAL LAPAROSCOPIC HYSTERECTOMY BILATERAL SALPINGO OOPHORECTOMY;  Surgeon: Yoan Yoo MD;  Location: [facility] MAIN OR;  Service: Gynecology;  Laterality: N/A;    TRIGGER POINT INJECTION  2021     MODIFIED BARIUM SWALLOW STUDY: SPEECH PATHOLOGY REPORT    PERTINENT INFORMATION:  This patient is a 43year old female referred for modified barium swallow study due to report of dysphagia..    Patient was referred for an MBSS by Dr. Romana coffee to rule out aspiration as well as to determine appropriate treatment plan for this patient.      PROCEDURE:    Patient was alert and cooperative.  The patient was viewed in lateral projection.  The following Ba consistencies were administered: Full range of consistencies with thin liquids from spoon cup and straw, purée consistency soft and hard solids.  The following compensatory swallowing strategies were performed: N/A      RESULTS:    1.  Oral stage is characterized by mildly slow bolus preparation and slow oral transit.  No oral residue noted after the swallow.  Pharyngeal phase appears timely with good laryngeal elevation, base of tongue retraction and epiglottic retroflexion noted.  Transient laryngeal penetration noted with thin liquids only.  No aspiration noted.  No pharyngeal residue  noted      IMPRESSIONS:    Patient demonstrated functional oropharyngeal swallow negative for aspiration or pharyngeal residue.     FUNCTIONAL DEFICIT: Patient scored level 7 of 7 on Functional Communication Measures for swallowing indicating a 0% limitation in function for current status, goal status, and discharge status.      RECOMMENDATIONS:   1.  Regular diet-thin liquids  2.  90 degrees upright  3.  Slow rate, small bites/drinks      YES: Patient/responsible party agrees with the plan of care and has been informed of all alternatives, risks and benefits.    Thank you for this referral.    EDUCATION  The patient has been educated in the following areas:   Dysphagia (Swallowing Impairment).          Margareth Desir, SLP  2/2/2024

## 2024-02-06 ENCOUNTER — TREATMENT (OUTPATIENT)
Dept: PHYSICAL THERAPY | Facility: CLINIC | Age: 44
End: 2024-02-06
Payer: MEDICARE

## 2024-02-06 ENCOUNTER — TELEPHONE (OUTPATIENT)
Dept: ORTHOPEDIC SURGERY | Facility: CLINIC | Age: 44
End: 2024-02-06
Payer: MEDICARE

## 2024-02-06 DIAGNOSIS — Z47.89 AFTERCARE FOLLOWING SURGERY OF THE MUSCULOSKELETAL SYSTEM: ICD-10-CM

## 2024-02-06 DIAGNOSIS — M89.512 OSTEOLYSIS OF ACROMIAL END OF LEFT CLAVICLE: ICD-10-CM

## 2024-02-06 DIAGNOSIS — M25.512 LEFT SHOULDER PAIN, UNSPECIFIED CHRONICITY: Primary | ICD-10-CM

## 2024-02-06 PROCEDURE — 97110 THERAPEUTIC EXERCISES: CPT | Performed by: PHYSICAL THERAPIST

## 2024-02-06 PROCEDURE — 97140 MANUAL THERAPY 1/> REGIONS: CPT | Performed by: PHYSICAL THERAPIST

## 2024-02-06 RX ORDER — HYDROCODONE BITARTRATE AND ACETAMINOPHEN 7.5; 325 MG/1; MG/1
1 TABLET ORAL EVERY 4 HOURS PRN
Qty: 42 TABLET | Refills: 0 | Status: SHIPPED | OUTPATIENT
Start: 2024-02-06

## 2024-02-06 NOTE — PROGRESS NOTES
Physical Therapy Daily Treatment Note      Patient: Bebe Steward   : 1980  Referring practitioner: Tomy Dobson MD  Date of Initial Visit: Type: THERAPY  Noted: 2024  Today's Date: 2024  Patient seen for 4 sessions           Subjective   Bebe Steward reports: left shoulder pain/stiffness 6/10      Objective   See Exercise, Manual, and Modality Logs for complete treatment.       Assessment & Plan       Assessment  Assessment details: Patient still having pain with left shoulder mobility, but feels a little better after stretches    Visit Diagnoses:    ICD-10-CM ICD-9-CM   1. Left shoulder pain, unspecified chronicity  M25.512 719.41   2. 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  M89.512 733.99       Progress per Plan of Care           Timed:  Manual Therapy:    8     mins  81427;  Therapeutic Exercise:    18     mins  07130;     Neuromuscular Kaiden:        mins  15535;    Therapeutic Activity:          mins  13959;     Gait Training:           mins  89504;     Ultrasound:          mins  78121;    Electrical Stimulation:         mins  58910 ( );    Untimed:  Electrical Stimulation:         mins  12229 ( );  Mechanical Traction:         mins  19031;     Timed Treatment:   26   mins   Total Treatment:     26   mins  Kimberly Wright PT    Electronically singed 2024      [place] PT license: [id number]  Physical Therapist

## 2024-02-07 ENCOUNTER — TELEPHONE (OUTPATIENT)
Dept: GENETICS | Facility: HOSPITAL | Age: 44
End: 2024-02-07
Payer: MEDICARE

## 2024-02-07 NOTE — TELEPHONE ENCOUNTER
Called pt to remind them of their upcoming genetic counseling appt. Reviewed family history with patient.

## 2024-02-08 ENCOUNTER — TELEPHONE (OUTPATIENT)
Dept: PHYSICAL THERAPY | Facility: OTHER | Age: 44
End: 2024-02-08
Payer: MEDICARE

## 2024-02-08 NOTE — TELEPHONE ENCOUNTER
Caller: Bebe Steward    Relationship: Self    What was the call regarding: PATIENT CANCELLED APPT TODAY DUE TO NOT FEELING WELL

## 2024-02-12 ENCOUNTER — CLINICAL SUPPORT (OUTPATIENT)
Dept: GENETICS | Facility: HOSPITAL | Age: 44
End: 2024-02-12
Payer: MEDICARE

## 2024-02-12 DIAGNOSIS — Z86.010 HISTORY OF ADENOMATOUS POLYP OF COLON: ICD-10-CM

## 2024-02-12 DIAGNOSIS — Z80.0 FAMILY HISTORY OF COLON CANCER: Primary | ICD-10-CM

## 2024-02-13 ENCOUNTER — TREATMENT (OUTPATIENT)
Dept: PHYSICAL THERAPY | Facility: CLINIC | Age: 44
End: 2024-02-13
Payer: MEDICARE

## 2024-02-13 ENCOUNTER — TELEPHONE (OUTPATIENT)
Dept: ORTHOPEDIC SURGERY | Facility: CLINIC | Age: 44
End: 2024-02-13
Payer: MEDICARE

## 2024-02-13 DIAGNOSIS — M89.512 OSTEOLYSIS OF ACROMIAL END OF LEFT CLAVICLE: ICD-10-CM

## 2024-02-13 DIAGNOSIS — Z47.89 AFTERCARE FOLLOWING SURGERY OF THE MUSCULOSKELETAL SYSTEM: ICD-10-CM

## 2024-02-13 DIAGNOSIS — M25.512 LEFT SHOULDER PAIN, UNSPECIFIED CHRONICITY: Primary | ICD-10-CM

## 2024-02-13 PROCEDURE — 97110 THERAPEUTIC EXERCISES: CPT | Performed by: PHYSICAL THERAPIST

## 2024-02-13 PROCEDURE — 97530 THERAPEUTIC ACTIVITIES: CPT | Performed by: PHYSICAL THERAPIST

## 2024-02-13 RX ORDER — HYDROCODONE BITARTRATE AND ACETAMINOPHEN 7.5; 325 MG/1; MG/1
1 TABLET ORAL EVERY 4 HOURS PRN
Qty: 42 TABLET | Refills: 0 | Status: SHIPPED | OUTPATIENT
Start: 2024-02-13

## 2024-02-15 ENCOUNTER — TREATMENT (OUTPATIENT)
Dept: PHYSICAL THERAPY | Facility: CLINIC | Age: 44
End: 2024-02-15
Payer: MEDICARE

## 2024-02-15 DIAGNOSIS — M89.512 OSTEOLYSIS OF ACROMIAL END OF LEFT CLAVICLE: ICD-10-CM

## 2024-02-15 DIAGNOSIS — M25.512 LEFT SHOULDER PAIN, UNSPECIFIED CHRONICITY: Primary | ICD-10-CM

## 2024-02-20 ENCOUNTER — TELEPHONE (OUTPATIENT)
Dept: ORTHOPEDIC SURGERY | Facility: CLINIC | Age: 44
End: 2024-02-20
Payer: MEDICARE

## 2024-02-20 ENCOUNTER — TREATMENT (OUTPATIENT)
Dept: PHYSICAL THERAPY | Facility: CLINIC | Age: 44
End: 2024-02-20
Payer: MEDICARE

## 2024-02-20 DIAGNOSIS — M25.512 LEFT SHOULDER PAIN, UNSPECIFIED CHRONICITY: Primary | ICD-10-CM

## 2024-02-20 DIAGNOSIS — M89.512 OSTEOLYSIS OF ACROMIAL END OF LEFT CLAVICLE: ICD-10-CM

## 2024-02-20 DIAGNOSIS — Z47.89 AFTERCARE FOLLOWING SURGERY OF THE MUSCULOSKELETAL SYSTEM: ICD-10-CM

## 2024-02-20 PROCEDURE — 97530 THERAPEUTIC ACTIVITIES: CPT | Performed by: PHYSICAL THERAPIST

## 2024-02-20 PROCEDURE — 97110 THERAPEUTIC EXERCISES: CPT | Performed by: PHYSICAL THERAPIST

## 2024-02-20 RX ORDER — HYDROCODONE BITARTRATE AND ACETAMINOPHEN 7.5; 325 MG/1; MG/1
1 TABLET ORAL EVERY 6 HOURS PRN
Qty: 28 TABLET | Refills: 0 | Status: SHIPPED | OUTPATIENT
Start: 2024-02-20 | End: 2024-02-26 | Stop reason: SDUPTHER

## 2024-02-20 NOTE — PROGRESS NOTES
Physical Therapy Daily Treatment Note      Patient: Bebe Steward   : 1980  Referring practitioner: Tomy Dobson MD  Date of Initial Visit: Type: THERAPY  Noted: 2024  Today's Date: 2024  Patient seen for 7 sessions           Subjective   Bebe Steward reports: left shoulder pain 5/10      Objective   See Exercise, Manual, and Modality Logs for complete treatment.       Assessment & Plan       Assessment  Assessment details: Patient still demonstrating left shoulder weakness and having discomfort with strengthening today.     Visit Diagnoses:    ICD-10-CM ICD-9-CM   1. Left shoulder pain, unspecified chronicity  M25.512 719.41   2. 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  M89.512 733.99       Progress per Plan of Care and Progress strengthening /stabilization /functional activity           Timed:  Manual Therapy:         mins  06208;  Therapeutic Exercise:    28     mins  55449;     Neuromuscular Kaiden:        mins  31183;    Therapeutic Activity:     10     mins  48896;     Gait Training:           mins  94681;     Ultrasound:          mins  83402;    Electrical Stimulation:         mins  86535 ( );    Untimed:  Electrical Stimulation:         mins  51946 ( );  Mechanical Traction:         mins  43961;     Timed Treatment:   38   mins   Total Treatment:     38   mins  Kimberly Wright PT    Electronically singed 2024      [place] PT license: [id number]  Physical Therapist

## 2024-02-26 ENCOUNTER — OFFICE VISIT (OUTPATIENT)
Dept: ORTHOPEDIC SURGERY | Facility: CLINIC | Age: 44
End: 2024-02-26
Payer: MEDICARE

## 2024-02-26 VITALS
OXYGEN SATURATION: 96 % | HEIGHT: 64 IN | SYSTOLIC BLOOD PRESSURE: 166 MMHG | BODY MASS INDEX: 45.17 KG/M2 | DIASTOLIC BLOOD PRESSURE: 92 MMHG | HEART RATE: 92 BPM | WEIGHT: 264.55 LBS

## 2024-02-26 DIAGNOSIS — Z47.89 AFTERCARE FOLLOWING SURGERY OF THE MUSCULOSKELETAL SYSTEM: Primary | ICD-10-CM

## 2024-02-26 DIAGNOSIS — Z47.89 AFTERCARE FOLLOWING SURGERY OF THE MUSCULOSKELETAL SYSTEM: ICD-10-CM

## 2024-02-26 DIAGNOSIS — M89.512 OSTEOLYSIS OF ACROMIAL END OF LEFT CLAVICLE: ICD-10-CM

## 2024-02-26 PROCEDURE — 3080F DIAST BP >= 90 MM HG: CPT | Performed by: PHYSICIAN ASSISTANT

## 2024-02-26 PROCEDURE — 99024 POSTOP FOLLOW-UP VISIT: CPT | Performed by: PHYSICIAN ASSISTANT

## 2024-02-26 PROCEDURE — 1159F MED LIST DOCD IN RCRD: CPT | Performed by: PHYSICIAN ASSISTANT

## 2024-02-26 PROCEDURE — 3077F SYST BP >= 140 MM HG: CPT | Performed by: PHYSICIAN ASSISTANT

## 2024-02-26 PROCEDURE — 1160F RVW MEDS BY RX/DR IN RCRD: CPT | Performed by: PHYSICIAN ASSISTANT

## 2024-02-26 RX ORDER — HYDROCODONE BITARTRATE AND ACETAMINOPHEN 7.5; 325 MG/1; MG/1
1 TABLET ORAL EVERY 6 HOURS PRN
Qty: 28 TABLET | Refills: 0 | Status: SHIPPED | OUTPATIENT
Start: 2024-02-26 | End: 2024-03-04 | Stop reason: SDUPTHER

## 2024-02-26 NOTE — PROGRESS NOTES
"Chief Complaint  Pain and Follow-up of the Left Shoulder    Subjective          History of Present Illness      Bebe Steward is a 43 y.o. female  presents to [facility] for     Patient presents for follow-up evaluation of left shoulder arthroscopic distal clavicle resection, rotator cuff debridement subacromial decompression bicep tenotomy chondroplasty 1/15/2024.  She states her shoulder is \"sore \".  She states she had an episode last week where therapy had her raise her arm above her head and she had a 3 pound weight and this caused a sharp pain to the anterior shoulder that radiates to her bicep.  She states she is taking pain medication daily for this.  She states she has pain with range of motion.  She states the incisions have healed well.  She is requesting a refill of pain meds.  She is also taking muscle relaxer from pain management she cannot take NSAIDs.      Allergies   Allergen Reactions    Adhesive Tape Itching and Rash    Cefaclor Rash    Cephalexin Rash    Cyclobenzaprine Other (See Comments)     Muscle spasms    Erythromycin Rash    Meperidine Nausea And Vomiting and Headache    Monosodium Glutamate Other (See Comments)     Blood pressure drops, pass out     Morphine Mental Status Change and Irritability    Nsaids Unknown - High Severity     CKD     Penicillins Anaphylaxis and Rash     1. When was your reaction? < 10 years ago    2. Did your reaction happen after the first dose? Do not know    3. Did your reaction happen after several doses? Do not know    4. Did your reaction require ED or hospital care to manage your reaction? Do not know    5. Did your reaction require treatment with epinephrine? Do not know    6. Have you taken amoxicillin (Amoxil) or amoxicillin-clavulanate (Augmentin) without issue since? no    7. Have you taken cephalexin (Keflex) without issue since?  No    Sulfa Antibiotics Rash    Sulfamethoxazole-Trimethoprim Rash    Vancomycin Itching    " "    Social History     Socioeconomic History    Marital status:     Number of children: 3   Tobacco Use    Smoking status: Former     Packs/day: 1.00     Years: 15.00     Additional pack years: 0.00     Total pack years: 15.00     Types: Cigarettes     Start date: 1/1/1997     Quit date: 1/1/2021     Years since quitting: 3.1     Passive exposure: Current    Smokeless tobacco: Never   Vaping Use    Vaping Use: Never used   Substance and Sexual Activity    Alcohol use: Yes     Comment: Rarely. 1-2 times yearly    Drug use: Never    Sexual activity: Defer     Partners: Male     Birth control/protection: Abstinence, Hysterectomy     Comment: Chemical menopause. Hysterectomy scheduled for 4/6/23        REVIEW OF SYSTEMS    Constitutional: Awake alert and oriented x3, no acute distress, denies fevers, chills, weight loss  Respiratory: No respiratory distress  Vascular: Brisk cap refill, Intact distal pulses, No cyanosis, compartments soft with no signs or symptoms of compartment syndrome or DVT.   Cardiovascular: Denies chest pain, shortness of breath  Skin: Denies rashes, acute skin changes  Neurologic: Denies headache, loss of consciousness  MSK: Left shoulder pain      Objective   Vital Signs:   /92   Pulse 92   Ht 162.6 cm (64\")   Wt 120 kg (264 lb 8.8 oz)   SpO2 96%   BMI 45.41 kg/m²     Body mass index is 45.41 kg/m².    Physical Exam       Left shoulder: Incisions are well-healed, no erythema, no drainage or dehiscence, no signs of infection, active forward elevation 130 with pain active abduction 90 with pain external rotation with abduction 70 with pain, internal rotation to her side      Procedures    Imaging Results (Most Recent)       None             Result Review :   The following data was reviewed by: CADY Ferrer on 02/26/2024:               Assessment and Plan    Diagnoses and all orders for this visit:    1. Aftercare following surgery of LEFT SHOULDER ARTHROSCOPY " DISTAL CLAVICLE RESECTION, ROTATOR CUFF REPAIR DEBRIDEMENT, SUBCROMIAL DECOMPRESSION, biceps tenotomy, chondroplasty 1/15/24 (Primary)        Discussed diagnosis and treatment options with the patient she was advised we could order MRI of the left shoulder for further evaluation she would like to hold off on this follow-up in 6 weeks for recheck may consider MRI if no improvement.  She would like to continue therapy new order was written, she was given pain medication refill.  Follow-up in 6 weeks    Call or return if worsening symptoms.    Follow Up   Return in about 6 weeks (around 4/8/2024) for Recheck.  Patient was given instructions and counseling regarding her condition or for health maintenance advice. Please see specific information pulled into the AVS if appropriate.       EMR Dragon/Transcription disclaimer:  Much of this encounter note is an electronic transcription/translation of spoken language to printed text, aka voice recognition.  The electronic translation of spoken language may permit erroneous or at times nonsensical words or phrases to be inadvertently transcribed; although I have reviewed the note for such errors, some may still exist so please interpret based on surrounding text content.

## 2024-02-27 ENCOUNTER — DOCUMENTATION (OUTPATIENT)
Dept: GENETICS | Facility: HOSPITAL | Age: 44
End: 2024-02-27
Payer: MEDICARE

## 2024-02-27 ENCOUNTER — TELEPHONE (OUTPATIENT)
Dept: GENETICS | Facility: HOSPITAL | Age: 44
End: 2024-02-27
Payer: MEDICARE

## 2024-02-27 RX ORDER — HYDRALAZINE HYDROCHLORIDE 50 MG/1
50 TABLET, FILM COATED ORAL 3 TIMES DAILY
Qty: 90 TABLET | Refills: 1 | Status: SHIPPED | OUTPATIENT
Start: 2024-02-27

## 2024-02-27 NOTE — PROGRESS NOTES
Bebe Steward, a 43-year-old female, was referred for genetic counseling due to a personal history of polyps and a family history of cancer. Genetic counseling was provided via telehealth with the patient onsite at [facility].  Ms. Steward had a colonoscopy at age 43, and was found to have two tubular adenoma type polyps. Ms. Steward was interested in discussing her risk for a hereditary cancer syndrome and genetic testing options. Ms. Steward opted to pursue testing via the Common Hereditary Cancer Panel through [facility] that evaluates 48 genes associated with hereditary cancer risk. Genetic testing was negative by sequencing and rearrangement testing for pathogenic germline mutations in the Lopez syndrome genes and 43 additional genes included on the [facility] Common Hereditary Cancer panel (see attached results). These normal results were discussed with Ms. Steward by telephone on 2/27/24.     FAMILY HISTORY (see attached pedigree):    Pat. Aunt:  Colon cancer, 50  Pat. Grandmother: Colon cancer, 87  Mat. Grandfather: Melanoma, 50s  Mat. Great-Uncle: Colon cancer    Medical records regarding family history were not available for review.     RISK ASSESSMENT: Ms. Steward's family history led to concern regarding a hereditary cancer syndrome. We discussed Lopez syndrome due to her history of tubular adenomas in combination with the family history of colon polyps and colon cancer. We discussed multigene panel testing which would evaluate the Lopez syndrome genes as well as 43 additional genes associated with hereditary cancer risk.  This assessment is based on the family history information provided at the time of the appointment and could change in the future should new information be obtained.    GENETIC COUNSELING:  We reviewed the family history information in detail. Cases of cancer follow three general patterns: sporadic, familial, and hereditary.  While most cancer is sporadic, some cases appear to occur in  family clusters.  These cases are said to be familial and account for 10-20% of cancer cases.  Familial cases may be due to a combination of shared genes and environmental factors among family members.  In even fewer cases (5-10%), the risk for cancer is inherited, and the genes responsible for the increased cancer risk are known.  Family histories typical of hereditary cancer syndromes usually include multiple first- and second-degree relatives diagnosed with cancer types that define a syndrome. These cases tend to be diagnosed at younger-than-expected ages and can be bilateral or multifocal. The cancer in these families follows an autosomal dominant inheritance pattern, which indicates the likely presence of a mutation in a cancer susceptibility gene. Children and siblings of an individual believed to carry this mutation have a 50% chance of inheriting that mutation, thereby inheriting the increased risk to develop cancer. These mutations can be passed down from the maternal or the paternal lineage.    We reviewed Lopez syndrome given Ms. Steward's family history of colon cancer and colon polyps. Lopez syndrome is the most common form of hereditary colon cancer, caused by a mutation in one of the mismatch repair genes.  The lifetime risk for colon cancer for individuals with Lopez syndrome is as high as 80% if there is no intervention (i.e. removal of polyps detected on colonoscopy).  Other risks include endometrial cancer (up to 60%) in women, as well as other cancers (ovarian, upper GI, brain, stomach, pancreatic). There are national management guidelines that have been established for individuals known to have Lopez syndrome.  Screening colonoscopy may be recommended as early as age 20 and repeating every 1-2 years.  The specific risks and screening recommendations vary based on which gene is found to have a mutation.   We discussed there are other genes associated with increased risk for colon cancer, colon  polyps, and other cancers, and the availability of multigene panel testing to evaluate multiple genes simultaneously.     GENETIC TESTING:  The risks, benefits and limitations of genetic testing and implications for clinical management following testing were reviewed. DNA test results can influence decisions regarding screening and prevention.  Genetic testing can have significant psychological implications for both individuals and families. Also discussed was the possibility of employment and insurance discrimination based on genetic test results and the federal law that is in place to prevent this (GRACIA), as well as the limitations of this law.         We discussed multigene panel testing, which would involve testing multiple genes associated with hereditary cancer risk. The benefits and limitations of genetic testing were discussed. The implications of a positive or negative test result were discussed. We discussed the possibility that, in some cases, genetic test results may be ambiguous due to the identification of a genetic variant. These variants may or may not be associated with an increased cancer risk. With multigene panel testing, it is not uncommon for a variant of uncertain significance (VUS) to be identified.  If a VUS is identified or if testing is negative, screening recommendations continue to be made based on the family history. The laboratories that perform genetic testing work to reclassify the VUS and send out an amended report if and when a VUS is reclassified.  The majority of variant findings are ultimately reclassified as benign variation, or a negative result. Given the family history, a negative test result does not eliminate all cancer risk, although the risk would not be as high as it would with positive genetic testing.     RESULTS:  Genetic testing was negative for pathogenic germline mutations in the 48 genes on the [facility] Common Hereditary Cancer Panel by sequencing and  deletion/duplication testing.  This negative result greatly lowers, but does not eliminate, the possibility of a hereditary cancer syndrome for Ms. Steward.  There is the possibility of genes that have yet to be described that contribute to risk for colon polyps and colon cancer, as well as the possibility of a combination of genetic and environmental factors contributing to risk within the family.  Ms. Steward may still be at an increased risk based on personal and family history.     PLAN: Ms. Steward should continue to follow her physician's recommendations regarding frequency of screening colonoscopy and other cancer screenings based on personal and family history.  Genetic counseling remains available to Ms. Steward, and she is welcome to contact our office at [phone] with any questions he may have.     Debi Dill MS, [facility] [place], [facility]  Licensed Certified Genetic Counselor     Cc: Bebe George MD

## 2024-02-27 NOTE — TELEPHONE ENCOUNTER
Spoke with patient and disclosed negative genetic results. Informed patient these results would be on [facility] and sent to her Dr. Patient requested a copy be mailed to her.

## 2024-02-28 RX ORDER — RIZATRIPTAN BENZOATE 10 MG/1
10 TABLET ORAL
Qty: 9 TABLET | Refills: 0 | Status: SHIPPED | OUTPATIENT
Start: 2024-02-28

## 2024-03-01 ENCOUNTER — TELEPHONE (OUTPATIENT)
Dept: ORTHOPEDIC SURGERY | Facility: CLINIC | Age: 44
End: 2024-03-01
Payer: MEDICARE

## 2024-03-01 NOTE — TELEPHONE ENCOUNTER
PATIENT STATES SHE HAS BEEN EXPERIENCING A LOT OF INCREASED PAIN SINCE PT VISIT ON TUESDAY 02/20/24. SHE STATES DURING AN EXERCISE SHE FELT EXTREME SHOOTING PAIN DOWN HER ARM AND FEELS THE SHOULDER HAS NOT BEEN THE SAME SINCE. SHE SAW S[facility]NEY ON MONDAY 02/26/24 AND WAS ADVISED TO RETURN IN 6 WEEKS TO EVALUATE SYMPTOMS PRIOR TO ORDERING REPEAT MRI. SHE STATES HER PAIN IS GETTING WORSE AND THE PAIN MEDICATION IS NO LONGER HELPING IT. SHE WOULD LIKE TO KNOW WHAT DR. GILLETTE OR [facility] ADVISE FOR HER TO DO.   SHE CANNOT TAKE NSAIDS DUE TO KIDNEY DISEASE.

## 2024-03-01 NOTE — TELEPHONE ENCOUNTER
Called and spoke with patient and informed her that per Dr. Dobson we are unable to give her anything stronger for pain medicine. Advised patient to continue pain medicine and to ice and rest arm. Will follow up with him next Thursday per Dr. Dobson. Patient expressed understanding

## 2024-03-04 DIAGNOSIS — M89.512 OSTEOLYSIS OF ACROMIAL END OF LEFT CLAVICLE: ICD-10-CM

## 2024-03-04 DIAGNOSIS — Z47.89 AFTERCARE FOLLOWING SURGERY OF THE MUSCULOSKELETAL SYSTEM: ICD-10-CM

## 2024-03-04 RX ORDER — HYDROCODONE BITARTRATE AND ACETAMINOPHEN 7.5; 325 MG/1; MG/1
1 TABLET ORAL EVERY 6 HOURS PRN
Qty: 28 TABLET | Refills: 0 | Status: SHIPPED | OUTPATIENT
Start: 2024-03-04

## 2024-03-04 NOTE — TELEPHONE ENCOUNTER
Patient is requesting a refill on pain medicine. She is requesting this to be sent to [facility].

## 2024-03-07 ENCOUNTER — OFFICE VISIT (OUTPATIENT)
Dept: ORTHOPEDIC SURGERY | Facility: CLINIC | Age: 44
End: 2024-03-07
Payer: MEDICARE

## 2024-03-07 VITALS
DIASTOLIC BLOOD PRESSURE: 115 MMHG | HEART RATE: 91 BPM | SYSTOLIC BLOOD PRESSURE: 171 MMHG | WEIGHT: 264 LBS | HEIGHT: 64 IN | OXYGEN SATURATION: 98 % | BODY MASS INDEX: 45.07 KG/M2

## 2024-03-07 DIAGNOSIS — Z47.89 AFTERCARE FOLLOWING SURGERY OF THE MUSCULOSKELETAL SYSTEM: ICD-10-CM

## 2024-03-07 DIAGNOSIS — M25.512 LEFT SHOULDER PAIN, UNSPECIFIED CHRONICITY: Primary | ICD-10-CM

## 2024-03-07 DIAGNOSIS — S49.92XA INJURY OF LEFT SHOULDER, INITIAL ENCOUNTER: ICD-10-CM

## 2024-03-07 PROCEDURE — 99024 POSTOP FOLLOW-UP VISIT: CPT | Performed by: ORTHOPAEDIC SURGERY

## 2024-03-07 NOTE — PROGRESS NOTES
Chief Complaint  Follow-up and Pain of the Left Shoulder     Subjective      Bebe Steward presents to [facility] for follow up evaluation of the left shoulder. The patient is S/P left shoulder arthroscopic distal clavicle resection, rotator cuff debridement subacromial decompression bicep tenotomy chondroplasty 1/15/2024. She was attending physical therapy and reports she was doing weights and had a sharp pain in her shoulder. She reports her pain has progressed since then. She has not been back to therapy.     Allergies   Allergen Reactions    Adhesive Tape Itching and Rash    Cefaclor Rash    Cephalexin Rash    Cyclobenzaprine Other (See Comments)     Muscle spasms    Erythromycin Rash    Meperidine Nausea And Vomiting and Headache    Monosodium Glutamate Other (See Comments)     Blood pressure drops, pass out     Morphine Mental Status Change and Irritability    Nsaids Unknown - High Severity     CKD     Penicillins Anaphylaxis and Rash     1. When was your reaction? < 10 years ago    2. Did your reaction happen after the first dose? Do not know    3. Did your reaction happen after several doses? Do not know    4. Did your reaction require ED or hospital care to manage your reaction? Do not know    5. Did your reaction require treatment with epinephrine? Do not know    6. Have you taken amoxicillin (Amoxil) or amoxicillin-clavulanate (Augmentin) without issue since? no    7. Have you taken cephalexin (Keflex) without issue since?  No    Sulfa Antibiotics Rash    Sulfamethoxazole-Trimethoprim Rash    Vancomycin Itching        Social History     Socioeconomic History    Marital status:     Number of children: 3   Tobacco Use    Smoking status: Former     Current packs/day: 0.00     Average packs/day: 1 pack/day for 24.0 years (24.0 ttl pk-yrs)     Types: Cigarettes     Start date: 1/1/1997     Quit date: 1/1/2021     Years since quitting: 3.1     Passive exposure: Current     "Smokeless tobacco: Never   Vaping Use    Vaping status: Never Used   Substance and Sexual Activity    Alcohol use: Yes     Comment: Rarely. 1-2 times yearly    Drug use: Never    Sexual activity: Defer     Partners: Male     Birth control/protection: Abstinence, Hysterectomy     Comment: Chemical menopause. Hysterectomy scheduled for 4/6/23        I reviewed the patient's chief complaint, history of present illness, review of systems, past medical history, surgical history, family history, social history, medications, and allergy list.     Review of Systems     Constitutional: Denies fevers, chills, weight loss  Cardiovascular: Denies chest pain, shortness of breath  Skin: Denies rashes, acute skin changes  Neurologic: Denies headache, loss of consciousness  MSK: Left shoulder pain      Vital Signs:   BP (!) 171/115 Comment: advised to follow up with PCP  Pulse 91   Ht 162.6 cm (64\")   Wt 120 kg (264 lb)   SpO2 98%   BMI 45.32 kg/m²          Physical Exam  General: Alert. No acute distress    Ortho Exam      Left shoulder- Forward elevation 80. Abduction 80. External Rotation 45. Internal rotation 35. Sensation to light touch median, radial, ulnar nerve. Positive AIN, PIN, ulnar nerve motor function. Positive pulses. 4/5 supraspinatus strength 4+ infraspinatus and subscapularis. Positive impingement signs. Well healed scope scars/     Procedures    X-Ray Report:  Left scapula X-Ray  Indication: Evaluation of left shoulder pain  AP/Lateral view(s)  Findings: no acute fracture. Postoperative changes of distal clavicle resection.   Prior studies available for comparison: yes       Imaging Results (Most Recent)       Procedure Component Value Units Date/Time    XR Scapula Left [[id number]] Resulted: 03/07/24 1040     Updated: 03/07/24 1041             Result Review :       Home Sleep Study    Result Date: 2/29/2024  Narrative: Images from the original result were not included. Home Sleep Test Report Bebe Steward " 1980 [id number] 02/29/24 17:04 EST Interpreting Physician: Amalia Little MD Referring Physician:  Amalia Little MD Primary Care Physician: Kimberly Almaguer MD Clinical Information: Patient is a 43 y.o. female that was seen in consultation at the sleep center on 1/24/2024 The chief complaint was difficulty falling asleep and staying asleep  Patient complains of daytime fatigue and sleepiness despite the normal [place] Sleepiness Scale (ESS) of 4. Patient complains of morning headache and waking up with sore dry mouth, difficulty falling asleep and staying asleep with overall restless sleep and frequent arousals She is on Ambien and she has been on it for several months, and it lost its effect and she does have sleep walking while on it Tried lunesta and it did not work She did do better on the past on Restoril but she is not on it Her weight is down by 40 pounds recently but no significant improvement on the above symptoms Denies any symptoms of restless leg syndrome. Patient denies any cataplexy, sleep paralysis or other symptoms to suggest narcolepsy. Patient denies any parasomnias. Denies any history of seizure disorder or recent head trauma. Patient has an adequate sleep hygiene with variable wake up time Bedtime is around 10 PM wake up time is variable patient feels like she gets 3 to 4 hours of uninterrupted sleep in between and wake up feeling tired Caffeine intake is usually 1-2 caffeinated beverages per day no active smoking alcohol or illicit substance abuse she is a former smoker quit at the age of 42 Comorbidities include: Mid migraine, arthritis, hypertension, anxiety, insomnia [place] Sleepiness Scale:  4 Methods: Study performed based on the standardized protocol. Respiratory Disturbance Events:   Home sleep study 2/23/2024 Reported weight on the night of study was 120 kg with a BMI of 45.4 Total recording time was 180.5-minute with a monitoring time of 122 minutes Respiratory summary was  negative for sleep apnea with overall AHI of 0.0 Patient within the supine position throughout most of the sleep No evidence of hypoxemia with a gayle of 93% Positive primary snoring with percentage of snoring at 20.9% Heart rate was within normal range throughout Impression:  Nondiagnostic home sleep study with negative evidence of sleep apnea during the 2 hours of recording time Primary snoring Plan:  Will follow-up with the patient to discuss the result of the home sleep study, if patient was unable to sleep during the time then this complexity is adequate to rule out sleep apnea and may need to consider further evaluation with in lab polysomnography On the other hand the patient was able to sleep during those 2 hours, the pattern was inconsistent with obstructive sleep apnea and showing only mild snoring and patient will need to be treated pharmacologically for psychophysiological insomnia Recommendation on the follow-up visit            Assessment and Plan     Diagnoses and all orders for this visit:    1. Left shoulder pain, unspecified chronicity (Primary)  -     XR Scapula Left    2. S/P left shoulder arthroscopic distal clavicle resection, rotator cuff debridement subacromial decompression bicep tenotomy chondroplasty 1/15/2024    3. Injury of left shoulder, initial encounter        Discussed the treatment plan with the patient.  I reviewed the x-rays that were obtained today with the patient. Plan for MRI of the left shoulder to evaluate the rotator cuff. The patient expressed understanding and wished to proceed.     Call or return if worsening symptoms.    Follow Up     MRI results      Patient was given instructions and counseling regarding her condition or for health maintenance advice. Please see specific information pulled into the AVS if appropriate.     Scribed for Tomy Dobson MD by Mima Kaplan.  03/07/24   10:43 EST    I have personally performed the services described in this  document as scribed by the above individual and it is both accurate and complete. Tomy Dobson MD 03/07/24

## 2024-03-11 ENCOUNTER — TELEPHONE (OUTPATIENT)
Dept: ORTHOPEDIC SURGERY | Facility: CLINIC | Age: 44
End: 2024-03-11
Payer: MEDICARE

## 2024-03-11 DIAGNOSIS — M89.512 OSTEOLYSIS OF ACROMIAL END OF LEFT CLAVICLE: ICD-10-CM

## 2024-03-11 DIAGNOSIS — Z47.89 AFTERCARE FOLLOWING SURGERY OF THE MUSCULOSKELETAL SYSTEM: ICD-10-CM

## 2024-03-12 ENCOUNTER — TELEPHONE (OUTPATIENT)
Dept: FAMILY MEDICINE CLINIC | Facility: CLINIC | Age: 44
End: 2024-03-12

## 2024-03-12 ENCOUNTER — OFFICE VISIT (OUTPATIENT)
Dept: FAMILY MEDICINE CLINIC | Facility: CLINIC | Age: 44
End: 2024-03-12
Payer: MEDICARE

## 2024-03-12 ENCOUNTER — TELEPHONE (OUTPATIENT)
Dept: ORTHOPEDIC SURGERY | Facility: CLINIC | Age: 44
End: 2024-03-12
Payer: MEDICARE

## 2024-03-12 VITALS
BODY MASS INDEX: 44.56 KG/M2 | HEIGHT: 64 IN | HEART RATE: 64 BPM | DIASTOLIC BLOOD PRESSURE: 78 MMHG | SYSTOLIC BLOOD PRESSURE: 120 MMHG | TEMPERATURE: 96.9 F | OXYGEN SATURATION: 100 % | WEIGHT: 261 LBS

## 2024-03-12 DIAGNOSIS — F51.01 PRIMARY INSOMNIA: ICD-10-CM

## 2024-03-12 DIAGNOSIS — S49.92XA INJURY OF LEFT SHOULDER, INITIAL ENCOUNTER: Primary | ICD-10-CM

## 2024-03-12 DIAGNOSIS — I10 ESSENTIAL HYPERTENSION: Primary | ICD-10-CM

## 2024-03-12 DIAGNOSIS — E78.2 MIXED HYPERLIPIDEMIA: ICD-10-CM

## 2024-03-12 DIAGNOSIS — Z47.89 AFTERCARE FOLLOWING SURGERY OF THE MUSCULOSKELETAL SYSTEM: ICD-10-CM

## 2024-03-12 DIAGNOSIS — R51.9 NONINTRACTABLE HEADACHE, UNSPECIFIED CHRONICITY PATTERN, UNSPECIFIED HEADACHE TYPE: ICD-10-CM

## 2024-03-12 RX ORDER — RIMEGEPANT SULFATE 75 MG/75MG
1 TABLET, ORALLY DISINTEGRATING ORAL AS NEEDED
Qty: 15 TABLET | Refills: 2 | Status: SHIPPED | OUTPATIENT
Start: 2024-03-12 | End: 2024-04-11

## 2024-03-12 RX ORDER — ONDANSETRON 4 MG/1
TABLET, ORALLY DISINTEGRATING ORAL
Qty: 30 TABLET | Refills: 2 | Status: SHIPPED | OUTPATIENT
Start: 2024-03-12 | End: 2024-04-11

## 2024-03-12 RX ORDER — HYDROCODONE BITARTRATE AND ACETAMINOPHEN 7.5; 325 MG/1; MG/1
1 TABLET ORAL EVERY 6 HOURS PRN
Qty: 28 TABLET | Refills: 0 | Status: SHIPPED | OUTPATIENT
Start: 2024-03-12 | End: 2024-03-18 | Stop reason: SDUPTHER

## 2024-03-12 RX ORDER — ATORVASTATIN CALCIUM 20 MG/1
20 TABLET, FILM COATED ORAL NIGHTLY
Qty: 90 TABLET | Refills: 1 | Status: SHIPPED | OUTPATIENT
Start: 2024-03-12 | End: 2024-06-10

## 2024-03-12 RX ORDER — DIAZEPAM 10 MG/1
TABLET ORAL
Qty: 1 TABLET | Refills: 0 | Status: SHIPPED | OUTPATIENT
Start: 2024-03-12

## 2024-03-12 RX ORDER — ZALEPLON 10 MG/1
10 CAPSULE ORAL NIGHTLY
Qty: 30 CAPSULE | Refills: 2 | Status: SHIPPED | OUTPATIENT
Start: 2024-03-12 | End: 2024-04-11

## 2024-03-12 NOTE — TELEPHONE ENCOUNTER
Phone call placed to [facility] pharmacy to clarify a medication prescribed with voiced appreciation.

## 2024-03-12 NOTE — PROGRESS NOTES
Chief Complaint  Chief Complaint   Patient presents with    Insomnia       HPI:  Bebe Steward presents to [facility]  The patient is a 44-year-old female who presents for evaluation of multiple medical concerns.    Her shoulder is bothering her. She was in physical therapy and was doing exercises with a 3-pound weight when she felt a sharp pain radiate through her shoulder. She was doing good and had her range of motion. She felt like something ripped. She went to see the doctor who wants her to get another MRI. She needs to call them today to try to get that set up to see if she needs revision surgery or not. She is waiting for the MRI to see if she can go back to physical therapy and pain management.    She has been on temazepam 7.5 mg since her last visit. Her insurance company has decided that they do not want to pay for the temazepam. She had to pay out of pocket the first time she got the prescription. She was on Zaleplon 10 mg in the past, which worked well for her. She had a sleep study and was told she does not have sleep apnea. She snored lightly; however, she did not stop breathing.    She was on rizatriptan for her migraines. She gets more migraines around spring and fall because of the weather change and allergy stuff in the air. Her migraines are sinus and stress triggered. Her pharmacy needed approval for Zofran. She is on Zofran.    Procedures     Past History:  Medical History: has a past medical history of Allergies, Anemia, Anxiety (2014), Arthritis, Arthritis of back (2006), Bilateral carpal tunnel syndrome (07/10/2023), Brain concussion (1995), Callus, Cervical disc disorder (2012), Colon polyp (03/23/2023), Difficulty walking, Essential hypertension (07/21/2020), Head injury (1996), HPV (human papilloma virus) infection, BKA, left, Hyperlipidemia, Hypoglycemia, Insomnia (07/21/2020), Left below-knee amputee (07/21/2020), Low back pain (2006), Low back strain,  Lumbosacral disc disease (2006), Migraine, Muscle spasm (01/04/2021), Neck strain, Numbness in right foot, Obesity, 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   Surgical History: has a past surgical history that includes Abdominal surgery (2010); Leg amputation, lower tibia/fibula (Left, 2014); Ankle surgery; Endometrial ablation (2007); Gastric bypass (2001); Knee surgery; Tonsillectomy (1994); Spinal fusion (10/2008); Epidural block injection (2007); Trigger point injection (2021); Ankle fracture surgery (2237-4579); Cholecystectomy (2001); Fracture surgery (2008); Colonoscopy (N/A, 03/23/2023); total laparoscopic hysterectomy (N/A, 04/06/2023); Cystoscopy (N/A, 04/06/2023); Avulsion toenail plate (Right, 06/29/2023); Achilles tendon surgery; and Shoulder arthroscopy w/ rotator cuff repair (Left, 1/15/2024).   Family History: family history includes Alcohol abuse in her maternal grandmother; Anesthesia problems in her father; Anxiety disorder in her maternal grandmother, mother, and son; Arthritis in her father, maternal grandfather, maternal grandmother, mother, paternal grandfather, and paternal grandmother; Broken bones in her maternal grandmother, mother, and paternal grandmother; Cancer in her maternal grandfather, paternal aunt, and paternal grandmother; Colon cancer in her paternal aunt; Colon cancer (age of onset: 87) in her paternal grandmother; Depression in her father, maternal grandmother, and mother; Developmental Disability in her paternal aunt; Diabetes in her maternal grandfather; Early death in her mother; Heart disease in her father, maternal  grandfather, maternal grandmother, and paternal grandfather; Hyperlipidemia in her father, maternal grandfather, maternal grandmother, mother, paternal grandfather, and paternal grandmother; Hypertension in her father, maternal grandfather, maternal grandmother, mother, paternal grandfather, and paternal grandmother; Kidney disease in her father, maternal grandmother, mother, paternal grandfather, and paternal grandmother; Liver disease in her maternal grandmother; Melanoma in her maternal grandfather; Miscarriages / Stillbirths in her maternal grandmother and paternal grandmother; Osteoporosis in her maternal grandmother and paternal grandmother; Other in her father, mother, and paternal grandmother; Rheumatologic disease in her father and paternal grandmother; Stroke in her maternal grandfather and mother; Thyroid disease in her maternal grandmother and mother; Vision loss in her father.   Social History: reports that she quit smoking about 3 years ago. Her smoking use included cigarettes. She started smoking about 27 years ago. She has a 24 pack-year smoking history. She has been exposed to tobacco smoke. She has never used smokeless tobacco. She reports current alcohol use. She reports that she does not use drugs.  Immunization History   Administered Date(s) Administered    COVID-19 (MODERNA) 1st,2nd,3rd Dose Monovalent 12/11/2021, 12/13/2021    COVID-19 (MODERNA) Monovalent Original Booster 11/11/2021, 12/09/2021    Fluzone (or Fluarix & Flulaval for VFC) >6mos 10/05/2021, 12/16/2022, 10/25/2023    Influenza, Unspecified 12/02/2020, 12/16/2022, 12/16/2022         Allergies: Adhesive tape, Cefaclor, Cephalexin, Cyclobenzaprine, Erythromycin, Meperidine, Monosodium glutamate, Morphine, Nsaids, Penicillins, Sulfa antibiotics, Sulfamethoxazole-trimethoprim, and Vancomycin     Medications:  Current Outpatient Medications on File Prior to Visit   Medication Sig Dispense Refill    ammonium lactate (AmLactin) 12 %  lotion Apply  topically to the appropriate area as directed 2 (Two) Times a Day. 225 g 11    docusate sodium (COLACE) 100 MG capsule TAKE ONE CAPSULE BY MOUTH TWICE A DAY AS NEEDED FOR CONSTIPATION 60 capsule 5    estradiol (Estrace) 1 MG tablet Take 1 tablet by mouth Daily. (Patient taking differently: Take 1 tablet by mouth Every Night.) 30 tablet 11    folic acid (FOLVITE) 1 MG tablet Take 1 tablet by mouth Daily for 360 days. 90 tablet 3    furosemide (LASIX) 20 MG tablet Take 1 tablet by mouth Daily As Needed (swelling). 30 tablet 0    gabapentin (NEURONTIN) 600 MG tablet Take 1 tablet by mouth 3 (Three) Times a Day.      hydrALAZINE (APRESOLINE) 50 MG tablet TAKE ONE TABLET BY MOUTH THREE TIMES A DAY 90 tablet 1    hydrOXYzine (ATARAX) 25 MG tablet TAKE ONE TABLET BY MOUTH EVERY 8 HOURS AS NEEDED FOR ANXIETY 90 tablet 1    irbesartan (AVAPRO) 75 MG tablet TAKE ONE TABLET BY MOUTH ONCE NIGHTLY 90 tablet 1    loratadine (CLARITIN) 10 MG tablet Take 1 tablet by mouth Every Night.      sertraline (ZOLOFT) 100 MG tablet TAKE 1 TABLET BY MOUTH DAILY (Patient taking differently: Take 1 tablet by mouth Every Night.) 90 tablet 1    tiZANidine (ZANAFLEX) 4 MG tablet Take 1 tablet by mouth 2 (Two) Times a Day As Needed.      vitamin D (ERGOCALCIFEROL) 1.25 MG (09482 UT) capsule capsule TAKE ONE CAPSULE BY MOUTH ONCE WEEKLY (Patient taking differently: Take 1 capsule by mouth 1 (One) Time Per Week. Last dose 1/3/24) 12 capsule 3    atorvastatin (LIPITOR) 20 MG tablet Take 1 tablet by mouth Every Night for 90 days. 90 tablet 1    ondansetron ODT (ZOFRAN-ODT) 4 MG disintegrating tablet DISSOLVE ONE TABLET BY MOUTH EVERY 8 HOURS AS NEEDED FOR NAUSEA OR VOMITING FOR UP TO 10 DAYS 30 tablet 2     No current facility-administered medications on file prior to visit.        Health Maintenance Due   Topic Date Due    TDAP/TD VACCINES (1 - Tdap) Never done    LIPID PANEL  02/10/2023       Vital Signs:   Vitals:    03/12/24 1017  "  BP: 120/78   Pulse: 64   Temp: 96.9 °F (36.1 °C)   SpO2: 100%   Weight: 118 kg (261 lb)   Height: 162.6 cm (64\")      Body mass index is 44.8 kg/m².     ROS:  Review of Systems   Constitutional:  Negative for fatigue and fever.   HENT:  Negative for congestion, ear pain and sinus pressure.    Respiratory:  Negative for cough, chest tightness and shortness of breath.    Cardiovascular:  Negative for chest pain, palpitations and leg swelling.   Gastrointestinal:  Negative for abdominal pain and diarrhea.   Genitourinary:  Negative for dysuria and frequency.   Neurological:  Negative for speech difficulty, headache and confusion.   Psychiatric/Behavioral:  Negative for agitation and behavioral problems.           Physical Exam  Vitals reviewed.   Constitutional:       Appearance: Normal appearance.   HENT:      Right Ear: Tympanic membrane normal.      Left Ear: Tympanic membrane normal.      Nose: Nose normal.   Eyes:      Extraocular Movements: Extraocular movements intact.      Conjunctiva/sclera: Conjunctivae normal.      Pupils: Pupils are equal, round, and reactive to light.   Cardiovascular:      Rate and Rhythm: Normal rate and regular rhythm.   Pulmonary:      Effort: Pulmonary effort is normal.      Breath sounds: Normal breath sounds.   Abdominal:      General: Bowel sounds are normal.   Musculoskeletal:         General: Normal range of motion.      Cervical back: Normal range of motion.   Skin:     General: Skin is warm and dry.   Neurological:      General: No focal deficit present.      Mental Status: She is alert and oriented to person, place, and time.   Psychiatric:         Mood and Affect: Mood normal.         Behavior: Behavior normal.      Vital Signs  Blood pressure is 120/78 mmHg.    Result Review   Laboratory Studies  Labs from 01/2024 were reviewed with the patient.     Diagnoses and all orders for this visit:    1. Essential hypertension (Primary)    2. Mixed hyperlipidemia    3. Primary " insomnia  -     zaleplon (SONATA) 10 MG capsule; Take 1 capsule by mouth Every Night for 30 days.  Dispense: 30 capsule; Refill: 2    4. Nonintractable headache, unspecified chronicity pattern, unspecified headache type  -     Rimegepant Sulfate (Nurtec) 75 MG tablet dispersible tablet; Take 1 tablet by mouth As Needed (headache) for up to 30 days.  Dispense: 15 tablet; Refill: 2      1. Shoulder pain.  She was advised to take vitamin B12 500 or higher.    2. Hypertension.  Her blood pressure is well controlled. She will continue her current medication regimen.    3. Insomnia.  She was switched from temazepam to zaleplon 10 mg.    4. Migraines.  She was advised rizatriptan is hard on the kidneys. She was provided with samples of Nurtec.    5. Medication refill.  Her atorvastatin and Zofran were refilled.    Follow-up  The patient will follow up in 3 months.          Smoking Cessation:    Bebe Steward  reports that she quit smoking about 3 years ago. Her smoking use included cigarettes. She started smoking about 27 years ago. She has a 24 pack-year smoking history. She has been exposed to tobacco smoke. She has never used smokeless tobacco.     Follow Up   Return in about 3 months (around 6/12/2024).  Patient was given instructions and counseling regarding her condition or for health maintenance advice. Please see specific information pulled into the AVS if appropriate.       Kimberly Almaguer MD  Answers submitted by the patient for this visit:  Other (Submitted on 3/5/2024)  Please describe your symptoms.: Here for blood pressure and sleep med follow up  Have you had these symptoms before?: Yes  How long have you been having these symptoms?: Greater than 2 weeks  Please list any medications you are currently taking for this condition.: Irbesartan 75mg, Hydralazine 50mg  Please describe any probable cause for these symptoms. : Hypertension  Primary Reason for Visit (Submitted on 3/5/2024)  What is the primary  reason for your visit?: Other    Transcribed from ambient dictation for Kimberly Almaguer MD by Kellee Holland.  03/12/24   11:29 EDT    Patient or patient representative verbalized consent to the visit recording.  I have personally performed the services described in this document as transcribed by the above individual, and it is both accurate and complete.

## 2024-03-12 NOTE — TELEPHONE ENCOUNTER
Caller:    SERGIO     Relationship:  [facility]     Best call back number:   [phone]     Who are you requesting to speak with (clinical staff, provider,  specific staff member):   CLINICAL     What was the call regarding:      [facility] IS REQUESTING CLARIFICATION OF INSTRUCTIONS FOR THIS PATIENT'S MEDICATION OF Rimegepant Sulfate (Nurtec) 75 MG tablet dispersible tablet . PLEASE CALL AND ADVISE SO THEY CAN FILL

## 2024-03-12 NOTE — TELEPHONE ENCOUNTER
Provider: NAIN    Caller: FRANCY    Relationship to Patient: SELF    Pharmacy: [facility]    Phone Number: [phone]    Reason for Call: PT CALLING TO REQUEST SOME KIND OF MEDICATION FOR HER TO TAKE RIGHT BEFORE HER MRI DUE TO BEING CLAUSTROPHOBIC

## 2024-03-13 NOTE — TELEPHONE ENCOUNTER
Someone already called pharmacy yesterday and gave clarification of medication and was already picked up

## 2024-03-18 ENCOUNTER — TELEPHONE (OUTPATIENT)
Dept: ORTHOPEDIC SURGERY | Facility: CLINIC | Age: 44
End: 2024-03-18
Payer: MEDICARE

## 2024-03-18 DIAGNOSIS — Z47.89 AFTERCARE FOLLOWING SURGERY OF THE MUSCULOSKELETAL SYSTEM: ICD-10-CM

## 2024-03-18 DIAGNOSIS — M89.512 OSTEOLYSIS OF ACROMIAL END OF LEFT CLAVICLE: ICD-10-CM

## 2024-03-18 RX ORDER — HYDROCODONE BITARTRATE AND ACETAMINOPHEN 7.5; 325 MG/1; MG/1
1 TABLET ORAL EVERY 6 HOURS PRN
Qty: 28 TABLET | Refills: 0 | Status: SHIPPED | OUTPATIENT
Start: 2024-03-18 | End: 2024-03-25 | Stop reason: SDUPTHER

## 2024-03-25 ENCOUNTER — TELEPHONE (OUTPATIENT)
Dept: ORTHOPEDIC SURGERY | Facility: CLINIC | Age: 44
End: 2024-03-25
Payer: MEDICARE

## 2024-03-25 DIAGNOSIS — Z47.89 AFTERCARE FOLLOWING SURGERY OF THE MUSCULOSKELETAL SYSTEM: ICD-10-CM

## 2024-03-25 DIAGNOSIS — M89.512 OSTEOLYSIS OF ACROMIAL END OF LEFT CLAVICLE: ICD-10-CM

## 2024-03-25 RX ORDER — HYDROCODONE BITARTRATE AND ACETAMINOPHEN 7.5; 325 MG/1; MG/1
1 TABLET ORAL EVERY 6 HOURS PRN
Qty: 28 TABLET | Refills: 0 | Status: SHIPPED | OUTPATIENT
Start: 2024-03-25

## 2024-03-25 NOTE — TELEPHONE ENCOUNTER
PATIENT WANTED IT NOTED IN HER CHART THAT THE PAIN SHE IS HAVING IS ALL OVER THE SHOULDER AND NOW INTO THE NECK AND HEAD.  PATIENT WANTED TO LET THE DOCTOR KNOW THAT THE PAIN AND DISCOMFORT WAS SPREADING AND AFFECTING HER ENTIRE ARM, NECK AND HEAD AREA.

## 2024-03-27 ENCOUNTER — HOSPITAL ENCOUNTER (OUTPATIENT)
Dept: MRI IMAGING | Facility: HOSPITAL | Age: 44
Discharge: HOME | End: 2024-03-27
Admitting: ORTHOPAEDIC SURGERY
Payer: MEDICARE

## 2024-03-27 DIAGNOSIS — Z47.89 AFTERCARE FOLLOWING SURGERY OF THE MUSCULOSKELETAL SYSTEM: ICD-10-CM

## 2024-03-27 DIAGNOSIS — S49.92XA INJURY OF LEFT SHOULDER, INITIAL ENCOUNTER: ICD-10-CM

## 2024-03-27 DIAGNOSIS — M25.512 LEFT SHOULDER PAIN, UNSPECIFIED CHRONICITY: ICD-10-CM

## 2024-03-27 PROCEDURE — 73221 MRI JOINT UPR EXTREM W/O DYE: CPT

## 2024-04-01 ENCOUNTER — TELEPHONE (OUTPATIENT)
Dept: ORTHOPEDIC SURGERY | Facility: CLINIC | Age: 44
End: 2024-04-01
Payer: MEDICARE

## 2024-04-01 DIAGNOSIS — Z47.89 AFTERCARE FOLLOWING SURGERY OF THE MUSCULOSKELETAL SYSTEM: ICD-10-CM

## 2024-04-01 DIAGNOSIS — M89.512 OSTEOLYSIS OF ACROMIAL END OF LEFT CLAVICLE: ICD-10-CM

## 2024-04-01 NOTE — TELEPHONE ENCOUNTER
Called and spoke with patient regarding pain medicine. Informed patient that Dr. Dobson was out of the office until Thursday. Advised patient to call her pain management doctor to schedule an appointment for them to continue refills for next week. Patient expressed understanding and know that this wont be sent in until Thursday

## 2024-04-02 RX ORDER — HYDROCODONE BITARTRATE AND ACETAMINOPHEN 7.5; 325 MG/1; MG/1
1 TABLET ORAL EVERY 6 HOURS PRN
Qty: 28 TABLET | Refills: 0 | Status: SHIPPED | OUTPATIENT
Start: 2024-04-02

## 2024-04-04 RX ORDER — LORATADINE 10 MG/1
10 TABLET ORAL DAILY
Qty: 90 TABLET | Refills: 2 | Status: SHIPPED | OUTPATIENT
Start: 2024-04-04

## 2024-04-04 RX ORDER — HYDROXYZINE HYDROCHLORIDE 25 MG/1
TABLET, FILM COATED ORAL
Qty: 90 TABLET | Refills: 1 | Status: SHIPPED | OUTPATIENT
Start: 2024-04-04

## 2024-04-05 ENCOUNTER — PRIOR AUTHORIZATION (OUTPATIENT)
Dept: FAMILY MEDICINE CLINIC | Facility: CLINIC | Age: 44
End: 2024-04-05
Payer: MEDICARE

## 2024-04-05 DIAGNOSIS — R51.9 NONINTRACTABLE HEADACHE, UNSPECIFIED CHRONICITY PATTERN, UNSPECIFIED HEADACHE TYPE: ICD-10-CM

## 2024-04-05 RX ORDER — RIMEGEPANT SULFATE 75 MG/75MG
1 TABLET, ORALLY DISINTEGRATING ORAL AS NEEDED
Qty: 15 TABLET | Refills: 2 | Status: SHIPPED | OUTPATIENT
Start: 2024-04-05 | End: 2024-05-05

## 2024-04-05 NOTE — TELEPHONE ENCOUNTER
Nurtec approval  You or your doctor asked us for NURTEC ODT 75 MG TABLET. We approved this request from   01/05/2024 to 04/05/2025. This approval is for the drug only

## 2024-04-08 ENCOUNTER — OFFICE VISIT (OUTPATIENT)
Dept: OBSTETRICS AND GYNECOLOGY | Facility: CLINIC | Age: 44
End: 2024-04-08
Payer: MEDICARE

## 2024-04-08 VITALS
WEIGHT: 261 LBS | SYSTOLIC BLOOD PRESSURE: 172 MMHG | HEIGHT: 64 IN | BODY MASS INDEX: 44.56 KG/M2 | DIASTOLIC BLOOD PRESSURE: 106 MMHG | HEART RATE: 93 BPM

## 2024-04-08 DIAGNOSIS — N95.1 MENOPAUSAL SYMPTOMS: ICD-10-CM

## 2024-04-08 DIAGNOSIS — Z01.419 WELL WOMAN EXAM: Primary | ICD-10-CM

## 2024-04-08 DIAGNOSIS — F51.01 PRIMARY INSOMNIA: ICD-10-CM

## 2024-04-08 PROBLEM — M79.18 MYOFASCIAL PAIN: Status: ACTIVE | Noted: 2023-08-10

## 2024-04-08 PROCEDURE — 99396 PREV VISIT EST AGE 40-64: CPT | Performed by: OBSTETRICS & GYNECOLOGY

## 2024-04-08 PROCEDURE — 3077F SYST BP >= 140 MM HG: CPT | Performed by: OBSTETRICS & GYNECOLOGY

## 2024-04-08 PROCEDURE — 3080F DIAST BP >= 90 MM HG: CPT | Performed by: OBSTETRICS & GYNECOLOGY

## 2024-04-08 PROCEDURE — G0123 SCREEN CERV/VAG THIN LAYER: HCPCS | Performed by: OBSTETRICS & GYNECOLOGY

## 2024-04-08 RX ORDER — ESTRADIOL 1 MG/1
1 TABLET ORAL NIGHTLY
Qty: 90 TABLET | Refills: 3 | Status: CANCELLED | OUTPATIENT
Start: 2024-04-08

## 2024-04-08 RX ORDER — ESTRADIOL 1 MG/1
1 TABLET ORAL NIGHTLY
Qty: 90 TABLET | Refills: 3 | Status: SHIPPED | OUTPATIENT
Start: 2024-04-08

## 2024-04-08 NOTE — PROGRESS NOTES
"Well Woman Visit    CC: Well woman visit    Subjective:   44 y.o. who presents for a well woman exam.  The patient has no gynecologic complaints today.  History of laparoscopic hysterectomy with BSO due to endometriosis and pelvic pain.  She reports doing very well since her surgery.  She has no pain.  She is currently taking Estrace 1 mg oral nightly for vasomotor symptoms.  These are very well-controlled on the Estrace.  Patient reports she recently had a rotator cuff surgery and a reinjury.  She is following up with her orthopedic surgeon to be reevaluate for possible revision surgery.    Last PAP:   Last Completed Pap Smear       This patient has no relevant Health Maintenance data.          Last MMG:   Last Completed Mammogram       This patient has no relevant Health Maintenance data.             History: PMHx, Meds, Allergies, PSHx, Social Hx, and POBHx all reviewed and updated.    BP (!) 172/106   Pulse 93   Ht 162.6 cm (64\")   Wt 118 kg (261 lb)   LMP 2022   Breastfeeding No   BMI 44.80 kg/m²     Physical Exam  Vitals and nursing note reviewed. Exam conducted with a chaperone present.   Constitutional:       General: She is not in acute distress.     Appearance: Normal appearance. She is not ill-appearing.   HENT:      Head: Normocephalic and atraumatic.      Mouth/Throat:      Mouth: Mucous membranes are moist.      Pharynx: Oropharynx is clear.   Eyes:      Extraocular Movements: Extraocular movements intact.   Neck:      Thyroid: No thyroid mass or thyromegaly.   Chest:   Breasts:     Breasts are symmetrical.      Right: Normal. No swelling, bleeding, inverted nipple, mass, nipple discharge, skin change or tenderness.      Left: Normal. No swelling, bleeding, inverted nipple, mass, nipple discharge, skin change or tenderness.   Abdominal:      General: There is no distension.      Palpations: Abdomen is soft. There is no mass.      Tenderness: There is no abdominal tenderness.      " Hernia: There is no hernia in the left inguinal area or right inguinal area.   Genitourinary:     General: Normal vulva.      Exam position: Lithotomy position.      Pubic Area: No rash.       Labia:         Right: No rash, tenderness, lesion or injury.         Left: No rash, tenderness, lesion or injury.       Urethra: No prolapse, urethral pain or urethral lesion.      Vagina: Normal. No vaginal discharge, erythema, tenderness, bleeding or prolapsed vaginal walls.      Uterus: Absent.       Adnexa:         Right: No mass or tenderness.          Left: No mass or tenderness.        Comments: The uterus and cervix, bilateral fallopian tubes and ovaries are surgically absent  Musculoskeletal:         General: No swelling.      Right lower leg: No edema.      Comments: The left lower extremity is surgically absent and the prosthesis is in place   Lymphadenopathy:      Upper Body:      Right upper body: No supraclavicular or axillary adenopathy.      Left upper body: No supraclavicular or axillary adenopathy.   Skin:     General: Skin is warm and dry.      Findings: No rash.   Neurological:      Mental Status: She is alert and oriented to person, place, and time.   Psychiatric:         Mood and Affect: Mood normal.         Behavior: Behavior normal.         Thought Content: Thought content normal.         Assessment and Plan:  Diagnoses and all orders for this visit:    1. Well woman exam (Primary)  -     IGP,rfx Aptima HPV All Pth    2. Menopausal symptoms  -     estradiol (Estrace) 1 MG tablet; Take 1 tablet by mouth Every Night.  Dispense: 90 tablet; Refill: 3        Counseling:     HRT R/B/A/SE/E all options including non-FDA approved options reviewed    Preventative:   MMG  s/p FLU vaccine this season  s/p COVID vaccine    She understands the importance of having any ordered tests to be performed in a timely fashion.  The risks of not performing them include, but are not limited to, advanced cancer stages, bone  loss from osteoporosis and/or subsequent increase in morbidity and/or mortality.  She is encouraged to review her results online and/or contact or office if she has questions.     Follow Up:  Return for Annual physical.    Yoan Yoo MD  04/08/2024

## 2024-04-09 ENCOUNTER — OFFICE VISIT (OUTPATIENT)
Dept: ORTHOPEDIC SURGERY | Facility: CLINIC | Age: 44
End: 2024-04-09
Payer: MEDICARE

## 2024-04-09 VITALS
DIASTOLIC BLOOD PRESSURE: 97 MMHG | SYSTOLIC BLOOD PRESSURE: 173 MMHG | BODY MASS INDEX: 44.41 KG/M2 | HEIGHT: 64 IN | OXYGEN SATURATION: 95 % | WEIGHT: 260.14 LBS | HEART RATE: 97 BPM

## 2024-04-09 DIAGNOSIS — M89.512 OSTEOLYSIS OF ACROMIAL END OF LEFT CLAVICLE: ICD-10-CM

## 2024-04-09 DIAGNOSIS — Z47.89 AFTERCARE FOLLOWING SURGERY OF THE MUSCULOSKELETAL SYSTEM: Primary | ICD-10-CM

## 2024-04-09 DIAGNOSIS — Z47.89 AFTERCARE FOLLOWING SURGERY OF THE MUSCULOSKELETAL SYSTEM: ICD-10-CM

## 2024-04-09 RX ORDER — HYDROCODONE BITARTRATE AND ACETAMINOPHEN 7.5; 325 MG/1; MG/1
1 TABLET ORAL EVERY 6 HOURS PRN
Qty: 28 TABLET | Refills: 0 | Status: SHIPPED | OUTPATIENT
Start: 2024-04-09

## 2024-04-09 RX ORDER — ZALEPLON 10 MG/1
10 CAPSULE ORAL NIGHTLY
Qty: 30 CAPSULE | Refills: 2 | Status: SHIPPED | OUTPATIENT
Start: 2024-04-09 | End: 2024-05-09

## 2024-04-09 RX ORDER — DOCUSATE SODIUM 100 MG/1
CAPSULE, LIQUID FILLED ORAL
Qty: 60 CAPSULE | Refills: 5 | OUTPATIENT
Start: 2024-04-09

## 2024-04-09 RX ORDER — FUROSEMIDE 20 MG/1
20 TABLET ORAL DAILY PRN
Qty: 30 TABLET | Refills: 0 | Status: SHIPPED | OUTPATIENT
Start: 2024-04-09

## 2024-04-09 NOTE — TELEPHONE ENCOUNTER
Patient requesting Estrace.  Last seen 4/8/24 .  Last filled 4/8/24 Estrace # 90 with 3 refill.  Next appointment 4/10/25.  Called patient informed her Rx already @ pharmacy.

## 2024-04-11 ENCOUNTER — OFFICE VISIT (OUTPATIENT)
Dept: ORTHOPEDIC SURGERY | Facility: CLINIC | Age: 44
End: 2024-04-11
Payer: MEDICARE

## 2024-04-11 VITALS
WEIGHT: 260.14 LBS | HEART RATE: 92 BPM | DIASTOLIC BLOOD PRESSURE: 98 MMHG | BODY MASS INDEX: 44.41 KG/M2 | OXYGEN SATURATION: 95 % | HEIGHT: 64 IN | SYSTOLIC BLOOD PRESSURE: 162 MMHG

## 2024-04-11 DIAGNOSIS — M75.112 INCOMPLETE TEAR OF LEFT ROTATOR CUFF, UNSPECIFIED WHETHER TRAUMATIC: ICD-10-CM

## 2024-04-11 DIAGNOSIS — M25.512 LEFT SHOULDER PAIN, UNSPECIFIED CHRONICITY: Primary | ICD-10-CM

## 2024-04-11 RX ORDER — LIDOCAINE HYDROCHLORIDE 10 MG/ML
5 INJECTION, SOLUTION INFILTRATION; PERINEURAL
Status: COMPLETED | OUTPATIENT
Start: 2024-04-11 | End: 2024-04-11

## 2024-04-11 RX ORDER — TRIAMCINOLONE ACETONIDE 40 MG/ML
40 INJECTION, SUSPENSION INTRA-ARTICULAR; INTRAMUSCULAR
Status: COMPLETED | OUTPATIENT
Start: 2024-04-11 | End: 2024-04-11

## 2024-04-11 RX ADMIN — TRIAMCINOLONE ACETONIDE 40 MG: 40 INJECTION, SUSPENSION INTRA-ARTICULAR; INTRAMUSCULAR at 10:29

## 2024-04-11 RX ADMIN — LIDOCAINE HYDROCHLORIDE 5 ML: 10 INJECTION, SOLUTION INFILTRATION; PERINEURAL at 10:29

## 2024-04-11 NOTE — PROGRESS NOTES
Chief Complaint  Pain and Follow-up of the Left Shoulder    Subjective          History of Present Illness      Bebe Steward is a 44 y.o. female  presents to [facility] for     Patient presents for follow-up evaluation of left shoulder pain left shoulder injury she has a history of left shoulder arthroscopic distal clavicle resection, rotator cuff debridement, subacromial decompression, bicep tenotomy and chondroplasty, 1/15/2024. She saw Dr. Dobson on 3/7/2024 for evaluation due to injury which occurred when she was at therapy she was lifting weights and she had a sharp pain in her shoulder she has had decreased range of motion and increased pain.  Just received a pain medicine refill, she will reestablish with pain management she has tried to keep her shoulder mobile with home exercises but states that her shoulder has been very painful and she is afraid she is getting frozen shoulder.  Patient is here for consultation with Dr. Dobson  Allergies   Allergen Reactions    Adhesive Tape Itching and Rash    Cefaclor Rash    Cephalexin Rash    Cyclobenzaprine Other (See Comments)     Muscle spasms    Erythromycin Rash    Meperidine Nausea And Vomiting and Headache    Monosodium Glutamate Other (See Comments)     Blood pressure drops, pass out     Morphine Mental Status Change and Irritability    Nsaids Unknown - High Severity     CKD     Penicillins Anaphylaxis and Rash     1. When was your reaction? < 10 years ago    2. Did your reaction happen after the first dose? Do not know    3. Did your reaction happen after several doses? Do not know    4. Did your reaction require ED or hospital care to manage your reaction? Do not know    5. Did your reaction require treatment with epinephrine? Do not know    6. Have you taken amoxicillin (Amoxil) or amoxicillin-clavulanate (Augmentin) without issue since? no    7. Have you taken cephalexin (Keflex) without issue since?  No    Sulfa  "Antibiotics Rash    Sulfamethoxazole-Trimethoprim Rash    Vancomycin Itching        Social History     Socioeconomic History    Marital status:     Number of children: 3   Tobacco Use    Smoking status: Former     Current packs/day: 0.00     Average packs/day: 1 pack/day for 24.0 years (24.0 ttl pk-yrs)     Types: Cigarettes     Start date: 1/1/1997     Quit date: 1/1/2021     Years since quitting: 3.2     Passive exposure: Current    Smokeless tobacco: Never   Vaping Use    Vaping status: Never Used   Substance and Sexual Activity    Alcohol use: Yes     Comment: Rarely. 1-2 times yearly    Drug use: Never    Sexual activity: Not Currently     Partners: Male     Birth control/protection: Abstinence, Hysterectomy     Comment: Chemical menopause. Hysterectomy scheduled for 4/6/23        REVIEW OF SYSTEMS    Constitutional: Awake alert and oriented x3, no acute distress, denies fevers, chills, weight loss  Respiratory: No respiratory distress  Vascular: Brisk cap refill, Intact distal pulses, No cyanosis, compartments soft with no signs or symptoms of compartment syndrome or DVT.   Cardiovascular: Denies chest pain, shortness of breath  Skin: Denies rashes, acute skin changes  Neurologic: Denies headache, loss of consciousness  MSK: Left shoulder pain      Objective   Vital Signs:   /98   Pulse 92   Ht 162.6 cm (64\")   Wt 118 kg (260 lb 2.3 oz)   SpO2 95%   BMI 44.65 kg/m²     Body mass index is 44.65 kg/m².    Physical Exam       Left shoulder- Forward elevation 80. Abduction 80. External Rotation 45. Internal rotation 35. Sensation to light touch median, radial, ulnar nerve. Positive AIN, PIN, ulnar nerve motor function. Positive pulses. 4/5 supraspinatus strength 4+ infraspinatus and subscapularis. Positive impingement signs. Well healed scope scars/      Large Joint Arthrocentesis: L subacromial bursa  Date/Time: 4/11/2024 10:29 AM  Consent given by: patient  Site marked: site marked  Timeout: " Immediately prior to procedure a time out was called to verify the correct patient, procedure, equipment, support staff and site/side marked as required   Supporting Documentation  Indications: pain   Procedure Details  Location: shoulder - L subacromial bursa  Preparation: Patient was prepped and draped in the usual sterile fashion  Needle gauge: 21 G.  Approach: lateral  Medications administered: 5 mL lidocaine 1 %; 40 mg triamcinolone acetonide 40 MG/ML  Patient tolerance: patient tolerated the procedure well with no immediate complications (injection scribed for Hugo LAZAR)      Imaging Results (Most Recent)       None         MRI Shoulder Left Without Contrast    Result Date: 3/28/2024  Narrative: MRI SHOULDER LEFT WO CONTRAST-  Date of Exam: 3/27/2024 6:55 PM  Indication: Left Shoulder Pain; M25.512-Pain in left shoulder; Z47.89-Encounter for other orthopedic aftercare; S49.92XA-Unspecified injury of left shoulder and upper arm, initial encounter.  Comparison: 3/7/2024 radiographs  Technique:  Routine multiplanar/multisequence images of the left shoulder were obtained without contrast administration.    Findings: There is severe distal supraspinatus tendinopathy and bursal sided fraying with evidence for low-grade partial-thickness bursal sided tearing as seen on coronal T2 image 10. There is also moderate distal infraspinatus tendinopathy without evidence of discrete tear.  Mild distal subscapularis tendinopathy without evidence of tear. The teres minor is intact. There is mild supraspinatus, infraspinatus, and subscapularis fatty muscle atrophy.  The long head biceps tendon is not visualized, likely sequela of prior tenotomy.  There is no evidence of new displaced glenoid labral tear or paralabral cyst formation. There is relative volume loss of the anterior superior glenoid labrum which may be secondary to prior debridement.  There is moderate glenohumeral joint degenerative changes with full-thickness  articular cartilage loss along the inferior glenoid and humeral head margins.  Evidence for prior distal clavicular resection with small volume fluid at the resection site. No evidence of subacromial spur formation or os acromiale.  There is moderate subacromial-subdeltoid bursal fluid. Small glenohumeral joint effusion.  No evidence of fracture or suspicious osseous lesion. Remaining soft tissues are unremarkable.      Impression: Impression: Low-grade partial-thickness bursal sided tearing of the distal supraspinatus tendon with superimposed severe tendinopathy and bursal sided fraying. Moderate infraspinatus and mild subscapularis tendinopathy without discrete evidence of tear.  Probable surgical changes of prior long head biceps tenotomy, labral debridement, and distal clavicular resection.  Moderate glenohumeral joint degenerative change. Moderate subacromial-subdeltoid bursitis.    Electronically Signed By-Ciro Anderson MD On:3/28/2024 10:00 AM         Result Review :   The following data was reviewed by: CADY Ferrer on 04/11/2024:  Data reviewed : Radiologic studies reviewed by me with the patient Dr. Dobson also reviewed              Assessment and Plan    Diagnoses and all orders for this visit:    1. Left shoulder pain, unspecified chronicity (Primary)    2. Incomplete tear of left rotator cuff, unspecified whether traumatic        Reviewed MRI with the patient Dr. Dobson also reviewed MRI, Dr. Dobson recommended and patient was advised conservative treatment is continued to be recommended based on MRI findings patient having recent surgery.  Patient was advised to continue therapy plan she was given new order to start therapy with [facility] she was given left shoulder steroid injection which she tolerated well follow-up in 8 weeks for recheck    Call or return if worsening symptoms.    Follow Up   Return in about 8 weeks (around 6/6/2024) for Recheck.  Patient was  given instructions and counseling regarding her condition or for health maintenance advice. Please see specific information pulled into the AVS if appropriate.       EMR Dragon/Transcription disclaimer:  Part of this note may be an electronic transcription/translation of spoken language to printed text using the Dragon Dictation System

## 2024-04-12 LAB
CONV .: NORMAL
CYTOLOGIST CVX/VAG CYTO: NORMAL
CYTOLOGY CVX/VAG DOC CYTO: NORMAL
CYTOLOGY CVX/VAG DOC THIN PREP: NORMAL
DX ICD CODE: NORMAL
Lab: NORMAL
Lab: NORMAL
OTHER STN SPEC: NORMAL
STAT OF ADQ CVX/VAG CYTO-IMP: NORMAL

## 2024-04-18 ENCOUNTER — TELEPHONE (OUTPATIENT)
Dept: FAMILY MEDICINE CLINIC | Facility: CLINIC | Age: 44
End: 2024-04-18
Payer: MEDICARE

## 2024-04-18 RX ORDER — DOCUSATE SODIUM 100 MG/1
100 CAPSULE, LIQUID FILLED ORAL 2 TIMES DAILY
Qty: 180 CAPSULE | Refills: 3 | Status: SHIPPED | OUTPATIENT
Start: 2024-04-18 | End: 2024-07-17

## 2024-04-18 NOTE — TELEPHONE ENCOUNTER
Caller: Bebe Steward    Relationship to patient: Self    Best call back number: [phone]    Patient is needing: PATIENT CALLED IN AND SAID THAT docusate sodium (COLACE) 100 MG capsule WAS DENIED AND PATIENT IS REQUESTING A CALL BACK WITH GUIDANCE AS TO WHY IT WAS DENIED. PATIENT SAID IT IS OKAY TO LEAVE MESSAGE ON PHONE.        docusate sodium (COLACE) 100 MG capsule

## 2024-04-25 ENCOUNTER — OFFICE VISIT (OUTPATIENT)
Dept: FAMILY MEDICINE CLINIC | Facility: CLINIC | Age: 44
End: 2024-04-25
Payer: MEDICARE

## 2024-04-25 VITALS
TEMPERATURE: 98.7 F | HEART RATE: 70 BPM | OXYGEN SATURATION: 99 % | BODY MASS INDEX: 43.65 KG/M2 | HEIGHT: 64 IN | DIASTOLIC BLOOD PRESSURE: 98 MMHG | SYSTOLIC BLOOD PRESSURE: 164 MMHG | WEIGHT: 255.7 LBS

## 2024-04-25 DIAGNOSIS — M17.11 PRIMARY OSTEOARTHRITIS OF RIGHT KNEE: ICD-10-CM

## 2024-04-25 DIAGNOSIS — I10 ESSENTIAL HYPERTENSION: ICD-10-CM

## 2024-04-25 DIAGNOSIS — Z00.00 ENCOUNTER FOR SUBSEQUENT ANNUAL WELLNESS VISIT (AWV) IN MEDICARE PATIENT: Primary | ICD-10-CM

## 2024-04-25 DIAGNOSIS — E78.2 MIXED HYPERLIPIDEMIA: ICD-10-CM

## 2024-04-25 DIAGNOSIS — N18.31 STAGE 3A CHRONIC KIDNEY DISEASE: ICD-10-CM

## 2024-04-25 DIAGNOSIS — Z79.899 MEDICATION MANAGEMENT: ICD-10-CM

## 2024-04-25 LAB
AMPHET+METHAMPHET UR QL: NEGATIVE
AMPHETAMINE INTERNAL CONTROL: ABNORMAL
AMPHETAMINES UR QL: NEGATIVE
BARBITURATE INTERNAL CONTROL: ABNORMAL
BARBITURATES UR QL SCN: NEGATIVE
BENZODIAZ UR QL SCN: NEGATIVE
BENZODIAZEPINE INTERNAL CONTROL: ABNORMAL
BUPRENORPHINE INTERNAL CONTROL: ABNORMAL
BUPRENORPHINE SERPL-MCNC: NEGATIVE NG/ML
CANNABINOIDS SERPL QL: NEGATIVE
COCAINE INTERNAL CONTROL: ABNORMAL
COCAINE UR QL: NEGATIVE
EXPIRATION DATE: ABNORMAL
Lab: ABNORMAL
MDMA (ECSTASY) INTERNAL CONTROL: ABNORMAL
MDMA UR QL SCN: NEGATIVE
METHADONE INTERNAL CONTROL: ABNORMAL
METHADONE UR QL SCN: NEGATIVE
METHAMPHETAMINE INTERNAL CONTROL: ABNORMAL
MORPHINE INTERNAL CONTROL: ABNORMAL
MORPHINE/OPIATES SCREEN, URINE: POSITIVE
OXYCODONE INTERNAL CONTROL: ABNORMAL
OXYCODONE UR QL SCN: NEGATIVE
PCP UR QL SCN: NEGATIVE
PHENCYCLIDINE INTERNAL CONTROL: ABNORMAL
THC INTERNAL CONTROL: ABNORMAL

## 2024-04-25 PROCEDURE — G0439 PPPS, SUBSEQ VISIT: HCPCS | Performed by: FAMILY MEDICINE

## 2024-04-25 PROCEDURE — 3080F DIAST BP >= 90 MM HG: CPT | Performed by: FAMILY MEDICINE

## 2024-04-25 PROCEDURE — 3077F SYST BP >= 140 MM HG: CPT | Performed by: FAMILY MEDICINE

## 2024-04-25 PROCEDURE — 80305 DRUG TEST PRSMV DIR OPT OBS: CPT | Performed by: FAMILY MEDICINE

## 2024-04-25 PROCEDURE — UDS: Performed by: FAMILY MEDICINE

## 2024-04-25 PROCEDURE — 1160F RVW MEDS BY RX/DR IN RCRD: CPT | Performed by: FAMILY MEDICINE

## 2024-04-25 PROCEDURE — 1159F MED LIST DOCD IN RCRD: CPT | Performed by: FAMILY MEDICINE

## 2024-04-25 PROCEDURE — 99214 OFFICE O/P EST MOD 30 MIN: CPT | Performed by: FAMILY MEDICINE

## 2024-04-25 PROCEDURE — 1170F FXNL STATUS ASSESSED: CPT | Performed by: FAMILY MEDICINE

## 2024-04-25 RX ORDER — HYDROCODONE BITARTRATE AND ACETAMINOPHEN 5; 325 MG/1; MG/1
TABLET ORAL
COMMUNITY
Start: 2024-04-17

## 2024-04-25 NOTE — PROGRESS NOTES
The ABCs of the Annual Wellness Visit  Subsequent Medicare Wellness Visit    Subjective    Bebe Steward is a 44 y.o. female who presents for a Subsequent Medicare Wellness Visit.    The following portions of the patient's history were reviewed and   updated as appropriate: allergies, current medications, past family history, past medical history, past social history, past surgical history, and problem list.    Compared to one year ago, the patient feels her physical   health is worse patient has multiple physical ailments.    Compared to one year ago, the patient feels her mental   health is the same.    Recent Hospitalizations:  She was admitted within the past 365 days at [facility].       Current Medical Providers:  Patient Care Team:  Kimberly Almaguer MD as PCP - General (Family Medicine)  Mabel Dean, GO as Nurse Practitioner (Nurse Practitioner)  Chavo George MD as Consulting Physician (Hematology and Oncology)    Outpatient Medications Prior to Visit   Medication Sig Dispense Refill    ammonium lactate (AmLactin) 12 % lotion Apply  topically to the appropriate area as directed 2 (Two) Times a Day. 225 g 11    atorvastatin (LIPITOR) 20 MG tablet Take 1 tablet by mouth Every Night for 90 days. 90 tablet 1    docusate sodium (COLACE) 100 MG capsule Take 1 capsule by mouth 2 (Two) Times a Day for 90 days. 180 capsule 3    estradiol (Estrace) 1 MG tablet Take 1 tablet by mouth Every Night. 90 tablet 3    folic acid (FOLVITE) 1 MG tablet Take 1 tablet by mouth Daily for 360 days. 90 tablet 3    furosemide (LASIX) 20 MG tablet Take 1 tablet by mouth Daily As Needed (swelling). 30 tablet 0    gabapentin (NEURONTIN) 600 MG tablet Take 1 tablet by mouth 3 (Three) Times a Day.      hydrALAZINE (APRESOLINE) 50 MG tablet TAKE ONE TABLET BY MOUTH THREE TIMES A DAY 90 tablet 1    HYDROcodone-acetaminophen (NORCO) 5-325 MG per tablet       hydrOXYzine (ATARAX) 25 MG tablet TAKE ONE TABLET BY MOUTH EVERY 8 HOURS  AS NEEDED FOR ANXIETY 90 tablet 1    irbesartan (AVAPRO) 75 MG tablet TAKE ONE TABLET BY MOUTH ONCE NIGHTLY 90 tablet 1    loratadine (CLARITIN) 10 MG tablet TAKE ONE TABLET BY MOUTH DAILY 90 tablet 2    Rimegepant Sulfate (Nurtec) 75 MG tablet dispersible tablet Take 1 tablet by mouth As Needed (headache) for up to 30 days. 15 tablet 2    sertraline (ZOLOFT) 100 MG tablet TAKE 1 TABLET BY MOUTH DAILY (Patient taking differently: Take 1 tablet by mouth Every Night.) 90 tablet 1    tiZANidine (ZANAFLEX) 4 MG tablet Take 1 tablet by mouth 2 (Two) Times a Day As Needed.      vitamin D (ERGOCALCIFEROL) 1.25 MG (08498 UT) capsule capsule TAKE ONE CAPSULE BY MOUTH ONCE WEEKLY (Patient taking differently: Take 1 capsule by mouth 1 (One) Time Per Week. Last dose 1/3/24) 12 capsule 3    zaleplon (SONATA) 10 MG capsule Take 1 capsule by mouth Every Night for 30 days. 30 capsule 2    HYDROcodone-acetaminophen (Norco) 7.5-325 MG per tablet Take 1 tablet by mouth Every 6 (Six) Hours As Needed for Moderate Pain. 28 tablet 0     No facility-administered medications prior to visit.       Opioid medication/s are on active medication list.  and I have evaluated her active treatment plan and pain score trends (see table).  There were no vitals filed for this visit.  I have reviewed the chart for potential of high risk medication and harmful drug interactions in the elderly.          Aspirin is not on active medication list.  Aspirin use is contraindicated for this patient due to: history of aspirin allergy.  .    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 of lower extremity    Plantar wart    Stage 3 chronic kidney disease    Vitamin D deficiency    Generalized edema    Chronic low back pain    Mixed  "hyperlipidemia    Obesity, morbid, BMI 40.0-49.9    History of lumbar spinal fusion    Constipation    Degeneration of lumbar intervertebral disc    Right knee pain    Primary osteoarthritis of right knee    Endometriosis    Atrophy of right kidney    Family history of colon cancer    Right knee injury, initial encounter    Patellofemoral arthritis    Iron deficiency anemia following bariatric surgery    S/P laparoscopic hysterectomy    Dysuria    Menopausal symptoms    Bilateral carpal tunnel syndrome    Chronic neck pain    Effusion of right knee    Cervical radiculopathy    Foraminal stenosis of cervical region    Phantom limb pain    Tear of lateral meniscus of right knee, current    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  ACP discussion was held with the patient during this visit. Patient does not have an advance directive, information provided.     Objective    Vitals:    04/25/24 0903   BP: 164/98   Pulse: 70   Temp: 98.7 °F (37.1 °C)   SpO2: 99%   Weight: 116 kg (255 lb 11.2 oz)   Height: 162.6 cm (64\")     Estimated body mass index is 43.89 kg/m² as calculated from the following:    Height as of this encounter: 162.6 cm (64\").    Weight as of this encounter: 116 kg (255 lb 11.2 oz).           Does the patient have evidence of cognitive impairment? No          HEALTH RISK ASSESSMENT    Smoking Status:  Social History     Tobacco Use   Smoking Status Former    Current packs/day: 0.00    Average packs/day: 1 pack/day for 24.0 years (24.0 ttl pk-yrs)    Types: " Cigarettes    Start date: 1997    Quit date: 2021    Years since quitting: 3.3    Passive exposure: Current   Smokeless Tobacco Never     Alcohol Consumption:  Social History     Substance and Sexual Activity   Alcohol Use Yes    Comment: Rarely. 1-2 times yearly     Fall Risk Screen:    JOSUÉ Fall Risk Assessment was completed, and patient is at HIGH risk for falls. Assessment completed on:2024    Depression Screenin/25/2024     9:01 AM   PHQ-2/PHQ-9 Depression Screening   Little Interest or Pleasure in Doing Things 1-->several days   Feeling Down, Depressed or Hopeless 0-->not at all   PHQ-9: Brief Depression Severity Measure Score 1       Health Habits and Functional and Cognitive Screenin/25/2024     8:51 AM   Functional & Cognitive Status   Do you have difficulty preparing food and eating? No   Do you have difficulty bathing yourself, getting dressed or grooming yourself? No   Do you have difficulty using the toilet? No   Do you have difficulty moving around from place to place? Yes   Do you have trouble with steps or getting out of a bed or a chair? Yes   Current Diet Well Balanced Diet   Dental Exam Not up to date   Eye Exam Up to date   Exercise (times per week) 1 times per week   Current Exercises Include House Cleaning   Do you need help using the phone?  No   Are you deaf or do you have serious difficulty hearing?  No   Do you need help to go to places out of walking distance? No   Do you need help shopping? No   Do you need help preparing meals?  No   Do you need help with housework?  Yes   Do you need help with laundry? No   Do you need help taking your medications? No   Do you need help managing money? No   Do you ever drive or ride in a car without wearing a seat belt? No   Have you felt unusual stress, anger or loneliness in the last month? No   Who do you live with? Other   If you need help, do you have trouble finding someone available to you? No   Have you been  bothered in the last four weeks by sexual problems? No   Do you have difficulty concentrating, remembering or making decisions? Yes       Age-appropriate Screening Schedule:  Refer to the list below for future screening recommendations based on patient's age, sex and/or medical conditions. Orders for these recommended tests are listed in the plan section. The patient has been provided with a written plan.    Health Maintenance   Topic Date Due    TDAP/TD VACCINES (1 - Tdap) Never done    LIPID PANEL  02/10/2023    COVID-19 Vaccine (5 - 2023-24 season) 04/27/2024 (Originally 9/1/2023)    INFLUENZA VACCINE  08/01/2024    BMI FOLLOWUP  04/11/2025    ANNUAL WELLNESS VISIT  04/25/2025    COLORECTAL CANCER SCREENING  03/23/2028    HEPATITIS C SCREENING  Completed    Pneumococcal Vaccine 0-64  Aged Out                  CMS Preventative Services Quick Reference  Risk Factors Identified During Encounter  Fall Risk-High or Moderate: Discussed Fall Prevention in the home  The above risks/problems have been discussed with the patient.  Pertinent information has been shared with the patient in the After Visit Summary.  An After Visit Summary and PPPS were made available to the patient.    Follow Up:   Next Medicare Wellness visit to be scheduled in 1 year.       Additional E&M Note during same encounter follows:  Patient has multiple medical problems which are significant and separately identifiable that require additional work above and beyond the Medicare Wellness Visit.      Chief Complaint  Medicare Wellness-subsequent, Neck Pain, and Leg Pain    Subjective        HPI  Bebe Steward is also being seen today for   History of Present Illness  The patient presents for an annual wellness visit.    The patient reports experiencing neck pain, which she first noticed approximately 2 to 3 days ago upon awakening. The pain, initially localized to one specific area, has since migrated to her skull and extends towards her ear. The  "area is tender to touch, likening the sensation to a bruise. She denies any recent falls or trauma to the head. She speculates a possible muscle strain, despite a history of shoulder issues. The pain intensifies when she extends her head back and when she looks down in a certain way, such as while looking at her phone. She has a history of radiculopathy, which often induces a sensation akin to a heart attack during flare-ups. During her last visit, pain management and physical therapy were temporarily suspended due to awaiting an MRI of her shoulder. She reports a deterioration in her condition compared to her pre-surgery state. She took her antihypertensive medication at 7:15 AM today. She does not have a living will or medical power of . She undergoes colonoscopies every 3 to 5 years due to her family history and polyps. She adheres to kidney instructions to maintain good kidney function. She recently underwent a 1-year follow-up for her hysterectomy, during which a Pap smear and breast exam were performed. She was informed that no further Pap smears would be necessary if her results were normal.     The patient reports persistent knee pain, which she attributes to a need for replacement. However, she has recently started experiencing radiating pain from her ankle up to her knee, leading her to suspect a shin splint, which she attributes to overuse of her right leg. She is not currently taking any anticoagulants or NSAIDs. Her pain medication, prescribed by orthopedics, was initially 7.5 mg 4 times a day, which was reduced to 5 mg 3 times a day by pain management. However, this has not provided relief.        Review of Systems   Musculoskeletal:  Positive for arthralgias.       Objective   Vital Signs:  /98   Pulse 70   Temp 98.7 °F (37.1 °C)   Ht 162.6 cm (64\")   Wt 116 kg (255 lb 11.2 oz)   SpO2 99%   BMI 43.89 kg/m²     Physical Exam  Vitals reviewed.   Constitutional:       Appearance: " Normal appearance.   HENT:      Right Ear: Tympanic membrane normal.      Left Ear: Tympanic membrane normal.      Nose: Nose normal.   Eyes:      Extraocular Movements: Extraocular movements intact.      Conjunctiva/sclera: Conjunctivae normal.      Pupils: Pupils are equal, round, and reactive to light.   Cardiovascular:      Rate and Rhythm: Normal rate and regular rhythm.   Pulmonary:      Effort: Pulmonary effort is normal.      Breath sounds: Normal breath sounds.   Abdominal:      General: Bowel sounds are normal.   Musculoskeletal:         General: Normal range of motion.      Cervical back: Normal range of motion.   Skin:     General: Skin is warm and dry.   Neurological:      General: No focal deficit present.      Mental Status: She is alert and oriented to person, place, and time.   Psychiatric:         Mood and Affect: Mood normal.         Behavior: Behavior normal.        Physical Exam  Vital Signs  Patient's weight is 255.    Results  Imaging  MRI of shoulder showed a partial tear of the rotator cuff and one frayed tendon.            Assessment and Plan   Diagnoses and all orders for this visit:    1. Encounter for subsequent annual wellness visit (AWV) in Medicare patient (Primary)    2. Essential hypertension  -     Comprehensive Metabolic Panel; Future    3. Stage 3a chronic kidney disease    4. Primary osteoarthritis of right knee    5. Mixed hyperlipidemia  -     Lipid Panel; Future    6. Medication management  -     POC Medline 12 Panel Urine Drug Screen      Assessment & Plan  1. Annual wellness visit.  The patient's blood pressure is significantly elevated today, a deviation from her usual readings. Her weight has decreased from 260 to 255. Her colonoscopy was performed in 2023, with a recommendation for a repeat in 5 years. The patient was advised to establish a living will and medical power of . A mammogram will be rescheduled. A CMP and lipid panel will be ordered.    2. Neck  pain.  The patient's ear appears normal, suggesting a possible muscle strain. Should there be any changes in her neck pain, she will inform us.    Follow-up  The patient is scheduled for a follow-up visit in 08/2024.           I spent 35 minutes caring for Bebe on this date of service. This time includes time spent by me in the following activities:reviewing tests  Follow Up   Return in about 3 months (around 7/25/2024).  Patient was given instructions and counseling regarding her condition or for health maintenance advice. Please see specific information pulled into the AVS if appropriate.

## 2024-04-26 ENCOUNTER — TREATMENT (OUTPATIENT)
Dept: PHYSICAL THERAPY | Facility: CLINIC | Age: 44
End: 2024-04-26
Payer: MEDICARE

## 2024-04-26 DIAGNOSIS — M75.112 INCOMPLETE TEAR OF LEFT ROTATOR CUFF, UNSPECIFIED WHETHER TRAUMATIC: ICD-10-CM

## 2024-04-26 DIAGNOSIS — M25.512 LEFT SHOULDER PAIN, UNSPECIFIED CHRONICITY: Primary | ICD-10-CM

## 2024-04-26 PROCEDURE — 97140 MANUAL THERAPY 1/> REGIONS: CPT | Performed by: PHYSICAL THERAPIST

## 2024-04-26 PROCEDURE — 97110 THERAPEUTIC EXERCISES: CPT | Performed by: PHYSICAL THERAPIST

## 2024-04-26 NOTE — PROGRESS NOTES
Re-Assessment / Re-Certification      Patient: Bebe Steward   : 1980  Diagnosis/ICD-10 Code:  Left shoulder pain, unspecified chronicity [M25.512]  Referring practitioner: Uriel Sears*  Date of Initial Visit: Type: THERAPY  Noted: 2024  Today's Date: 2024  Patient seen for 8 sessions      Subjective:   Bebe Steward reports: left shoulder pain 6/10; states mostly in left bicep.  Subjective Questionnaire: QuickDASH: 33=40-59% limitation improved from initial score 47=80-99% limitation   Clinical Progress: improved  Home Program Compliance: Yes  Treatment has included: therapeutic exercise, manual therapy, and therapeutic activity    Subjective   Objective          Palpation   Left   No palpable tenderness to the biceps.   Tenderness of the biceps and supraspinatus.     Active Range of Motion   Left Shoulder   Flexion: 80 degrees with pain  Abduction: 50 degrees with pain  External rotation BTH: C2 with pain  Internal rotation BTB: L5 with pain    Passive Range of Motion   Left Shoulder   Flexion: 150 degrees   Abduction: 120 degrees   External rotation 45°: 50 degrees   Internal rotation 45°: WFL    Strength/Myotome Testing     Left Shoulder     Planes of Motion   Flexion: 3-   Extension: 3   Abduction: 3-   External rotation at 0°: 3-   Internal rotation at 0°: 3       Assessment & Plan       Assessment  Impairments: abnormal muscle firing, abnormal or restricted ROM, activity intolerance, impaired physical strength, lacks appropriate home exercise program, pain with function and weight-bearing intolerance   Functional limitations: carrying objects, lifting, sleeping, pulling, pushing, uncomfortable because of pain, reaching behind back, reaching overhead and unable to perform repetitive tasks   Assessment details: Pt presents with limitations, noted by evaluation that impede patient's ability to use left UE for ADLs.  The skills of a therapist will be required to safely and  effectively implement the following treatment plan to restore maximal level of function.      Prognosis: good  Prognosis details: Patient has not attended therapy since 2/20/24 secondary to left shoulder pain. Patient had a MRI that revealed an incomplete left rotator cuff tear.  Patient had shot in left shoulder that did not help with pain. Patient referred back to physical therapy.     Goals  Plan Goals: 1. The patient has limited ROM of the left shoulder.    LTG 1: 12 weeks:  The patient will demonstrate 160 degrees of left shoulder flexion, 150 degrees of shoulder abduction, 70 degrees of shoulder external rotation, and shoulder internal rotation to 70 to allow the patient to reach into upper kitchen cabinets and manipulate clothing behind the back with greater ease.   STATUS:  ongoing   STG 1a: 6 weeks:  The patient will demonstrate 130 degrees of left shoulder flexion, 120 degrees of shoulder abduction, 60 degrees of shoulder external rotation, and shoulder internal rotation to 60 to allow the patient to reach into upper kitchen cabinets and manipulate clothing behind the back with greater ease.   STATUS: ongoing     2. The patient has limited strength of the left shoulder.   LTG 2: 12 weeks:  The patient will demonstrate 4/5 strength for left shoulder flexion, abduction, external rotation, and internal rotation in order to demonstrate improved shoulder stability.   STATUS:  ongoing  STG 2a: 6 weeks:  The patient will demonstrate 3/5 strength for left shoulder flexion, abduction, external rotation, and internal rotation.   STATUS: ongoing  STG2b:  4 weeks:  The patient will be independent with home exercises.    STATUS:  ongoing     3. The patient complains of pain to the left shoulder.   LTG 3: 12 weeks:  The patient will report a pain rating of 3/10 or better in order to improve sleep quality and tolerance to performance of activities of daily living.   STATUS:  ongoing  STG 3a: 5 weeks:  The patient will  report a pain rating of 5/10 or better.     STATUS: ongoing      4. Carrying, Moving, and Handling Objects Functional Limitation     LTG 4: 12 weeks:  The patient will demonstrate 1-19% limitation by achieving a score of 19 on the Quick DASH.   STATUS:  ongoing  STG 4a: 6 weeks:  The patient will demonstrate 40-59% limitation by achieving a score of 35 on the Quick DASH.     STATUS: Met       Plan  Therapy options: will be seen for skilled therapy services  Planned modality interventions: cryotherapy and TENS  Planned therapy interventions: manual therapy, postural training, soft tissue mobilization, strengthening, stretching, therapeutic activities, home exercise program, gait training, joint mobilization, functional ROM exercises, flexibility and balance/weight-bearing training  Frequency: 2x week  Duration in weeks: 12  Treatment plan discussed with: patient        Visit Diagnoses:    ICD-10-CM ICD-9-CM   1. Left shoulder pain, unspecified chronicity  M25.512 719.41   2. Incomplete tear of left rotator cuff, unspecified whether traumatic  M75.112 840.4       Progress toward previous goals: Partially Met       Recommendations: Continue as planned  Timeframe: 2 months  Prognosis to achieve goals: good    PT Signature: Kimberly Wright PT    Electronically singed 4/26/2024    [place] PT license: [id number]        90 Day Recertification  Certification Period: 4/26/2024 thru 7/24/2024  I certify that the therapy services are furnished while this patient is under my care.  The services outlined above are required by this patient, and will be reviewed every 90 days.    PHYSICIAN: Uriel Marcus PA  NPI: [id number]                                      DATE:     Based upon review of the patient's progress and continued therapy plan, it is my medical opinion that Bebe Steward should continue physical therapy treatment at [facility]  [address] [place] [address] [address] [place] [address] [place] [address]  [id number]  [phone].    Signature: __________________________________  Uriel Marcus PA    Timed:  Manual Therapy:    8     mins  91185;  Therapeutic Exercise:    18     mins  44880;     Neuromuscular Kaiden:        mins  68624;    Therapeutic Activity:          mins  22775;     Gait Training:           mins  58115;     Ultrasound:          mins  95594;    Electrical Stimulation:         mins  94319 ( );    Untimed:  Electrical Stimulation:         mins  80033 ( );  Mechanical Traction:         mins  77768;   PT Re-evaluation   14 minutes 02416    Timed Treatment:   26   mins   Total Treatment:     40   mins

## 2024-05-08 ENCOUNTER — TREATMENT (OUTPATIENT)
Dept: PHYSICAL THERAPY | Facility: CLINIC | Age: 44
End: 2024-05-08
Payer: MEDICARE

## 2024-05-08 ENCOUNTER — OFFICE VISIT (OUTPATIENT)
Dept: SLEEP MEDICINE | Facility: HOSPITAL | Age: 44
End: 2024-05-08
Payer: MEDICARE

## 2024-05-08 VITALS
HEART RATE: 77 BPM | BODY MASS INDEX: 43.54 KG/M2 | DIASTOLIC BLOOD PRESSURE: 85 MMHG | WEIGHT: 255 LBS | SYSTOLIC BLOOD PRESSURE: 154 MMHG | HEIGHT: 64 IN | OXYGEN SATURATION: 97 %

## 2024-05-08 DIAGNOSIS — G47.10 HYPERSOMNIA, UNSPECIFIED: ICD-10-CM

## 2024-05-08 DIAGNOSIS — E66.01 OBESITY, MORBID, BMI 40.0-49.9: ICD-10-CM

## 2024-05-08 DIAGNOSIS — M25.512 LEFT SHOULDER PAIN, UNSPECIFIED CHRONICITY: Primary | ICD-10-CM

## 2024-05-08 DIAGNOSIS — M75.112 INCOMPLETE TEAR OF LEFT ROTATOR CUFF, UNSPECIFIED WHETHER TRAUMATIC: ICD-10-CM

## 2024-05-08 DIAGNOSIS — R51.9 MORNING HEADACHE: ICD-10-CM

## 2024-05-08 DIAGNOSIS — G47.00 INSOMNIA, UNSPECIFIED TYPE: ICD-10-CM

## 2024-05-08 DIAGNOSIS — R29.818 SUSPECTED SLEEP APNEA: Primary | ICD-10-CM

## 2024-05-08 PROCEDURE — G0463 HOSPITAL OUTPT CLINIC VISIT: HCPCS

## 2024-05-08 PROCEDURE — 97110 THERAPEUTIC EXERCISES: CPT | Performed by: PHYSICAL THERAPIST

## 2024-05-08 PROCEDURE — 3077F SYST BP >= 140 MM HG: CPT | Performed by: INTERNAL MEDICINE

## 2024-05-08 PROCEDURE — 3079F DIAST BP 80-89 MM HG: CPT | Performed by: INTERNAL MEDICINE

## 2024-05-08 PROCEDURE — 97140 MANUAL THERAPY 1/> REGIONS: CPT | Performed by: PHYSICAL THERAPIST

## 2024-05-10 ENCOUNTER — TREATMENT (OUTPATIENT)
Dept: PHYSICAL THERAPY | Facility: CLINIC | Age: 44
End: 2024-05-10
Payer: MEDICARE

## 2024-05-10 DIAGNOSIS — M25.512 LEFT SHOULDER PAIN, UNSPECIFIED CHRONICITY: Primary | ICD-10-CM

## 2024-05-10 DIAGNOSIS — M75.112 INCOMPLETE TEAR OF LEFT ROTATOR CUFF, UNSPECIFIED WHETHER TRAUMATIC: ICD-10-CM

## 2024-05-10 PROCEDURE — 97140 MANUAL THERAPY 1/> REGIONS: CPT | Performed by: PHYSICAL THERAPIST

## 2024-05-10 PROCEDURE — 97110 THERAPEUTIC EXERCISES: CPT | Performed by: PHYSICAL THERAPIST

## 2024-05-13 RX ORDER — FUROSEMIDE 20 MG/1
20 TABLET ORAL DAILY PRN
Qty: 30 TABLET | Refills: 0 | Status: SHIPPED | OUTPATIENT
Start: 2024-05-13

## 2024-05-14 DIAGNOSIS — F33.1 MAJOR DEPRESSIVE DISORDER, RECURRENT, MODERATE: ICD-10-CM

## 2024-05-14 DIAGNOSIS — I10 ESSENTIAL HYPERTENSION: ICD-10-CM

## 2024-05-14 DIAGNOSIS — F41.9 ANXIETY: ICD-10-CM

## 2024-05-15 ENCOUNTER — TELEPHONE (OUTPATIENT)
Dept: PHYSICAL THERAPY | Facility: CLINIC | Age: 44
End: 2024-05-15

## 2024-05-15 RX ORDER — SERTRALINE HYDROCHLORIDE 100 MG/1
100 TABLET, FILM COATED ORAL DAILY
Qty: 90 TABLET | Refills: 1 | Status: SHIPPED | OUTPATIENT
Start: 2024-05-15

## 2024-05-15 RX ORDER — IRBESARTAN 75 MG/1
TABLET ORAL
Qty: 90 TABLET | Refills: 1 | Status: SHIPPED | OUTPATIENT
Start: 2024-05-15

## 2024-05-15 NOTE — TELEPHONE ENCOUNTER
Caller: Bebe Steward    Relationship: Self         What was the call regarding: HAS A MIGRAINE

## 2024-06-05 ENCOUNTER — TREATMENT (OUTPATIENT)
Dept: PHYSICAL THERAPY | Facility: CLINIC | Age: 44
End: 2024-06-05
Payer: MEDICARE

## 2024-06-05 DIAGNOSIS — M25.512 LEFT SHOULDER PAIN, UNSPECIFIED CHRONICITY: Primary | ICD-10-CM

## 2024-06-05 DIAGNOSIS — M75.112 INCOMPLETE TEAR OF LEFT ROTATOR CUFF, UNSPECIFIED WHETHER TRAUMATIC: ICD-10-CM

## 2024-06-05 NOTE — PROGRESS NOTES
"Physical Therapy Progress Note  [address] [facility], [address], [place] [address]      Patient: Bebe Steward   : 1980  Referring practitioner: Uriel Sears*  Date of Initial Visit: Type: THERAPY  Noted: 2024  Today's Date: 2024  Patient seen for 11 sessions           Subjective   Bebe Steward reports: left shoulder pain 6/10  Subjective Questionnaire: QuickDASH: 34=40-59% limitation       Objective          Palpation   Left   No palpable tenderness to the biceps.     Active Range of Motion   Left Shoulder   Flexion: 165 degrees with pain  Abduction: 120 degrees with pain  External rotation BTH: C6 with pain  Internal rotation BTB: L3 with pain    Passive Range of Motion   Left Shoulder   Flexion: WFL and with pain  Abduction: WFL and with pain  External rotation 45°: WFL and with pain  Internal rotation 45°: WFL and with pain    Strength/Myotome Testing     Left Shoulder     Planes of Motion   Flexion: 3   Extension: 3+   Abduction: 3   External rotation at 0°: 3   Internal rotation at 0°: 3+       See Exercise, Manual, and Modality Logs for complete treatment.       Assessment & Plan       Assessment  Impairments: abnormal muscle firing, abnormal or restricted ROM, activity intolerance, impaired physical strength, lacks appropriate home exercise program, pain with function and weight-bearing intolerance   Functional limitations: carrying objects, lifting, sleeping, pulling, pushing, uncomfortable because of pain, reaching behind back, reaching overhead and unable to perform repetitive tasks   Assessment details: Patient has only attended 2 visits since PT re-evaluation on 24. Patient still demonstrating limited left shoulder active range of motion and strength. Patient has limited tolerance to left shoulder activity.  Patient reports \"shooting\" pain into left arm with activity and states don't know if it's her shoulder or neck.   Prognosis: good  Prognosis details:      Goals  Plan " Goals: 1. The patient has limited ROM of the left shoulder.    LTG 1: 12 weeks:  The patient will demonstrate 160 degrees of left shoulder flexion, 150 degrees of shoulder abduction, 70 degrees of shoulder external rotation, and shoulder internal rotation to 70 to allow the patient to reach into upper kitchen cabinets and manipulate clothing behind the back with greater ease.   STATUS:  ongoing   STG 1a: 6 weeks:  The patient will demonstrate 130 degrees of left shoulder flexion, 120 degrees of shoulder abduction, 60 degrees of shoulder external rotation, and shoulder internal rotation to 60 to allow the patient to reach into upper kitchen cabinets and manipulate clothing behind the back with greater ease.   STATUS: Met     2. The patient has limited strength of the left shoulder.   LTG 2: 12 weeks:  The patient will demonstrate 4/5 strength for left shoulder flexion, abduction, external rotation, and internal rotation in order to demonstrate improved shoulder stability.   STATUS:  ongoing  STG 2a: 6 weeks:  The patient will demonstrate 3/5 strength for left shoulder flexion, abduction, external rotation, and internal rotation.   STATUS: Met  STG2b:  4 weeks:  The patient will be independent with home exercises.    STATUS:  ongoing     3. The patient complains of pain to the left shoulder.   LTG 3: 12 weeks:  The patient will report a pain rating of 3/10 or better in order to improve sleep quality and tolerance to performance of activities of daily living.   STATUS:  ongoing  STG 3a: 5 weeks:  The patient will report a pain rating of 5/10 or better.     STATUS: ongoing      4. Carrying, Moving, and Handling Objects Functional Limitation     LTG 4: 12 weeks:  The patient will demonstrate 1-19% limitation by achieving a score of 19 on the Quick DASH.   STATUS:  ongoing  STG 4a: 6 weeks:  The patient will demonstrate 40-59% limitation by achieving a score of 35 on the Quick DASH.     STATUS: Met       Plan  Therapy  options: will be seen for skilled therapy services  Planned modality interventions: cryotherapy and TENS  Planned therapy interventions: manual therapy, postural training, soft tissue mobilization, strengthening, stretching, therapeutic activities, home exercise program, gait training, joint mobilization, functional ROM exercises, flexibility and balance/weight-bearing training  Frequency: 2x week  Duration in weeks: 8  Treatment plan discussed with: patient      Visit Diagnoses:    ICD-10-CM ICD-9-CM   1. Left shoulder pain, unspecified chronicity  M25.512 719.41   2. Incomplete tear of left rotator cuff, unspecified whether traumatic  M75.112 840.4       Progress per Plan of Care and Progress strengthening /stabilization /functional activity           Timed:  Manual Therapy:    8     mins  72748;  Therapeutic Exercise:    22     mins  35783;     Neuromuscular Kaiden:        mins  57415;    Therapeutic Activity:     8     mins  68735;     Gait Training:           mins  42999;     Ultrasound:          mins  66290;    Electrical Stimulation:         mins  23635 ( );    Untimed:  Electrical Stimulation:         mins  19423 ( );  Mechanical Traction:         mins  40356;     Timed Treatment:   38   mins   Total Treatment:     38   mins  Kimberly Wright PT    Electronically singed 6/5/2024      [place] PT license: [id number]  Physical Therapist

## 2024-06-06 ENCOUNTER — OFFICE VISIT (OUTPATIENT)
Dept: ORTHOPEDIC SURGERY | Facility: CLINIC | Age: 44
End: 2024-06-06
Payer: MEDICARE

## 2024-06-06 VITALS
WEIGHT: 255 LBS | HEART RATE: 96 BPM | SYSTOLIC BLOOD PRESSURE: 179 MMHG | HEIGHT: 64 IN | OXYGEN SATURATION: 95 % | BODY MASS INDEX: 43.54 KG/M2 | DIASTOLIC BLOOD PRESSURE: 135 MMHG

## 2024-06-06 DIAGNOSIS — Z98.890 STATUS POST ARTHROSCOPY OF LEFT SHOULDER: ICD-10-CM

## 2024-06-06 DIAGNOSIS — Z47.89 AFTERCARE FOLLOWING SURGERY OF THE MUSCULOSKELETAL SYSTEM: Primary | ICD-10-CM

## 2024-06-06 DIAGNOSIS — M75.82 ROTATOR CUFF TENDONITIS, LEFT: ICD-10-CM

## 2024-06-06 NOTE — PROGRESS NOTES
Chief Complaint  Follow-up and Pain of the Left Shoulder     Subjective      Bebe Steward presents to [facility] for a follow up for her left shoulder. She underwent left shoulder arthroscopy, distal clavicle resection, rotator cuff repair, debridement, SAD, biceps tenotomy, chondroplasty on 01/15/24. She reports pain over the top of her shoulder and her biceps. She reports limited range of motion due to her pain. She reports she has been attending outpatient physical therapy with out much relief. She had a steroid injection on 04/11/24 with Hugo Marcus PA-C and states this did not give her much relief. She is in pain management. She reports sometimes her pain radiates down her arm with certain range of motion.     Allergies   Allergen Reactions    Adhesive Tape Itching and Rash    Cefaclor Rash    Cephalexin Rash    Cyclobenzaprine Other (See Comments)     Muscle spasms    Erythromycin Rash    Meperidine Nausea And Vomiting and Headache    Monosodium Glutamate Other (See Comments)     Blood pressure drops, pass out     Morphine Mental Status Change and Irritability    Nsaids Unknown - High Severity     CKD     Penicillins Anaphylaxis and Rash     1. When was your reaction? < 10 years ago    2. Did your reaction happen after the first dose? Do not know    3. Did your reaction happen after several doses? Do not know    4. Did your reaction require ED or hospital care to manage your reaction? Do not know    5. Did your reaction require treatment with epinephrine? Do not know    6. Have you taken amoxicillin (Amoxil) or amoxicillin-clavulanate (Augmentin) without issue since? no    7. Have you taken cephalexin (Keflex) without issue since?  No    Sulfa Antibiotics Rash    Sulfamethoxazole-Trimethoprim Rash    Vancomycin Itching        Social History     Socioeconomic History    Marital status:     Number of children: 3   Tobacco Use    Smoking status: Former     Current  "packs/day: 0.00     Average packs/day: 1 pack/day for 24.0 years (24.0 ttl pk-yrs)     Types: Cigarettes     Start date: 1/1/1997     Quit date: 1/1/2021     Years since quitting: 3.4     Passive exposure: Current    Smokeless tobacco: Never   Vaping Use    Vaping status: Never Used   Substance and Sexual Activity    Alcohol use: Yes     Comment: Rarely. 1-2 times yearly    Drug use: Never    Sexual activity: Not Currently     Partners: Male     Birth control/protection: Abstinence, Hysterectomy     Comment: Chemical menopause. Hysterectomy scheduled for 4/6/23        I reviewed the patient's chief complaint, history of present illness, review of systems, past medical history, surgical history, family history, social history, medications, and allergy list.     Review of Systems     Constitutional: Denies fevers, chills, weight loss  Cardiovascular: Denies chest pain, shortness of breath  Skin: Denies rashes, acute skin changes  Neurologic: Denies headache, loss of consciousness  MSK: Left shoulder pain       Vital Signs:   BP (!) 179/135 Comment: ADVISED  TO SEE PCP  Pulse 96   Ht 162.6 cm (64\")   Wt 116 kg (255 lb)   SpO2 95%   BMI 43.77 kg/m²            Ortho Exam    Physical Exam  General:Alert. No acute distress   Left upper extremity: well healed surgical incision, forward elevation  to 160 degrees, abduction to 130 degrees, external rotation  to 70 degrees, internal rotation to 45 degrees, external rotation  at the side is 45 degrees, internal rotation to the SI joint, 4 plus supraspinatus strength, 4 plus infraspinatus, 5/5 subscap, neurovascularly intact      Procedures      Imaging Results (Most Recent)       None             Result Review :       No results found.           Assessment and Plan     Diagnoses and all orders for this visit:    1. Aftercare following surgery of the musculoskeletal system (Primary)    2. Rotator cuff tendonitis, left    3. Status post arthroscopy of left " shoulder        The patient presents here today for a follow up for left shoulder.     Discussed the treatment plan with the patient.    Referral placed today to Dr. Nevarez for a second opinion.     Educated on risk of elevated BMI.  Discussed options for weight loss/decreasing BMI prior to procedure including dietician consult, weight loss options and exercise program. and Call or return if worsening symptoms.    Follow Up     PRN     Patient was given instructions and counseling regarding her condition or for health maintenance advice. Please see specific information pulled into the AVS if appropriate.     Scribed for Tomy Dobson MD by Jaylene Zhao.  06/06/24   11:01 EDT    I have personally performed the services described in this document as scribed by the above individual and it is both accurate and complete. Tomy Dobson MD 06/08/24

## 2024-06-14 RX ORDER — HYDROXYZINE HYDROCHLORIDE 25 MG/1
TABLET, FILM COATED ORAL
Qty: 90 TABLET | Refills: 1 | Status: SHIPPED | OUTPATIENT
Start: 2024-06-14

## 2024-06-25 ENCOUNTER — TRANSCRIBE ORDERS (OUTPATIENT)
Dept: ADMINISTRATIVE | Facility: HOSPITAL | Age: 44
End: 2024-06-25
Payer: MEDICARE

## 2024-06-25 DIAGNOSIS — M75.102 TEAR OF LEFT ROTATOR CUFF, UNSPECIFIED TEAR EXTENT, UNSPECIFIED WHETHER TRAUMATIC: Primary | ICD-10-CM

## 2024-06-25 RX ORDER — HYDRALAZINE HYDROCHLORIDE 50 MG/1
50 TABLET, FILM COATED ORAL 3 TIMES DAILY
Qty: 90 TABLET | Refills: 1 | Status: SHIPPED | OUTPATIENT
Start: 2024-06-25

## 2024-06-28 RX ORDER — ONDANSETRON 4 MG/1
TABLET, ORALLY DISINTEGRATING ORAL
Qty: 30 TABLET | Refills: 2 | OUTPATIENT
Start: 2024-06-28

## 2024-06-28 RX ORDER — ONDANSETRON 4 MG/1
TABLET, ORALLY DISINTEGRATING ORAL
Qty: 30 TABLET | Refills: 2 | Status: SHIPPED | OUTPATIENT
Start: 2024-06-28

## 2024-06-28 RX ORDER — FUROSEMIDE 20 MG/1
20 TABLET ORAL DAILY PRN
Qty: 30 TABLET | Refills: 0 | Status: SHIPPED | OUTPATIENT
Start: 2024-06-28

## 2024-07-16 ENCOUNTER — DOCUMENTATION (OUTPATIENT)
Dept: PHYSICAL THERAPY | Facility: CLINIC | Age: 44
End: 2024-07-16
Payer: MEDICARE

## 2024-07-17 ENCOUNTER — HOSPITAL ENCOUNTER (OUTPATIENT)
Dept: SLEEP MEDICINE | Facility: HOSPITAL | Age: 44
Discharge: HOME | End: 2024-07-17
Admitting: INTERNAL MEDICINE
Payer: MEDICARE

## 2024-07-17 DIAGNOSIS — R29.818 SUSPECTED SLEEP APNEA: ICD-10-CM

## 2024-07-17 DIAGNOSIS — G47.10 HYPERSOMNIA, UNSPECIFIED: ICD-10-CM

## 2024-07-17 PROCEDURE — 95806 SLEEP STUDY UNATT&RESP EFFT: CPT

## 2024-07-26 ENCOUNTER — HOSPITAL ENCOUNTER (OUTPATIENT)
Dept: MRI IMAGING | Facility: HOSPITAL | Age: 44
Discharge: HOME | End: 2024-07-26
Payer: MEDICARE

## 2024-07-26 ENCOUNTER — HOSPITAL ENCOUNTER (OUTPATIENT)
Dept: INTERVENTIONAL RADIOLOGY/VASCULAR | Facility: HOSPITAL | Age: 44
Discharge: HOME | End: 2024-07-26
Payer: MEDICARE

## 2024-07-26 DIAGNOSIS — M75.102 TEAR OF LEFT ROTATOR CUFF, UNSPECIFIED TEAR EXTENT, UNSPECIFIED WHETHER TRAUMATIC: ICD-10-CM

## 2024-07-26 PROCEDURE — 25510000001 IOPAMIDOL 61 % SOLUTION: Performed by: STUDENT IN AN ORGANIZED HEALTH CARE EDUCATION/TRAINING PROGRAM

## 2024-07-26 PROCEDURE — A9577 INJ MULTIHANCE: HCPCS | Performed by: STUDENT IN AN ORGANIZED HEALTH CARE EDUCATION/TRAINING PROGRAM

## 2024-07-26 PROCEDURE — 0 GADOBENATE DIMEGLUMINE 529 MG/ML SOLUTION: Performed by: STUDENT IN AN ORGANIZED HEALTH CARE EDUCATION/TRAINING PROGRAM

## 2024-07-26 PROCEDURE — 73222 MRI JOINT UPR EXTREM W/DYE: CPT

## 2024-07-26 PROCEDURE — 77002 NEEDLE LOCALIZATION BY XRAY: CPT

## 2024-07-26 RX ORDER — IOPAMIDOL 612 MG/ML
15 INJECTION, SOLUTION INTRATHECAL
Status: COMPLETED | OUTPATIENT
Start: 2024-07-26 | End: 2024-07-26

## 2024-07-26 RX ORDER — LIDOCAINE HYDROCHLORIDE 20 MG/ML
20 INJECTION, SOLUTION INFILTRATION; PERINEURAL ONCE
Status: COMPLETED | OUTPATIENT
Start: 2024-07-26 | End: 2024-07-26

## 2024-07-26 RX ORDER — SODIUM CHLORIDE 9 MG/ML
10 INJECTION, SOLUTION INTRAMUSCULAR; INTRAVENOUS; SUBCUTANEOUS AS NEEDED
Status: DISCONTINUED | OUTPATIENT
Start: 2024-07-26 | End: 2024-07-27 | Stop reason: HOSPADM

## 2024-07-26 RX ADMIN — IOPAMIDOL 5 ML: 612 INJECTION, SOLUTION INTRATHECAL at 14:41

## 2024-07-26 RX ADMIN — GADOBENATE DIMEGLUMINE 0.1 ML: 529 INJECTION, SOLUTION INTRAVENOUS at 14:41

## 2024-07-26 RX ADMIN — LIDOCAINE HYDROCHLORIDE 7 ML: 20 INJECTION, SOLUTION INFILTRATION; PERINEURAL at 14:40

## 2024-07-26 RX ADMIN — SODIUM BICARBONATE 1 ML: 84 INJECTION, SOLUTION INTRAVENOUS at 14:40

## 2024-07-30 ENCOUNTER — OFFICE VISIT (OUTPATIENT)
Dept: PODIATRY | Facility: CLINIC | Age: 44
End: 2024-07-30
Payer: MEDICARE

## 2024-07-30 VITALS
SYSTOLIC BLOOD PRESSURE: 129 MMHG | DIASTOLIC BLOOD PRESSURE: 88 MMHG | BODY MASS INDEX: 42.32 KG/M2 | TEMPERATURE: 97.4 F | WEIGHT: 254 LBS | HEIGHT: 65 IN | OXYGEN SATURATION: 97 % | HEART RATE: 65 BPM

## 2024-07-30 DIAGNOSIS — M25.571 ACUTE RIGHT ANKLE PAIN: ICD-10-CM

## 2024-07-30 DIAGNOSIS — S82.891A CLOSED AVULSION FRACTURE OF RIGHT ANKLE, INITIAL ENCOUNTER: ICD-10-CM

## 2024-07-30 DIAGNOSIS — Z89.512 HX OF BKA, LEFT: ICD-10-CM

## 2024-07-30 DIAGNOSIS — M19.071 ARTHRITIS OF RIGHT ANKLE: ICD-10-CM

## 2024-07-30 DIAGNOSIS — E66.01 OBESITY, MORBID, BMI 40.0-49.9: Primary | ICD-10-CM

## 2024-07-30 NOTE — PROGRESS NOTES
Answers submitted by the patient for this visit:  Other (Submitted on 7/29/2024)  Please describe your symptoms.: Broken bone  Have you had these symptoms before?: No  How long have you been having these symptoms?: 5-7 days  Please describe any probable cause for these symptoms. : Stepped down wrong on stairs  Primary Reason for Visit (Submitted on 7/29/2024)  What is the primary reason for your visit?: Other      Psychiatric - PODIATRY    Today's Date: 07/30/24    Patient Name: Bebe Steward  MRN: [id number]  CSN: [id number]  PCP: Kimberly Almaguer MD, Last PCP Visit: 16 May 2023  Referring Provider: No ref. provider found    SUBJECTIVE     Chief Complaint   Patient presents with    Right Ankle - Pain, Fracture     Stepped down wrong going down stairs, heard a crack on 7/25/24  Went to [facility] 2 days later on 7/27/24  Xray on chart   Pt wearing cam walker   Sees pain management      HPI: Bebe Steward, a 44 y.o.female, comes to clinic.    New, Established, New Problem: New problem  Location: Right lateral ankle  Nature: Sore  Stable, worsening, improving: Slowly improving  Aggravating factors:  Patient relates pain is aggravated by shoe gear and ambulation.      Patient denies any fevers, chills, nausea, vomiting, shortness of breath, nor any other constitutional signs nor symptoms.       Past Medical History:   Diagnosis Date    Allergies     seasonal and multi drug    Anemia     no current issues    Ankle pain, right     Anxiety 2014    Arthritis     knee    Arthritis of back 2006    Bilateral carpal tunnel syndrome 07/10/2023    Brain concussion 1995    no residual    Callus     Cervical disc disorder 2012    Compressed disc    Colon polyp 03/23/2023    removed    Difficulty walking     RBK prosthetic    Essential hypertension 07/21/2020    Head injury 1996    HPV (human papilloma virus) infection     last 2 pap clear    Hx of BKA, left     Uses a prosthetic leg    Hyperlipidemia     Hypoglycemia      Insomnia 07/21/2020    Left below-knee amputee 07/21/2020    Low back pain 2006    Low back strain     Lumbosacral disc disease 2006    Migraine     Muscle spasm 01/04/2021    Neck strain     full ROM    Numbness in right foot     Obesity     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    Pain management clinic    Renal insufficiency 2020    right kidney atrophy bmi >50    Right foot pain 12/02/2020    Right hip pain 07/21/2020    Stage 3 chronic kidney disease 09/01/2020    currently stage 2 3-24-23    Subluxation of patella 1996    Left    Tear of meniscus of knee 1996    Thoracic disc disorder     Visual impairment 1988    Near sighted    Vitamin D deficiency 12/02/2020     Past Surgical History:   Procedure Laterality Date    ABDOMINAL SURGERY  2010    scar tissue removed    ACHILLES TENDON SURGERY      ANKLE OPEN REDUCTION INTERNAL FIXATION  [id number]    Left 11 surgeries total, external fixation    ANKLE SURGERY      3/08, 5/08, 8/08, 12/10, 03/11, 2012, 2013, 2014    AVULSION TOENAIL PLATE Right 06/29/2023    BIG TOE    BELOW KNEE AMPUTATION Left 2014    CHOLECYSTECTOMY  2001    COLONOSCOPY N/A 03/23/2023    Procedure: COLONOSCOPY WITH COLD SNARE POLYPECTOMIES;  Surgeon: Tian Smith MD;  Location: [facility] ENDOSCOPY;  Service: General;  Laterality: N/A;  COLON POLYPS    CYSTOSCOPY N/A 04/06/2023    Procedure: CYSTOSCOPY;  Surgeon: Yoan Yoo MD;  Location: [facility] MAIN OR;  Service: Gynecology;  Laterality: N/A;    ENDOMETRIAL ABLATION  2007    EPIDURAL BLOCK  2007    FRACTURE SURGERY  2008    left ankle    GASTRIC BYPASS  2001    open Ruen Y    KNEE SURGERY      left 1996,1997 right 2015 scope on right, open procedure on left x2    SHOULDER ARTHROSCOPY W/ ROTATOR CUFF REPAIR Left 01/15/2024    Procedure: SHOULDER ARTHROSCOPY DISTAL CLAVICLE RESECTION, ROTATOR CUFF REPAIR DEBRIDEMENT, SUBCROMIAL DECOMPRESSION, biceps tenotomy,  chondroplasty;  Surgeon: Tomy Dobson MD;  [facility];  Service: Orthopedics;  Laterality: Left;    SPINAL FUSION  10/2008    L5/S1    TONSILLECTOMY  1994    TOTAL LAPAROSCOPIC HYSTERECTOMY N/A 04/06/2023    Procedure: TOTAL LAPAROSCOPIC HYSTERECTOMY BILATERAL SALPINGO OOPHORECTOMY;  Surgeon: Yoan Yoo MD;  Location: [facility] MAIN OR;  Service: Gynecology;  Laterality: N/A;    TRIGGER POINT INJECTION  2021     Family History   Problem Relation Age of Onset    Stroke Mother         3 TIAs    Other Mother         Fibromyalgia    Kidney disease Mother         Cause of death    Anxiety disorder Mother     Arthritis Mother     Depression Mother     Early death Mother         50 yrs old kidney disease    Hyperlipidemia Mother     Thyroid disease Mother     Broken bones Mother         Leg    Hypertension Mother     Heart disease Father     Kidney disease Father         Currently on dialysis    Arthritis Father     Depression Father     Hyperlipidemia Father     Vision loss Father         Glaucoma, some vision loss    Anesthesia problems Father     Rheumatologic disease Father         Gout    Other Father         Gout    Hypertension Father     Anxiety disorder Son     Colon cancer Paternal Aunt     Cancer Paternal Aunt         Colon/liver    Developmental Disability Paternal Aunt         Down Syndrome    Anxiety disorder Maternal Grandmother     Arthritis Maternal Grandmother     Depression Maternal Grandmother     Heart disease Maternal Grandmother     Hyperlipidemia Maternal Grandmother     Kidney disease Maternal Grandmother         Cause of death    Liver disease Maternal Grandmother         Alcohol abuse    Thyroid disease Maternal Grandmother     Osteoporosis Maternal Grandmother     Miscarriages / Stillbirths Maternal Grandmother         Miscarriage    Broken bones Maternal Grandmother         Clavicle/shoulder    Alcohol abuse Maternal Grandmother     Hypertension Maternal  Grandmother     Melanoma Maternal Grandfather     Diabetes Maternal Grandfather         Type 2    Stroke Maternal Grandfather     Heart disease Maternal Grandfather     Arthritis Maternal Grandfather     Cancer Maternal Grandfather         Melanoma    Hyperlipidemia Maternal Grandfather     Hypertension Maternal Grandfather     Colon cancer Paternal Grandmother 87    Arthritis Paternal Grandmother     Cancer Paternal Grandmother         Colon/kidney    Hyperlipidemia Paternal Grandmother     Kidney disease Paternal Grandmother     Osteoporosis Paternal Grandmother     Miscarriages / Stillbirths Paternal Grandmother         Miscarriage    Rheumatologic disease Paternal Grandmother         Gout    Broken bones Paternal Grandmother         Clavicle/shoulder    Other Paternal Grandmother         Gout    Hypertension Paternal Grandmother     Arthritis Paternal Grandfather     Heart disease Paternal Grandfather     Hyperlipidemia Paternal Grandfather     Kidney disease Paternal Grandfather     Hypertension Paternal Grandfather     Ovarian cancer Neg Hx     Uterine cancer Neg Hx     Breast cancer Neg Hx     Prostate cancer Neg Hx     Malig Hyperthermia Neg Hx      Social History     Socioeconomic History    Marital status:     Number of children: 3   Tobacco Use    Smoking status: Former     Current packs/day: 0.00     Average packs/day: 1 pack/day for 24.0 years (24.0 ttl pk-yrs)     Types: Cigarettes     Start date: 1/1/1997     Quit date: 1/1/2021     Years since quitting: 3.5     Passive exposure: Current    Smokeless tobacco: Never   Vaping Use    Vaping status: Never Used   Substance and Sexual Activity    Alcohol use: Yes     Comment: Rarely. 1-2 times yearly    Drug use: Never    Sexual activity: Not Currently     Partners: Male     Birth control/protection: Abstinence, Hysterectomy     Comment: Chemical menopause. Hysterectomy scheduled for 4/6/23     Allergies   Allergen Reactions    Adhesive Tape Itching  and Rash    Cefaclor Rash, Hives, Itching and Swelling     Other reaction(s): Rash, Swelling, Rash, Swelling    Cephalexin Rash    Cyclobenzaprine Other (See Comments)     Muscle spasms    Erythromycin Rash    Meperidine Nausea And Vomiting and Headache    Monosodium Glutamate Other (See Comments)     Blood pressure drops, pass out     Morphine Mental Status Change and Irritability    Nsaids Unknown - High Severity     CKD     Penicillins Anaphylaxis and Rash     1. When was your reaction? < 10 years ago    2. Did your reaction happen after the first dose? Do not know    3. Did your reaction happen after several doses? Do not know    4. Did your reaction require ED or hospital care to manage your reaction? Do not know    5. Did your reaction require treatment with epinephrine? Do not know    6. Have you taken amoxicillin (Amoxil) or amoxicillin-clavulanate (Augmentin) without issue since? no    7. Have you taken cephalexin (Keflex) without issue since?  No    Sulfa Antibiotics Rash    Sulfamethoxazole-Trimethoprim Rash    Vancomycin Itching     Current Outpatient Medications   Medication Sig Dispense Refill    ammonium lactate (AmLactin) 12 % lotion Apply  topically to the appropriate area as directed 2 (Two) Times a Day. 225 g 11    estradiol (Estrace) 1 MG tablet Take 1 tablet by mouth Every Night. 90 tablet 3    folic acid (FOLVITE) 1 MG tablet Take 1 tablet by mouth Daily for 360 days. 90 tablet 3    furosemide (LASIX) 20 MG tablet TAKE 1 TABLET BY MOUTH DAILY AS NEEDED FOR SWELLING 30 tablet 0    gabapentin (NEURONTIN) 600 MG tablet Take 1 tablet by mouth 3 (Three) Times a Day.      hydrALAZINE (APRESOLINE) 50 MG tablet TAKE ONE TABLET BY MOUTH THREE TIMES A DAY 90 tablet 1    HYDROcodone-acetaminophen (NORCO) 7.5-325 MG per tablet Take 1 tablet by mouth 3 (Three) Times a Day As Needed for Moderate Pain.      hydrOXYzine (ATARAX) 25 MG tablet TAKE ONE TABLET BY MOUTH EVERY 8 HOURS AS NEEDED FOR ANXIETY 90  tablet 1    irbesartan (AVAPRO) 75 MG tablet TAKE ONE TABLET BY MOUTH ONCE NIGHTLY 90 tablet 1    loratadine (CLARITIN) 10 MG tablet TAKE ONE TABLET BY MOUTH DAILY 90 tablet 2    Nurtec 75 MG dispersible tablet 1 tablet.      ondansetron ODT (ZOFRAN-ODT) 4 MG disintegrating tablet DISSOLVE ONE TABLET BY MOUTH EVERY 8 HOURS AS NEEDED FOR NUSEA OR VOMITING FOR UP TO 10 DAYS 30 tablet 2    sertraline (ZOLOFT) 100 MG tablet TAKE 1 TABLET BY MOUTH DAILY 90 tablet 1    tiZANidine (ZANAFLEX) 4 MG tablet Take 1 tablet by mouth 2 (Two) Times a Day As Needed.      vitamin D (ERGOCALCIFEROL) 1.25 MG (08157 UT) capsule capsule TAKE ONE CAPSULE BY MOUTH ONCE WEEKLY (Patient taking differently: Take 1 capsule by mouth 1 (One) Time Per Week. Last dose 1/3/24) 12 capsule 3    zaleplon (SONATA) 10 MG capsule Take 1 capsule by mouth.       No current facility-administered medications for this visit.     Review of Systems   Constitutional: Negative.    Musculoskeletal:         Lateral right ankle   All other systems reviewed and are negative.      OBJECTIVE     Vitals:    07/30/24 0758   BP: 129/88   Pulse: 65   Temp: 97.4 °F (36.3 °C)   SpO2: 97%       Patient seen in no apparent distress.      PHYSICAL EXAM:     Foot/Ankle Exam    GENERAL  Appearance:  appears stated age and obese  Orientation:  AAOx3  Affect:  appropriate  Assistance:  independent (Left below-knee amputation prosthetic)  Right shoe gear: CAM boot  Left shoe gear: casual shoe    VASCULAR     Right Foot Vascularity   Dorsalis pedis:  2+  Posterior tibial:  2+  Skin temperature:  warm  Edema grading:  None  CFT:  < 3 seconds  Pedal hair growth:  Present  Varicosities:  mild varicosities     NEUROLOGIC     Right Foot Neurologic   Normal sensation    Light touch sensation: normal  Vibratory sensation: normal  Hot/Cold sensation: normal  Protective Sensation using [place] Monofilament:   Sites intact: 10  Sites tested: 10    MUSCULOSKELETAL     Right Foot  Musculoskeletal   Ecchymosis:  ankle joint  Tenderness:  (Lateral ankle)  Hallux valgus: Yes       Left Foot Musculoskeletal    Amputation   Below left knee: Yes      MUSCLE STRENGTH     Right Foot Muscle Strength   Foot dorsiflexion:  4  Foot plantar flexion:  4  Foot inversion:  4  Foot eversion:  4    RANGE OF MOTION     Right Foot Range of Motion   Foot and ankle ROM within normal limits       Left Foot Range of Motion   Foot and ankle ROM within normal limits      DERMATOLOGIC      Right Foot Dermatologic   Skin  Right foot skin is intact.   Nails  1.  (Healing without complications)    XR Ankle 3+ View Right    Result Date: 7/27/2024  Narrative: XR ANKLE 3+ VW RIGHT Date of Exam: 7/27/2024 2:50 PM EDT Indication: twisted ankle, pain/swelling lateral Comparison: None available. Findings: There is a mildly displaced fracture along the lateral aspect of the talus. There is also evidence for an osteochondral defect of the lateral talar dome measuring 6 mm. No evidence of additional fracture. Mild tibiotalar arthritis. Normal joint alignment. Calcaneal enthesopathy. Soft tissue edema about the ankle.     Impression: Impression: Mildly displaced fracture along the lateral aspect of the talus likely representing a ligamentous avulsion fracture. 6 mm osteochondral defect along the lateral talar dome. Electronically Signed: Ciro Anderson MD  7/27/2024 3:16 PM EDT  Workstation ID: [id number]    ASSESSMENT/PLAN     Diagnoses and all orders for this visit:    1. Obesity, morbid, BMI 40.0-49.9 (Primary)    2. Hx of BKA, left    3. Arthritis of right ankle    4. Closed avulsion fracture of right ankle, initial encounter    5. Acute right ankle pain        Comprehensive lower extremity examination and evaluation was performed.    Discussed findings and treatment plan including risks, benefits, and treatment options with patient in detail. Patient agreed with treatment plan.    Rice Therapy: It is important to treat any injury  as soon as possible to help control swelling and increase recovery time. The recognized regimen for immediate treatment of sport injuries includes rest, ice (cold application), compression, and elevation (RICE). Remove the injured athlete from play, apply ice to the affected area, wrap or compress the injured area with an elastic bandage when appropriate, and elevate the injured area above heart level to reduce swelling.  The patient is to not use ice for longer than 20 minutes at a time, with at least 20 minutes of no ice usage between applications.     Patient instructed use OTC analgesics with dosing per package insert as needed.      The patient states understanding and agreement with this plan.    An ankle brace was dispensed and applied for her to transition to out of her cam walker.  The patient states understanding and agreement with this plan.    Patient is to monitor for recurrence and any new symptoms and to contact Dr. Marti's office for a follow-up appointment.      The patient states understanding and agreement with this plan.    An After Visit Summary was printed and given to the patient at discharge, including (if requested) any available informative/educational handouts regarding diagnosis, treatment, or medications. All questions were answered to patient/family satisfaction. Should symptoms fail to improve or worsen they agree to call or return to clinic or to go to the Emergency Department. Discussed the importance of following up with any needed screening tests/labs/specialist appointments and any requested follow-up recommended by me today. Importance of maintaining follow-up discussed and patient accepts that missed appointments can delay diagnosis and potentially lead to worsening of conditions.    Return if symptoms worsen or fail to improve., or sooner if acute issues arise.    This document has been electronically signed by William Marti DPM on July 30, 2024 08:34 EDT

## 2024-08-02 RX ORDER — ATORVASTATIN CALCIUM 20 MG/1
20 TABLET, FILM COATED ORAL NIGHTLY
Qty: 90 TABLET | Refills: 1 | Status: SHIPPED | OUTPATIENT
Start: 2024-08-02

## 2024-08-08 DIAGNOSIS — G47.33 OSA (OBSTRUCTIVE SLEEP APNEA): Primary | ICD-10-CM

## 2024-08-08 RX ORDER — RIMEGEPANT SULFATE 75 MG/75MG
TABLET, ORALLY DISINTEGRATING ORAL
Qty: 8 TABLET | Refills: 3 | Status: SHIPPED | OUTPATIENT
Start: 2024-08-08

## 2024-08-13 ENCOUNTER — TRANSCRIBE ORDERS (OUTPATIENT)
Dept: LAB | Facility: HOSPITAL | Age: 44
End: 2024-08-13
Payer: MEDICARE

## 2024-08-13 DIAGNOSIS — C85.10 B-CELL LYMPHOMA, UNSPECIFIED B-CELL LYMPHOMA TYPE, UNSPECIFIED BODY REGION: ICD-10-CM

## 2024-08-13 DIAGNOSIS — N18.31 CHRONIC KIDNEY DISEASE (CKD) STAGE G3A/A1, MODERATELY DECREASED GLOMERULAR FILTRATION RATE (GFR) BETWEEN 45-59 ML/MIN/1.73 SQUARE METER AND ALBUMINURIA CREATININE RATIO LESS THAN 30 MG/G (CMS/H*: ICD-10-CM

## 2024-08-13 DIAGNOSIS — M51.36 DEGENERATION OF LUMBAR INTERVERTEBRAL DISC: ICD-10-CM

## 2024-08-13 DIAGNOSIS — I12.9 HYPERTENSIVE NEPHROPATHY: Primary | ICD-10-CM

## 2024-08-13 DIAGNOSIS — E55.9 VITAMIN D DEFICIENCY, UNSPECIFIED: ICD-10-CM

## 2024-08-14 ENCOUNTER — TELEPHONE (OUTPATIENT)
Dept: SLEEP MEDICINE | Facility: HOSPITAL | Age: 44
End: 2024-08-14
Payer: MEDICARE

## 2024-08-21 RX ORDER — HYDROXYZINE HYDROCHLORIDE 25 MG/1
TABLET, FILM COATED ORAL
Qty: 90 TABLET | Refills: 1 | Status: SHIPPED | OUTPATIENT
Start: 2024-08-21

## 2024-08-21 RX ORDER — HYDRALAZINE HYDROCHLORIDE 50 MG/1
50 TABLET, FILM COATED ORAL 3 TIMES DAILY
Qty: 90 TABLET | Refills: 1 | Status: SHIPPED | OUTPATIENT
Start: 2024-08-21

## 2024-08-29 ENCOUNTER — OFFICE VISIT (OUTPATIENT)
Dept: FAMILY MEDICINE CLINIC | Facility: CLINIC | Age: 44
End: 2024-08-29
Payer: MEDICARE

## 2024-08-29 VITALS
BODY MASS INDEX: 46.03 KG/M2 | HEART RATE: 73 BPM | OXYGEN SATURATION: 100 % | TEMPERATURE: 98 F | DIASTOLIC BLOOD PRESSURE: 80 MMHG | SYSTOLIC BLOOD PRESSURE: 130 MMHG | WEIGHT: 269.6 LBS | HEIGHT: 64 IN

## 2024-08-29 DIAGNOSIS — D64.9 CHRONIC ANEMIA: ICD-10-CM

## 2024-08-29 DIAGNOSIS — I10 ESSENTIAL HYPERTENSION: ICD-10-CM

## 2024-08-29 DIAGNOSIS — G89.29 CHRONIC LEFT SHOULDER PAIN: ICD-10-CM

## 2024-08-29 DIAGNOSIS — E78.2 MIXED HYPERLIPIDEMIA: ICD-10-CM

## 2024-08-29 DIAGNOSIS — F41.9 ANXIETY: ICD-10-CM

## 2024-08-29 DIAGNOSIS — M25.512 CHRONIC LEFT SHOULDER PAIN: ICD-10-CM

## 2024-08-29 DIAGNOSIS — F33.1 MAJOR DEPRESSIVE DISORDER, RECURRENT, MODERATE: ICD-10-CM

## 2024-08-29 DIAGNOSIS — F51.01 PRIMARY INSOMNIA: Primary | ICD-10-CM

## 2024-08-29 PROCEDURE — 3079F DIAST BP 80-89 MM HG: CPT | Performed by: FAMILY MEDICINE

## 2024-08-29 PROCEDURE — 80061 LIPID PANEL: CPT | Performed by: FAMILY MEDICINE

## 2024-08-29 PROCEDURE — 85025 COMPLETE CBC W/AUTO DIFF WBC: CPT | Performed by: FAMILY MEDICINE

## 2024-08-29 PROCEDURE — 80053 COMPREHEN METABOLIC PANEL: CPT | Performed by: FAMILY MEDICINE

## 2024-08-29 PROCEDURE — 99214 OFFICE O/P EST MOD 30 MIN: CPT | Performed by: FAMILY MEDICINE

## 2024-08-29 PROCEDURE — G2211 COMPLEX E/M VISIT ADD ON: HCPCS | Performed by: FAMILY MEDICINE

## 2024-08-29 PROCEDURE — 1125F AMNT PAIN NOTED PAIN PRSNT: CPT | Performed by: FAMILY MEDICINE

## 2024-08-29 PROCEDURE — 3075F SYST BP GE 130 - 139MM HG: CPT | Performed by: FAMILY MEDICINE

## 2024-08-29 RX ORDER — ZALEPLON 10 MG/1
20 CAPSULE ORAL NIGHTLY
Qty: 60 CAPSULE | Refills: 2 | Status: SHIPPED | OUTPATIENT
Start: 2024-08-29 | End: 2024-09-28

## 2024-08-29 RX ORDER — SERTRALINE HYDROCHLORIDE 100 MG/1
150 TABLET, FILM COATED ORAL DAILY
Qty: 135 TABLET | Refills: 1 | Status: SHIPPED | OUTPATIENT
Start: 2024-08-29 | End: 2024-11-27

## 2024-08-30 LAB
ALBUMIN SERPL-MCNC: 4.3 G/DL (ref 3.5–5.2)
ALBUMIN/GLOB SERPL: 1.3 G/DL
ALP SERPL-CCNC: 130 U/L (ref 39–117)
ALT SERPL W P-5'-P-CCNC: 11 U/L (ref 1–33)
ANION GAP SERPL CALCULATED.3IONS-SCNC: 14.1 MMOL/L (ref 5–15)
AST SERPL-CCNC: 12 U/L (ref 1–32)
BASOPHILS # BLD AUTO: 0.1 10*3/MM3 (ref 0–0.2)
BASOPHILS NFR BLD AUTO: 0.8 % (ref 0–1.5)
BILIRUB SERPL-MCNC: 0.2 MG/DL (ref 0–1.2)
BUN SERPL-MCNC: 19 MG/DL (ref 6–20)
BUN/CREAT SERPL: 13.8 (ref 7–25)
CALCIUM SPEC-SCNC: 10 MG/DL (ref 8.6–10.5)
CHLORIDE SERPL-SCNC: 106 MMOL/L (ref 98–107)
CHOLEST SERPL-MCNC: 157 MG/DL (ref 0–200)
CO2 SERPL-SCNC: 19.9 MMOL/L (ref 22–29)
CREAT SERPL-MCNC: 1.38 MG/DL (ref 0.57–1)
DEPRECATED RDW RBC AUTO: 44.5 FL (ref 37–54)
EGFRCR SERPLBLD CKD-EPI 2021: 47.9 ML/MIN/1.73
EOSINOPHIL # BLD AUTO: 0.24 10*3/MM3 (ref 0–0.4)
EOSINOPHIL NFR BLD AUTO: 1.8 % (ref 0.3–6.2)
ERYTHROCYTE [DISTWIDTH] IN BLOOD BY AUTOMATED COUNT: 13 % (ref 12.3–15.4)
GLOBULIN UR ELPH-MCNC: 3.2 GM/DL
GLUCOSE SERPL-MCNC: 78 MG/DL (ref 65–99)
HCT VFR BLD AUTO: 41.9 % (ref 34–46.6)
HDLC SERPL-MCNC: 55 MG/DL (ref 40–60)
HGB BLD-MCNC: 14 G/DL (ref 12–15.9)
IMM GRANULOCYTES # BLD AUTO: 0.05 10*3/MM3 (ref 0–0.05)
IMM GRANULOCYTES NFR BLD AUTO: 0.4 % (ref 0–0.5)
LDLC SERPL CALC-MCNC: 80 MG/DL (ref 0–100)
LDLC/HDLC SERPL: 1.41 {RATIO}
LYMPHOCYTES # BLD AUTO: 2.76 10*3/MM3 (ref 0.7–3.1)
LYMPHOCYTES NFR BLD AUTO: 20.9 % (ref 19.6–45.3)
MCH RBC QN AUTO: 31.3 PG (ref 26.6–33)
MCHC RBC AUTO-ENTMCNC: 33.4 G/DL (ref 31.5–35.7)
MCV RBC AUTO: 93.7 FL (ref 79–97)
MONOCYTES # BLD AUTO: 0.74 10*3/MM3 (ref 0.1–0.9)
MONOCYTES NFR BLD AUTO: 5.6 % (ref 5–12)
NEUTROPHILS NFR BLD AUTO: 70.5 % (ref 42.7–76)
NEUTROPHILS NFR BLD AUTO: 9.33 10*3/MM3 (ref 1.7–7)
NRBC BLD AUTO-RTO: 0 /100 WBC (ref 0–0.2)
PLATELET # BLD AUTO: 340 10*3/MM3 (ref 140–450)
PMV BLD AUTO: 10.5 FL (ref 6–12)
POTASSIUM SERPL-SCNC: 4.4 MMOL/L (ref 3.5–5.2)
PROT SERPL-MCNC: 7.5 G/DL (ref 6–8.5)
RBC # BLD AUTO: 4.47 10*6/MM3 (ref 3.77–5.28)
SODIUM SERPL-SCNC: 140 MMOL/L (ref 136–145)
TRIGL SERPL-MCNC: 123 MG/DL (ref 0–150)
VLDLC SERPL-MCNC: 22 MG/DL (ref 5–40)
WBC NRBC COR # BLD AUTO: 13.22 10*3/MM3 (ref 3.4–10.8)

## 2024-09-10 ENCOUNTER — TELEPHONE (OUTPATIENT)
Dept: SLEEP MEDICINE | Facility: HOSPITAL | Age: 44
End: 2024-09-10
Payer: MEDICARE

## 2024-09-10 NOTE — TELEPHONE ENCOUNTER
Received correspondence from [employer]     Pt had appt on 09/09/24, pt called said she had unexpected car maintenance and would not be able to afford this added expense for the month of September. Pt was asked to call us back when she wanted to schedule her setup.

## 2024-09-12 ENCOUNTER — OFFICE VISIT (OUTPATIENT)
Dept: ORTHOPEDIC SURGERY | Facility: CLINIC | Age: 44
End: 2024-09-12
Payer: MEDICARE

## 2024-09-12 VITALS
SYSTOLIC BLOOD PRESSURE: 149 MMHG | DIASTOLIC BLOOD PRESSURE: 80 MMHG | WEIGHT: 269 LBS | HEART RATE: 98 BPM | OXYGEN SATURATION: 94 % | BODY MASS INDEX: 45.93 KG/M2 | HEIGHT: 64 IN

## 2024-09-12 DIAGNOSIS — M19.012 PRIMARY OSTEOARTHRITIS OF LEFT SHOULDER: ICD-10-CM

## 2024-09-12 DIAGNOSIS — Z47.89 AFTERCARE FOLLOWING SURGERY OF THE MUSCULOSKELETAL SYSTEM: ICD-10-CM

## 2024-09-12 DIAGNOSIS — M25.512 LEFT SHOULDER PAIN, UNSPECIFIED CHRONICITY: Primary | ICD-10-CM

## 2024-09-12 DIAGNOSIS — E66.01 OBESITY, MORBID, BMI 40.0-49.9: ICD-10-CM

## 2024-09-12 NOTE — PROGRESS NOTES
Chief Complaint  Pain and Follow-up of the Left Shoulder       Subjective      Bebe Steward presents to [facility] for a follow up for her left shoulder. She underwent a left shoulder arthroscopy with distal clavicle resection, rotator cuff repair debridement, subacromial decompression biceps tenotomy and chondroplasty performed on 01/15/24. She recently saw Dr Márquez with [facility] where he recommended a left shoulder arthroplasty. She is still having pain and has a hard time with range of motion. She is in pain management.     Allergies   Allergen Reactions    Adhesive Tape Itching and Rash    Cefaclor Rash, Hives, Itching and Swelling     Other reaction(s): Rash, Swelling, Rash, Swelling    Cephalexin Rash    Cyclobenzaprine Other (See Comments)     Muscle spasms    Erythromycin Rash    Meperidine Nausea And Vomiting and Headache    Monosodium Glutamate Other (See Comments)     Blood pressure drops, pass out     Morphine Mental Status Change and Irritability    Nsaids Unknown - High Severity     CKD     Penicillins Anaphylaxis and Rash     1. When was your reaction? < 10 years ago    2. Did your reaction happen after the first dose? Do not know    3. Did your reaction happen after several doses? Do not know    4. Did your reaction require ED or hospital care to manage your reaction? Do not know    5. Did your reaction require treatment with epinephrine? Do not know    6. Have you taken amoxicillin (Amoxil) or amoxicillin-clavulanate (Augmentin) without issue since? no    7. Have you taken cephalexin (Keflex) without issue since?  No    Sulfa Antibiotics Rash    Sulfamethoxazole-Trimethoprim Rash    Vancomycin Itching        Social History     Socioeconomic History    Marital status:     Number of children: 3   Tobacco Use    Smoking status: Former     Current packs/day: 0.00     Average packs/day: 1 pack/day for 24.0 years (24.0 ttl pk-yrs)     Types: Cigarettes     Start  "date: 1/1/1997     Quit date: 1/1/2021     Years since quitting: 3.6     Passive exposure: Current    Smokeless tobacco: Never   Vaping Use    Vaping status: Never Used   Substance and Sexual Activity    Alcohol use: Yes     Comment: Rarely. 1-2 times yearly    Drug use: Never    Sexual activity: Not Currently     Partners: Male     Birth control/protection: Abstinence, Hysterectomy     Comment: Chemical menopause. Hysterectomy scheduled for 4/6/23        I reviewed the patient's chief complaint, history of present illness, review of systems, past medical history, surgical history, family history, social history, medications, and allergy list.     Review of Systems     Constitutional: Denies fevers, chills, weight loss  Cardiovascular: Denies chest pain, shortness of breath  Skin: Denies rashes, acute skin changes  Neurologic: Denies headache, loss of consciousness  MSK: left shoulder pain       Vital Signs:   /80   Pulse 98   Ht 162.6 cm (64\")   Wt 122 kg (269 lb)   SpO2 94%   BMI 46.17 kg/m²            Ortho Exam    Physical Exam  General:Alert. No acute distress   Left upper extremity: well healed surgical incision, forward elevation  to 160 degrees, abduction to 130 degrees, external rotation  to 70 degrees, internal rotation to 45 degrees, external rotation  at the side is 45 degrees, internal rotation to the SI joint, 4 plus supraspinatus strength, 4 plus infraspinatus, 5/5 subscap, neurovascularly intact       Procedures    X-Ray Report:  Left scapula X-Ray  Indication: Evaluation of left shoulder pain   AP/Lateral view(s)  Findings: mild to moderate degenerative changes, no acute fracture   Prior studies available for comparison: yes       Imaging Results (Most Recent)       Procedure Component Value Units Date/Time    XR Scapula Left [[id number]] Resulted: 09/12/24 1120     Updated: 09/12/24 1122             Result Review :       MRI Arthrogram Shoulder LT    Result Date: 8/16/2024  Narrative: " This result has an attachment that is not available.            Assessment and Plan     Diagnoses and all orders for this visit:    1. Left shoulder pain, unspecified chronicity (Primary)  -     XR Scapula Left    2. Aftercare following surgery of the musculoskeletal system    3. Primary osteoarthritis of left shoulder    4. Obesity, morbid, BMI 40.0-49.9      The patient presents here today for a follow up for her left shoulder. X-rays were obtained in the office today and these were reviewed today.     Recommend following up with Dr. Márquez regarding a left shoulder arthroplasty due to her age. Patient expressed understanding and wishes to proceed.     Educated on risk of elevated BMI.  Discussed options for weight loss/decreasing BMI prior to procedure including dietician consult, weight loss options and exercise program. and Call or return if worsening symptoms.    Follow Up     PRN     Patient was given instructions and counseling regarding her condition or for health maintenance advice. Please see specific information pulled into the AVS if appropriate.     Scribed for Tomy Dobson MD by Jaylene Zhao.  09/12/24   11:34 EDT    I have personally performed the services described in this document as scribed by the above individual and it is both accurate and complete. Tomy Dobson MD 09/13/24

## 2024-09-18 ENCOUNTER — TRANSCRIBE ORDERS (OUTPATIENT)
Dept: LAB | Facility: HOSPITAL | Age: 44
End: 2024-09-18
Payer: MEDICARE

## 2024-09-18 DIAGNOSIS — M51.36 DEGENERATION OF LUMBAR INTERVERTEBRAL DISC: ICD-10-CM

## 2024-09-18 DIAGNOSIS — E55.9 VITAMIN D DEFICIENCY, UNSPECIFIED: ICD-10-CM

## 2024-09-18 DIAGNOSIS — I12.9 HYPERTENSIVE NEPHROPATHY: Primary | ICD-10-CM

## 2024-09-18 DIAGNOSIS — N18.31 CHRONIC KIDNEY DISEASE (CKD) STAGE G3A/A1, MODERATELY DECREASED GLOMERULAR FILTRATION RATE (GFR) BETWEEN 45-59 ML/MIN/1.73 SQUARE METER AND ALBUMINURIA CREATININE RATIO LESS THAN 30 MG/G (CMS/H*: ICD-10-CM

## 2024-09-18 DIAGNOSIS — C85.10 B-CELL LYMPHOMA, UNSPECIFIED B-CELL LYMPHOMA TYPE, UNSPECIFIED BODY REGION: ICD-10-CM

## 2024-10-07 ENCOUNTER — LAB (OUTPATIENT)
Dept: LAB | Facility: HOSPITAL | Age: 44
End: 2024-10-07
Payer: MEDICARE

## 2024-10-07 DIAGNOSIS — M51.369 DEGENERATION OF LUMBAR INTERVERTEBRAL DISC: ICD-10-CM

## 2024-10-07 DIAGNOSIS — C85.10 B-CELL LYMPHOMA, UNSPECIFIED B-CELL LYMPHOMA TYPE, UNSPECIFIED BODY REGION: ICD-10-CM

## 2024-10-07 DIAGNOSIS — N18.31 CHRONIC KIDNEY DISEASE (CKD) STAGE G3A/A1, MODERATELY DECREASED GLOMERULAR FILTRATION RATE (GFR) BETWEEN 45-59 ML/MIN/1.73 SQUARE METER AND ALBUMINURIA CREATININE RATIO LESS THAN 30 MG/G (CMS/H*: ICD-10-CM

## 2024-10-07 DIAGNOSIS — I12.9 HYPERTENSIVE NEPHROPATHY: ICD-10-CM

## 2024-10-07 DIAGNOSIS — E55.9 VITAMIN D DEFICIENCY, UNSPECIFIED: ICD-10-CM

## 2024-10-07 LAB
ALBUMIN SERPL-MCNC: 3.9 G/DL (ref 3.5–5.2)
ALBUMIN UR-MCNC: 10.8 MG/DL
ANION GAP SERPL CALCULATED.3IONS-SCNC: 9 MMOL/L (ref 5–15)
BACTERIA UR QL AUTO: ABNORMAL /HPF
BILIRUB UR QL STRIP: NEGATIVE
BUN SERPL-MCNC: 16 MG/DL (ref 6–20)
BUN/CREAT SERPL: 10.7 (ref 7–25)
CALCIUM SPEC-SCNC: 9.3 MG/DL (ref 8.6–10.5)
CHLORIDE SERPL-SCNC: 107 MMOL/L (ref 98–107)
CLARITY UR: CLEAR
CO2 SERPL-SCNC: 23 MMOL/L (ref 22–29)
COLOR UR: YELLOW
CREAT SERPL-MCNC: 1.5 MG/DL (ref 0.57–1)
CREAT UR-MCNC: 212.1 MG/DL
CREAT UR-MCNC: 219.1 MG/DL
EGFRCR SERPLBLD CKD-EPI 2021: 43.3 ML/MIN/1.73
GLUCOSE SERPL-MCNC: 91 MG/DL (ref 65–99)
GLUCOSE UR STRIP-MCNC: NEGATIVE MG/DL
HGB UR QL STRIP.AUTO: NEGATIVE
HYALINE CASTS UR QL AUTO: ABNORMAL /LPF
KETONES UR QL STRIP: ABNORMAL
LEUKOCYTE ESTERASE UR QL STRIP.AUTO: NEGATIVE
MICROALBUMIN/CREAT UR: 50.9 MG/G (ref 0–29)
NITRITE UR QL STRIP: NEGATIVE
PH UR STRIP.AUTO: 6 [PH] (ref 5–8)
PHOSPHATE SERPL-MCNC: 3.8 MG/DL (ref 2.5–4.5)
POTASSIUM SERPL-SCNC: 3.7 MMOL/L (ref 3.5–5.2)
PROT ?TM UR-MCNC: 86.3 MG/DL
PROT UR QL STRIP: ABNORMAL
PROT/CREAT UR: 0.39 MG/G{CREAT}
RBC # UR STRIP: ABNORMAL /HPF
REF LAB TEST METHOD: ABNORMAL
SODIUM SERPL-SCNC: 139 MMOL/L (ref 136–145)
SP GR UR STRIP: 1.02 (ref 1–1.03)
SQUAMOUS #/AREA URNS HPF: ABNORMAL /HPF
UROBILINOGEN UR QL STRIP: ABNORMAL
WBC # UR STRIP: ABNORMAL /HPF

## 2024-10-07 PROCEDURE — 82043 UR ALBUMIN QUANTITATIVE: CPT

## 2024-10-07 PROCEDURE — 84156 ASSAY OF PROTEIN URINE: CPT

## 2024-10-07 PROCEDURE — 81001 URINALYSIS AUTO W/SCOPE: CPT

## 2024-10-07 PROCEDURE — 82610 CYSTATIN C: CPT

## 2024-10-07 PROCEDURE — 82570 ASSAY OF URINE CREATININE: CPT

## 2024-10-07 PROCEDURE — 36415 COLL VENOUS BLD VENIPUNCTURE: CPT

## 2024-10-07 PROCEDURE — 80069 RENAL FUNCTION PANEL: CPT

## 2024-10-10 LAB
CYSTATIN C SERPL-MCNC: 2.01 MG/L (ref 0.6–1)
GFR/BSA.PRED SERPLBLD CYS-BASED-ARV: 30 ML/MIN/1.73

## 2024-10-14 ENCOUNTER — TRANSCRIBE ORDERS (OUTPATIENT)
Dept: ADMINISTRATIVE | Facility: HOSPITAL | Age: 44
End: 2024-10-14
Payer: MEDICARE

## 2024-10-14 DIAGNOSIS — M75.102 ROTATOR CUFF TEAR ARTHROPATHY, LEFT: Primary | ICD-10-CM

## 2024-10-14 DIAGNOSIS — M12.812 ROTATOR CUFF TEAR ARTHROPATHY, LEFT: Primary | ICD-10-CM

## 2024-10-16 NOTE — PROGRESS NOTES
Chief Complaint  Pain and Follow-up of the Left Shoulder    Subjective          History of Present Illness      Bebe Steward is a 44 y.o. female  presents to [facility] for     Patient presents for follow-up evaluation of left shoulder pain left shoulder injury she has a history of left shoulder arthroscopic distal clavicle resection, rotator cuff debridement, subacromial decompression, bicep tenotomy and chondroplasty, 1/15/2024.  She saw Dr. Dobson on 3/7/2024 for evaluation due to injury which occurred when she was at therapy she was lifting weights and she had a sharp pain in her shoulder she has had decreased range of motion and increased pain.  She is taking hydrocodone and is requesting a refill.  She has tried to keep her shoulder mobile with home exercises but states that her shoulder has been very painful and she is afraid she is getting frozen shoulder.  She states she has a tearing sensation in the shoulder which occurred several days ago.      Allergies   Allergen Reactions    Adhesive Tape Itching and Rash    Cefaclor Rash    Cephalexin Rash    Cyclobenzaprine Other (See Comments)     Muscle spasms    Erythromycin Rash    Meperidine Nausea And Vomiting and Headache    Monosodium Glutamate Other (See Comments)     Blood pressure drops, pass out     Morphine Mental Status Change and Irritability    Nsaids Unknown - High Severity     CKD     Penicillins Anaphylaxis and Rash     1. When was your reaction? < 10 years ago    2. Did your reaction happen after the first dose? Do not know    3. Did your reaction happen after several doses? Do not know    4. Did your reaction require ED or hospital care to manage your reaction? Do not know    5. Did your reaction require treatment with epinephrine? Do not know    6. Have you taken amoxicillin (Amoxil) or amoxicillin-clavulanate (Augmentin) without issue since? no    7. Have you taken cephalexin (Keflex) without issue  "since?  No    Sulfa Antibiotics Rash    Sulfamethoxazole-Trimethoprim Rash    Vancomycin Itching        Social History     Socioeconomic History    Marital status:     Number of children: 3   Tobacco Use    Smoking status: Former     Current packs/day: 0.00     Average packs/day: 1 pack/day for 24.0 years (24.0 ttl pk-yrs)     Types: Cigarettes     Start date: 1/1/1997     Quit date: 1/1/2021     Years since quitting: 3.2     Passive exposure: Current    Smokeless tobacco: Never   Vaping Use    Vaping status: Never Used   Substance and Sexual Activity    Alcohol use: Yes     Comment: Rarely. 1-2 times yearly    Drug use: Never    Sexual activity: Not Currently     Partners: Male     Birth control/protection: Abstinence, Hysterectomy     Comment: Chemical menopause. Hysterectomy scheduled for 4/6/23        REVIEW OF SYSTEMS    Constitutional: Awake alert and oriented x3, no acute distress, denies fevers, chills, weight loss  Respiratory: No respiratory distress  Vascular: Brisk cap refill, Intact distal pulses, No cyanosis, compartments soft with no signs or symptoms of compartment syndrome or DVT.   Cardiovascular: Denies chest pain, shortness of breath  Skin: Denies rashes, acute skin changes  Neurologic: Denies headache, loss of consciousness  MSK: Left shoulder pain      Objective   Vital Signs:   /97   Pulse 97   Ht 162.6 cm (64\")   Wt 118 kg (260 lb 2.3 oz)   SpO2 95%   BMI 44.65 kg/m²     Body mass index is 44.65 kg/m².    Physical Exam       Left shoulder- Forward elevation 80. Abduction 80. External Rotation 45. Internal rotation 35. Sensation to light touch median, radial, ulnar nerve. Positive AIN, PIN, ulnar nerve motor function. Positive pulses. 4/5 supraspinatus strength 4+ infraspinatus and subscapularis. Positive impingement signs. Well healed scope scars/       Procedures    Imaging Results (Most Recent)       None         MRI Shoulder Left Without Contrast    Result Date: " 3/28/2024  Narrative: MRI SHOULDER LEFT WO CONTRAST-  Date of Exam: 3/27/2024 6:55 PM  Indication: Left Shoulder Pain; M25.512-Pain in left shoulder; Z47.89-Encounter for other orthopedic aftercare; S49.92XA-Unspecified injury of left shoulder and upper arm, initial encounter.  Comparison: 3/7/2024 radiographs  Technique:  Routine multiplanar/multisequence images of the left shoulder were obtained without contrast administration.    Findings: There is severe distal supraspinatus tendinopathy and bursal sided fraying with evidence for low-grade partial-thickness bursal sided tearing as seen on coronal T2 image 10. There is also moderate distal infraspinatus tendinopathy without evidence of discrete tear.  Mild distal subscapularis tendinopathy without evidence of tear. The teres minor is intact. There is mild supraspinatus, infraspinatus, and subscapularis fatty muscle atrophy.  The long head biceps tendon is not visualized, likely sequela of prior tenotomy.  There is no evidence of new displaced glenoid labral tear or paralabral cyst formation. There is relative volume loss of the anterior superior glenoid labrum which may be secondary to prior debridement.  There is moderate glenohumeral joint degenerative changes with full-thickness articular cartilage loss along the inferior glenoid and humeral head margins.  Evidence for prior distal clavicular resection with small volume fluid at the resection site. No evidence of subacromial spur formation or os acromiale.  There is moderate subacromial-subdeltoid bursal fluid. Small glenohumeral joint effusion.  No evidence of fracture or suspicious osseous lesion. Remaining soft tissues are unremarkable.      Impression: Impression: Low-grade partial-thickness bursal sided tearing of the distal supraspinatus tendon with superimposed severe tendinopathy and bursal sided fraying. Moderate infraspinatus and mild subscapularis tendinopathy without discrete evidence of tear.   Probable surgical changes of prior long head biceps tenotomy, labral debridement, and distal clavicular resection.  Moderate glenohumeral joint degenerative change. Moderate subacromial-subdeltoid bursitis.    Electronically Signed By-Ciro Anderson MD On:3/28/2024 10:00 AM         Result Review :   The following data was reviewed by: CADY Ferrer on 04/09/2024:  Data reviewed : Radiologic studies reviewed by me with the patient              Assessment and Plan    Diagnoses and all orders for this visit:    1. Aftercare following surgery of SHOULDER ARTHROSCOPY DISTAL CLAVICLE RESECTION, ROTATOR CUFF REPAIR DEBRIDEMENT, SUBCROMIAL DECOMPRESSION, biceps tenotomy, chondroplasty 1/15/24 (Primary)        Reviewed MRI with the patient discussed diagnosis and treatment options with her she would like to meet with Dr. Dobson to discuss possible surgical intervention due to her severe pain and limited range of motion follow-up at earliest available with Dr. Dobson this Thursday or Friday per patient request.    Discussed surgery. and Risks/benefits discussed with patient including, but not limited to: infection, bleeding, neurovascular damage, re-rupture, aesthetic deformity, need for further surgery, and death.    Follow Up   Return To discuss surgical intervention with Dr. Dobson this Thursday or Friday.  Patient was given instructions and counseling regarding her condition or for health maintenance advice. Please see specific information pulled into the AVS if appropriate.       EMR Dragon/Transcription disclaimer:  Much of this encounter note is an electronic transcription/translation of spoken language to printed text, aka voice recognition.  The electronic translation of spoken language may permit erroneous or at times nonsensical words or phrases to be inadvertently transcribed; although I have reviewed the note for such errors, some may still exist so please interpret based on  surrounding text content.   36.9

## 2024-11-05 DIAGNOSIS — I10 ESSENTIAL HYPERTENSION: ICD-10-CM

## 2024-11-06 RX ORDER — IRBESARTAN 75 MG/1
TABLET ORAL
Qty: 90 TABLET | Refills: 1 | Status: SHIPPED | OUTPATIENT
Start: 2024-11-06

## 2024-11-18 ENCOUNTER — HOSPITAL ENCOUNTER (OUTPATIENT)
Dept: MAMMOGRAPHY | Facility: HOSPITAL | Age: 44
Discharge: HOME | End: 2024-11-18
Admitting: FAMILY MEDICINE
Payer: MEDICARE

## 2024-11-18 DIAGNOSIS — Z12.31 BREAST CANCER SCREENING BY MAMMOGRAM: ICD-10-CM

## 2024-11-18 PROCEDURE — 77063 BREAST TOMOSYNTHESIS BI: CPT

## 2024-11-18 PROCEDURE — 77067 SCR MAMMO BI INCL CAD: CPT

## 2024-11-22 DIAGNOSIS — F51.01 PRIMARY INSOMNIA: Primary | ICD-10-CM

## 2024-11-22 RX ORDER — ZALEPLON 10 MG/1
20 CAPSULE ORAL NIGHTLY
Qty: 60 CAPSULE | Refills: 2 | Status: SHIPPED | OUTPATIENT
Start: 2024-11-22

## 2024-12-05 ENCOUNTER — OFFICE VISIT (OUTPATIENT)
Dept: FAMILY MEDICINE CLINIC | Facility: CLINIC | Age: 44
End: 2024-12-05
Payer: MEDICARE

## 2024-12-05 VITALS
OXYGEN SATURATION: 98 % | WEIGHT: 267.2 LBS | HEART RATE: 70 BPM | TEMPERATURE: 98.5 F | BODY MASS INDEX: 45.62 KG/M2 | DIASTOLIC BLOOD PRESSURE: 80 MMHG | HEIGHT: 64 IN | SYSTOLIC BLOOD PRESSURE: 142 MMHG

## 2024-12-05 DIAGNOSIS — I10 ESSENTIAL HYPERTENSION: Primary | ICD-10-CM

## 2024-12-05 DIAGNOSIS — M35.3 POLYMYALGIA: ICD-10-CM

## 2024-12-05 PROCEDURE — 86038 ANTINUCLEAR ANTIBODIES: CPT | Performed by: FAMILY MEDICINE

## 2024-12-05 RX ORDER — ASPIRIN 325 MG
325 TABLET, DELAYED RELEASE (ENTERIC COATED) ORAL DAILY
COMMUNITY
Start: 2024-11-21 | End: 2024-12-05

## 2024-12-05 RX ORDER — ONDANSETRON 4 MG/1
4 TABLET, FILM COATED ORAL
COMMUNITY
Start: 2024-11-20

## 2024-12-05 RX ORDER — OXYCODONE AND ACETAMINOPHEN 5; 325 MG/1; MG/1
1 TABLET ORAL
COMMUNITY
Start: 2024-11-20

## 2024-12-05 NOTE — PROGRESS NOTES
Chief Complaint  Hypertension (Fluctuating up and down /)    Subjective        Bebe Steward presents to [facility]  History of Present Illness  The patient presents for evaluation of multiple medical concerns.    She experienced a fall from her bed on Thanksgiving day, which resulted in a minor reopening of a wound. She managed to close it using Steri-Strips. The wound drained slightly and appeared pink, but was not painful. She had undergone surgery on 11/20/2024, and the scar from this procedure is sensitive to touch. She was advised to get an x-ray if she noticed any clunking noise or if her arm started to lock up. She experienced arm lock-up last night and has an x-ray scheduled for Friday. She was prescribed Percocet for three days following her surgery.    She reported a drop in her blood pressure last week, which she attributed to residual anesthesia. She experienced dizziness and a general feeling of unwellness, with her blood pressure reading at 97/64. She was taken off her estrogen medication prior to her surgery. She received physical therapy at home for the first two weeks post-surgery, during which her blood pressure was regularly checked. On Monday, her blood pressure was recorded as 225/125 with a pulse of 72, even though she felt normal. She took her blood pressure medication last night and this morning and felt fine.    She also reported experiencing pain in areas where she usually does not feel pain, including every joint. She described her skin as feeling raw at times and mentioned a persistent rash on both arms. She has not been tested for SUZANNE lupus.    FAMILY HISTORY  Her mother had fibromyalgia.        Medical History: has a past medical history of Allergies, Anemia, Ankle pain, right, Anxiety (2014), Arthritis, Arthritis of back (2006), Bilateral carpal tunnel syndrome (07/10/2023), Brain concussion (1995), Callus, Cervical disc disorder (2012), Colon polyp  (03/23/2023), Difficulty walking, Essential hypertension (07/21/2020), Head injury (1996), HPV (human papilloma virus) infection, BKA, left, Hyperlipidemia, Hypoglycemia, Insomnia (07/21/2020), Left below-knee amputee (07/21/2020), Low back pain (2006), Low back strain, Lumbosacral disc disease (2006), Migraine, Muscle spasm (01/04/2021), Neck strain, Numbness in right foot, Obesity, 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   Surgical History: has a past surgical history that includes Abdominal surgery (2010); Leg amputation, lower tibia/fibula (Left, 2014); Ankle surgery; Endometrial ablation (2007); Gastric bypass (2001); Knee surgery; Tonsillectomy (1994); Spinal fusion (10/2008); Epidural block injection (2007); Trigger point injection (2021); Ankle fracture surgery (7025-5005); Cholecystectomy (2001); Fracture surgery (2008); Colonoscopy (N/A, 03/23/2023); total laparoscopic hysterectomy (N/A, 04/06/2023); Cystoscopy (N/A, 04/06/2023); Avulsion toenail plate (Right, 06/29/2023); Achilles tendon surgery; and Shoulder arthroscopy w/ rotator cuff repair (Left, 01/15/2024).   Family History: family history includes Alcohol abuse in her maternal grandmother; Anesthesia problems in her father; Anxiety disorder in her maternal grandmother, mother, and son; Arthritis in her father, maternal grandfather, maternal grandmother, mother, paternal grandfather, and paternal grandmother; Broken bones in her maternal grandmother, mother, and paternal grandmother; Cancer in her maternal grandfather, paternal aunt, and paternal grandmother; Colon cancer in her paternal aunt; Colon  cancer (age of onset: 87) in her paternal grandmother; Depression in her father, maternal grandmother, and mother; Developmental Disability in her paternal aunt; Diabetes in her maternal grandfather; Early death in her mother; Heart disease in her father, maternal grandfather, maternal grandmother, and paternal grandfather; Hyperlipidemia in her father, maternal grandfather, maternal grandmother, mother, paternal grandfather, and paternal grandmother; Hypertension in her father, maternal grandfather, maternal grandmother, mother, paternal grandfather, and paternal grandmother; Kidney disease in her father, maternal grandmother, mother, paternal grandfather, and paternal grandmother; Liver disease in her maternal grandmother; Melanoma in her maternal grandfather; Miscarriages / Stillbirths in her maternal grandmother and paternal grandmother; Osteoporosis in her maternal grandmother and paternal grandmother; Other in her father, mother, and paternal grandmother; Rheumatologic disease in her father and paternal grandmother; Stroke in her maternal grandfather and mother; Thyroid disease in her maternal grandmother and mother; Vision loss in her father.   Social History: reports that she quit smoking about 3 years ago. Her smoking use included cigarettes. She started smoking about 27 years ago. She has a 24 pack-year smoking history. She has been exposed to tobacco smoke. She has never used smokeless tobacco. She reports current alcohol use. She reports that she does not use drugs.  Immunization History   Administered Date(s) Administered    COVID-19 (MODERNA) 1st,2nd,3rd Dose Monovalent 12/11/2021, 12/13/2021    COVID-19 (MODERNA) Monovalent Original Booster 11/11/2021, 12/09/2021    Fluzone (or Fluarix & Flulaval for VFC) >6mos 10/05/2021, 12/16/2022, 10/25/2023    Influenza, Unspecified 12/02/2020, 12/16/2022, 12/16/2022       Objective   Vital Signs:  /80 (BP Location: Right arm, Patient Position: Sitting,  "Cuff Size: Adult)   Pulse 70   Temp 98.5 °F (36.9 °C) (Oral)   Ht 162.6 cm (64\")   Wt 121 kg (267 lb 3.2 oz)   SpO2 98%   BMI 45.86 kg/m²   Estimated body mass index is 45.86 kg/m² as calculated from the following:    Height as of this encounter: 162.6 cm (64\").    Weight as of this encounter: 121 kg (267 lb 3.2 oz).             ROS:  Review of Systems   Constitutional:  Positive for fatigue. Negative for chills, diaphoresis and fever.   HENT:  Positive for congestion. Negative for sore throat and swollen glands.    Respiratory:  Negative for cough.    Cardiovascular:  Negative for chest pain.   Gastrointestinal:  Negative for abdominal pain, nausea and vomiting.   Genitourinary:  Negative for dysuria.   Musculoskeletal:  Positive for myalgias and neck pain.   Skin:  Negative for rash.   Neurological:  Negative for weakness and numbness.      Physical Exam  Vitals reviewed.   Constitutional:       Appearance: Normal appearance.   HENT:      Right Ear: Tympanic membrane normal.      Left Ear: Tympanic membrane normal.      Nose: Nose normal.   Eyes:      Extraocular Movements: Extraocular movements intact.      Conjunctiva/sclera: Conjunctivae normal.      Pupils: Pupils are equal, round, and reactive to light.   Cardiovascular:      Rate and Rhythm: Normal rate and regular rhythm.   Pulmonary:      Effort: Pulmonary effort is normal.      Breath sounds: Normal breath sounds.   Abdominal:      General: Bowel sounds are normal.   Musculoskeletal:         General: Normal range of motion.      Cervical back: Normal range of motion.   Skin:     General: Skin is warm and dry.   Neurological:      General: No focal deficit present.      Mental Status: She is alert and oriented to person, place, and time.   Psychiatric:         Mood and Affect: Mood normal.         Behavior: Behavior normal.       Physical Exam  Clear lungs.  Normal heart sounds.    Vital Signs  Blood pressure is 142/80.      Result Review     The " following data was reviewed by: Kimberly Almaguer MD on 12/05/2024:  Common labs          1/8/2024    10:26 8/29/2024    11:44 10/7/2024    16:17 10/7/2024    16:21   Common Labs   Glucose 80  78  91     BUN 19  19  16     Creatinine 1.25  1.38  1.50     Sodium 139  140  139     Potassium 4.5  4.4  3.7     Chloride 103  106  107     Calcium 9.3  10.0  9.3     Albumin 3.9  4.3  3.9     Total Bilirubin  0.2      Alkaline Phosphatase  130      AST (SGOT)  12      ALT (SGPT)  11      WBC 7.23  13.22      Hemoglobin 12.4  14.0      Hematocrit 35.8  41.9      Platelets 272  340      Total Cholesterol  157      Triglycerides  123      HDL Cholesterol  55      LDL Cholesterol   80      Microalbumin, Urine    10.8      Results               Assessment and Plan     Diagnoses and all orders for this visit:    1. Essential hypertension (Primary)    2. Polymyalgia  -     SUZANNE With / DsDNA, RNP, Sjogrens A / B, Connelly      Assessment & Plan  1. Post-surgical wound infection.  The patient experienced a fall on Thanksgiving day, causing her surgical wound to reopen slightly. The wound is now pink and has drained a little, but it does not hurt. There is concern about the redness and potential infection. She will see her surgeon tomorrow for follow-up and x-rays. If the surgical team does not prescribe an antibiotic, she should inform me so that I can prescribe one. Clindamycin was mentioned as a possible antibiotic due to her allergies.    2. Hypertension.  The patient's blood pressure has been fluctuating significantly. Last week, it dropped to 97/64 and 97/50, possibly due to residual anesthesia effects. Recently, it spiked to 225/125 without symptoms, which is concerning. Today's reading is 142/80, which is acceptable given her current condition. She is advised to monitor her blood pressure closely and report any significant changes.    3. Fibromyalgia.  The patient reports widespread pain and skin sensitivity, which may be  indicative of fibromyalgia. Her mother had fibromyalgia, and she is concerned about the genetic link. An SUZANNE test will be conducted to rule out other autoimmune conditions such as lupus, scleroderma, and Sjogren's.         I spent 35 minutes caring for Bebe on this date of service. This time includes time spent by me in the following activities:reviewing tests  Follow Up   Return in about 3 months (around 3/5/2025).  Patient was given instructions and counseling regarding her condition or for health maintenance advice. Please see specific information pulled into the AVS if appropriate.   Patient or patient representative verbalized consent for the use of Ambient Listening during the visit with  Kimberly Almaguer MD for chart documentation. 12/15/2024  13:19 EST    Kimberly Almaguer MD      Answers submitted by the patient for this visit:  Primary Reason for Visit (Submitted on 12/3/2024)  What is the primary reason for your visit?: Problem Not Listed  Problem not listed (Submitted on 12/3/2024)  Chief Complaint: Other medical problem  Reason for appointment: Follow up/blood pressure  anorexia: Yes  joint pain: Yes  change in stool: No  headaches: No  joint swelling: Yes  vertigo: No  visual change: No  Onset: 1 to 5 years  Chronicity: chronic  Frequency: daily

## 2024-12-07 LAB — ANA SER QL: NEGATIVE

## 2024-12-09 ENCOUNTER — TRANSCRIBE ORDERS (OUTPATIENT)
Dept: PHYSICAL THERAPY | Facility: CLINIC | Age: 44
End: 2024-12-09
Payer: MEDICARE

## 2024-12-09 DIAGNOSIS — T81.49XA INFECTED INCISION: Primary | ICD-10-CM

## 2024-12-09 DIAGNOSIS — G89.18 ACUTE POSTOPERATIVE PAIN: ICD-10-CM

## 2024-12-09 DIAGNOSIS — Z96.612 S/P REVERSE TOTAL SHOULDER ARTHROPLASTY, LEFT: ICD-10-CM

## 2024-12-09 RX ORDER — HYDROXYZINE HYDROCHLORIDE 25 MG/1
TABLET, FILM COATED ORAL
Qty: 90 TABLET | Refills: 1 | Status: SHIPPED | OUTPATIENT
Start: 2024-12-09

## 2024-12-09 RX ORDER — HYDRALAZINE HYDROCHLORIDE 50 MG/1
50 TABLET, FILM COATED ORAL 3 TIMES DAILY
Qty: 90 TABLET | Refills: 1 | Status: SHIPPED | OUTPATIENT
Start: 2024-12-09

## 2025-01-08 ENCOUNTER — TELEPHONE (OUTPATIENT)
Dept: ORTHOPEDICS | Facility: OTHER | Age: 45
End: 2025-01-08
Payer: MEDICARE

## 2025-01-14 RX ORDER — RIMEGEPANT SULFATE 75 MG/75MG
TABLET, ORALLY DISINTEGRATING ORAL
Qty: 8 TABLET | Refills: 3 | Status: SHIPPED | OUTPATIENT
Start: 2025-01-14

## 2025-01-24 ENCOUNTER — TREATMENT (OUTPATIENT)
Dept: PHYSICAL THERAPY | Facility: CLINIC | Age: 45
End: 2025-01-24
Payer: MEDICARE

## 2025-01-24 DIAGNOSIS — Z96.612 S/P REVERSE TOTAL SHOULDER ARTHROPLASTY, LEFT: Primary | ICD-10-CM

## 2025-01-24 DIAGNOSIS — M25.512 ACUTE PAIN OF LEFT SHOULDER: ICD-10-CM

## 2025-01-24 DIAGNOSIS — R29.898 WEAKNESS OF LEFT SHOULDER: ICD-10-CM

## 2025-01-24 DIAGNOSIS — M25.612 DECREASED RANGE OF MOTION OF LEFT SHOULDER: ICD-10-CM

## 2025-01-24 NOTE — PROGRESS NOTES
Physical Therapy Initial Evaluation and Plan of Care  [address] [facility] [address] [place] [address]    Patient: Bebe Steward   : 1980  Diagnosis/ICD-10 Code:  S/P reverse total shoulder arthroplasty, left [Z96.612]  Referring practitioner: GO Ward  Date of Initial Visit: 2025  Today's Date: 2025  Patient seen for 1 sessions           Subjective Questionnaire: QuickDASH: 34/50      Subjective Evaluation    History of Present Illness  Mechanism of injury: Pt presents to PT s/p L reverse TSA on 2024. Pt reports on Thanksgiving she fell out of bed and landed on a cooler on her L shoulder. Pt has since been to the doctor and reports her shoulder is okay since fall. Pt presents to PT with no sling. Pt reports she takes pain medication for pain relief as needed. Pt reports she did have a cervical ablation last week, which did help with pain. Pt reports she has been performing table slides, wrist supination/pronation, and ball squeezes for HEP. Pt follows up with MD on 2025.    Pain  Current pain ratin  At best pain ratin  At worst pain ratin  Quality: discomfort  Relieving factors: medications (7.5 mg of Norco)  Aggravating factors: movement    Hand dominance: right    Patient Goals  Patient goals for therapy: decreased pain, increased motion, increased strength, independence with ADLs/IADLs and return to sport/leisure activities  Patient goal: To get dressed without pain and to be able to clean house         Objective          Observations   Left Shoulder   Positive for incision.     Additional Shoulder Observation Details  Incision is closed with one scab in the middle of incision. No signs and symptoms of infection.     Active Range of Motion   Left Shoulder   Flexion: 120 degrees with pain  Abduction: 72 degrees with pain  External rotation 0°: 50 degrees with pain  External rotation BTH: C2 with pain  Internal rotation BTB: Active internal rotation behind the  back: L hip. with pain    Passive Range of Motion   Left Shoulder   Flexion: 150 degrees   Abduction: 150 degrees   External rotation 45°: 45 degrees   Internal rotation 45°: 55 degrees     Strength/Myotome Testing     Left Shoulder     Planes of Motion   Flexion: 3-   Abduction: 2+   External rotation at 0°: 3-   Internal rotation at 0°: 3+     Isolated Muscles   Biceps: 4-   Triceps: 4-     Right Shoulder     Planes of Motion   Flexion: 5   Abduction: 5   External rotation at 0°: 5   Internal rotation at 0°: 5     Isolated Muscles   Biceps: 5   Triceps: 5         See Exercise, Manual, and Modality Logs for complete treatment.       Assessment & Plan       Assessment  Impairments: abnormal or restricted ROM, activity intolerance, impaired physical strength, lacks appropriate home exercise program, pain with function and weight-bearing intolerance   Functional limitations: carrying objects, lifting, pushing, uncomfortable because of pain, reaching behind back, reaching overhead and unable to perform repetitive tasks   Assessment details: Pt presents to PT s/p L reverse [facility] on 11/20/2024. Pt has decrease in L shoulder PROM/AROM, decrease in L shoulder strength, increase in L shoulder pain, and decrease in functional mobility. Pt requires skilled PT in order to address deficits listed to return patient back to maximum function such as reaching into cabinets and behind back. Reviewed with patient HEP, surgical precautions and plan to progress PT. Will progress patient as able.   Prognosis: good    Goals  Plan Goals: SHOULDER PROBLEMS:    1. The patient has limited ROM of the left shoulder.    LTG 1: 12 weeks: The patient will demonstrate 160 degrees of left shoulder flexion, 160 degrees of shoulder abduction, 60 degrees of shoulder external rotation, and shoulder internal rotation to L5 to allow the patient to reach into upper kitchen cabinets and manipulate clothing behind the back with greater ease.    STATUS:  New    STG 1a: 6 weeks: The patient will demonstrate 130 degrees of left shoulder flexion, 120 degrees of shoulder abduction,  and 40 degrees of shoulder external rotation to allow the patient to reach into upper kitchen cabinets and manipulate clothing behind the back with greater ease.    STATUS: New      2. The patient has limited strength of the left shoulder.    LTG 2: 12 weeks: The patient will demonstrate 4+/5 strength for left shoulder flexion, abduction, external rotation, and internal rotation in order to demonstrate improved shoulder stability.    STATUS: New      STG 2a: 6 weeks: The patient will demonstrate 4/5 strength for left shoulder flexion, abduction, external rotation, and internal rotation.    STATUS: New      STG2b: 6 weeks: The patient will be independent with home exercises.    STATUS: New    3. The patient complains of pain to the left shoulder.    LTG 3: 12 weeks: The patient will report a pain rating of 1/10 or better in order to improve sleep quality and tolerance to performance of activities of daily living.    STATUS: New    STG 3a: 6 weeks: The patient will report a pain rating of 3/10 or better.    STATUS: New    Carrying, Moving, and Handling Objects Functional Limitation    LTG 4: 12 weeks: The patient will demonstrate 1-19% limitation by achieving a score of 15 on the Quick DASH.    STATUS: New    STG 4a: 6 weeks: The patient will demonstrate 20-39% limitation by achieving a score of 25 on the Quick DASH.    STATUS: New        Plan  Therapy options: will be seen for skilled therapy services  Planned modality interventions: cryotherapy, TENS, thermotherapy (hydrocollator packs) and electrical stimulation/Russian stimulation  Planned therapy interventions: abdominal trunk stabilization, ADL retraining, body mechanics training, flexibility, functional ROM exercises, home exercise program, IADL retraining, joint mobilization, manual therapy, neuromuscular re-education, postural  training, soft tissue mobilization, spinal/joint mobilization, strengthening, stretching and therapeutic activities  Frequency: 2x week  Duration in weeks: 12  Treatment plan discussed with: patient        History # of Personal Factors and/or Comorbidities: LOW (0)  Examination of Body System(s): # of elements: LOW (1-2)  Clinical Presentation: STABLE   Clinical Decision Making: LOW       Timed:         Manual Therapy:    12     mins  03726;     Therapeutic Exercise:    12     mins  13917;     Neuromuscular Kaiden:    0    mins  89016;    Therapeutic Activity:     0     mins  58044;     Gait Trainin     mins  78900;     Ultrasound:     0     mins  31239;    Ionto                               0    mins   90428  Self pay                         0     mins [id number]    Un-Timed:  Electrical Stimulation:    0     mins  42063 ([facility] )  Traction     0     mins 55066  Low Eval     15     Mins  47513  Mod Eval     0     Mins  73253  High Eval                       0     Mins  66117  Self Pay Eval                 0     PTSP1   Re-Eval                           0    mins  36993        Timed Treatment:   24   mins   Total Treatment:     39   mins    PT SIGNATURE: Electronically signed by Jeison Wiggins [facility] LICENSE: [id number]    DATE TREATMENT INITIATED: 2025    Initial Certification  Certification Period: 2025 thru 2025  I certify that the therapy services are furnished while this patient is under my care.  The services outlined above are required by this patient, and will be reviewed every 90 days.     PHYSICIAN: Svetlana Abreu APRN   NPI: [id number]      DATE:     Please sign and return via fax to [phone] Thank you, [facility].

## 2025-01-31 RX ORDER — ATORVASTATIN CALCIUM 20 MG/1
20 TABLET, FILM COATED ORAL NIGHTLY
Qty: 90 TABLET | Refills: 1 | Status: SHIPPED | OUTPATIENT
Start: 2025-01-31

## 2025-02-07 RX ORDER — HYDRALAZINE HYDROCHLORIDE 50 MG/1
50 TABLET, FILM COATED ORAL 3 TIMES DAILY
Qty: 90 TABLET | Refills: 1 | Status: SHIPPED | OUTPATIENT
Start: 2025-02-07

## 2025-02-10 ENCOUNTER — OFFICE VISIT (OUTPATIENT)
Dept: FAMILY MEDICINE CLINIC | Facility: CLINIC | Age: 45
End: 2025-02-10
Payer: MEDICARE

## 2025-02-10 VITALS
HEIGHT: 64 IN | BODY MASS INDEX: 42.85 KG/M2 | WEIGHT: 251 LBS | HEART RATE: 91 BPM | DIASTOLIC BLOOD PRESSURE: 84 MMHG | TEMPERATURE: 99.1 F | SYSTOLIC BLOOD PRESSURE: 142 MMHG | OXYGEN SATURATION: 97 %

## 2025-02-10 DIAGNOSIS — R05.1 ACUTE COUGH: Primary | ICD-10-CM

## 2025-02-10 DIAGNOSIS — J10.1 INFLUENZA A: ICD-10-CM

## 2025-02-10 LAB
EXPIRATION DATE: ABNORMAL
FLUAV AG UPPER RESP QL IA.RAPID: DETECTED
FLUBV AG UPPER RESP QL IA.RAPID: NOT DETECTED
INTERNAL CONTROL: ABNORMAL
Lab: ABNORMAL
SARS-COV-2 AG UPPER RESP QL IA.RAPID: NOT DETECTED

## 2025-02-10 RX ORDER — OSELTAMIVIR PHOSPHATE 75 MG/1
75 CAPSULE ORAL 2 TIMES DAILY
Qty: 10 CAPSULE | Refills: 0 | Status: SHIPPED | OUTPATIENT
Start: 2025-02-10 | End: 2025-02-15

## 2025-02-10 RX ORDER — DOXYCYCLINE 100 MG/1
CAPSULE ORAL
COMMUNITY
Start: 2024-12-06 | End: 2025-02-27

## 2025-02-10 RX ORDER — CODEINE PHOSPHATE AND GUAIFENESIN 10; 100 MG/5ML; MG/5ML
5 SOLUTION ORAL 3 TIMES DAILY PRN
Qty: 118 ML | Refills: 0 | Status: SHIPPED | OUTPATIENT
Start: 2025-02-10 | End: 2025-02-20

## 2025-02-10 NOTE — PROGRESS NOTES
Chief Complaint  Cough, bilateral ear pain, joint pain, Headache, and Fever    Subjective        Bebe Steward presents to [facility]  History of Present Illness  The patient presents for evaluation of influenza A.    She began experiencing symptoms on Saturday night, initially manifesting as generalized body aches. By Sunday, she developed a cough, accompanied by bilateral ear pain and an overall sensation of body discomfort. She describes her cough as severe, originating from the abdomen, and associated with head pain. She also reports a diminished sense of smell, leading to concerns about a potential COVID-19 infection. Additionally, she has been experiencing sinus pressure. Her last influenza infection was approximately 20 years ago during her pregnancy with her middle child. She has previously used a pinkish-purple cough syrup containing codeine for symptom relief. She has been experiencing poor sleep quality over the past 2 weeks, with some nights characterized by complete insomnia and others by minimal sleep duration, which she believes may have contributed to her current illness.    IMMUNIZATIONS  She received the influenza vaccine in October.        Medical History: has a past medical history of Allergies, Anemia, Ankle pain, right, Anxiety (2014), Arthritis, Arthritis of back (2006), Bilateral carpal tunnel syndrome (07/10/2023), Brain concussion (1995), Callus, Cervical disc disorder (2012), Colon polyp (03/23/2023), Difficulty walking, Essential hypertension (07/21/2020), Head injury (1996), HPV (human papilloma virus) infection, BKA, left, Hyperlipidemia, Hypoglycemia, Insomnia (07/21/2020), Left below-knee amputee (07/21/2020), Low back pain (2006), Low back strain, Lumbosacral disc disease (2006), Migraine, Muscle spasm (01/04/2021), Neck strain, Numbness in right foot, Obesity, 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   Surgical History: has a past surgical history that includes Abdominal surgery (2010); Leg amputation, lower tibia/fibula (Left, 2014); Ankle surgery; Endometrial ablation (2007); Gastric bypass (2001); Knee surgery; Tonsillectomy (1994); Spinal fusion (10/2008); Epidural block injection (2007); Trigger point injection (2021); Ankle fracture surgery (9320-8135); Cholecystectomy (2001); Fracture surgery (2008); Colonoscopy (N/A, 03/23/2023); total laparoscopic hysterectomy (N/A, 04/06/2023); Cystoscopy (N/A, 04/06/2023); Avulsion toenail plate (Right, 06/29/2023); Achilles tendon surgery; and Shoulder arthroscopy w/ rotator cuff repair (Left, 01/15/2024).   Family History: family history includes Alcohol abuse in her maternal grandmother; Anesthesia problems in her father; Anxiety disorder in her maternal grandmother, mother, and son; Arthritis in her father, maternal grandfather, maternal grandmother, mother, paternal grandfather, and paternal grandmother; Broken bones in her maternal grandmother, mother, and paternal grandmother; Cancer in her maternal grandfather, paternal aunt, and paternal grandmother; Colon cancer in her paternal aunt; Colon cancer (age of onset: 87) in her paternal grandmother; Depression in her father, maternal grandmother, and mother; Developmental Disability in her paternal aunt; Diabetes in her maternal grandfather; Early death in her mother; Heart disease in her father, maternal grandfather, maternal grandmother, and paternal grandfather; Hyperlipidemia in her father, maternal grandfather, maternal grandmother, mother, paternal grandfather, and paternal grandmother; Hypertension in  "her father, maternal grandfather, maternal grandmother, mother, paternal grandfather, and paternal grandmother; Kidney disease in her father, maternal grandmother, mother, paternal grandfather, and paternal grandmother; Liver disease in her maternal grandmother; Melanoma in her maternal grandfather; Miscarriages / Stillbirths in her maternal grandmother and paternal grandmother; Osteoporosis in her maternal grandmother and paternal grandmother; Other in her father, mother, and paternal grandmother; Rheumatologic disease in her father and paternal grandmother; Stroke in her maternal grandfather and mother; Thyroid disease in her maternal grandmother and mother; Vision loss in her father.   Social History: reports that she quit smoking about 4 years ago. Her smoking use included cigarettes. She started smoking about 28 years ago. She has a 24 pack-year smoking history. She has been exposed to tobacco smoke. She has never used smokeless tobacco. She reports current alcohol use. She reports that she does not use drugs.  Immunization History   Administered Date(s) Administered    COVID-19 (MODERNA) 1st,2nd,3rd Dose Monovalent 12/11/2021, 12/13/2021    COVID-19 (MODERNA) Monovalent Original Booster 11/11/2021, 12/09/2021    Fluzone (or Fluarix & Flulaval for VFC) >6mos 10/05/2021, 12/16/2022, 10/25/2023    Influenza Seasonal Injectable 10/26/2015    Influenza, Unspecified 12/02/2016, 12/02/2020, 12/16/2022, 12/16/2022    Pneumococcal Polysaccharide (PPSV23) 12/02/2016    Pneumococcal, Unspecified 12/02/2016       Objective   Vital Signs:  /84   Pulse 91   Temp 99.1 °F (37.3 °C)   Ht 162.6 cm (64\")   Wt 114 kg (251 lb)   SpO2 97%   BMI 43.08 kg/m²   Estimated body mass index is 43.08 kg/m² as calculated from the following:    Height as of this encounter: 162.6 cm (64\").    Weight as of this encounter: 114 kg (251 lb).             ROS:  Review of Systems   Constitutional:  Positive for fever. Negative for " fatigue.   HENT:  Negative for congestion, ear pain and sinus pressure.    Respiratory:  Positive for cough. Negative for chest tightness and shortness of breath.    Cardiovascular:  Negative for chest pain, palpitations and leg swelling.   Gastrointestinal:  Negative for abdominal pain and diarrhea.   Genitourinary:  Negative for dysuria and frequency.   Musculoskeletal:  Positive for arthralgias.   Neurological:  Positive for headache. Negative for speech difficulty and confusion.   Psychiatric/Behavioral:  Negative for agitation and behavioral problems.       Physical Exam  Vitals reviewed.   Constitutional:       Appearance: Normal appearance.   HENT:      Right Ear: Tympanic membrane normal.      Left Ear: Tympanic membrane normal.      Nose: Nose normal.   Eyes:      Extraocular Movements: Extraocular movements intact.      Conjunctiva/sclera: Conjunctivae normal.      Pupils: Pupils are equal, round, and reactive to light.   Cardiovascular:      Rate and Rhythm: Normal rate and regular rhythm.   Pulmonary:      Effort: Pulmonary effort is normal.      Breath sounds: Normal breath sounds.   Abdominal:      General: Bowel sounds are normal.   Musculoskeletal:         General: Normal range of motion.      Cervical back: Normal range of motion.   Skin:     General: Skin is warm and dry.   Neurological:      General: No focal deficit present.      Mental Status: She is alert and oriented to person, place, and time.   Psychiatric:         Mood and Affect: Mood normal.         Behavior: Behavior normal.       Physical Exam  Fluid is present in both ears.      Result Review     The following data was reviewed by: Kimberly Almaguer MD on 02/10/2025:  Common labs          8/29/2024    11:44 10/7/2024    16:17 10/7/2024    16:21   Common Labs   Glucose 78  91     BUN 19  16     Creatinine 1.38  1.50     Sodium 140  139     Potassium 4.4  3.7     Chloride 106  107     Calcium 10.0  9.3     Albumin 4.3  3.9     Total  Bilirubin 0.2      Alkaline Phosphatase 130      AST (SGOT) 12      ALT (SGPT) 11      WBC 13.22      Hemoglobin 14.0      Hematocrit 41.9      Platelets 340      Total Cholesterol 157      Triglycerides 123      HDL Cholesterol 55      LDL Cholesterol  80      Microalbumin, Urine   10.8      Results  Laboratory Studies  Positive for influenza A.             Assessment and Plan     Diagnoses and all orders for this visit:    1. Acute cough (Primary)  -     POCT SARS-CoV-2 Antigen KAROLINA + Flu  -     guaiFENesin-codeine (ROMILAR-AC) 100-10 MG/5ML solution/syrup; Take 5 mL by mouth 3 (Three) Times a Day As Needed for Cough for up to 10 days.  Dispense: 118 mL; Refill: 0    2. Influenza A  -     guaiFENesin-codeine (ROMILAR-AC) 100-10 MG/5ML solution/syrup; Take 5 mL by mouth 3 (Three) Times a Day As Needed for Cough for up to 10 days.  Dispense: 118 mL; Refill: 0  -     oseltamivir (Tamiflu) 75 MG capsule; Take 1 capsule by mouth 2 (Two) Times a Day for 5 days.  Dispense: 10 capsule; Refill: 0      Assessment & Plan  1. Influenza A.  She tested positive for influenza A. Symptoms include body aches, cough, ear pain, and sinus pressure. The presence of fluid in both ears is likely contributing to the sinus congestion and associated discomfort. A prescription for Tamiflu will be provided to manage the influenza A infection. Additionally, a prescription for Robitussin-AC will be given to alleviate the cough and aid in rest. She is advised to ensure adequate rest and hydration.       I spent 35 minutes caring for Bebe on this date of service. This time includes time spent by me in the following activities:reviewing tests  Follow Up   Return if symptoms worsen or fail to improve.  Patient was given instructions and counseling regarding her condition or for health maintenance advice. Please see specific information pulled into the AVS if appropriate.   Patient or patient representative verbalized consent for the use of Ambient  Listening during the visit with  Kimberly Almaguer MD for chart documentation. 2/27/2025  20:08 SUE Almaguer MD

## 2025-02-14 DIAGNOSIS — F51.01 PRIMARY INSOMNIA: ICD-10-CM

## 2025-02-17 RX ORDER — ZALEPLON 10 MG/1
20 CAPSULE ORAL NIGHTLY
Qty: 60 CAPSULE | Refills: 2 | Status: SHIPPED | OUTPATIENT
Start: 2025-02-17

## 2025-02-22 ENCOUNTER — HOSPITAL ENCOUNTER (EMERGENCY)
Facility: HOSPITAL | Age: 45
Discharge: HOME | End: 2025-02-22
Attending: EMERGENCY MEDICINE
Payer: MEDICARE

## 2025-02-22 ENCOUNTER — APPOINTMENT (OUTPATIENT)
Dept: GENERAL RADIOLOGY | Facility: HOSPITAL | Age: 45
End: 2025-02-22
Payer: MEDICARE

## 2025-02-22 VITALS
RESPIRATION RATE: 18 BRPM | TEMPERATURE: 98.1 F | SYSTOLIC BLOOD PRESSURE: 111 MMHG | OXYGEN SATURATION: 100 % | HEART RATE: 79 BPM | DIASTOLIC BLOOD PRESSURE: 65 MMHG

## 2025-02-22 DIAGNOSIS — Z91.81 HISTORY OF RECENT FALL: ICD-10-CM

## 2025-02-22 DIAGNOSIS — M25.552 BILATERAL HIP PAIN: Primary | ICD-10-CM

## 2025-02-22 DIAGNOSIS — M25.551 BILATERAL HIP PAIN: Primary | ICD-10-CM

## 2025-02-22 DIAGNOSIS — A49.9 UTI (URINARY TRACT INFECTION), BACTERIAL: ICD-10-CM

## 2025-02-22 DIAGNOSIS — N39.0 UTI (URINARY TRACT INFECTION), BACTERIAL: ICD-10-CM

## 2025-02-22 LAB
BACTERIA UR QL AUTO: ABNORMAL /HPF
BILIRUB UR QL STRIP: NEGATIVE
CLARITY UR: CLEAR
COLOR UR: YELLOW
GLUCOSE UR STRIP-MCNC: NEGATIVE MG/DL
HGB UR QL STRIP.AUTO: ABNORMAL
HYALINE CASTS UR QL AUTO: ABNORMAL /LPF
KETONES UR QL STRIP: NEGATIVE
LEUKOCYTE ESTERASE UR QL STRIP.AUTO: NEGATIVE
NITRITE UR QL STRIP: NEGATIVE
PH UR STRIP.AUTO: 5.5 [PH] (ref 5–8)
PROT UR QL STRIP: ABNORMAL
RBC # UR STRIP: ABNORMAL /HPF
REF LAB TEST METHOD: ABNORMAL
SP GR UR STRIP: 1.01 (ref 1–1.03)
SQUAMOUS #/AREA URNS HPF: ABNORMAL /HPF
UROBILINOGEN UR QL STRIP: ABNORMAL
WBC # UR STRIP: ABNORMAL /HPF

## 2025-02-22 PROCEDURE — 99283 EMERGENCY DEPT VISIT LOW MDM: CPT

## 2025-02-22 PROCEDURE — 96372 THER/PROPH/DIAG INJ SC/IM: CPT

## 2025-02-22 PROCEDURE — 73521 X-RAY EXAM HIPS BI 2 VIEWS: CPT

## 2025-02-22 PROCEDURE — 25010000002 DEXAMETHASONE SODIUM PHOSPHATE 10 MG/ML SOLUTION

## 2025-02-22 PROCEDURE — 81001 URINALYSIS AUTO W/SCOPE: CPT | Performed by: EMERGENCY MEDICINE

## 2025-02-22 PROCEDURE — 25010000002 HYDROMORPHONE 1 MG/ML SOLUTION: Performed by: EMERGENCY MEDICINE

## 2025-02-22 RX ORDER — NITROFURANTOIN 25; 75 MG/1; MG/1
100 CAPSULE ORAL 2 TIMES DAILY
Qty: 10 CAPSULE | Refills: 0 | Status: SHIPPED | OUTPATIENT
Start: 2025-02-22 | End: 2025-02-27

## 2025-02-22 RX ORDER — NITROFURANTOIN 25; 75 MG/1; MG/1
100 CAPSULE ORAL 2 TIMES DAILY
Qty: 10 CAPSULE | Refills: 0 | Status: SHIPPED | OUTPATIENT
Start: 2025-02-22 | End: 2025-02-22

## 2025-02-22 RX ORDER — DEXAMETHASONE SODIUM PHOSPHATE 10 MG/ML
10 INJECTION, SOLUTION INTRAMUSCULAR; INTRAVENOUS ONCE
Status: COMPLETED | OUTPATIENT
Start: 2025-02-22 | End: 2025-02-22

## 2025-02-22 RX ADMIN — HYDROMORPHONE HYDROCHLORIDE 0.5 MG: 1 INJECTION, SOLUTION INTRAMUSCULAR; INTRAVENOUS; SUBCUTANEOUS at 20:18

## 2025-02-22 RX ADMIN — DEXAMETHASONE SODIUM PHOSPHATE 10 MG: 10 INJECTION INTRAMUSCULAR; INTRAVENOUS at 20:18

## 2025-02-22 NOTE — ED PROVIDER NOTES
Time: 6:13 PM EST  Date of encounter:  2/22/2025  Independent Historian/Clinical History and Information was obtained by:   Patient    History is limited by: N/A    Chief Complaint   Patient presents with    Hip Pain    Fall         History of Present Illness:  Patient is a 44 y.o. year old female who presents to the emergency department for evaluation of bilateral hip pain secondary to mechanical fall that occurred just prior to arrival. She states that right is worse than left. She does have a left prosthetic leg. States that she slept on the couch last night and woke up between her kitchen table and the couch. She has a shira on her right arm from the fall. She does not remember doing this. States that she did not take her sleeping pill last night. Since then, she's had trouble moving her right leg. She feels like it would give up on her. She does take Norco 7.5mg TID, Gabapentin 600mg TID, Tizanidine 4mg BID. She is established with pain management. States that she's taken her pain meds with minimal relief. Denies any head trauma.    Patient Care Team  Primary Care Provider: Kimberly Almaguer MD    Past Medical History:     Allergies   Allergen Reactions    Adhesive Tape Itching and Rash    Cefaclor Rash, Hives, Itching and Swelling     Other reaction(s): Rash, Swelling, Rash, Swelling    Cephalexin Rash    Cyclobenzaprine Other (See Comments)     Muscle spasms    Erythromycin Rash    Meperidine Nausea And Vomiting and Headache    Monosodium Glutamate Other (See Comments)     Blood pressure drops, pass out     Morphine Mental Status Change and Irritability    Nsaids Unknown - High Severity     CKD     Penicillins Anaphylaxis and Rash     1. When was your reaction? < 10 years ago    2. Did your reaction happen after the first dose? Do not know    3. Did your reaction happen after several doses? Do not know    4. Did your reaction require ED or hospital care to manage your reaction? Do not know    5. Did your  reaction require treatment with epinephrine? Do not know    6. Have you taken amoxicillin (Amoxil) or amoxicillin-clavulanate (Augmentin) without issue since? no    7. Have you taken cephalexin (Keflex) without issue since?  No    Sulfa Antibiotics Rash    Sulfamethoxazole-Trimethoprim Rash    Vancomycin Itching     Past Medical History:   Diagnosis Date    Allergies     seasonal and multi drug    Anemia     no current issues    Ankle pain, right     Anxiety 2014    Arthritis     knee    Arthritis of back 2006    Bilateral carpal tunnel syndrome 07/10/2023    Brain concussion 1995    no residual    Callus     Cervical disc disorder 2012    Compressed disc    Colon polyp 03/23/2023    removed    Difficulty walking     RBK prosthetic    Essential hypertension 07/21/2020    Head injury 1996    HPV (human papilloma virus) infection     last 2 pap clear    Hx of BKA, left     Uses a prosthetic leg    Hyperlipidemia     Hypoglycemia     Insomnia 07/21/2020    Left below-knee amputee 07/21/2020    Low back pain 2006    Low back strain     Lumbosacral disc disease 2006    Migraine     Muscle spasm 01/04/2021    Neck strain     full ROM    Numbness in right foot     Obesity     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    Pain management clinic    Renal insufficiency 2020    right kidney atrophy bmi >50    Right foot pain 12/02/2020    Right hip pain 07/21/2020    Stage 3 chronic kidney disease 09/01/2020    currently stage 2 3-24-23    Subluxation of patella 1996    Left    Tear of meniscus of knee 1996    Thoracic disc disorder     Visual impairment 1988    Near sighted    Vitamin D deficiency 12/02/2020     Past Surgical History:   Procedure Laterality Date    ABDOMINAL SURGERY  2010    scar tissue removed    ACHILLES TENDON SURGERY      ANKLE OPEN REDUCTION INTERNAL FIXATION  [id number]    Left 11 surgeries total, external fixation    ANKLE SURGERY       3/08, 5/08, 8/08, 12/10, 03/11, 2012, 2013, 2014    AVULSION TOENAIL PLATE Right 06/29/2023    BIG TOE    BELOW KNEE AMPUTATION Left 2014    CHOLECYSTECTOMY  2001    COLONOSCOPY N/A 03/23/2023    Procedure: COLONOSCOPY WITH COLD SNARE POLYPECTOMIES;  Surgeon: Tian Smith MD;  Location: [facility] ENDOSCOPY;  Service: General;  Laterality: N/A;  COLON POLYPS    CYSTOSCOPY N/A 04/06/2023    Procedure: CYSTOSCOPY;  Surgeon: Yoan Yoo MD;  Location: [facility] MAIN OR;  Service: Gynecology;  Laterality: N/A;    ENDOMETRIAL ABLATION  2007    EPIDURAL BLOCK  2007    FRACTURE SURGERY  2008    left ankle    GASTRIC BYPASS  2001    open Ruen Y    KNEE SURGERY      left 1996,1997 right 2015 scope on right, open procedure on left x2    SHOULDER ARTHROSCOPY W/ ROTATOR CUFF REPAIR Left 01/15/2024    Procedure: SHOULDER ARTHROSCOPY DISTAL CLAVICLE RESECTION, ROTATOR CUFF REPAIR DEBRIDEMENT, SUBCROMIAL DECOMPRESSION, biceps tenotomy, chondroplasty;  Surgeon: Tomy Dobson MD;  Location: [facility] OR OSC;  Service: Orthopedics;  Laterality: Left;    SPINAL FUSION  10/2008    L5/S1    TONSILLECTOMY  1994    TOTAL LAPAROSCOPIC HYSTERECTOMY N/A 04/06/2023    Procedure: TOTAL LAPAROSCOPIC HYSTERECTOMY BILATERAL SALPINGO OOPHORECTOMY;  Surgeon: Yoan Yoo MD;  Location: [facility] MAIN OR;  Service: Gynecology;  Laterality: N/A;    TRIGGER POINT INJECTION  2021     Family History   Problem Relation Age of Onset    Stroke Mother         3 TIAs    Other Mother         Fibromyalgia    Kidney disease Mother         Cause of death    Anxiety disorder Mother     Arthritis Mother     Depression Mother     Early death Mother         50 yrs old kidney disease    Hyperlipidemia Mother     Thyroid disease Mother     Broken bones Mother         Leg    Hypertension Mother     Heart disease Father     Kidney disease Father         Currently on dialysis    Arthritis Father     Depression Father     Hyperlipidemia Father      Vision loss Father         Glaucoma, some vision loss    Anesthesia problems Father     Rheumatologic disease Father         Gout    Other Father         Gout    Hypertension Father     Anxiety disorder Son     Colon cancer Paternal Aunt     Cancer Paternal Aunt         Colon/liver    Developmental Disability Paternal Aunt         Down Syndrome    Anxiety disorder Maternal Grandmother     Arthritis Maternal Grandmother     Depression Maternal Grandmother     Heart disease Maternal Grandmother     Hyperlipidemia Maternal Grandmother     Kidney disease Maternal Grandmother         Cause of death    Liver disease Maternal Grandmother         Alcohol abuse    Thyroid disease Maternal Grandmother     Osteoporosis Maternal Grandmother     Miscarriages / Stillbirths Maternal Grandmother         Miscarriage    Broken bones Maternal Grandmother         Clavicle/shoulder    Alcohol abuse Maternal Grandmother     Hypertension Maternal Grandmother     Melanoma Maternal Grandfather     Diabetes Maternal Grandfather         Type 2    Stroke Maternal Grandfather     Heart disease Maternal Grandfather     Arthritis Maternal Grandfather     Cancer Maternal Grandfather         Melanoma    Hyperlipidemia Maternal Grandfather     Hypertension Maternal Grandfather     Colon cancer Paternal Grandmother 87    Arthritis Paternal Grandmother     Cancer Paternal Grandmother         Colon/kidney    Hyperlipidemia Paternal Grandmother     Kidney disease Paternal Grandmother     Osteoporosis Paternal Grandmother     Miscarriages / Stillbirths Paternal Grandmother         Miscarriage    Rheumatologic disease Paternal Grandmother         Gout    Broken bones Paternal Grandmother         Clavicle/shoulder    Other Paternal Grandmother         Gout    Hypertension Paternal Grandmother     Arthritis Paternal Grandfather     Heart disease Paternal Grandfather     Hyperlipidemia Paternal Grandfather     Kidney disease Paternal Grandfather      Hypertension Paternal Grandfather     Ovarian cancer Neg Hx     Uterine cancer Neg Hx     Breast cancer Neg Hx     Prostate cancer Neg Hx     Malig Hyperthermia Neg Hx        Home Medications:  Prior to Admission medications    Medication Sig Start Date End Date Taking? Authorizing Provider   ammonium lactate (AmLactin) 12 % lotion Apply  topically to the appropriate area as directed 2 (Two) Times a Day. 6/14/23   William Marti DPM   atorvastatin (LIPITOR) 20 MG tablet TAKE ONE TABLET BY MOUTH ONCE NIGHTLY 1/31/25   Kimberly Almaguer MD   doxycycline (VIBRAMYCIN) 100 MG capsule  12/6/24   Adelina De Jesus MD   estradiol (Estrace) 1 MG tablet Take 1 tablet by mouth Every Night. 4/8/24   Yoan Yoo MD   furosemide (LASIX) 20 MG tablet TAKE 1 TABLET BY MOUTH DAILY AS NEEDED FOR SWELLING 6/28/24   Kimberly Almaguer MD   gabapentin (NEURONTIN) 600 MG tablet Take 1 tablet by mouth 3 (Three) Times a Day. 1/3/24   Adelina De Jesus MD   hydrALAZINE (APRESOLINE) 50 MG tablet TAKE 1 TABLET BY MOUTH 3 TIMES A DAY 2/7/25   Kimberly Almaguer MD   HYDROcodone-acetaminophen (NORCO) 7.5-325 MG per tablet Take 1 tablet by mouth 3 (Three) Times a Day As Needed for Moderate Pain.    Adelina De Jesus MD   hydrOXYzine (ATARAX) 25 MG tablet TAKE ONE TABLET BY MOUTH EVERY 8 HOURS AS NEEDED FOR ANXIETY 12/9/24   Kimberly Almaguer MD   irbesartan (AVAPRO) 75 MG tablet TAKE ONE TABLET BY MOUTH ONCE NIGHTLY 11/6/24   Kimberly Almaguer MD   loratadine (CLARITIN) 10 MG tablet TAKE ONE TABLET BY MOUTH DAILY 4/4/24   Kimberly Almaguer MD   Nurtec 75 MG dispersible tablet DISSOLVE 1 TABLET BY MOUTH DAILY AS NEEDED FOR HEADACHE FOR UP TO 30 DAYS 1/14/25   Kimberly Almaguer MD   ondansetron (ZOFRAN) 4 MG tablet Take 1 tablet by mouth.  Patient not taking: Reported on 2/10/2025 11/20/24   Adelina De Jesus MD   sertraline (ZOLOFT) 100 MG tablet Take 1.5 tablets by mouth Daily for 90 days. 8/29/24 12/5/24   Kimberly Almaguer MD   tiZANidine (ZANAFLEX) 4 MG tablet Take 1 tablet by mouth 2 (Two) Times a Day As Needed. 12/3/23   Provider, MD Adelina   vitamin D (ERGOCALCIFEROL) 1.25 MG (69629 UT) capsule capsule TAKE ONE CAPSULE BY MOUTH ONCE WEEKLY  Patient taking differently: Take 1 capsule by mouth 1 (One) Time Per Week. Last dose 1/3/24 11/9/23   Kimberly Almaguer MD   zaleplon (SONATA) 10 MG capsule TAKE 2 CAPSULES EVERY NIGHT 25   Kimberly Almaguer MD        Social History:   Social History     Tobacco Use    Smoking status: Former     Current packs/day: 0.00     Average packs/day: 1 pack/day for 24.0 years (24.0 ttl pk-yrs)     Types: Cigarettes     Start date: 1997     Quit date: 2021     Years since quittin.1     Passive exposure: Current    Smokeless tobacco: Never   Vaping Use    Vaping status: Never Used   Substance Use Topics    Alcohol use: Yes     Comment: Rarely. 1-2 times yearly    Drug use: Never         Review of Systems:  Review of Systems   Constitutional:  Negative for chills and fever.   HENT:  Negative for congestion, ear pain, rhinorrhea and sore throat.    Eyes:  Negative for pain and visual disturbance.   Respiratory:  Negative for apnea, cough, chest tightness and shortness of breath.    Cardiovascular:  Negative for chest pain and palpitations.   Gastrointestinal:  Negative for abdominal pain, diarrhea, nausea and vomiting.   Genitourinary:  Negative for difficulty urinating and dysuria.   Musculoskeletal:  Positive for arthralgias. Negative for joint swelling and myalgias.   Skin:  Negative for color change and rash.   Neurological:  Negative for seizures and headaches.   Psychiatric/Behavioral: Negative.     All other systems reviewed and are negative.       Physical Exam:  /65 (BP Location: Left arm, Patient Position: Sitting)   Pulse 79   Temp 98.1 °F (36.7 °C) (Oral)   Resp 18   LMP 2022   SpO2 100%         Physical Exam  HENT:      Head:  Normocephalic.      Mouth/Throat:      Mouth: Mucous membranes are moist.   Eyes:      Pupils: Pupils are equal, round, and reactive to light.   Pulmonary:      Effort: Pulmonary effort is normal.   Abdominal:      General: There is no distension.   Musculoskeletal:      Cervical back: Neck supple.      Comments: No tenderness to the denzel SI. No tenderness to the right GTB. Mild tenderness to the lumbar spine. Patient was able to stand up with minimal assist.    Skin:     General: Skin is warm and dry.   Neurological:      General: No focal deficit present.      Mental Status: She is alert and oriented to person, place, and time.   Psychiatric:         Mood and Affect: Mood normal.         Behavior: Behavior normal.                            Medical Decision Making:      Comorbidities that affect care:    HTN, LBP, HLD, Chronic LBP, Left BKA    External Notes reviewed:    None      The following orders were placed and all results were independently analyzed by me:  Orders Placed This Encounter   Procedures    XR Hips Bilateral With or Without Pelvis 2 View    Urinalysis With Microscopic If Indicated (No Culture) - Urine, Clean Catch    Urinalysis, Microscopic Only - Urine, Clean Catch       Medications Given in the Emergency Department:  Medications   dexAMETHasone sodium phosphate injection 10 mg (10 mg Intramuscular Given 2/22/25 2018)   HYDROmorphone (DILAUDID) injection 0.5 mg (0.5 mg Intramuscular Given 2/22/25 2018)        ED Course:    The patient was initially evaluated in the triage area where orders were placed. The patient was later dispositioned by CADY Rudd.      The patient was advised to stay for completion of workup which includes but is not limited to communication of labs and radiological results, reassessment and plan. The patient was advised that leaving prior to disposition by a provider could result in critical findings that are not communicated to the patient.     ED Course as of 02/22/25  2028   Sat Feb 22, 2025 1813 Provider In Triage: Patient was evaluated by me in triage, Clive Mora PA-C. Orders were placed and the patient is currently awaiting final results and disposition.   [SK]   1911 XR Hips Bilateral With or Without Pelvis 2 View  Neg [MV]   2022 No tenderness to the denzel SI. No tenderness to the right GTB. Mild tenderness to the lumbar spine. Patient was able to stand up with minimal assist.  [MV]   2022 Urinalysis, Microscopic Only - Urine, Clean Catch(!)  Pt is positive for UTI. Will dc patient with macrobid. [MV]   2025 XR Hips Bilateral With or Without Pelvis 2 View  Negative X-ray. Patient was able to stand up with minimal assist. Will give the patient a steroid and dilaudid. [MV]      ED Course User Index  [MV] Kristofer Kent PA  [SK] Clive Mora PA-C       Labs:    Lab Results (last 24 hours)       Procedure Component Value Units Date/Time    Urinalysis With Microscopic If Indicated (No Culture) - Urine, Clean Catch [[id number]]  (Abnormal) Collected: 02/22/25 1930    Specimen: Urine, Clean Catch Updated: 02/22/25 1941     Color, UA Yellow     Appearance, UA Clear     pH, UA 5.5     Specific Gravity, UA 1.008     Glucose, UA Negative     Ketones, UA Negative     Bilirubin, UA Negative     Blood, UA Large (3+)     Protein,  mg/dL (2+)     Leuk Esterase, UA Negative     Nitrite, UA Negative     Urobilinogen, UA 0.2 E.U./dL    Urinalysis, Microscopic Only - Urine, Clean Catch [[id number]]  (Abnormal) Collected: 02/22/25 1930    Specimen: Urine, Clean Catch Updated: 02/22/25 1941     RBC, UA 11-20 /HPF      WBC, UA 3-5 /HPF      Bacteria, UA 1+ /HPF      Squamous Epithelial Cells, UA 0-2 /HPF      Hyaline Casts, UA 3-6 /LPF      Methodology Automated Microscopy             Imaging:    XR Hips Bilateral With or Without Pelvis 2 View    Result Date: 2/22/2025  XR HIPS BILATERAL W OR WO PELVIS 2 VIEW Date of Exam: 2/22/2025 6:42 PM EST Indication: fall Comparison: Right hip  series with AP pelvis 7/22/2020 Findings: The sacrum and bony pelvis appear intact. Post lumbosacral fusion. The proximal femurs appear intact. No degenerative change is evident. No lytic or sclerotic bone lesions are seen.     Impression: Negative bilateral hip series with AP pelvis. Electronically Signed: Ranjit Singh MD  2/22/2025 6:49 PM EST  Workstation ID: [id number]       Differential Diagnosis and Discussion:      Orthopedic Injuries: Differential diagnosis includes but is not limited to fractures, soft tissue injuries, dislocations, contusions, ligamentous injuries, tendon injuries, nerve injuries, compartment syndrome, bursitis, and vascular injuries.    PROCEDURES:    Labs were collected in the emergency department and all labs were reviewed and interpreted by me.  X-ray were performed in the emergency department and all X-ray impressions were independently interpreted by me.    No orders to display        Procedures    MDM     Amount and/or Complexity of Data Reviewed  Clinical lab tests: reviewed  Tests in the radiology section of CPT®: reviewed    Risk of Complications, Morbidity, and/or Mortality  Presenting problems: moderate  Diagnostic procedures: low  Management options: low    Patient Progress  Patient progress: stable                     Patient Care Considerations:    NARCOTICS: I considered prescribing opiate pain medication as an outpatient, however patient already has norco at home.      Consultants/Shared Management Plan:    None    Social Determinants of Health:    Patient is independent, reliable, and has access to care.       Disposition and Care Coordination:    Discharged: The patient is suitable and stable for discharge with no need for consideration of admission.    I have explained the patient´s condition, diagnoses and treatment plan based on the information available to me at this time. I have answered questions and addressed any concerns. The patient has a good  understanding of  the patient´s diagnosis, condition, and treatment plan as can be expected at this point. The vital signs have been stable. The patient´s condition is stable and appropriate for discharge from the emergency department.      The patient will pursue further outpatient evaluation with the primary care physician or other designated or consulting physician as outlined in the discharge instructions. They are agreeable to this plan of care and follow-up instructions have been explained in detail. The patient has received these instructions in written format and has expressed an understanding of the discharge instructions. The patient is aware that any significant change in condition or worsening of symptoms should prompt an immediate return to this or the closest emergency department or call to 911.  I have explained discharge medications and the need for follow up with the patient/caretakers. This was also printed in the discharge instructions. Patient was discharged with the following medications and follow up:      Medication List        Changed      vitamin D 1.25 MG (17450 UT) capsule capsule  Commonly known as: ERGOCALCIFEROL  TAKE ONE CAPSULE BY MOUTH ONCE WEEKLY  What changed: additional instructions           No follow-up provider specified.     Final diagnoses:   Bilateral hip pain   History of recent fall   UTI (urinary tract infection), bacterial        ED Disposition       ED Disposition   Discharge    Condition   Stable    Comment   --               This medical record created using voice recognition software.             Kristofer Kent PA  02/22/25 2029

## 2025-02-23 NOTE — DISCHARGE INSTRUCTIONS
As discussed, your x-ray today is negative for any acute fractures or malalignment. Continue taking your pain medications. Follow up with your PCP in 2-3 days especially if your symptoms persist.    You also have a mild UTI. You are being discharged home with macrobid.    Return to the Emergency Department if you develop any uncontrollable fever, intractable pain, nausea, vomiting.

## 2025-03-07 ENCOUNTER — OFFICE VISIT (OUTPATIENT)
Dept: FAMILY MEDICINE CLINIC | Facility: CLINIC | Age: 45
End: 2025-03-07
Payer: MEDICARE

## 2025-03-07 VITALS
HEART RATE: 73 BPM | OXYGEN SATURATION: 98 % | WEIGHT: 246 LBS | TEMPERATURE: 98.3 F | HEIGHT: 64 IN | DIASTOLIC BLOOD PRESSURE: 76 MMHG | SYSTOLIC BLOOD PRESSURE: 138 MMHG | BODY MASS INDEX: 42 KG/M2

## 2025-03-07 DIAGNOSIS — M62.81 MUSCLE WEAKNESS OF LOWER EXTREMITY: ICD-10-CM

## 2025-03-07 DIAGNOSIS — R31.0 GROSS HEMATURIA: ICD-10-CM

## 2025-03-07 DIAGNOSIS — I10 ESSENTIAL HYPERTENSION: Primary | ICD-10-CM
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
## 2025-03-07 LAB
ALBUMIN SERPL-MCNC: 3.8 G/DL (ref 3.5–5.2)
ALBUMIN/GLOB SERPL: 1.2 G/DL
ALP SERPL-CCNC: 113 U/L (ref 39–117)
ALT SERPL W P-5'-P-CCNC: 13 U/L (ref 1–33)
ANION GAP SERPL CALCULATED.3IONS-SCNC: 11.9 MMOL/L (ref 5–15)
AST SERPL-CCNC: 21 U/L (ref 1–32)
BASOPHILS # BLD AUTO: 0.05 10*3/MM3 (ref 0–0.2)
BASOPHILS NFR BLD AUTO: 0.5 % (ref 0–1.5)
BILIRUB BLD-MCNC: NEGATIVE MG/DL
BILIRUB SERPL-MCNC: <0.2 MG/DL (ref 0–1.2)
BUN SERPL-MCNC: 14 MG/DL (ref 6–20)
BUN/CREAT SERPL: 12.5 (ref 7–25)
CALCIUM SPEC-SCNC: 9.2 MG/DL (ref 8.6–10.5)
CHLORIDE SERPL-SCNC: 108 MMOL/L (ref 98–107)
CLARITY, POC: CLEAR
CO2 SERPL-SCNC: 19.1 MMOL/L (ref 22–29)
COLOR UR: YELLOW
CREAT SERPL-MCNC: 1.12 MG/DL (ref 0.57–1)
DEPRECATED RDW RBC AUTO: 46.8 FL (ref 37–54)
EGFRCR SERPLBLD CKD-EPI 2021: 61.2 ML/MIN/1.73
EOSINOPHIL # BLD AUTO: 0.17 10*3/MM3 (ref 0–0.4)
EOSINOPHIL NFR BLD AUTO: 1.5 % (ref 0.3–6.2)
ERYTHROCYTE [DISTWIDTH] IN BLOOD BY AUTOMATED COUNT: 13.3 % (ref 12.3–15.4)
GLOBULIN UR ELPH-MCNC: 3.3 GM/DL
GLUCOSE SERPL-MCNC: 94 MG/DL (ref 65–99)
GLUCOSE UR STRIP-MCNC: NEGATIVE MG/DL
HCT VFR BLD AUTO: 39.6 % (ref 34–46.6)
HGB BLD-MCNC: 12.7 G/DL (ref 12–15.9)
IMM GRANULOCYTES # BLD AUTO: 0.03 10*3/MM3 (ref 0–0.05)
IMM GRANULOCYTES NFR BLD AUTO: 0.3 % (ref 0–0.5)
KETONES UR QL: NEGATIVE
LEUKOCYTE EST, POC: NEGATIVE
LYMPHOCYTES # BLD AUTO: 2.68 10*3/MM3 (ref 0.7–3.1)
LYMPHOCYTES NFR BLD AUTO: 24.2 % (ref 19.6–45.3)
MCH RBC QN AUTO: 30.6 PG (ref 26.6–33)
MCHC RBC AUTO-ENTMCNC: 32.1 G/DL (ref 31.5–35.7)
MCV RBC AUTO: 95.4 FL (ref 79–97)
MONOCYTES # BLD AUTO: 0.57 10*3/MM3 (ref 0.1–0.9)
MONOCYTES NFR BLD AUTO: 5.1 % (ref 5–12)
MYOGLOBIN SERPL-MCNC: 24 NG/ML (ref 25–58)
NEUTROPHILS NFR BLD AUTO: 68.4 % (ref 42.7–76)
NEUTROPHILS NFR BLD AUTO: 7.58 10*3/MM3 (ref 1.7–7)
NITRITE UR-MCNC: NEGATIVE MG/ML
NRBC BLD AUTO-RTO: 0 /100 WBC (ref 0–0.2)
PH UR: 6 [PH] (ref 5–8)
PLATELET # BLD AUTO: 289 10*3/MM3 (ref 140–450)
PMV BLD AUTO: 10.8 FL (ref 6–12)
POTASSIUM SERPL-SCNC: 4.1 MMOL/L (ref 3.5–5.2)
PROT SERPL-MCNC: 7.1 G/DL (ref 6–8.5)
PROT UR STRIP-MCNC: ABNORMAL MG/DL
RBC # BLD AUTO: 4.15 10*6/MM3 (ref 3.77–5.28)
RBC # UR STRIP: NEGATIVE /UL
SODIUM SERPL-SCNC: 139 MMOL/L (ref 136–145)
SP GR UR: 1.02 (ref 1–1.03)
UROBILINOGEN UR QL: ABNORMAL
WBC NRBC COR # BLD AUTO: 11.08 10*3/MM3 (ref 3.4–10.8)

## 2025-03-07 PROCEDURE — 85025 COMPLETE CBC W/AUTO DIFF WBC: CPT | Performed by: FAMILY MEDICINE

## 2025-03-07 PROCEDURE — 83874 ASSAY OF MYOGLOBIN: CPT | Performed by: FAMILY MEDICINE

## 2025-03-07 PROCEDURE — 80053 COMPREHEN METABOLIC PANEL: CPT | Performed by: FAMILY MEDICINE

## 2025-03-07 NOTE — PROGRESS NOTES
Chief Complaint  ER FU (Fell off couch and was pinned between couch and iron coffee table. Legs were numb and could not put weight on them.)    Subjective        Bebe Steward presents to [facility]  History of Present Illness  The patient presents for evaluation of a recent fall.    She experienced a fall between 5:00 AM and 8:30 AM, the duration of which she is uncertain. She recalls falling asleep naturally on the couch while watching television the previous night, without having taken her medication. Upon awakening at 5:00 AM to use the restroom, she felt well and returned to sleep on the couch. When she woke up at 8:30 AM, she found herself on the floor, unable to recall how she ended up there. She sustained bruises from the iron base of her table and a rug burn on her leg from attempting to crawl. Additionally, she developed scrapes on her foot from crawling up her stairs after being discharged from the emergency room. She was unable to stand or bear weight on her legs for approximately 3 hours, which she attributes to a possible nerve impingement in her back. It took her 3 to 4 days to regain her ability to walk. She reports that her phone was within reach but not close enough for her to access it. Initially, she thought her legs had fallen asleep and attempted to roll onto the couch, but after an hour, she realized something was amiss. This incident has caused her significant distress, particularly as she has already lost one leg. Her x-rays were reported as normal, but she continues to experience pain between her groin and hip, 2 weeks post-fall. She is considering whether a consultation with her neurologist is necessary. She was diagnosed with a mild urinary tract infection (UTI) and prescribed Macrobid. She also received a steroid injection in one arm and Dilaudid in the other. She reports that her urine was dark during her hospital stay but has since returned to its  normal color. She experienced muscle weakness for about a week and noticed swelling in her stomach and leg, which has mostly subsided. She does not report any increased urinary frequency or burning sensation during urination. She used a walker for support initially, gradually progressing from shuffling her feet to taking small steps. She still experiences pain when transitioning from sitting to standing and reports numbness in certain areas of her legs.    Supplemental Information  She has had a hysterectomy.    MEDICATIONS  Current: Macrobid        Medical History: has a past medical history of Allergies, Anemia, Ankle pain, right, Anxiety (2014), Arthritis, Arthritis of back (2006), Bilateral carpal tunnel syndrome (07/10/2023), Brain concussion (1995), Callus, Cervical disc disorder (2012), Colon polyp (03/23/2023), Difficulty walking, Essential hypertension (07/21/2020), Head injury (1996), HPV (human papilloma virus) infection, BKA, left, Hyperlipidemia, Hypoglycemia, Insomnia (07/21/2020), Left below-knee amputee (07/21/2020), Low back pain (2006), Low back strain, Lumbosacral disc disease (2006), Migraine, Muscle spasm (01/04/2021), Neck strain, Numbness in right foot, Obesity, 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   Surgical History: has a past surgical history that includes Abdominal surgery (2010); Leg amputation, lower tibia/fibula (Left, 2014); Ankle surgery; Endometrial ablation (2007); Gastric bypass (2001); Knee surgery; Tonsillectomy (1994); Spinal fusion (10/2008); Epidural block injection (2007); Trigger point injection (2021); Ankle fracture surgery  (9534-2931); Cholecystectomy (2001); Fracture surgery (2008); Colonoscopy (N/A, 03/23/2023); total laparoscopic hysterectomy (N/A, 04/06/2023); Cystoscopy (N/A, 04/06/2023); Avulsion toenail plate (Right, 06/29/2023); Achilles tendon surgery; and Shoulder arthroscopy w/ rotator cuff repair (Left, 01/15/2024).   Family History: family history includes Alcohol abuse in her maternal grandmother; Anesthesia problems in her father; Anxiety disorder in her maternal grandmother, mother, and son; Arthritis in her father, maternal grandfather, maternal grandmother, mother, paternal grandfather, and paternal grandmother; Broken bones in her maternal grandmother, mother, and paternal grandmother; Cancer in her maternal grandfather, paternal aunt, and paternal grandmother; Colon cancer in her paternal aunt; Colon cancer (age of onset: 87) in her paternal grandmother; Depression in her father, maternal grandmother, and mother; Developmental Disability in her paternal aunt; Diabetes in her maternal grandfather; Early death in her mother; Heart disease in her father, maternal grandfather, maternal grandmother, and paternal grandfather; Hyperlipidemia in her father, maternal grandfather, maternal grandmother, mother, paternal grandfather, and paternal grandmother; Hypertension in her father, maternal grandfather, maternal grandmother, mother, paternal grandfather, and paternal grandmother; Kidney disease in her father, maternal grandmother, mother, paternal grandfather, and paternal grandmother; Liver disease in her maternal grandmother; Melanoma in her maternal grandfather; Miscarriages / Stillbirths in her maternal grandmother and paternal grandmother; Osteoporosis in her maternal grandmother and paternal grandmother; Other in her father, mother, and paternal grandmother; Rheumatologic disease in her father and paternal grandmother; Stroke in her maternal grandfather and mother; Thyroid disease in her maternal grandmother and  "mother; Vision loss in her father.   Social History: reports that she quit smoking about 4 years ago. Her smoking use included cigarettes. She started smoking about 28 years ago. She has a 24 pack-year smoking history. She has been exposed to tobacco smoke. She has never used smokeless tobacco. She reports current alcohol use. She reports that she does not use drugs.  Immunization History   Administered Date(s) Administered    COVID-19 (MODERNA) 1st,2nd,3rd Dose Monovalent 12/11/2021, 12/13/2021    COVID-19 (MODERNA) Monovalent Original Booster 11/11/2021, 12/09/2021    Fluzone (or Fluarix & Flulaval for VFC) >6mos 10/05/2021, 12/16/2022, 10/25/2023    Influenza Seasonal Injectable 10/26/2015    Influenza, Unspecified 12/02/2016, 12/02/2020, 12/16/2022, 12/16/2022    Pneumococcal Polysaccharide (PPSV23) 12/02/2016    Pneumococcal, Unspecified 12/02/2016       Objective   Vital Signs:  /76   Pulse 73   Temp 98.3 °F (36.8 °C)   Ht 162.6 cm (64\")   Wt 112 kg (246 lb)   SpO2 98%   BMI 42.23 kg/m²   Estimated body mass index is 42.23 kg/m² as calculated from the following:    Height as of this encounter: 162.6 cm (64\").    Weight as of this encounter: 112 kg (246 lb).             ROS:  Review of Systems   Constitutional:  Negative for chills, diaphoresis, fatigue and fever.   HENT:  Negative for congestion, sore throat and swollen glands.    Respiratory:  Negative for cough.    Cardiovascular:  Negative for chest pain.   Gastrointestinal:  Negative for abdominal pain, nausea and vomiting.   Genitourinary:  Negative for dysuria.   Musculoskeletal:  Positive for myalgias. Negative for neck pain.   Skin:  Negative for rash.   Neurological:  Positive for weakness and numbness.      Physical Exam  Vitals reviewed.   Constitutional:       Appearance: Normal appearance.   HENT:      Right Ear: Tympanic membrane normal.      Left Ear: Tympanic membrane normal.      Nose: Nose normal.   Eyes:      Extraocular " Movements: Extraocular movements intact.      Conjunctiva/sclera: Conjunctivae normal.      Pupils: Pupils are equal, round, and reactive to light.   Cardiovascular:      Rate and Rhythm: Normal rate and regular rhythm.   Pulmonary:      Effort: Pulmonary effort is normal.      Breath sounds: Normal breath sounds.   Abdominal:      General: Bowel sounds are normal.   Musculoskeletal:         General: Normal range of motion.      Cervical back: Normal range of motion.   Skin:     General: Skin is warm and dry.   Neurological:      General: No focal deficit present.      Mental Status: She is alert and oriented to person, place, and time.   Psychiatric:         Mood and Affect: Mood normal.         Behavior: Behavior normal.       Physical Exam        Result Review     The following data was reviewed by: Kimberly Almaguer MD on 03/07/2025:  Common labs          8/29/2024    11:44 10/7/2024    16:17 10/7/2024    16:21 3/7/2025    08:59   Common Labs   Glucose 78  91   94    BUN 19  16   14    Creatinine 1.38  1.50   1.12    Sodium 140  139   139    Potassium 4.4  3.7   4.1    Chloride 106  107   108    Calcium 10.0  9.3   9.2    Albumin 4.3  3.9   3.8    Total Bilirubin 0.2    <0.2    Alkaline Phosphatase 130    113    AST (SGOT) 12    21    ALT (SGPT) 11    13    WBC 13.22    11.08    Hemoglobin 14.0    12.7    Hematocrit 41.9    39.6    Platelets 340    289    Total Cholesterol 157       Triglycerides 123       HDL Cholesterol 55       LDL Cholesterol  80       Microalbumin, Urine   10.8       Results  Laboratory Studies  Urinalysis revealed a significant amount of blood, but no signs of infection.    Imaging  X-rays were normal.             Assessment and Plan   Diagnoses and all orders for this visit:    1. Essential hypertension (Primary)  -     Comprehensive Metabolic Panel    2. Fall, subsequent encounter    3. Muscle weakness of lower extremity  -     Myoglobin, Serum    4. Gross hematuria  -     POCT  urinalysis dipstick, manual  -     CBC Auto Differential      Assessment & Plan  1. Potential rhabdomyolysis.  The patient's symptoms, including severe muscle pain, weakness, and dark-colored urine, suggest the onset of rhabdomyolysis. This condition likely resulted from prolonged immobility on a hard surface following her fall. The presence of blood in her urine further supports this diagnosis, as it is not a typical finding for her and she was not menstruating at the time. Her muscle strength is improving, indicating a positive prognosis. She is advised to acquire a life alert watch for immediate access to emergency services in case of future falls. A re-evaluation of her urine will be conducted today to assess for persistent hematuria. Additionally, her kidney function will be reassessed to ensure it has improved. Laboratory tests will be performed to monitor her potassium levels and other potential imbalances.    PROCEDURE  The patient received a steroid injection in one arm and Dilaudid in the other during her recent hospital visit.       I spent 35 minutes caring for Bebe on this date of service. This time includes time spent by me in the following activities:reviewing tests  Follow Up  Return if symptoms worsen or fail to improve.  Patient was given instructions and counseling regarding her condition or for health maintenance advice. Please see specific information pulled into the AVS if appropriate.   Patient or patient representative verbalized consent for the use of Ambient Listening during the visit with  Kimberly Almaguer MD for chart documentation. 3/23/2025  08:23 EST    Kimberly Almaguer MD      Answers submitted by the patient for this visit:  Problem not listed (Submitted on 3/5/2025)  Chief Complaint: Other medical problem  Reason for appointment: ER follow up  anorexia: No  joint pain: Yes  change in stool: No  headaches: Yes  joint swelling: No  vertigo: No  visual change: No  Onset: in the past  7 days  Chronicity: new  Frequency: constantly

## 2025-03-13 ENCOUNTER — TELEPHONE (OUTPATIENT)
Dept: OBSTETRICS AND GYNECOLOGY | Facility: CLINIC | Age: 45
End: 2025-03-13
Payer: MEDICARE

## 2025-03-13 NOTE — TELEPHONE ENCOUNTER
[id number]: we need to tre her dr james appt from [phone] he will not be here that day. Please call the office to reschedule

## 2025-03-23 DIAGNOSIS — F41.9 ANXIETY: ICD-10-CM

## 2025-03-23 DIAGNOSIS — N95.1 MENOPAUSAL SYMPTOMS: ICD-10-CM

## 2025-03-23 DIAGNOSIS — F33.1 MAJOR DEPRESSIVE DISORDER, RECURRENT, MODERATE: ICD-10-CM

## 2025-03-24 RX ORDER — SERTRALINE HYDROCHLORIDE 100 MG/1
150 TABLET, FILM COATED ORAL DAILY
Qty: 135 TABLET | Refills: 1 | Status: SHIPPED | OUTPATIENT
Start: 2025-03-24

## 2025-03-24 RX ORDER — ESTRADIOL 1 MG/1
1 TABLET ORAL NIGHTLY
Qty: 90 TABLET | Refills: 0 | Status: SHIPPED | OUTPATIENT
Start: 2025-03-24

## 2025-03-24 RX ORDER — FUROSEMIDE 20 MG/1
20 TABLET ORAL DAILY PRN
Qty: 30 TABLET | Refills: 0 | Status: SHIPPED | OUTPATIENT
Start: 2025-03-24

## 2025-03-24 RX ORDER — HYDROXYZINE HYDROCHLORIDE 25 MG/1
25 TABLET, FILM COATED ORAL EVERY 8 HOURS PRN
Qty: 90 TABLET | Refills: 1 | Status: SHIPPED | OUTPATIENT
Start: 2025-03-24

## 2025-03-24 NOTE — TELEPHONE ENCOUNTER
I received a refill request for Estradiol 1 mg tablet.  The pt was last seen on 04/08/2024 and has a follow up scheduled for 05/20/2025. Rx was last sent on 04/08/2024 #90 with 3 refills.  She will run out before her next appointment.  Per protocol, I have sent #90 with 0 additional refills to her pharmacy.

## 2025-04-22 ENCOUNTER — OFFICE VISIT (OUTPATIENT)
Dept: FAMILY MEDICINE CLINIC | Facility: CLINIC | Age: 45
End: 2025-04-22
Payer: MEDICARE

## 2025-04-22 VITALS
DIASTOLIC BLOOD PRESSURE: 92 MMHG | HEIGHT: 64 IN | OXYGEN SATURATION: 97 % | SYSTOLIC BLOOD PRESSURE: 162 MMHG | BODY MASS INDEX: 40.8 KG/M2 | HEART RATE: 83 BPM | TEMPERATURE: 99.4 F | WEIGHT: 239 LBS

## 2025-04-22 DIAGNOSIS — N18.31 STAGE 3A CHRONIC KIDNEY DISEASE: ICD-10-CM

## 2025-04-22 DIAGNOSIS — F43.21 GRIEVING: ICD-10-CM

## 2025-04-22 DIAGNOSIS — M70.62 TROCHANTERIC BURSITIS OF LEFT HIP: Primary | ICD-10-CM

## 2025-04-22 DIAGNOSIS — I10 ESSENTIAL HYPERTENSION: ICD-10-CM

## 2025-04-22 RX ORDER — ONDANSETRON 4 MG/1
4 TABLET, ORALLY DISINTEGRATING ORAL
COMMUNITY
Start: 2025-03-23

## 2025-04-22 NOTE — PROGRESS NOTES
Chief Complaint  Hypertension and Right hip to knee pain    Subjective        Bebe Steward presents to [facility]  History of Present Illness  The patient presents for evaluation of right hip pain, blood pressure management, and weight loss. She is accompanied by her son.    Persistent pain in the right hip, extending to just above the knee, is reported. This pain began following a fall incident and is localized to a specific area on the side of the hip, with no pain present elsewhere. The pain is described as unique and unlike any previous injuries. Tenderness in the area is noted occasionally. Additionally, she experiences occasional popping sensations in the hip, which sometimes provide temporary relief.     She admits to not taking her antihypertensive medication this morning but reports no associated symptoms such as headache or dizziness.    Weight loss has been observed, attributed to a decreased appetite. Despite this, more food has been consumed than usual over the past few days. Her nephrologist has informed her that appetite loss can be a side effect of kidney atrophy. Kidney function was within normal limits at her last check-up.    PAST SURGICAL HISTORY:  - Left shoulder surgery in 01/2024  - Left shoulder surgery in 01/2023        Medical History: has a past medical history of Allergies, Anemia, Ankle pain, right, Anxiety (2014), Arthritis, Arthritis of back (2006), Bilateral carpal tunnel syndrome (07/10/2023), Brain concussion (1995), Callus, Cervical disc disorder (2012), Colon polyp (03/23/2023), Difficulty walking, Essential hypertension (07/21/2020), Head injury (1996), HPV (human papilloma virus) infection, BKA, left, Hyperlipidemia, Hypoglycemia, Insomnia (07/21/2020), Left below-knee amputee (07/21/2020), Low back pain (2006), Low back strain, Lumbosacral disc disease (2006), Migraine, Muscle spasm (01/04/2021), Neck strain, Numbness in right foot, Obesity,  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   Surgical History: has a past surgical history that includes Abdominal surgery (2010); Leg amputation, lower tibia/fibula (Left, 2014); Ankle surgery; Endometrial ablation (2007); Gastric bypass (2001); Knee surgery; Tonsillectomy (1994); Spinal fusion (10/2008); Epidural block injection (2007); Trigger point injection (2021); Ankle fracture surgery ([id number]); Cholecystectomy (2001); Fracture surgery (2008); Colonoscopy (N/A, 03/23/2023); total laparoscopic hysterectomy (N/A, 04/06/2023); Cystoscopy (N/A, 04/06/2023); Avulsion toenail plate (Right, 06/29/2023); Achilles tendon surgery; and Shoulder arthroscopy w/ rotator cuff repair (Left, 01/15/2024).   Family History: family history includes Alcohol abuse in her maternal grandmother; Anesthesia problems in her father; Anxiety disorder in her maternal grandmother, mother, and son; Arthritis in her father, maternal grandfather, maternal grandmother, mother, paternal grandfather, and paternal grandmother; Broken bones in her maternal grandmother, mother, and paternal grandmother; Cancer in her maternal grandfather, paternal aunt, and paternal grandmother; Colon cancer in her paternal aunt; Colon cancer (age of onset: 87) in her paternal grandmother; Depression in her father, maternal grandmother, and mother; Developmental Disability in her paternal aunt; Diabetes in her maternal grandfather; Early death in her mother; Heart disease in her father, maternal grandfather, maternal grandmother, and paternal grandfather; Hyperlipidemia in her father, maternal grandfather, maternal  "grandmother, mother, paternal grandfather, and paternal grandmother; Hypertension in her father, maternal grandfather, maternal grandmother, mother, paternal grandfather, and paternal grandmother; Kidney disease in her father, maternal grandmother, mother, paternal grandfather, and paternal grandmother; Liver disease in her maternal grandmother; Melanoma in her maternal grandfather; Miscarriages / Stillbirths in her maternal grandmother and paternal grandmother; Osteoporosis in her maternal grandmother and paternal grandmother; Other in her father, mother, and paternal grandmother; Rheumatologic disease in her father and paternal grandmother; Stroke in her maternal grandfather and mother; Thyroid disease in her maternal grandmother and mother; Vision loss in her father.   Social History: reports that she quit smoking about 4 years ago. Her smoking use included cigarettes. She started smoking about 28 years ago. She has a 24 pack-year smoking history. She has been exposed to tobacco smoke. She has never used smokeless tobacco. She reports current alcohol use. She reports that she does not use drugs.  Immunization History   Administered Date(s) Administered    COVID-19 (MODERNA) 1st,2nd,3rd Dose Monovalent 12/11/2021, 12/13/2021    COVID-19 (MODERNA) Monovalent Original Booster 11/11/2021, 12/09/2021    Fluzone (or Fluarix & Flulaval for VFC) >6mos 10/05/2021, 12/16/2022, 10/25/2023    Influenza Seasonal Injectable 10/26/2015    Influenza, Unspecified 12/02/2016, 12/02/2020, 12/16/2022, 12/16/2022    Pneumococcal Polysaccharide (PPSV23) 12/02/2016    Pneumococcal, Unspecified 12/02/2016       Objective   Vital Signs:  /92   Pulse 83   Temp 99.4 °F (37.4 °C)   Ht 162.6 cm (64\")   Wt 108 kg (239 lb)   SpO2 97%   BMI 41.02 kg/m²   Estimated body mass index is 41.02 kg/m² as calculated from the following:    Height as of this encounter: 162.6 cm (64\").    Weight as of this encounter: 108 kg (239 lb).       "       ROS:  Review of Systems   Constitutional:  Negative for fatigue and fever.   HENT:  Negative for congestion, ear pain and sinus pressure.    Respiratory:  Negative for cough, chest tightness and shortness of breath.    Cardiovascular:  Negative for chest pain, palpitations and leg swelling.   Gastrointestinal:  Negative for abdominal pain and diarrhea.   Genitourinary:  Negative for dysuria and frequency.   Neurological:  Negative for speech difficulty, numbness, headache and confusion.   Psychiatric/Behavioral:  Negative for agitation and behavioral problems.       Physical Exam  Vitals reviewed.   Constitutional:       Appearance: Normal appearance.   HENT:      Right Ear: Tympanic membrane normal.      Left Ear: Tympanic membrane normal.      Nose: Nose normal.   Eyes:      Extraocular Movements: Extraocular movements intact.      Conjunctiva/sclera: Conjunctivae normal.      Pupils: Pupils are equal, round, and reactive to light.   Cardiovascular:      Rate and Rhythm: Normal rate and regular rhythm.   Pulmonary:      Effort: Pulmonary effort is normal.      Breath sounds: Normal breath sounds.   Abdominal:      General: Bowel sounds are normal.   Musculoskeletal:         General: Normal range of motion.      Cervical back: Normal range of motion.   Skin:     General: Skin is warm and dry.   Neurological:      General: No focal deficit present.      Mental Status: She is alert and oriented to person, place, and time.   Psychiatric:         Mood and Affect: Mood normal.         Behavior: Behavior normal.       Physical Exam  Respiratory: Clear to auscultation, no wheezing, rales or rhonchi  Cardiovascular: Regular rate and rhythm, no murmurs, rubs, or gallops  Musculoskeletal: Tenderness in the right hip area, likely bursitis      Result Review     The following data was reviewed by: Kimberly Almaguer MD on 04/22/2025:  Common labs          8/29/2024    11:44 10/7/2024    16:17 10/7/2024    16:21 3/7/2025     08:59   Common Labs   Glucose 78  91   94    BUN 19  16   14    Creatinine 1.38  1.50   1.12    Sodium 140  139   139    Potassium 4.4  3.7   4.1    Chloride 106  107   108    Calcium 10.0  9.3   9.2    Albumin 4.3  3.9   3.8    Total Bilirubin 0.2    <0.2    Alkaline Phosphatase 130    113    AST (SGOT) 12    21    ALT (SGPT) 11    13    WBC 13.22    11.08    Hemoglobin 14.0    12.7    Hematocrit 41.9    39.6    Platelets 340    289    Total Cholesterol 157       Triglycerides 123       HDL Cholesterol 55       LDL Cholesterol  80       Microalbumin, Urine   10.8       Results  Labs   - Myoglobin: High   - Urine Protein: Protein present, but improving   - Kidney Function Test: 61    Imaging   - X-ray of the hip: No fracture             Assessment and Plan     Diagnoses and all orders for this visit:    1. Trochanteric bursitis of left hip (Primary)  -     Ambulatory Referral to Orthopedic Surgery    2. Essential hypertension    3. Stage 3a chronic kidney disease    4. Grieving      Assessment & Plan  1. Right hip pain.  - Laboratory results indicate potential tissue damage, likely due to prolonged pressure on the muscle during the fall.  - The popping sensation experienced is more indicative of arthritis in the hip.  - The hip pain has persisted for over a month; no fracture was noted on the x-ray.  - Referral to orthopedics for further evaluation and potential injection therapy for the hip. Advised to maintain adequate hydration and ensure a safe environment to prevent future falls.    2. Blood pressure management.  - Blood pressure readings are significantly elevated compared to baseline.  - Did not take blood pressure medication this morning.  - Advised to adhere to medication regimen consistently.  - Encouraged to monitor blood pressure regularly and report any concerning symptoms.    3. Weight loss.  - Lost 7 pounds since the last visit.  - Decreased appetite possibly related to kidney atrophy as mentioned  by nephrologist.  - Advised to stay hydrated and maintain adequate nutrition despite lack of appetite.  - Encouraged to monitor weight and dietary intake.    4. Kidney function.  - Kidney function was last checked and found to be within normal range (GFR 61).  - Advised to drink more water to maintain kidney function.  - Encouraged to follow up with nephrologist as needed.       I spent 35 minutes caring for Bebe on this date of service. This time includes time spent by me in the following activities:reviewing tests  Follow Up   Return in about 3 months (around 7/22/2025).  Patient was given instructions and counseling regarding her condition or for health maintenance advice. Please see specific information pulled into the AVS if appropriate.   Patient or patient representative verbalized consent for the use of Ambient Listening during the visit with  Kimberly Almaguer MD for chart documentation. 4/22/2025  13:54 EDT    Kimberly Almaguer MD      Answers submitted by the patient for this visit:  Lower Extremity Injury Questionnaire (Submitted on 4/21/2025)  Chief Complaint: Extremity pain  Injury: Yes  Date of trauma: 2/22/2025  Injury location: at home  Injury mechanism: a fall, unknown  Pain location: right hip, right upper leg  Pain quality: aching, burning, shooting  Pain - numeric: 8/10  Progression since onset: unchanged  tingling: No  inability to bear weight: No  lower extremity swelling: No  redness: No  Foreign body present: no foreign bodies  Additional information: Pain unchanged since ER visit

## 2025-04-25 ENCOUNTER — PATIENT MESSAGE (OUTPATIENT)
Dept: FAMILY MEDICINE CLINIC | Facility: CLINIC | Age: 45
End: 2025-04-25
Payer: MEDICARE

## 2025-04-25 ENCOUNTER — PRIOR AUTHORIZATION (OUTPATIENT)
Dept: FAMILY MEDICINE CLINIC | Facility: CLINIC | Age: 45
End: 2025-04-25
Payer: MEDICARE

## 2025-05-13 DIAGNOSIS — I10 ESSENTIAL HYPERTENSION: ICD-10-CM

## 2025-05-15 RX ORDER — IRBESARTAN 75 MG/1
75 TABLET ORAL NIGHTLY
Qty: 90 TABLET | Refills: 1 | Status: SHIPPED | OUTPATIENT
Start: 2025-05-15

## 2025-05-19 DIAGNOSIS — F51.01 PRIMARY INSOMNIA: ICD-10-CM

## 2025-05-20 RX ORDER — ZALEPLON 10 MG/1
20 CAPSULE ORAL EVERY EVENING
Qty: 60 CAPSULE | Refills: 2 | Status: SHIPPED | OUTPATIENT
Start: 2025-05-20

## 2025-05-20 RX ORDER — ONDANSETRON 4 MG/1
TABLET, ORALLY DISINTEGRATING ORAL
Qty: 30 TABLET | Refills: 0 | Status: SHIPPED | OUTPATIENT
Start: 2025-05-20

## 2025-05-20 RX ORDER — HYDRALAZINE HYDROCHLORIDE 50 MG/1
50 TABLET, FILM COATED ORAL 3 TIMES DAILY
Qty: 90 TABLET | Refills: 1 | Status: SHIPPED | OUTPATIENT
Start: 2025-05-20

## 2025-05-29 DIAGNOSIS — N95.1 MENOPAUSAL SYMPTOMS: ICD-10-CM

## 2025-05-29 RX ORDER — ESTRADIOL 1 MG/1
1 TABLET ORAL NIGHTLY
Qty: 90 TABLET | Refills: 0 | Status: SHIPPED | OUTPATIENT
Start: 2025-05-29

## 2025-05-29 NOTE — TELEPHONE ENCOUNTER
Pt was unable to make her April appointment w/ Dr. Yoo for more refills. Pt rescheduled out to July. Will send in 1 more refill to keep from a lapse in treatment. Pharmacy note added to let pt know that she needs to keep her July appointment for any further refills.

## 2025-06-19 RX ORDER — HYDROXYZINE HYDROCHLORIDE 25 MG/1
25 TABLET, FILM COATED ORAL EVERY 8 HOURS PRN
Qty: 90 TABLET | Refills: 1 | Status: SHIPPED | OUTPATIENT
Start: 2025-06-19

## 2025-06-19 RX ORDER — ONDANSETRON 4 MG/1
TABLET, ORALLY DISINTEGRATING ORAL
Qty: 30 TABLET | Refills: 0 | Status: SHIPPED | OUTPATIENT
Start: 2025-06-19

## 2025-06-19 RX ORDER — RIMEGEPANT SULFATE 75 MG/75MG
TABLET, ORALLY DISINTEGRATING ORAL
Qty: 8 TABLET | Refills: 3 | Status: SHIPPED | OUTPATIENT
Start: 2025-06-19

## (undated) DEVICE — GLV SURG SENSICARE PI ORTHO SZ6.5 LF STRL

## (undated) DEVICE — ADHS LIQ MASTISOL 2/3ML

## (undated) DEVICE — SUT MNCRYL PLS ANTIB UD 4/0 PS2 18IN

## (undated) DEVICE — GLV SURG BIOGEL LTX PF 7

## (undated) DEVICE — Device: Brand: DEFENDO AIR/WATER/SUCTION AND BIOPSY VALVE

## (undated) DEVICE — TOTAL TRAY, 16FR 10ML SIL FOLEY, URN: Brand: MEDLINE

## (undated) DEVICE — GLV SURG BIOGEL LTX PF 7 1/2

## (undated) DEVICE — SYR LUERLOK 50ML

## (undated) DEVICE — APPL CHLORAPREP HI/LITE 26ML ORNG

## (undated) DEVICE — SOL IRR H2O BTL 1000ML STRL

## (undated) DEVICE — SYS CLOSE PORTII CARTR/THOMASN XL

## (undated) DEVICE — ENDOPATH XCEL DILATING TIP TROCARS WITH STABILITY SLEEVES: Brand: ENDOPATH XCEL

## (undated) DEVICE — MANIP UTER RUMI 2 KOH EFFICIENT SS CP 3.5CM

## (undated) DEVICE — SOLIDIFIER LIQLOC PLS 1500CC BT

## (undated) DEVICE — TROCAR: Brand: KII OPTICAL ACCESS SYSTEM

## (undated) DEVICE — LINER SURG CANSTR SXN S/RIGD 1500CC

## (undated) DEVICE — SNAR POLYP CAPTIFLEX XS/OVL 11X2.4MM 240CM 1P/U

## (undated) DEVICE — Device

## (undated) DEVICE — MANIP UTER RUMI TP 6.7MM 6CM WHT

## (undated) DEVICE — SOL IRR NACL 0.9PCT BT 1000ML

## (undated) DEVICE — ENDOPATH PNEUMONEEDLE INSUFFLATION NEEDLES WITH LUER LOCK CONNECTORS 150MM: Brand: ENDOPATH

## (undated) DEVICE — HARMONIC ACE +7 LAPAROSCOPIC SHEARS ADVANCED HEMOSTASIS 5MM DIAMETER 36CM SHAFT LENGTH  FOR USE WITH GRAY HAND PIECE ONLY: Brand: HARMONIC ACE

## (undated) DEVICE — CONN JET HYDRA H20 AUXILIARY DISP

## (undated) DEVICE — INTENDED FOR TISSUE SEPARATION, AND OTHER PROCEDURES THAT REQUIRE A SHARP SURGICAL BLADE TO PUNCTURE OR CUT.: Brand: BARD-PARKER ® CARBON RIB-BACK BLADES

## (undated) DEVICE — GYN LAPAROSCOPY-LF: Brand: MEDLINE INDUSTRIES, INC.

## (undated) DEVICE — COVADERM PLUS: Brand: DEROYAL

## (undated) DEVICE — LAPAROSCOPIC SMOKE EVACUATION SYSTEM ACTIVE AND PASSIVE: Brand: VALLEYLAB

## (undated) DEVICE — SOL IRRG H2O BG 3000ML STRL

## (undated) DEVICE — STERILE POLYISOPRENE POWDER-FREE SURGICAL GLOVES WITH EMOLLIENT COATING: Brand: PROTEXIS

## (undated) DEVICE — THE SINGLE USE ETRAP – POLYP TRAP IS USED FOR SUCTION RETRIEVAL OF ENDOSCOPICALLY REMOVED POLYPS.: Brand: ETRAP

## (undated) DEVICE — SLV SCD KN/LEN ADJ EXPRSS BLENDED MD 1P/U

## (undated) DEVICE — SOL IRRG H2O PL/BG 1000ML STRL

## (undated) DEVICE — PCH SURG INST LAP 2 LF

## (undated) DEVICE — ENDOPATH XCEL WITH OPTIVIEW TECHNOLOGY BLADELESS TROCARS WITH STABILITY SLEEVES: Brand: ENDOPATH XCEL OPTIVIEW

## (undated) DEVICE — TUBING, SUCTION, 1/4" X 10', STRAIGHT: Brand: MEDLINE

## (undated) DEVICE — SUPER ATRAUMATIC GRABBER TIP, DISPOSABLE: Brand: RENEW

## (undated) DEVICE — DRAPE,SPLIT,CARDIOVASC,STERILE: Brand: MEDLINE

## (undated) DEVICE — TRY PREP SCRB VAG PVP

## (undated) DEVICE — ANTIBACTERIAL VIOLET BRAIDED (POLYGLACTIN 910), SYNTHETIC ABSORBABLE SUTURE: Brand: COATED VICRYL

## (undated) DEVICE — SUT VIC PLS CTD BR 0 TIE 18IN VIL

## (undated) DEVICE — STERILE POLYISOPRENE POWDER-FREE SURGICAL GLOVES: Brand: PROTEXIS